# Patient Record
Sex: MALE | Race: BLACK OR AFRICAN AMERICAN | NOT HISPANIC OR LATINO | URBAN - METROPOLITAN AREA
[De-identification: names, ages, dates, MRNs, and addresses within clinical notes are randomized per-mention and may not be internally consistent; named-entity substitution may affect disease eponyms.]

---
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
## 2024-01-16 ENCOUNTER — TELEPHONE (OUTPATIENT)
Dept: PHYSICAL MEDICINE AND REHAB | Facility: CLINIC | Age: 19
End: 2024-01-16
Payer: MEDICAID

## 2024-01-16 DIAGNOSIS — G82.50 SPASTIC QUADRIPARESIS: Primary | ICD-10-CM

## 2024-01-16 DIAGNOSIS — S06.9X6S TRAUMATIC BRAIN INJURY, WITH LOSS OF CONSCIOUSNESS GREATER THAN 24 HOURS WITHOUT RETURN TO PRE-EXISTING CONSCIOUS LEVEL WITH PATIENT SURVIVING, SEQUELA: ICD-10-CM

## 2024-03-20 ENCOUNTER — OFFICE VISIT (OUTPATIENT)
Dept: PHYSICAL MEDICINE AND REHAB | Facility: CLINIC | Age: 19
End: 2024-03-20
Payer: MEDICAID

## 2024-03-20 VITALS
BODY MASS INDEX: 17.27 KG/M2 | HEIGHT: 67 IN | WEIGHT: 110 LBS | DIASTOLIC BLOOD PRESSURE: 70 MMHG | SYSTOLIC BLOOD PRESSURE: 103 MMHG | HEART RATE: 64 BPM

## 2024-03-20 DIAGNOSIS — G82.50 SPASTIC QUADRIPARESIS: Primary | ICD-10-CM

## 2024-03-20 DIAGNOSIS — S06.9X6S TRAUMATIC BRAIN INJURY, WITH LOSS OF CONSCIOUSNESS GREATER THAN 24 HOURS WITHOUT RETURN TO PRE-EXISTING CONSCIOUS LEVEL WITH PATIENT SURVIVING, SEQUELA: ICD-10-CM

## 2024-03-20 DIAGNOSIS — I69.910 ATTENTION AND CONCENTRATION DEFICIT FOLLOWING UNSPECIFIED CEREBROVASCULAR DISEASE: ICD-10-CM

## 2024-03-20 PROCEDURE — 99499 UNLISTED E&M SERVICE: CPT | Mod: S$PBB,,, | Performed by: PHYSICAL MEDICINE & REHABILITATION

## 2024-03-20 PROCEDURE — 95874 GUIDE NERV DESTR NEEDLE EMG: CPT | Mod: 26,S$PBB,, | Performed by: PHYSICAL MEDICINE & REHABILITATION

## 2024-03-20 PROCEDURE — 99213 OFFICE O/P EST LOW 20 MIN: CPT | Mod: PBBFAC | Performed by: PHYSICAL MEDICINE & REHABILITATION

## 2024-03-20 PROCEDURE — 99999 PR PBB SHADOW E&M-EST. PATIENT-LVL III: CPT | Mod: PBBFAC,,, | Performed by: PHYSICAL MEDICINE & REHABILITATION

## 2024-03-20 PROCEDURE — 64642 CHEMODENERV 1 EXTREMITY 1-4: CPT | Mod: S$PBB,,, | Performed by: PHYSICAL MEDICINE & REHABILITATION

## 2024-03-20 PROCEDURE — 99999PBSHW PR PBB SHADOW TECHNICAL ONLY FILED TO HB: Mod: JZ,PBBFAC,,

## 2024-03-20 PROCEDURE — 64643 CHEMODENERV 1 EXTREM 1-4 EA: CPT | Mod: PBBFAC | Performed by: PHYSICAL MEDICINE & REHABILITATION

## 2024-03-20 RX ORDER — METHYLPHENIDATE HYDROCHLORIDE 300 MG/60ML
5 SUSPENSION, EXTENDED RELEASE ORAL
Qty: 180 ML | Refills: 0 | Status: SHIPPED | OUTPATIENT
Start: 2024-03-20 | End: 2024-05-07 | Stop reason: SDUPTHER

## 2024-03-20 RX ADMIN — ONABOTULINUMTOXINA 600 UNITS: 100 INJECTION, POWDER, LYOPHILIZED, FOR SOLUTION INTRADERMAL; INTRAMUSCULAR at 01:03

## 2024-03-20 RX ADMIN — ONABOTULINUMTOXINA 600 UNITS: 100 INJECTION, POWDER, LYOPHILIZED, FOR SOLUTION INTRADERMAL; INTRAMUSCULAR at 08:03
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
## 2024-03-21 NOTE — PROCEDURES
Botulinum Injection  Location: Limbs/Trunk    Date/Time: 3/20/2024 1:40 PM    Performed by: Michael Nunez MD  Authorized by: Michael Nunez MD      Consent:      Consent obtained:  Verbal     Consent given by:  Parent     Risks discussed:  Weakness, bleeding and pain     Alternatives discussed:  Observation    Universal protocol:      Relevant documents present and verified:  Yes       Site/side verified:  Yes       Patient identity confirmed:  Verbally with patient and provided demographic data    Procedure details:      EMG used?:  Yes     Electrical stimulation used?:  NoNo     Diluted by:  Preservative free saline     Toxin (Brand):  OnaBoNT-A (Botox)     Concentration (u/mL):  100     Total number of units available:  600     Muscle area injected: upper arm and upper leg    Upper extremity - upper arm:      Right biceps brachii:  100 units divided amongst 2 site(s)     Right brachialis:  100 units divided amongst 2 site(s)    Lower extremity - upper leg:      Right rectus femoris:  50 units divided amongst 1 site(s)     Left rectus femoris:  100 units divided amongst 2 site(s)     Left vastus intermedius:  50 units divided amongst 1 site(s)     Right vastus lateralis:  50 units divided amongst 1 site(s)     Left vastus lateralis:  50 units divided amongst 1 site(s)     Right vastus medial:  50 units divided amongst 1 site(s)     Left vastus medial:  50 units divided amongst 1 site(s)       Total units injected:  600     Total units wasted:  0    Medications: 600 Units onabotulinumtoxina (BOTOX) injection    Post-procedure details:      Patient tolerance of procedure:  Tolerated well, no immediate complications

## 2024-03-26 ENCOUNTER — TELEPHONE (OUTPATIENT)
Dept: PHYSICAL MEDICINE AND REHAB | Facility: CLINIC | Age: 19
End: 2024-03-26
Payer: MEDICAID

## 2024-03-26 ENCOUNTER — PATIENT MESSAGE (OUTPATIENT)
Dept: NEUROLOGY | Facility: CLINIC | Age: 19
End: 2024-03-26
Payer: MEDICAID

## 2024-03-26 NOTE — TELEPHONE ENCOUNTER
FW: HH REFERRAL  Received: Today  Brandie Mustafa, Michael ARREGUIN MD; Dana Carranza MA; Sandee Galeano  I spoke with the patient's father who was unable to write the given information down due to being at work.    I was given the patient's grandmother's cell phone number of [phone].    I sent the following information through the patient's portal as his grandmother was unable to take down the information as well due to being in transit to another physician's appointment for the patient.          Previous Messages       ----- Message -----  From: Sandee Galeano  Sent: 3/21/2024   8:55 AM CDT  To: Michael Nunez MD; Enrique Rodriguez Staff  Subject: HH REFERRAL                                      Good morning  Thank you for the referral to [facility]. We are out of network with the patient's insurance. Please reach out to: (phone numbers listed below)    [facility] [phone]  [facility] home care [phone]  [facility] home health [phone]  [facility] ph: [phone]  [facility] ph: [phone]  [facility] ph: [phone]  [facility] ph: [phone]       Thanks,  Sandee Galeano LPN  [facility]

## 2024-04-11 ENCOUNTER — TELEPHONE (OUTPATIENT)
Dept: NEUROLOGY | Facility: CLINIC | Age: 19
End: 2024-04-11
Payer: MEDICAID

## 2024-04-11 NOTE — TELEPHONE ENCOUNTER
----- Message from Lady Cannon sent at 4/11/2024 10:27 AM CDT -----  Regarding: Medication Clarity Neede  Contact: [phone]  Albaro moore [facility] is calling in regards to medication methylphenidate HCl (QUILLIVANT XR) 5 mg/mL (25 mg/5 mL) SR24. She states they just want to confirm the medication dosage. Please give [facility] a call.

## 2024-04-12 ENCOUNTER — TELEPHONE (OUTPATIENT)
Dept: NEUROLOGY | Facility: CLINIC | Age: 19
End: 2024-04-12
Payer: MEDICAID

## 2024-04-12 NOTE — TELEPHONE ENCOUNTER
----- Message from James Akers sent at 4/12/2024 11:13 AM CDT -----  YIN DESHPANDE calling regarding Refills  (message) [facility]  calling to get some calarification methylphenidate HCl (QUILLIVANT XR) 5 mg/mL (25 mg/5 mL) SR24 asking for a call back at [phone] Josette

## 2024-05-06 DIAGNOSIS — I69.910 ATTENTION AND CONCENTRATION DEFICIT FOLLOWING UNSPECIFIED CEREBROVASCULAR DISEASE: ICD-10-CM

## 2024-05-06 DIAGNOSIS — S06.9X6S TRAUMATIC BRAIN INJURY, WITH LOSS OF CONSCIOUSNESS GREATER THAN 24 HOURS WITHOUT RETURN TO PRE-EXISTING CONSCIOUS LEVEL WITH PATIENT SURVIVING, SEQUELA: ICD-10-CM

## 2024-05-06 NOTE — TELEPHONE ENCOUNTER
----- Message from Constance CHINA Route sent at 5/6/2024  9:04 AM CDT -----  Regarding: Clarity  Contact: Josette [phone]  Josette with [facility] is calling in ref to pt's medication methylphenidate HCl (QUILLIVANT XR) 5 mg/mL (25 mg/5 mL) SR24. Clarity on the dosage is needed. Best Call Back Number: Josette [phone]

## 2024-05-07 RX ORDER — METHYLPHENIDATE HYDROCHLORIDE 300 MG/60ML
5 SUSPENSION, EXTENDED RELEASE ORAL
Qty: 180 ML | Refills: 0 | Status: SHIPPED | OUTPATIENT
Start: 2024-05-07 | End: 2024-06-06

## 2024-07-31 ENCOUNTER — OFFICE VISIT (OUTPATIENT)
Dept: PHYSICAL MEDICINE AND REHAB | Facility: CLINIC | Age: 19
End: 2024-07-31
Payer: MEDICAID

## 2024-07-31 VITALS — HEART RATE: 69 BPM | SYSTOLIC BLOOD PRESSURE: 137 MMHG | DIASTOLIC BLOOD PRESSURE: 74 MMHG

## 2024-07-31 DIAGNOSIS — G82.50 SPASTIC QUADRIPARESIS: Primary | ICD-10-CM

## 2024-07-31 PROCEDURE — 64642 CHEMODENERV 1 EXTREMITY 1-4: CPT | Mod: PBBFAC | Performed by: PHYSICAL MEDICINE & REHABILITATION

## 2024-07-31 PROCEDURE — 99999PBSHW PR PBB SHADOW TECHNICAL ONLY FILED TO HB: Mod: PBBFAC,,,

## 2024-07-31 PROCEDURE — 99213 OFFICE O/P EST LOW 20 MIN: CPT | Mod: PBBFAC,25 | Performed by: PHYSICAL MEDICINE & REHABILITATION

## 2024-07-31 PROCEDURE — 64643 CHEMODENERV 1 EXTREM 1-4 EA: CPT | Mod: PBBFAC | Performed by: PHYSICAL MEDICINE & REHABILITATION

## 2024-07-31 PROCEDURE — 95874 GUIDE NERV DESTR NEEDLE EMG: CPT | Mod: PBBFAC | Performed by: PHYSICAL MEDICINE & REHABILITATION

## 2024-07-31 PROCEDURE — 99999 PR PBB SHADOW E&M-EST. PATIENT-LVL III: CPT | Mod: PBBFAC,,, | Performed by: PHYSICAL MEDICINE & REHABILITATION

## 2024-07-31 RX ADMIN — ONABOTULINUMTOXINA 600 UNITS: 100 INJECTION, POWDER, LYOPHILIZED, FOR SOLUTION INTRADERMAL; INTRAMUSCULAR at 02:07
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
## 2024-07-31 NOTE — PROCEDURES
Botulinum Injection  Location: Limbs/Trunk    Date/Time: 7/31/2024 2:20 PM    Performed by: Michael Nunez MD  Authorized by: Michael Nunez MD      Consent:      Consent given by:  Guardian     Risks discussed:  Bleeding, infection, pain and weakness     Alternatives discussed:  Observation    Universal protocol:      Relevant documents present and verified:  Yes       Site/side verified:  Yes       Immediately prior to procedure a time out was called:  Yes       Patient identity confirmed:  Verbally with patient and provided demographic data    Procedure details:      EMG used?:  Yes     Diluted by:  Preservative free saline     Toxin (Brand):  OnaBoNT-A (Botox)     Comments about dilution:  1:1     Concentration (u/mL):  100     Total number of units available:  600     Muscle area injected: upper arm, forearm, hand, leg and upper leg    Upper extremity - hand:      Left flexor pollicis brevis:  20 units divided amongst 1 site(s)     Right first dorsal interossei:  30 units divided amongst 1 site(s)     Right interossei:  50 units divided amongst 3 site(s)     Left interossei:  30 units divided amongst 3 site(s)    Upper extremity - forearm:      Right flexor carpi radialis:  50 units divided amongst 1 site(s)     Right flexor carpi ulnaris:  50 units divided amongst 1 site(s)     Right flexor digitorum superficialis:  50 units divided amongst 1 site(s)     Left flexor pollicis longus:  20 units divided amongst 1 site(s)    Upper extremity - upper arm:      Right biceps brachii:  50 units divided amongst 1 site(s)     Right brachialis:  50 units divided amongst 1 site(s)    Lower extremity - upper leg:      Right rectus femoris:  50 units divided amongst 2 site(s)     Left rectus femoris:  50 units divided amongst 2 site(s)    Lower extremity - leg:      Right lateral head gastrocnemius:  25 units divided amongst 1 site(s)     Left lateral head gastrocnemius:  25 units divided amongst 1 site(s)      Right medial head gastrocnemius:  25 units divided amongst 1 site(s)     Left medial head gastrocnemius:  25 units divided amongst 1 site(s)       Total units injected:  600     Total units wasted:  0    Medications: 600 Units onabotulinumtoxina 100 unit    Post-procedure details:      Patient tolerance of procedure:  Tolerated well, no immediate complications

## 2024-08-09 ENCOUNTER — PATIENT MESSAGE (OUTPATIENT)
Dept: NEUROSURGERY | Facility: CLINIC | Age: 19
End: 2024-08-09
Payer: MEDICAID

## 2024-08-09 ENCOUNTER — TELEPHONE (OUTPATIENT)
Dept: NEUROSURGERY | Facility: CLINIC | Age: 19
End: 2024-08-09
Payer: MEDICAID

## 2024-08-14 ENCOUNTER — TELEPHONE (OUTPATIENT)
Dept: NEUROSURGERY | Facility: CLINIC | Age: 19
End: 2024-08-14
Payer: MEDICAID

## 2024-08-14 DIAGNOSIS — R25.2 SPASTICITY: Primary | ICD-10-CM

## 2024-08-14 NOTE — TELEPHONE ENCOUNTER
Called grandmother, could not lvm, but sending message vai portal to inform that xray is scheduled b/f appt.

## 2024-08-15 ENCOUNTER — OFFICE VISIT (OUTPATIENT)
Dept: NEUROSURGERY | Facility: CLINIC | Age: 19
End: 2024-08-15
Payer: MEDICAID

## 2024-08-15 ENCOUNTER — HOSPITAL ENCOUNTER (OUTPATIENT)
Dept: RADIOLOGY | Facility: HOSPITAL | Age: 19
Discharge: HOME | End: 2024-08-15
Attending: NEUROLOGICAL SURGERY
Payer: MEDICAID

## 2024-08-15 DIAGNOSIS — R25.2 SPASTICITY: ICD-10-CM

## 2024-08-15 DIAGNOSIS — G82.50 SPASTIC QUADRIPARESIS: Primary | ICD-10-CM

## 2024-08-15 DIAGNOSIS — S06.9X9S TRAUMATIC BRAIN INJURY WITH LOSS OF CONSCIOUSNESS, SEQUELA: ICD-10-CM

## 2024-08-15 PROCEDURE — 72100 X-RAY EXAM L-S SPINE 2/3 VWS: CPT | Mod: TC

## 2024-08-15 PROCEDURE — 72100 X-RAY EXAM L-S SPINE 2/3 VWS: CPT | Mod: 26,,, | Performed by: RADIOLOGY

## 2024-08-15 PROCEDURE — 99204 OFFICE O/P NEW MOD 45 MIN: CPT | Mod: S$PBB,,, | Performed by: NEUROLOGICAL SURGERY
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
## 2024-08-16 PROBLEM — G82.50 SPASTIC QUADRIPARESIS: Status: ACTIVE | Noted: 2024-08-16

## 2024-08-16 PROBLEM — S06.9XAA TRAUMATIC BRAIN INJURY: Status: ACTIVE | Noted: 2024-08-16

## 2024-08-16 NOTE — PATIENT INSTRUCTIONS
Please use the Bactroban ointment twice daily in your nose for 5 days leading up to surgery. (You may use a Q-tip to apply a little bit of ointment inside each nostril.)    Please use the Hibiclens soap every other day in the shower for the 5 days before your surgery. For example, if surgery is on Monday, use the Hibiclens when you shower on Thursday, Saturday, and Monday morning.     We are asking you to do this to help reduce the chance of having an infection associated with surgery. Thank you!

## 2024-09-03 DIAGNOSIS — G82.50 SPASTIC QUADRIPARESIS: Primary | ICD-10-CM

## 2024-09-20 RX ORDER — METHYLPHENIDATE HYDROCHLORIDE 300 MG/60ML
7 SUSPENSION, EXTENDED RELEASE ORAL DAILY
COMMUNITY
Start: 2024-08-08

## 2024-09-20 RX ORDER — PANTOPRAZOLE SODIUM 40 MG/1
40 TABLET, DELAYED RELEASE ORAL DAILY
COMMUNITY

## 2024-09-20 RX ORDER — HYDROCHLOROTHIAZIDE 12.5 MG/1
12.5 TABLET ORAL DAILY
COMMUNITY

## 2024-09-20 RX ORDER — BACLOFEN 5 MG/ML
25 SUSPENSION ORAL 4 TIMES DAILY
COMMUNITY

## 2024-09-20 RX ORDER — POLYETHYLENE GLYCOL 3350 17 G/17G
17 POWDER, FOR SOLUTION ORAL 2 TIMES DAILY
COMMUNITY
Start: 2024-06-09

## 2024-09-20 NOTE — PRE-PROCEDURE INSTRUCTIONS
PreOp Instructions given: To FOP - Mick [facility]  - Verbal medication information (what to hold and what to take)   - NPO guidelines Stop J-Tube feedings 8 hours prior to Sx   Jesus may have water flushes w/AM medications up to 30 minutes prior to arrival to [facility]  - Arrival place directions given; time to be given the day before procedure by the   Surgeon's Office - [facility]  - Bathing with antibacterial soap   - Don't wear any jewelry or bring any valuables AM of surgery   - No makeup or moisturizer to face   - No perfume/cologne, powder, lotions or aftershave   Pt.'S FATHER verbalized understanding.   Pt 'S FATHER denies any PT h/o Anesthesia/Sedation complications or side effects.  Patient'S FATHER does not know arrival time.  Explained that this information comes from the surgeon's office and if they haven't heard from them by 2 or 3 pm to call the office.  Patient 'S FATHER stated an understanding.     Spastic Quadriplegia  Non-verbal  Stretcher   J-Tube/NPO

## 2024-09-22 ENCOUNTER — ANESTHESIA EVENT (OUTPATIENT)
Dept: SURGERY | Facility: HOSPITAL | Age: 19
End: 2024-09-22
Payer: MEDICAID

## 2024-09-23 ENCOUNTER — HOSPITAL ENCOUNTER (OUTPATIENT)
Facility: HOSPITAL | Age: 19
Discharge: HOME | End: 2024-09-23
Attending: NEUROLOGICAL SURGERY | Admitting: NEUROLOGICAL SURGERY
Payer: MEDICAID

## 2024-09-23 ENCOUNTER — TELEPHONE (OUTPATIENT)
Dept: NEUROSURGERY | Facility: CLINIC | Age: 19
End: 2024-09-23
Payer: MEDICAID

## 2024-09-23 ENCOUNTER — ANESTHESIA (OUTPATIENT)
Dept: SURGERY | Facility: HOSPITAL | Age: 19
End: 2024-09-23
Payer: MEDICAID

## 2024-09-23 VITALS
HEART RATE: 48 BPM | SYSTOLIC BLOOD PRESSURE: 107 MMHG | DIASTOLIC BLOOD PRESSURE: 70 MMHG | TEMPERATURE: 97 F | OXYGEN SATURATION: 97 %

## 2024-09-23 VITALS
HEART RATE: 51 BPM | DIASTOLIC BLOOD PRESSURE: 71 MMHG | WEIGHT: 110 LBS | RESPIRATION RATE: 16 BRPM | TEMPERATURE: 97 F | BODY MASS INDEX: 17.27 KG/M2 | HEIGHT: 67 IN | SYSTOLIC BLOOD PRESSURE: 115 MMHG | OXYGEN SATURATION: 100 %

## 2024-09-23 DIAGNOSIS — R25.2 SPASTICITY: ICD-10-CM

## 2024-09-23 PROCEDURE — 25000003 PHARM REV CODE 250

## 2024-09-23 PROCEDURE — D9220A PRA ANESTHESIA: Mod: CRNA,,,

## 2024-09-23 PROCEDURE — 25000003 PHARM REV CODE 250: Performed by: NEUROLOGICAL SURGERY

## 2024-09-23 PROCEDURE — 37000008 HC ANESTHESIA 1ST 15 MINUTES: Performed by: NEUROLOGICAL SURGERY

## 2024-09-23 PROCEDURE — 71000044 HC DOSC ROUTINE RECOVERY FIRST HOUR: Performed by: NEUROLOGICAL SURGERY

## 2024-09-23 PROCEDURE — 63600175 PHARM REV CODE 636 W HCPCS

## 2024-09-23 PROCEDURE — 63600175 PHARM REV CODE 636 W HCPCS: Performed by: NEUROLOGICAL SURGERY

## 2024-09-23 PROCEDURE — 71000016 HC POSTOP RECOV ADDL HR: Performed by: NEUROLOGICAL SURGERY

## 2024-09-23 PROCEDURE — 37000009 HC ANESTHESIA EA ADD 15 MINS: Performed by: NEUROLOGICAL SURGERY

## 2024-09-23 PROCEDURE — 71000015 HC POSTOP RECOV 1ST HR: Performed by: NEUROLOGICAL SURGERY

## 2024-09-23 PROCEDURE — 97161 PT EVAL LOW COMPLEX 20 MIN: CPT

## 2024-09-23 PROCEDURE — 62323 NJX INTERLAMINAR LMBR/SAC: CPT | Mod: ,,, | Performed by: NEUROLOGICAL SURGERY

## 2024-09-23 PROCEDURE — 36000705 HC OR TIME LEV I EA ADD 15 MIN: Performed by: NEUROLOGICAL SURGERY

## 2024-09-23 PROCEDURE — 36000704 HC OR TIME LEV I 1ST 15 MIN: Performed by: NEUROLOGICAL SURGERY

## 2024-09-23 PROCEDURE — D9220A PRA ANESTHESIA: Mod: ANES,,, | Performed by: ANESTHESIOLOGY

## 2024-09-23 RX ORDER — CEFAZOLIN SODIUM 1 G/3ML
INJECTION, POWDER, FOR SOLUTION INTRAMUSCULAR; INTRAVENOUS
Status: DISCONTINUED | OUTPATIENT
Start: 2024-09-23 | End: 2024-09-23

## 2024-09-23 RX ORDER — LIDOCAINE HYDROCHLORIDE 10 MG/ML
2.1 INJECTION, SOLUTION INFILTRATION; PERINEURAL ONCE
Status: COMPLETED | OUTPATIENT
Start: 2024-09-23 | End: 2024-09-23

## 2024-09-23 RX ORDER — LIDOCAINE HYDROCHLORIDE 10 MG/ML
1 INJECTION, SOLUTION EPIDURAL; INFILTRATION; INTRACAUDAL; PERINEURAL ONCE
Status: DISCONTINUED | OUTPATIENT
Start: 2024-09-23 | End: 2024-09-23 | Stop reason: HOSPADM

## 2024-09-23 RX ORDER — CEFTRIAXONE 1 G/1
1 INJECTION, POWDER, FOR SOLUTION INTRAMUSCULAR; INTRAVENOUS ONCE
Status: COMPLETED | OUTPATIENT
Start: 2024-09-23 | End: 2024-09-23

## 2024-09-23 RX ORDER — PROPOFOL 10 MG/ML
VIAL (ML) INTRAVENOUS
Status: DISCONTINUED | OUTPATIENT
Start: 2024-09-23 | End: 2024-09-23

## 2024-09-23 RX ORDER — LIDOCAINE HYDROCHLORIDE 20 MG/ML
INJECTION, SOLUTION EPIDURAL; INFILTRATION; INTRACAUDAL; PERINEURAL
Status: DISCONTINUED | OUTPATIENT
Start: 2024-09-23 | End: 2024-09-23

## 2024-09-23 RX ORDER — LIDOCAINE HYDROCHLORIDE 10 MG/ML
INJECTION, SOLUTION INFILTRATION; PERINEURAL
Status: DISCONTINUED | OUTPATIENT
Start: 2024-09-23 | End: 2024-09-23 | Stop reason: HOSPADM

## 2024-09-23 RX ORDER — GLUCAGON 1 MG
1 KIT INJECTION
Status: DISCONTINUED | OUTPATIENT
Start: 2024-09-23 | End: 2024-09-23 | Stop reason: HOSPADM

## 2024-09-23 RX ADMIN — BACLOFEN 50 MCG: 0.05 INJECTION INTRATHECAL at 07:09

## 2024-09-23 RX ADMIN — PROPOFOL 30 MG: 10 INJECTION, EMULSION INTRAVENOUS at 07:09

## 2024-09-23 RX ADMIN — LIDOCAINE HYDROCHLORIDE 2.1 ML: 10 INJECTION, SOLUTION INFILTRATION; PERINEURAL at 10:09

## 2024-09-23 RX ADMIN — LIDOCAINE HYDROCHLORIDE 40 MG: 20 INJECTION, SOLUTION EPIDURAL; INFILTRATION; INTRACAUDAL; PERINEURAL at 07:09

## 2024-09-23 RX ADMIN — SODIUM CHLORIDE: 9 INJECTION, SOLUTION INTRAVENOUS at 07:09

## 2024-09-23 RX ADMIN — CEFTRIAXONE 1 G: 1 INJECTION, POWDER, FOR SOLUTION INTRAMUSCULAR; INTRAVENOUS at 10:09

## 2024-09-23 RX ADMIN — CEFAZOLIN 2 G: 330 INJECTION, POWDER, FOR SOLUTION INTRAMUSCULAR; INTRAVENOUS at 07:09
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
## 2024-09-23 NOTE — ANESTHESIA PREPROCEDURE EVALUATION
[facility]  Anesthesia Pre-Operative Evaluation         Patient Name/: Jesus Posey, 2005  MRN: [id number]    SUBJECTIVE:     Pre-operative evaluation for Procedure(s) (LRB):  Lumbar Puncture (N/A)     2024    Jesus Posey is a 19 y.o. male     Patient now presents for the above procedure(s).    ________________________________________  No results found for this or any previous visit.    ________________________________________    LDA:        Drips:       Patient Active Problem List   Diagnosis    Spastic quadriparesis    Traumatic brain injury       Review of patient's allergies indicates:  No Known Allergies    Current Inpatient Medications:       Current Facility-Administered Medications on File Prior to Encounter   Medication Dose Route Frequency Provider Last Rate Last Admin    onabotulinumtoxina injection 600 Units  600 Units   Michael Nunez MD   600 Units at 24 1420     Current Outpatient Medications on File Prior to Encounter   Medication Sig Dispense Refill    famotidine (PEPCID) 40 mg/5 mL (8 mg/mL) suspension 5 mLs (40 mg total) by Per G Tube route once daily. 1200 mL 11    levETIRAcetam (KEPPRA) 100 mg/mL Soln 5 mLs (500 mg total) by Per G Tube route 2 (two) times daily. 300 mL 11    polyethylene glycol (GLYCOLAX) 17 gram/dose powder Take 17 g by mouth 2 (two) times daily.      QUILLIVANT XR 5 mg/mL (25 mg/5 mL) SR24 7 mLs by Per J Tube route Daily.      amantadine HCL (SYMMETREL) 50 mg/5 mL Soln TAKE 10 ML VIA GTUBE TWICE DAILY (Patient not taking: Reported on 2024) 473 mL 11    baclofen 25 mg/5 mL (5 mg/mL) Susp oral liquid 25 mg by Per J Tube route 4 (four) times daily.      diazePAM (VALIUM) 5 mg/5 mL (1 mg/mL) oral solution SMARTSI Milliliter(s) Gastro Tube 3 Times Daily      erythromycin (ROMYCIN) ophthalmic ointment SMARTSI.5 Inch(es) In Eye(s) 4 Times Daily 1 g 11    glycopyrrolate (CUVPOSA) 1 mg/5 mL (0.2 mg/mL) Soln 40 mcg/kg by FEEDING TUBE  route 3 (three) times daily as needed.      hydroCHLOROthiazide (HYDRODIURIL) 12.5 MG Tab 12.5 mg by Per J Tube route once daily.      ibuprofen 20 mg/mL oral liquid TAKE 30 ML BY MOUTH PER PEG EVERY 8 HOURS      ondansetron (ZOFRAN-ODT) 4 MG TbDL Take 4 mg by mouth every 8 (eight) hours as needed.      pantoprazole (PROTONIX) 40 MG tablet Take 40 mg by mouth once daily.      propranoloL (INDERAL) 20 mg/5 mL (4 mg/mL) Soln SMARTSI.5 Milliliter(s) Gastro Tube Twice Daily (Patient not taking: Reported on 2024)      traMADoL (ULTRAM) 50 mg tablet Take 50 mg by mouth every 12 (twelve) hours as needed.         Past Surgical History:   Procedure Laterality Date    CRANIECTOMY Right     CRANIOPLASTY FOR CRANIAL DEFECT Right     GASTROSTOMY TUBE PLACEMENT      PLACEMENT OF JEJUNOSTOMY TUBE         Social History:  Tobacco Use: Low Risk  (9/3/2024)    Received from [facility]    Patient History     Smoking Tobacco Use: Never     Smokeless Tobacco Use: Never     Passive Exposure: Never       Alcohol Use: Not At Risk (2021)    [facility]    AUDIT-C     Frequency of Alcohol Consumption: Never     Average Number of Drinks: Not asked     Frequency of Binge Drinking: Never       OBJECTIVE:     Vital Signs Range:  BMI Readings from Last 1 Encounters:   24 17.23 kg/m² (<1%, Z= -2.78)*     * Growth percentiles are based on [facility] (Boys, 2-20 Years) data.               Significant Labs:        Component Value Date/Time    WBC 11.1 (H) 2021 1239    HGB 11.6 (L) 2021 1239    HCT 35.1 (L) 2021 1239     (H) 2023 0843    K 4.6 2023 1241    CO2 26 2023 1241    BUN 10.0 2023 1241    CREATININE 0.70 2023 1241    CALCIUM 10.7 2023 1241    ALBUMIN 4.3 2023 1241    BILITOT 0.4 2023 1241    AST 35 2023 1241    ALT 66 (H) 2023 1241    INR 1.2 08/15/2023 1301        Please see Results Review for additional labs.      Diagnostic Studies: No relevant studies.    EKG:   No results found for this or any previous visit.    ECHO:  See subjective, if available.      ASSESSMENT/PLAN:                                                                                                                  09/23/2024  Jesus Posey is a 19 y.o., male.      Pre-op Assessment          Review of Systems  Social:  Non-Smoker       Neurological:           Spastic quadriparesis  Traumatic brain injury                                       Anesthesia Plan  Type of Anesthesia, risks & benefits discussed:    Anesthesia Type: Gen ETT, Gen Natural Airway  Intra-op Monitoring Plan: Standard ASA Monitors  Induction:  IV  ASA Score: 3  Day of Surgery Review of History & Physical: H&P Update referred to the surgeon/provider.    Ready For Surgery From Anesthesia Perspective.     .

## 2024-09-23 NOTE — PROGRESS NOTES
New blood consent and Anesthesia consent was obtained via phone with patient's father Deonte Posey [phone]    Grandmother Aurolyn was present at bedside.

## 2024-09-23 NOTE — PLAN OF CARE
Pt meets criteria for discharge. VS WNL; respirations even and unlabored. No signs of acute distress. Assisted pt into personal clothing. D/c instructions reviewed w/ caregiver. verbalized understanding. No questions at this time. Pt transferred off unit via [facility] transport services accompanied by grandmother.

## 2024-09-23 NOTE — PT/OT/SLP EVAL
Physical Therapy   Baclofen Injection - Assessment    Jesus Posey   MRN: [id number]     Pt is a 19M with PMH of severe TBI presenting today for elective lumbar puncture for baclofen pump trial due to spastic quadriparesis. Pt is met with grandmother present who reports pt current level of function to be mostly in the bed and not getting up in wheelchair due to increased spasticity. Pt grandmother reports the R UE to be the most contracted and difficult to move. Upon examination, pt is resting with head turned to the left but is able to be repositioned to midline. Pt also resting with right arm positioned in shoulder adduction and internally rotated, elbow flexed and wrist flexed and pronated. Formal spasticity assessment details below. Pt was found to have decreased elbow ROM to 90 deg extension with a hard end feel into extension and decreased wrist ROM to 60 deg flexion. Pt noted to have decreased ankle ROM bilaterally with patient maintaining plantarflexion also with a hard end feel into DF and PF. Post trial pt was noted to have improvement in spasticity but end feels of these joints and range of motion did not change.      Pre-Bolus 2 hrs after bolus 4 hrs after bolus   Spasticity Scores L R L R L R   Wrist Flexion 0 4 0 3 0 3   Wrist Extension 1+ 0 0 0 1 0   Elbow Flexion 1+ 4 0 3 0 4   Elbow Extension 1+ 0 0 0 0 0   Hip Adduction 3 1+ 1+ 1 1 1   Hip Abduction 0 0 0 0 0 0   Hip Flexion 0 0 0 0 0 0   Knee Extension 4 4 3 4 3 4   Knee Flexion 0 0 0 0 0 0   Ankle Dorsiflexion 4 4 4 4 4 4   Ankle Plantarflexion 4 4 4 4 4 4     Scale Used: Faiza    PT Total time: 33 minutes    Geraldine Aiken, PT 9/23/2024

## 2024-09-23 NOTE — DISCHARGE INSTRUCTIONS
--Patient stable for discharge to home    --Please take prescriptions as detailed in medication list    --All questions/concerns addressed and answered    --Please followup with neurosurgery clinic; to be arranged by Neurosurgery Clinic     --Please call immediately for any new onset nausea/vomiting/fever/chills, clear fluid leakage from lower back, wound breakdown, new numbness/tingling/weakness, or severe headache    Wound Care Instructions:  -If you have any dressings at discharge, please remove 48 hours after the surgery.  -You may shower daily but do not soak or submerge wound in water. Pat incision dry, do not rub.  -Keep all incisions clean, dry, and open to air.  -Do not apply creams or ointments to the wound.  -No driving while on narcotic pain medications  --No excessive bending, twisting or lifting >10 lbs until clinic follow up  -Call Neurosurgery if the wound opens, drains, or becomes red.

## 2024-09-23 NOTE — BRIEF OP NOTE
[facility] - Surgery ([facility])  Brief Operative Note    SUMMARY     Surgery Date: 9/23/2024     Surgeons and Role:     * Estefany Rogers MD - Primary     * Christopher Mcclure MD - Resident - Assisting        Pre-op Diagnosis:  Spastic quadriparesis [G82.50]    Post-op Diagnosis:  Post-Op Diagnosis Codes:     * Spastic quadriparesis [G82.50]    Procedure(s) (LRB):  Lumbar Puncture (N/A)    Anesthesia: Local MAC    Implants:  * No implants in log *    Operative Findings: n/a    Estimated Blood Loss: * No values recorded between 9/23/2024  7:50 AM and 9/23/2024  8:12 AM *    Estimated Blood Loss has been documented.         Specimens:   Specimen (24h ago, onward)      None            [id number]

## 2024-09-23 NOTE — OP NOTE
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 male who presents as a referral from Dr. Nunez for consideration of LP for baclofen trial to assess for candidacy for baclofen pump placement.      We had a pleasant discussion about the risks, benefits, and alternatives to the procedure. Risks include but are not limited to bleeding, pain, infection, scarring, leak of spinal fluid, need for further/repeat procedure, paralysis, and death. They understand that this is a diagnostic, not therapeutic procedure. Informed consent was obtained of the patient's father.      We also discussed the risks and benefits of placing a baclofen pump, including the possible life-threatening withdrawal from baclofen if the pump were to malfunction or become broken. They understand, further, that the LP for trial is not a guarantee that we will proceed with pump placement.       Technical Procedures Used:   LP for IT baclofen injection with use of fluoroscopy      Description of the Procedure: The patient was brought back to the operating room and positioned in the decubitus position with his left side down.  All pressure points were carefully padded.  Monitored anesthesia care was induced by the anesthesia service.  The patient received 2 g of IV Rocephin for prophylaxis.     Using the iliac crest, the entry point around L4 was palpated.  The area was prepped and draped in the usual sterile fashion. 3 cc of 1% lidocaine without epinephrine were used to infiltrate the skin.  The spinal needle was then inserted.   The thecal sac was penetrated with the needle, and brisk flow of clear CSF was appreciated. Xray was brought in to confirm positioning.  Opening pressure was measured and found to be 14 mm of mercury.  This fluctuated well with inspiration and expiration.  We then proceeded to inject 1 cc of 50 mcg/mg Baclofen over one minute, followed by a flush of approximately 2.5 cc of CSF, also injected over one minute. Care was taken not to allow any air into the system.      The stylet was reinserted into the spinal needle, and the entire apparatus was withdrawn.  Sterile gauze and Band-Aid were applied over the wound.  The patient tolerated the procedure well without any apparent complication.  He was transferred to the recovery room with instructions to remain flat for 2 hours. He will be evaluated by physical therapy service to see if there is favorable change at two and four hours post-procedure.      Complications: No     Estimated Blood Loss (EBL): minimal           Specimens:   Specimen (24h ago, onward)      None             Implants: * No implants in log *           Condition: Stable    Disposition: PACU - hemodynamically stable.    Attestation: I was present and scrubbed for the entire procedure.

## 2024-09-23 NOTE — ANESTHESIA POSTPROCEDURE EVALUATION
Anesthesia Post Evaluation    Patient: Jesus Posey    Procedure(s) Performed: Procedure(s) (LRB):  Lumbar Puncture (N/A)    Final Anesthesia Type: general      Patient location during evaluation: PACU  Patient participation: Yes- Able to Participate  Level of consciousness: awake and alert  Post-procedure vital signs: reviewed and stable  Pain management: adequate  Airway patency: patent    PONV status at discharge: No PONV  Anesthetic complications: no      Cardiovascular status: blood pressure returned to baseline  Respiratory status: unassisted  Hydration status: euvolemic  Follow-up not needed.              Vitals Value Taken Time   BP 96/54 09/23/24 0932   Temp 36.2 °C (97.2 °F) 09/23/24 0817   Pulse 48 09/23/24 0939   Resp 17 09/23/24 0939   SpO2 100 % 09/23/24 0939   Vitals shown include unfiled device data.      No case tracking events are documented in the log.      Pain/Basil Score: Basil Score: 8 (9/23/2024  9:30 AM)

## 2024-09-23 NOTE — DISCHARGE SUMMARY
[facility] - Surgery (2nd Fl)  Discharge Note  Short Stay    Procedure(s) (LRB):  Lumbar Puncture (N/A)      OUTCOME: Patient tolerated treatment/procedure well without complication and is now ready for discharge.    DISPOSITION: Home or Self Care    FINAL DIAGNOSIS:  Spastic quadriparesis    FOLLOWUP: In clinic    DISCHARGE INSTRUCTIONS:     --Patient stable for discharge to home    --Please take prescriptions as detailed in medication list    --All questions/concerns addressed and answered    --Please followup with neurosurgery clinic; to be arranged by Neurosurgery Clinic     --Please call immediately for any new onset nausea/vomiting/fever/chills, clear fluid leaking from lower back, new numbness/tingling/weakness, or severe headache    Wound Care Instructions:  -If you have any dressings at discharge, please remove 48 hours after the surgery.  -You may shower daily but do not soak or submerge in water. Pat incision dry, do not rub.  -Keep all incisions clean, dry, and open to air.  -Do not apply creams or ointments to the wound.  -No driving while on narcotic pain medications  --No excessive bending, twisting or lifting >10 lbs until clinic follow up  -Call Neurosurgery if the wound opens, drains, or becomes red.       TIME SPENT ON DISCHARGE: 5 minutes

## 2024-09-23 NOTE — TRANSFER OF CARE
"Anesthesia Transfer of Care Note    Patient: Jesus Posey    Procedure(s) Performed: Procedure(s) (LRB):  Lumbar Puncture (N/A)    Patient location: PACU    Anesthesia Type: general    Transport from OR: Transported from OR on room air with adequate spontaneous ventilation    Post pain: adequate analgesia    Post assessment: no apparent anesthetic complications and tolerated procedure well    Post vital signs: stable    Level of consciousness: sedated    Nausea/Vomiting: no nausea/vomiting    Complications: none    Transfer of care protocol was followed      Last vitals: Visit Vitals  /60 (BP Location: Left leg, Patient Position: Lying)   Pulse (!) 51   Temp 36.2 °C (97.2 °F) (Temporal)   Resp 14   Ht 5' 7" (1.702 m)   Wt 49.9 kg (110 lb 0.2 oz)   SpO2 99%   BMI 17.23 kg/m²     "

## 2024-09-23 NOTE — PLAN OF CARE
Patient is a difficult IV stick.    Margaret PEREZ tried unsuccessfully. Patient does not have any other obvious veins and nothing seen with vein finder.    Dr Reeves was notified that patient will need IV via Ultrasound

## 2024-09-25 ENCOUNTER — TELEPHONE (OUTPATIENT)
Dept: NEUROSURGERY | Facility: CLINIC | Age: 19
End: 2024-09-25
Payer: MEDICAID

## 2024-09-25 NOTE — TELEPHONE ENCOUNTER
Wanted to know if appt could be a phone call, told her that will ask Dr. Rogers but to first try downloading av on her phone and logging into the portal if possible.     ----- Message from Suki Barron sent at 9/25/2024 10:29 AM CDT -----  Regarding: Appointment  Name of Who is Calling:  Patient Grandmother          What is the request in detail:  Patient grandmother would like Dr Rogers to contact her about the patient virtual appointment, she has questions            Can the clinic reply by [facility]: No            What Number to Call Back if not in [facility]: [phone]

## 2024-09-26 ENCOUNTER — OFFICE VISIT (OUTPATIENT)
Dept: NEUROSURGERY | Facility: CLINIC | Age: 19
End: 2024-09-26
Payer: MEDICAID

## 2024-09-26 DIAGNOSIS — G82.50 SPASTIC QUADRIPARESIS: Primary | ICD-10-CM

## 2024-09-26 PROCEDURE — 99214 OFFICE O/P EST MOD 30 MIN: CPT | Mod: 57,95,, | Performed by: NEUROLOGICAL SURGERY
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
## 2024-09-27 NOTE — PATIENT INSTRUCTIONS
Please use the Bactroban ointment twice daily in your nose for 5 days leading up to surgery. (You may use a Q-tip to apply a little bit of ointment inside each nostril.)    Please use the Hibiclens soap every other day in the shower for the 5 days before your surgery. For example, if surgery is on Monday, use the Hibiclens when you shower on Thursday, Saturday, and Monday morning.     We are asking you to do this to help reduce the chance of having an infection associated with surgery. Thank you!     Please do not take any blood thinner medicaitons

## 2024-10-02 DIAGNOSIS — G82.50 SPASTIC QUADRIPARESIS: Primary | ICD-10-CM

## 2024-10-02 DIAGNOSIS — Z01.818 PRE-OP EXAM: ICD-10-CM

## 2024-10-02 RX ORDER — MUPIROCIN 20 MG/G
OINTMENT TOPICAL 2 TIMES DAILY
Qty: 1 EACH | Refills: 0 | Status: SHIPPED | OUTPATIENT
Start: 2024-10-02 | End: 2024-10-07

## 2024-10-04 ENCOUNTER — ANESTHESIA EVENT (OUTPATIENT)
Dept: SURGERY | Facility: HOSPITAL | Age: 19
DRG: 052 | End: 2024-10-04
Payer: MEDICAID

## 2024-10-04 ENCOUNTER — HOSPITAL ENCOUNTER (OUTPATIENT)
Dept: PREADMISSION TESTING | Facility: HOSPITAL | Age: 19
Discharge: HOME | End: 2024-10-04
Attending: NEUROLOGICAL SURGERY | Admitting: NEUROLOGICAL SURGERY
Payer: MEDICAID
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
## 2024-10-07 ENCOUNTER — ANESTHESIA (OUTPATIENT)
Dept: SURGERY | Facility: HOSPITAL | Age: 19
DRG: 052 | End: 2024-10-07
Payer: MEDICAID

## 2024-10-07 ENCOUNTER — HOSPITAL ENCOUNTER (INPATIENT)
Facility: HOSPITAL | Age: 19
LOS: 2 days | Discharge: HOME | DRG: 052 | End: 2024-10-09
Attending: NEUROLOGICAL SURGERY | Admitting: NEUROLOGICAL SURGERY
Payer: MEDICAID

## 2024-10-07 DIAGNOSIS — R33.9 URINARY RETENTION: Primary | ICD-10-CM

## 2024-10-07 DIAGNOSIS — G82.50 SPASTIC QUADRIPARESIS: ICD-10-CM

## 2024-10-07 LAB
ABO + RH BLD: NORMAL
ALBUMIN SERPL BCP-MCNC: 4.1 G/DL (ref 3.5–5.2)
ALP SERPL-CCNC: 205 U/L (ref 55–135)
ALT SERPL W/O P-5'-P-CCNC: 27 U/L (ref 10–44)
ANION GAP SERPL CALC-SCNC: 12 MMOL/L (ref 8–16)
APTT PPP: 37.9 SEC (ref 21–32)
AST SERPL-CCNC: 23 U/L (ref 10–40)
BASOPHILS # BLD AUTO: 0.05 K/UL (ref 0–0.2)
BASOPHILS NFR BLD: 1.1 % (ref 0–1.9)
BILIRUB SERPL-MCNC: 0.3 MG/DL (ref 0.1–1)
BILIRUB UR QL STRIP: NEGATIVE
BLD GP AB SCN CELLS X3 SERPL QL: NORMAL
BUN SERPL-MCNC: 9 MG/DL (ref 6–20)
CALCIUM SERPL-MCNC: 10.7 MG/DL (ref 8.7–10.5)
CHLORIDE SERPL-SCNC: 99 MMOL/L (ref 95–110)
CLARITY UR REFRACT.AUTO: CLEAR
CO2 SERPL-SCNC: 30 MMOL/L (ref 23–29)
COLOR UR AUTO: YELLOW
CREAT SERPL-MCNC: 0.6 MG/DL (ref 0.5–1.4)
DIFFERENTIAL METHOD BLD: ABNORMAL
EOSINOPHIL # BLD AUTO: 0.4 K/UL (ref 0–0.5)
EOSINOPHIL NFR BLD: 9.5 % (ref 0–8)
ERYTHROCYTE [DISTWIDTH] IN BLOOD BY AUTOMATED COUNT: 13.9 % (ref 11.5–14.5)
EST. GFR  (NO RACE VARIABLE): >60 ML/MIN/1.73 M^2
GLUCOSE SERPL-MCNC: 71 MG/DL (ref 70–110)
GLUCOSE UR QL STRIP: NEGATIVE
HCT VFR BLD AUTO: 45.3 % (ref 40–54)
HGB BLD-MCNC: 14.4 G/DL (ref 14–18)
HGB UR QL STRIP: NEGATIVE
IMM GRANULOCYTES # BLD AUTO: 0.02 K/UL (ref 0–0.04)
IMM GRANULOCYTES NFR BLD AUTO: 0.4 % (ref 0–0.5)
INR PPP: 1 (ref 0.8–1.2)
KETONES UR QL STRIP: NEGATIVE
LEUKOCYTE ESTERASE UR QL STRIP: NEGATIVE
LYMPHOCYTES # BLD AUTO: 1.5 K/UL (ref 1–4.8)
LYMPHOCYTES NFR BLD: 32.7 % (ref 18–48)
MCH RBC QN AUTO: 29.9 PG (ref 27–31)
MCHC RBC AUTO-ENTMCNC: 31.8 G/DL (ref 32–36)
MCV RBC AUTO: 94 FL (ref 82–98)
MONOCYTES # BLD AUTO: 0.3 K/UL (ref 0.3–1)
MONOCYTES NFR BLD: 7.3 % (ref 4–15)
NEUTROPHILS # BLD AUTO: 2.2 K/UL (ref 1.8–7.7)
NEUTROPHILS NFR BLD: 49 % (ref 38–73)
NITRITE UR QL STRIP: NEGATIVE
NRBC BLD-RTO: 0 /100 WBC
PH UR STRIP: 7 [PH] (ref 5–8)
PLATELET # BLD AUTO: 283 K/UL (ref 150–450)
PMV BLD AUTO: 11.7 FL (ref 9.2–12.9)
POTASSIUM SERPL-SCNC: 3.7 MMOL/L (ref 3.5–5.1)
PROT SERPL-MCNC: 9.1 G/DL (ref 6–8.4)
PROT UR QL STRIP: NEGATIVE
PROTHROMBIN TIME: 11.4 SEC (ref 9–12.5)
RBC # BLD AUTO: 4.81 M/UL (ref 4.6–6.2)
SODIUM SERPL-SCNC: 141 MMOL/L (ref 136–145)
SP GR UR STRIP: 1.01 (ref 1–1.03)
SPECIMEN OUTDATE: NORMAL
URN SPEC COLLECT METH UR: NORMAL
WBC # BLD AUTO: 4.52 K/UL (ref 3.9–12.7)

## 2024-10-07 PROCEDURE — D9220A PRA ANESTHESIA: Mod: CRNA,,,

## 2024-10-07 PROCEDURE — C1755 CATHETER, INTRASPINAL: HCPCS | Performed by: NEUROLOGICAL SURGERY

## 2024-10-07 PROCEDURE — 85025 COMPLETE CBC W/AUTO DIFF WBC: CPT

## 2024-10-07 PROCEDURE — 36000707: Performed by: NEUROLOGICAL SURGERY

## 2024-10-07 PROCEDURE — 27201037 HC PRESSURE MONITORING SET UP

## 2024-10-07 PROCEDURE — 36415 COLL VENOUS BLD VENIPUNCTURE: CPT | Performed by: NEUROLOGICAL SURGERY

## 2024-10-07 PROCEDURE — 27201423 OPTIME MED/SURG SUP & DEVICES STERILE SUPPLY: Performed by: NEUROLOGICAL SURGERY

## 2024-10-07 PROCEDURE — 71000016 HC POSTOP RECOV ADDL HR: Performed by: NEUROLOGICAL SURGERY

## 2024-10-07 PROCEDURE — 36000706: Performed by: NEUROLOGICAL SURGERY

## 2024-10-07 PROCEDURE — 81003 URINALYSIS AUTO W/O SCOPE: CPT

## 2024-10-07 PROCEDURE — 80053 COMPREHEN METABOLIC PANEL: CPT

## 2024-10-07 PROCEDURE — 63600175 PHARM REV CODE 636 W HCPCS

## 2024-10-07 PROCEDURE — 85610 PROTHROMBIN TIME: CPT

## 2024-10-07 PROCEDURE — D9220A PRA ANESTHESIA: Mod: ANES,,, | Performed by: STUDENT IN AN ORGANIZED HEALTH CARE EDUCATION/TRAINING PROGRAM

## 2024-10-07 PROCEDURE — 71000015 HC POSTOP RECOV 1ST HR: Performed by: NEUROLOGICAL SURGERY

## 2024-10-07 PROCEDURE — 71000039 HC RECOVERY, EACH ADD'L HOUR: Performed by: NEUROLOGICAL SURGERY

## 2024-10-07 PROCEDURE — 71000033 HC RECOVERY, INTIAL HOUR: Performed by: NEUROLOGICAL SURGERY

## 2024-10-07 PROCEDURE — 85730 THROMBOPLASTIN TIME PARTIAL: CPT

## 2024-10-07 PROCEDURE — 25000003 PHARM REV CODE 250

## 2024-10-07 PROCEDURE — 37000008 HC ANESTHESIA 1ST 15 MINUTES: Performed by: NEUROLOGICAL SURGERY

## 2024-10-07 PROCEDURE — 25000003 PHARM REV CODE 250: Performed by: NEUROLOGICAL SURGERY

## 2024-10-07 PROCEDURE — 86901 BLOOD TYPING SEROLOGIC RH(D): CPT

## 2024-10-07 PROCEDURE — 86900 BLOOD TYPING SEROLOGIC ABO: CPT

## 2024-10-07 PROCEDURE — C1772 INFUSION PUMP, PROGRAMMABLE: HCPCS | Performed by: NEUROLOGICAL SURGERY

## 2024-10-07 PROCEDURE — 63600175 PHARM REV CODE 636 W HCPCS: Mod: JZ,JG | Performed by: NEUROLOGICAL SURGERY

## 2024-10-07 PROCEDURE — 27000221 HC OXYGEN, UP TO 24 HOURS

## 2024-10-07 PROCEDURE — 86850 RBC ANTIBODY SCREEN: CPT

## 2024-10-07 PROCEDURE — 63600175 PHARM REV CODE 636 W HCPCS: Performed by: NEUROLOGICAL SURGERY

## 2024-10-07 PROCEDURE — 00HU03Z INSERTION OF INFUSION DEVICE INTO SPINAL CANAL, OPEN APPROACH: ICD-10-PCS | Performed by: NEUROLOGICAL SURGERY

## 2024-10-07 PROCEDURE — 11000001 HC ACUTE MED/SURG PRIVATE ROOM

## 2024-10-07 PROCEDURE — 99900035 HC TECH TIME PER 15 MIN (STAT)

## 2024-10-07 PROCEDURE — 94761 N-INVAS EAR/PLS OXIMETRY MLT: CPT

## 2024-10-07 PROCEDURE — 37000009 HC ANESTHESIA EA ADD 15 MINS: Performed by: NEUROLOGICAL SURGERY

## 2024-10-07 DEVICE — PUMP 8667-20 SYNCHROMEDIII INFUSION EMAN
Type: IMPLANTABLE DEVICE | Site: ABDOMEN | Status: FUNCTIONAL
Brand: SYNCHROMED™ III

## 2024-10-07 DEVICE — CATH ASCENDA 86.4X114.3 4FR: Type: IMPLANTABLE DEVICE | Site: BACK | Status: FUNCTIONAL

## 2024-10-07 RX ORDER — ACETAMINOPHEN 325 MG/1
650 TABLET ORAL EVERY 4 HOURS PRN
Status: DISCONTINUED | OUTPATIENT
Start: 2024-10-07 | End: 2024-10-09 | Stop reason: HOSPADM

## 2024-10-07 RX ORDER — GLUCAGON 1 MG
1 KIT INJECTION
Status: DISCONTINUED | OUTPATIENT
Start: 2024-10-07 | End: 2024-10-07 | Stop reason: HOSPADM

## 2024-10-07 RX ORDER — ROCURONIUM BROMIDE 10 MG/ML
INJECTION, SOLUTION INTRAVENOUS
Status: DISCONTINUED | OUTPATIENT
Start: 2024-10-07 | End: 2024-10-07

## 2024-10-07 RX ORDER — DEXAMETHASONE SODIUM PHOSPHATE 4 MG/ML
INJECTION, SOLUTION INTRA-ARTICULAR; INTRALESIONAL; INTRAMUSCULAR; INTRAVENOUS; SOFT TISSUE
Status: DISCONTINUED | OUTPATIENT
Start: 2024-10-07 | End: 2024-10-07

## 2024-10-07 RX ORDER — FENTANYL CITRATE 50 UG/ML
25 INJECTION, SOLUTION INTRAMUSCULAR; INTRAVENOUS EVERY 5 MIN PRN
Status: DISCONTINUED | OUTPATIENT
Start: 2024-10-07 | End: 2024-10-07 | Stop reason: HOSPADM

## 2024-10-07 RX ORDER — ACETAMINOPHEN 325 MG/1
650 TABLET ORAL EVERY 4 HOURS PRN
Status: DISCONTINUED | OUTPATIENT
Start: 2024-10-07 | End: 2024-10-07

## 2024-10-07 RX ORDER — MUPIROCIN 20 MG/G
1 OINTMENT TOPICAL 2 TIMES DAILY
Status: DISCONTINUED | OUTPATIENT
Start: 2024-10-07 | End: 2024-10-07 | Stop reason: HOSPADM

## 2024-10-07 RX ORDER — FAMOTIDINE 20 MG/1
20 TABLET, FILM COATED ORAL 2 TIMES DAILY
Status: DISCONTINUED | OUTPATIENT
Start: 2024-10-07 | End: 2024-10-07

## 2024-10-07 RX ORDER — FENTANYL CITRATE 50 UG/ML
INJECTION, SOLUTION INTRAMUSCULAR; INTRAVENOUS
Status: DISCONTINUED | OUTPATIENT
Start: 2024-10-07 | End: 2024-10-07

## 2024-10-07 RX ORDER — BACITRACIN ZINC 500 UNIT/G
OINTMENT (GRAM) TOPICAL
Status: DISCONTINUED | OUTPATIENT
Start: 2024-10-07 | End: 2024-10-07 | Stop reason: HOSPADM

## 2024-10-07 RX ORDER — SODIUM CHLORIDE 0.9 % (FLUSH) 0.9 %
10 SYRINGE (ML) INJECTION
Status: DISCONTINUED | OUTPATIENT
Start: 2024-10-07 | End: 2024-10-07 | Stop reason: HOSPADM

## 2024-10-07 RX ORDER — HYDROCODONE BITARTRATE AND ACETAMINOPHEN 5; 325 MG/1; MG/1
1 TABLET ORAL EVERY 4 HOURS PRN
Status: DISCONTINUED | OUTPATIENT
Start: 2024-10-07 | End: 2024-10-07

## 2024-10-07 RX ORDER — EPHEDRINE SULFATE 50 MG/ML
INJECTION, SOLUTION INTRAVENOUS
Status: DISCONTINUED | OUTPATIENT
Start: 2024-10-07 | End: 2024-10-07

## 2024-10-07 RX ORDER — MIDAZOLAM HYDROCHLORIDE 1 MG/ML
INJECTION INTRAMUSCULAR; INTRAVENOUS
Status: DISCONTINUED | OUTPATIENT
Start: 2024-10-07 | End: 2024-10-07

## 2024-10-07 RX ORDER — OXYCODONE HYDROCHLORIDE 10 MG/1
10 TABLET ORAL EVERY 4 HOURS PRN
Status: DISCONTINUED | OUTPATIENT
Start: 2024-10-07 | End: 2024-10-09 | Stop reason: HOSPADM

## 2024-10-07 RX ORDER — ONDANSETRON HYDROCHLORIDE 2 MG/ML
INJECTION, SOLUTION INTRAVENOUS
Status: DISCONTINUED | OUTPATIENT
Start: 2024-10-07 | End: 2024-10-07

## 2024-10-07 RX ORDER — OXYCODONE HYDROCHLORIDE 10 MG/1
10 TABLET ORAL EVERY 4 HOURS PRN
Status: DISCONTINUED | OUTPATIENT
Start: 2024-10-07 | End: 2024-10-07

## 2024-10-07 RX ORDER — PROPOFOL 10 MG/ML
VIAL (ML) INTRAVENOUS
Status: DISCONTINUED | OUTPATIENT
Start: 2024-10-07 | End: 2024-10-07

## 2024-10-07 RX ORDER — MUPIROCIN 20 MG/G
OINTMENT TOPICAL
Status: DISCONTINUED | OUTPATIENT
Start: 2024-10-07 | End: 2024-10-07 | Stop reason: HOSPADM

## 2024-10-07 RX ORDER — ONDANSETRON 8 MG/1
8 TABLET, ORALLY DISINTEGRATING ORAL EVERY 8 HOURS PRN
Status: DISCONTINUED | OUTPATIENT
Start: 2024-10-07 | End: 2024-10-09 | Stop reason: HOSPADM

## 2024-10-07 RX ORDER — LIDOCAINE HYDROCHLORIDE AND EPINEPHRINE 10; 10 MG/ML; UG/ML
INJECTION, SOLUTION INFILTRATION; PERINEURAL
Status: DISCONTINUED | OUTPATIENT
Start: 2024-10-07 | End: 2024-10-07 | Stop reason: HOSPADM

## 2024-10-07 RX ORDER — SODIUM CHLORIDE 9 MG/ML
INJECTION, SOLUTION INTRAVENOUS CONTINUOUS
Status: DISCONTINUED | OUTPATIENT
Start: 2024-10-07 | End: 2024-10-09 | Stop reason: HOSPADM

## 2024-10-07 RX ORDER — HYDROCODONE BITARTRATE AND ACETAMINOPHEN 5; 325 MG/1; MG/1
1 TABLET ORAL EVERY 4 HOURS PRN
Status: DISCONTINUED | OUTPATIENT
Start: 2024-10-07 | End: 2024-10-09 | Stop reason: HOSPADM

## 2024-10-07 RX ORDER — ONDANSETRON HYDROCHLORIDE 2 MG/ML
4 INJECTION, SOLUTION INTRAVENOUS DAILY PRN
Status: DISCONTINUED | OUTPATIENT
Start: 2024-10-07 | End: 2024-10-07 | Stop reason: HOSPADM

## 2024-10-07 RX ORDER — PHENYLEPHRINE HYDROCHLORIDE 10 MG/ML
INJECTION INTRAVENOUS
Status: DISCONTINUED | OUTPATIENT
Start: 2024-10-07 | End: 2024-10-07

## 2024-10-07 RX ORDER — CEFTRIAXONE 1 G/1
INJECTION, POWDER, FOR SOLUTION INTRAMUSCULAR; INTRAVENOUS
Status: DISCONTINUED | OUTPATIENT
Start: 2024-10-07 | End: 2024-10-07 | Stop reason: HOSPADM

## 2024-10-07 RX ORDER — FAMOTIDINE 20 MG/1
20 TABLET, FILM COATED ORAL 2 TIMES DAILY
Status: DISCONTINUED | OUTPATIENT
Start: 2024-10-07 | End: 2024-10-09 | Stop reason: HOSPADM

## 2024-10-07 RX ADMIN — FENTANYL CITRATE 50 MCG: 50 INJECTION, SOLUTION INTRAMUSCULAR; INTRAVENOUS at 08:10

## 2024-10-07 RX ADMIN — EPHEDRINE SULFATE 10 MG: 50 INJECTION INTRAVENOUS at 09:10

## 2024-10-07 RX ADMIN — PROPOFOL 100 MG: 10 INJECTION, EMULSION INTRAVENOUS at 07:10

## 2024-10-07 RX ADMIN — EPHEDRINE SULFATE 10 MG: 50 INJECTION INTRAVENOUS at 08:10

## 2024-10-07 RX ADMIN — PHENYLEPHRINE HYDROCHLORIDE 100 MCG: 10 INJECTION INTRAVENOUS at 08:10

## 2024-10-07 RX ADMIN — ROCURONIUM BROMIDE 40 MG: 10 INJECTION, SOLUTION INTRAVENOUS at 07:10

## 2024-10-07 RX ADMIN — FENTANYL CITRATE 100 MCG: 50 INJECTION, SOLUTION INTRAMUSCULAR; INTRAVENOUS at 07:10

## 2024-10-07 RX ADMIN — PHENYLEPHRINE HYDROCHLORIDE 100 MCG: 10 INJECTION INTRAVENOUS at 07:10

## 2024-10-07 RX ADMIN — CEFTRIAXONE SODIUM 2 G: 2 INJECTION, POWDER, FOR SOLUTION INTRAMUSCULAR; INTRAVENOUS at 07:10

## 2024-10-07 RX ADMIN — FAMOTIDINE 20 MG: 20 TABLET ORAL at 08:10

## 2024-10-07 RX ADMIN — SUGAMMADEX 200 MG: 100 INJECTION, SOLUTION INTRAVENOUS at 10:10

## 2024-10-07 RX ADMIN — ONDANSETRON 4 MG: 2 INJECTION INTRAMUSCULAR; INTRAVENOUS at 07:10

## 2024-10-07 RX ADMIN — DEXAMETHASONE SODIUM PHOSPHATE 4 MG: 4 INJECTION, SOLUTION INTRAMUSCULAR; INTRAVENOUS at 07:10

## 2024-10-07 RX ADMIN — CEFTRIAXONE 2 G: 2 INJECTION, POWDER, FOR SOLUTION INTRAMUSCULAR; INTRAVENOUS at 08:10

## 2024-10-07 RX ADMIN — FAMOTIDINE 20 MG: 20 TABLET ORAL at 01:10

## 2024-10-07 RX ADMIN — MIDAZOLAM HYDROCHLORIDE 0.5 MG: 2 INJECTION, SOLUTION INTRAMUSCULAR; INTRAVENOUS at 07:10

## 2024-10-07 RX ADMIN — SODIUM CHLORIDE: 0.9 INJECTION, SOLUTION INTRAVENOUS at 07:10

## 2024-10-07 RX ADMIN — EPHEDRINE SULFATE 10 MG: 50 INJECTION INTRAVENOUS at 07:10

## 2024-10-07 NOTE — PROGRESS NOTES
Patient has not voided since intra-op remy discontinued around 10:08 AM. Bladder is flat and non distended. Bladder scan performed: Showing only 207cc in bladder. Will allow patient more time to void. Father & Grandmother state patient voids spontaneously at home and does not require In & Out catheterization.

## 2024-10-07 NOTE — BRIEF OP NOTE
[facility] - Surgery ([facility])  Brief Operative Note    SUMMARY     Surgery Date: 10/7/2024     Surgeons and Role:     * Estefany Rogers MD - Primary     * Roby Ospina MD - Resident - Assisting        Pre-op Diagnosis:  Spastic quadriparesis [G82.50]    Post-op Diagnosis:  Post-Op Diagnosis Codes:     * Spastic quadriparesis [G82.50]    Procedure(s) (LRB):  INSERTION, INTRATHECAL BACLOFEN PUMP (N/A)    Anesthesia: General    Implants:  Implant Name Type Inv. Item Serial No.  Lot No. LRB No. Used Action   CATH ASCENDA 86.4X114.3 4FR - [id number]  CATH ASCENDA 86.4X114.3 4FR  [employer] [facility] [id number] N/A 1 Implanted   PUMP INFUS SYNCHROMED III 20ML - [id number]  PUMP INFUS SYNCHROMED III 20ML [id number] [employer] [facility]  N/A 1 Implanted       Operative Findings: intrathecal catheter with right baclofen pump insertion as planned. No intra-operative complications    Estimated Blood Loss: * No values recorded between 10/7/2024  8:22 AM and 10/7/2024 10:12 AM *    Estimated Blood Loss has not been documented. EBL = 10 cc.         Specimens:   Specimen (24h ago, onward)      None            [id number]

## 2024-10-07 NOTE — OP NOTE
[facility] - Surgery ([facility])  Neurosurgery  Operative Note    SUMMARY      Date of Procedure: 10/7/2024     Procedure: Procedure(s) (LRB):  INSERTION, INTRATHECAL BACLOFEN PUMP (N/A)     Surgeons and Role:     * Estefany Rogers MD - Primary     * Roby Ospina MD - Resident - Assisting        Pre-Operative Diagnosis: Spastic quadriparesis [G82.50]    Post-Operative Diagnosis: Post-Op Diagnosis Codes:     * Spastic quadriparesis [G82.50]    Anesthesia: General    Technical Procedures Used:   1. Placement of 20 cc Medtronic Syncromed II intrathecal catheter and pump with 500 mcg/ml baclofen at 50 mcg/day. Catheter length 93.8cm.   2. Use of intraoperative X-ray      Indications:  Jesus Posey is a 19 y.o. male who presents as a referral from Dr. Nunez for consideration of LP for baclofen trial to assess for candidacy for baclofen pump placement. He underwent a 50mcg trial with objective and subjective improvement.      We discussed the risks, benefits, and alternatives to placement of a baclofen pump at length. Risks include, but are not limited to, bleeding, pain, infection, scarring, need for further/repeat procedure, death, paralysis, withdrawal from drug if pump malfunctions that could be deadly, leak of cerebrospinal fluid, and hardware-related complications. Mr. Deonte Posey, Jesus's father, understands and wishes to proceed. Informed consent was obtained. His grandmother, the patient's primary caretaker, also participates in the conversation.      We also discussed the maintenance that will be required for the pump: intermittent refills and programming with Dr. Nunez, together with changing the pump itself every 6.5 years. They expressed understanding. We also discussed that I will likely need to place the pump submuscular given the patient's very thin habitus (meets criteria for severe protein calorie malnutrition with BMI 17.23, loss of subcutaneous fat, and bedridden--will plan for nutrition consult while  in house, together with Iván supplements for wound healing).      Description of the Procedure: The patient was brought back to the operating room, and general endotracheal anesthesia was induced by the anesthesia service. Prior to positioning, the abdominal incision was carefully designed to be more than two finger breadths below the caudal-most rib and approximately 7 cm long. The patient was carefully rolled into the left lateral decubitus position and positioned with the assistance of a bean bag, multiple pillows, and multiple foam rolls, including one placed under the left axilla. SCDs were applied to the patient's lower extremities. He was prepped and draped in the usual sterile fashion.      The patient received 2 grams of IV Vancomycin for surgical prophylaxis.     A lumbar incision was carefully designed in the midline at the level of L4 by palpation of the iliac crest. Skin was incised with a 15 blade, and dissection was carried down to the level of the fascia with Bovie cautery. Using a Tuohy needle with the bevel up under direct fluoroscopic guidance, the L3-L4 disc space was identified and entered with brisk return of CSF. The Tuohy needle was re-oriented to aim rostrally, and the catheter was then passed through the needle to the level of approximately T7. This was observed under fluoroscopic guidance as well. Once the catheter tip was confirmed0, the stylet and needle were carefully withdrawn. Another Xray confirmed that the tip had not moved after removal of the stylet. The anchoring device was then applied and used to secure the catheter to the fascia.      At this point we irrigated the lumbar incision copiously before turning our attention to the abdominal incision. A 10 blade was used to incise the skin, and a combination of Bovie and Bipolar cautery were used to dissect a pocket just larger than the pump that was not more than 2 cm deep from the skin surface. We went below the fascia and below  "the external oblique to allow sufficient coverage over the pump. We then used the Medtronic disposable shunt passer  to tunnel from the lumbar to the abdominal incision. This was tolerated well without any buttonholing. The catheter was then passed gently through the metal tubing of the tunneling device. It was carefully inspected and found to have brisk flow of CSF from the distal end.      At this point we cut about 20 cm off the end of the catheter and connected it to the pump connector. Care was taken to thread the catheter the full length of the metal connecting device, and the plastic cap was snapped securely into place with a resounding "click." We then confirmed flow from the distal end of the connector prior to attaching it to the pump. We employed a 0.346 ml priming bolus over 21 minutes.      After copiously irrigating the abdominal pocket, we placed three 2.0 silk sutures into the fascia to secure the pump. Care was taken to coil the catheter under the pump, and the pump was positioned in the pocked and secured with the sutures. We then irrigated both incision and closed them in layers. We used two layers of 0 Vicryl for the abdominal incision and closed skin with a subcuticular running 4.0 Monocryl. Staples were placed over the subcuticular to provide extra support.  The back was closed with two layers of 2.0 Vicryl suture, and the wound was stitched with a running 4.0 Monocryl suture.      Sterile dressings were applied to both wounds: bacitracin, Telfa, and Tegaderm. The patient was carefully rolled back supine onto an abdominal binder. He was awakened by the anesthesia team and brought to the recovery room. All counts were correct x2, and we used antibiotic-containing irrigation throughout the case.      This case warrants a 22 modifer due to the patient's habitus/malnutrition, requiring more time to place the device subfascially.      Complications: No     Estimated Blood Loss (EBL): minimal         "   Specimens:   Specimen (24h ago, onward)      None             Implants:   Implant Name Type Inv. Item Serial No.  Lot No. LRB No. Used Action   CATH ASCENDA 86.4X114.3 4FR - [id number]  CATH ASCENDA 86.4X114.3 4FR  [facility] [id number] N/A 1 Implanted   PUMP INFUS SYNCHROMED III 20ML - [id number]  PUMP INFUS SYNCHROMED III 20ML [id number] [employer] [facility]  N/A 1 Implanted              Condition: Stable    Disposition: PACU - hemodynamically stable.    Attestation: I was present and scrubbed for the entire procedure.

## 2024-10-07 NOTE — INTERVAL H&P NOTE
The patient has been examined and the H&P has been reviewed:    I concur with the findings and no changes have occurred since H&P was written.    Surgery risks, benefits and alternative options discussed and understood by patient/family. NPO since 2000 last night. No AP/AC per mom.          There are no hospital problems to display for this patient.

## 2024-10-07 NOTE — NURSING TRANSFER
Nursing Transfer Note      10/7/2024   5:15 PM    Reason patient is being transferred: Recovery criteria met    PACU nurse giving handoff: CHRIS Pruett    Nurse receiving handoff:     Transfer to 942    Transfer via bed    Transported by Patient Escort/PCT    Transfer Vital Signs @ 1700  Temperature: 97.5  Blood Pressure: 95/62  Heart Rate: 52 SB  O2 Sat: 95% on RA  Respirations:15    Medicines/Equipment sent with patient: Tube feeding by pump; Extra formula bag and tubing    Any special needs or follow-up needed: Due to void spontaneously; Due for Turn Q2H at 1800    Patient belongings transferred with patient: Yes one duffle bag and one clear belongings bag    Chart send with patient: Yes    Notified: Grandmother & Father    Patient reassessed prior to transfer at: 10/7/24 @ 1700

## 2024-10-07 NOTE — NURSING
Patient Transferred to NPU Room 942       Upon arrival to the floor, patient greeted and oriented to room. Complete head to toe assessment completed per protocol. VSS, see flowsheet for details. Neuro assessment completed. Primary team notified of patient's transfer to floor. All current and transfer orders reviewed/reconciled per protocol. All emergency equipment set up in patient's room. Fall/seizure precautions initiated per providers orders. 4 Eyes skin assessment performed, see below for details. Reviewed assessment and rounding frequency with patient and family. Questions were encouraged and addressed. Repositioned patient for comfort with bed locked in lowest position, side rails up x 2, bed alarm set, and call light within reach. Instructed patient to call staff for mobility/assistance, verbalized understanding. No acute signs of distress noted at this time.

## 2024-10-07 NOTE — ANESTHESIA PROCEDURE NOTES
Intubation    Date/Time: 10/7/2024 7:39 AM    Performed by: Karlos Benítez CRNA  Authorized by: Sandhya Martinez MD    Intubation:     Induction:  Intravenous    Intubated:  Postinduction    Mask Ventilation:  Easy with oral airway    Attempts:  1    Attempted By:  CRNA    Method of Intubation:  Video laryngoscopy    Blade:  Modi 3    Laryngeal View Grade: Grade I - full view of cords      Difficult Airway Encountered?: No      Airway Device:  Oral endotracheal tube    Airway Device Size:  7.5    Style/Cuff Inflation:  Cuffed (inflated to minimal occlusive pressure)    Tube secured:  21    Secured at:  The lips    Placement Verified By:  Capnometry    Complicating Factors:  None    Findings Post-Intubation:  BS equal bilateral

## 2024-10-07 NOTE — PLAN OF CARE
Jesus Posey is a 19 y.o. 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 The patient was seen at bedside following the procedure. The patient was still waking up from anesthesia but grossly at his neurological baseline. All post-operative management orders have been placed and the patient will be going to a room shortly.    Plan:  - Admitted to floor with telemetry, q4h neuro checks, pulse ox  - Maintain abdominal binder  - continue IV rocephin while inpatient, plan to discharge home with 5 day course of PO antibiotics  - follow up nutrition consult for G-tube feeds  - restart home tube feeding Nestle Nutren 1.5 at 64cc/hr for 12-14 hours daily with Iván supplement  - tentative plan for discharge home tomorrow  - Continue to monitor clinically, notify NSGY immediately with any changes in neuro status    Plan discussed with Dr. Rogers.    Roby Ospina MD  Neurosurgery Resident, PGY-1

## 2024-10-07 NOTE — PLAN OF CARE
Anesthesia at bedside for PIV insertion via ultrasound.     U/A to be collected intraoperatively per OR RN (per Dr. Rogers).

## 2024-10-07 NOTE — TRANSFER OF CARE
Anesthesia Transfer of Care Note    Patient: Jesus Posey    Procedure(s) Performed: Procedure(s) (LRB):  INSERTION, INTRATHECAL BACLOFEN PUMP (N/A)    Patient location: PACU    Anesthesia Type: general    Transport from OR: Transported from OR on 6-10 L/min O2 by face mask with adequate spontaneous ventilation    Post pain: adequate analgesia    Post assessment: no apparent anesthetic complications    Post vital signs: stable    Level of consciousness: responds to stimulation    Nausea/Vomiting: no nausea/vomiting    Complications: none    Transfer of care protocol was followed      Last vitals: Visit Vitals  /71 (BP Location: Right arm, Patient Position: Lying)   Pulse (!) 58   Temp 36.1 °C (97 °F) (Temporal)   Resp 12   Wt 49.9 kg (110 lb 0.2 oz)   SpO2 100%   BMI 17.23 kg/m²

## 2024-10-07 NOTE — ANESTHESIA POSTPROCEDURE EVALUATION
Anesthesia Post Evaluation    Patient: Jesus Posey    Procedure(s) Performed: Procedure(s) (LRB):  INSERTION, INTRATHECAL BACLOFEN PUMP (N/A)    Final Anesthesia Type: general      Patient location during evaluation: PACU  Patient participation: No - Unable to Participate, Other Reason (see comments) (Pt at baseline. Non-verbal, spastic paralysis, opens eyes.)  Level of consciousness: awake and alert  Post-procedure vital signs: reviewed and stable  Pain management: adequate  Airway patency: patent  PIO mitigation strategies: Multimodal analgesia  PONV status at discharge: No PONV  Anesthetic complications: no      Cardiovascular status: blood pressure returned to baseline, hemodynamically stable and stable  Respiratory status: unassisted, room air and spontaneous ventilation  Hydration status: euvolemic  Follow-up not needed.              Vitals Value Taken Time   BP 96/74 10/07/24 1217   Temp 36.1 °C (97 °F) 10/07/24 1038   Pulse 46 10/07/24 1222   Resp 45 10/07/24 1222   SpO2 99 % 10/07/24 1222   Vitals shown include unfiled device data.      Event Time   Out of Recovery 11:45:00         Pain/Basil Score: Basil Score: 9 (10/7/2024 11:45 AM)

## 2024-10-08 PROBLEM — Z43.1 ATTENTION TO GASTROSTOMY: Status: ACTIVE | Noted: 2024-10-08

## 2024-10-08 PROBLEM — R53.2 FUNCTIONAL QUADRIPLEGIA: Status: ACTIVE | Noted: 2024-10-08

## 2024-10-08 PROBLEM — Z91.89: Status: ACTIVE | Noted: 2024-10-08

## 2024-10-08 PROBLEM — Z97.8 STATUS POST INSERTION OF INTRATHECAL BACLOFEN PUMP: Status: ACTIVE | Noted: 2024-10-08

## 2024-10-08 PROBLEM — J98.11 ATELECTASIS: Status: ACTIVE | Noted: 2024-10-08

## 2024-10-08 PROBLEM — Z91.89: Status: RESOLVED | Noted: 2024-10-08 | Resolved: 2024-10-08

## 2024-10-08 LAB
ANION GAP SERPL CALC-SCNC: 12 MMOL/L (ref 8–16)
BASOPHILS # BLD AUTO: 0.03 K/UL (ref 0–0.2)
BASOPHILS NFR BLD: 0.4 % (ref 0–1.9)
BUN SERPL-MCNC: 8 MG/DL (ref 6–20)
CALCIUM SERPL-MCNC: 10.2 MG/DL (ref 8.7–10.5)
CHLORIDE SERPL-SCNC: 106 MMOL/L (ref 95–110)
CO2 SERPL-SCNC: 19 MMOL/L (ref 23–29)
CREAT SERPL-MCNC: 0.6 MG/DL (ref 0.5–1.4)
DIFFERENTIAL METHOD BLD: ABNORMAL
EOSINOPHIL # BLD AUTO: 0.1 K/UL (ref 0–0.5)
EOSINOPHIL NFR BLD: 0.7 % (ref 0–8)
ERYTHROCYTE [DISTWIDTH] IN BLOOD BY AUTOMATED COUNT: 14.6 % (ref 11.5–14.5)
EST. GFR  (NO RACE VARIABLE): >60 ML/MIN/1.73 M^2
GLUCOSE SERPL-MCNC: 63 MG/DL (ref 70–110)
HCT VFR BLD AUTO: 44.1 % (ref 40–54)
HGB BLD-MCNC: 14.1 G/DL (ref 14–18)
IMM GRANULOCYTES # BLD AUTO: 0.04 K/UL (ref 0–0.04)
IMM GRANULOCYTES NFR BLD AUTO: 0.5 % (ref 0–0.5)
LYMPHOCYTES # BLD AUTO: 1.1 K/UL (ref 1–4.8)
LYMPHOCYTES NFR BLD: 15.2 % (ref 18–48)
MAGNESIUM SERPL-MCNC: 2.3 MG/DL (ref 1.6–2.6)
MCH RBC QN AUTO: 31.3 PG (ref 27–31)
MCHC RBC AUTO-ENTMCNC: 32 G/DL (ref 32–36)
MCV RBC AUTO: 98 FL (ref 82–98)
MONOCYTES # BLD AUTO: 0.9 K/UL (ref 0.3–1)
MONOCYTES NFR BLD: 11.4 % (ref 4–15)
NEUTROPHILS # BLD AUTO: 5.4 K/UL (ref 1.8–7.7)
NEUTROPHILS NFR BLD: 71.8 % (ref 38–73)
NRBC BLD-RTO: 0 /100 WBC
PHOSPHATE SERPL-MCNC: 3.7 MG/DL (ref 2.7–4.5)
PLATELET # BLD AUTO: 292 K/UL (ref 150–450)
PMV BLD AUTO: 11.2 FL (ref 9.2–12.9)
POTASSIUM SERPL-SCNC: 4.9 MMOL/L (ref 3.5–5.1)
RBC # BLD AUTO: 4.5 M/UL (ref 4.6–6.2)
SODIUM SERPL-SCNC: 137 MMOL/L (ref 136–145)
WBC # BLD AUTO: 7.45 K/UL (ref 3.9–12.7)

## 2024-10-08 PROCEDURE — 25000003 PHARM REV CODE 250

## 2024-10-08 PROCEDURE — 11000001 HC ACUTE MED/SURG PRIVATE ROOM

## 2024-10-08 PROCEDURE — 63600175 PHARM REV CODE 636 W HCPCS

## 2024-10-08 PROCEDURE — 36415 COLL VENOUS BLD VENIPUNCTURE: CPT

## 2024-10-08 PROCEDURE — 80048 BASIC METABOLIC PNL TOTAL CA: CPT

## 2024-10-08 PROCEDURE — 25000003 PHARM REV CODE 250: Performed by: PHYSICIAN ASSISTANT

## 2024-10-08 PROCEDURE — 25000242 PHARM REV CODE 250 ALT 637 W/ HCPCS: Performed by: PHYSICIAN ASSISTANT

## 2024-10-08 PROCEDURE — 25000003 PHARM REV CODE 250: Performed by: NEUROLOGICAL SURGERY

## 2024-10-08 PROCEDURE — 83735 ASSAY OF MAGNESIUM: CPT

## 2024-10-08 PROCEDURE — 84100 ASSAY OF PHOSPHORUS: CPT

## 2024-10-08 PROCEDURE — 99024 POSTOP FOLLOW-UP VISIT: CPT | Mod: ,,, | Performed by: PHYSICIAN ASSISTANT

## 2024-10-08 PROCEDURE — 85025 COMPLETE CBC W/AUTO DIFF WBC: CPT

## 2024-10-08 RX ORDER — B-COMPLEX WITH VITAMIN C
1 TABLET ORAL DAILY
Status: DISCONTINUED | OUTPATIENT
Start: 2024-10-08 | End: 2024-10-09 | Stop reason: HOSPADM

## 2024-10-08 RX ORDER — AMANTADINE HYDROCHLORIDE 50 MG/5ML
100 SOLUTION ORAL 2 TIMES DAILY
Status: DISCONTINUED | OUTPATIENT
Start: 2024-10-08 | End: 2024-10-09 | Stop reason: HOSPADM

## 2024-10-08 RX ORDER — LEVETIRACETAM 100 MG/ML
500 SOLUTION ORAL 2 TIMES DAILY
Status: DISCONTINUED | OUTPATIENT
Start: 2024-10-08 | End: 2024-10-09 | Stop reason: HOSPADM

## 2024-10-08 RX ORDER — ERYTHROMYCIN 5 MG/G
OINTMENT OPHTHALMIC EVERY 6 HOURS
Status: DISCONTINUED | OUTPATIENT
Start: 2024-10-08 | End: 2024-10-09 | Stop reason: HOSPADM

## 2024-10-08 RX ORDER — DIAZEPAM 5 MG/5ML
5 SOLUTION ORAL 3 TIMES DAILY
Status: DISCONTINUED | OUTPATIENT
Start: 2024-10-08 | End: 2024-10-09 | Stop reason: HOSPADM

## 2024-10-08 RX ORDER — HYDROCHLOROTHIAZIDE 12.5 MG/1
12.5 TABLET ORAL DAILY
Status: DISCONTINUED | OUTPATIENT
Start: 2024-10-08 | End: 2024-10-08

## 2024-10-08 RX ORDER — METRONIDAZOLE 500 MG/100ML
500 INJECTION, SOLUTION INTRAVENOUS
Status: DISCONTINUED | OUTPATIENT
Start: 2024-10-08 | End: 2024-10-09

## 2024-10-08 RX ADMIN — LEVETIRACETAM 500 MG: 500 SOLUTION ORAL at 02:10

## 2024-10-08 RX ADMIN — ERYTHROMYCIN: 5 OINTMENT OPHTHALMIC at 05:10

## 2024-10-08 RX ADMIN — BACLOFEN 25 MG: 5 SUSPENSION ORAL at 05:10

## 2024-10-08 RX ADMIN — CEFEPIME 2 G: 2 INJECTION, POWDER, FOR SOLUTION INTRAVENOUS at 09:10

## 2024-10-08 RX ADMIN — DIAZEPAM 5 MG: 5 SOLUTION ORAL at 10:10

## 2024-10-08 RX ADMIN — CEFTRIAXONE 2 G: 2 INJECTION, POWDER, FOR SOLUTION INTRAMUSCULAR; INTRAVENOUS at 09:10

## 2024-10-08 RX ADMIN — DIAZEPAM 5 MG: 5 SOLUTION ORAL at 03:10

## 2024-10-08 RX ADMIN — LEVETIRACETAM 500 MG: 500 SOLUTION ORAL at 09:10

## 2024-10-08 RX ADMIN — METRONIDAZOLE 500 MG: 500 INJECTION, SOLUTION INTRAVENOUS at 08:10

## 2024-10-08 RX ADMIN — FAMOTIDINE 20 MG: 20 TABLET ORAL at 09:10

## 2024-10-08 RX ADMIN — Medication 1 TABLET: at 02:10

## 2024-10-08 RX ADMIN — BACLOFEN 25 MG: 5 SUSPENSION ORAL at 09:10

## 2024-10-08 RX ADMIN — AMANTADINE HYDROCHLORIDE 100 MG: 50 SOLUTION ORAL at 09:10
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
## 2024-10-08 NOTE — PROGRESS NOTES
[facility] - Neurosurgery ([facility])  Adult Nutrition  Consult Note    SUMMARY     Recommendations    1) TF Rec Goal: Nutren 1.5 @ 50ml/hr to provide 1800 kcal/day 82g protein, 917ml Free water  - Provide 150ml flush q4h to provide additional 900ml free water   -  Restart TF's at 10 ml/hr. Increase rate by 10ml each hour until at goal.   - Monitor for tolerance     2) Recommend multivitamin with minerals (Vit C, A, Zinc) to aid in wound healing     3) RD to monitor wt, tolerance, skin, labs     Goals: Meet % EEN/EPN by next RD f/u  Nutrition Goal Status: new  Communication of RD Recs: other (comment) (POC)    Assessment and Plan    Nutrition Problem  Inadequate oral intake     Related to (etiology):   Feeds paused for 12+ hours    Signs and Symptoms (as evidenced by):    Emesis     Interventions/Recommendations (treatment strategy):  Collaboration of nutritional care with other providers    Nutrition Diagnosis Status:   New       Reason for Assessment    Reason For Assessment: consult, new tube feeding  Diagnosis: other (see comments)  Relevant medical history: Spastic quadriparesis, traumatic brain injury   Interdisciplinary Rounds: did not attend     General Information Comments: RD consulted for TF recs. Pt recently had insertion of intrathecal baclofen pump yesterday. Father said around 9pm last night Jesus was vomiting and his feeds were stopped and not started in the morning. Plan is to restart and trickle his feeds to see how he tolerated starting at 12pm. 10ml/hr and increasing it 10ml/hr until goal rate is reached. No NFPE preformed at this time, will continue to follow. 1 BM and 500ml of U/O noted.     Nutrition discharge planning: no indicators present     Nutrition Related Social Determinants of Health: SDOH: None Identified      Nutrition Risk Screen    Nutrition Risk Screen: tube feeding or parenteral nutrition    Nutrition/Diet History    Spiritual, Cultural Beliefs, Buddhism Practices, Values  that Affect Care: no  Food Allergies: NKFA  Factors Affecting Nutritional Intake: nausea/vomiting    Anthropometrics    Temp: (!) 94.7 °F (34.8 °C)  Weight Method: Standard Scale  Weight: 49.9 kg (110 lb 0.2 oz)  Weight (lb): 110.01 lb          Labs:   Recent Labs   Lab 10/08/24  0504      K 4.9      CO2 19*   BUN 8   CREATININE 0.6   GLU 63*   CALCIUM 10.2   PHOS 3.7   MG 2.3       Intake/Output Summary (Last 24 hours) at 10/8/2024 1215  Last data filed at 10/7/2024 1300  Gross per 24 hour   Intake 60 ml   Output --   Net 60 ml         Estimated/Assessed Needs    Weight Used For Calorie Calculations: 49.9 kg (110 lb 0.2 oz)  Energy Calorie Requirements (kcal): 8990-1685 (35-40 kcal/kg) ~1800kcal (36kcal/kg) * for weight gain     Protein Requirements: 75-100g (1.5-2.0 g/kg)  Weight Used For Protein Calculations: 49.9 kg (110 lb 0.2 oz)     Estimated Fluid Requirement Method: RDA Method  RDA Method (mL): 1800ml         Nutrition Prescription Ordered    Nutrition Order Comments: NPO  Restart TF's at 10 cc/hr. Increase rate by 10 cc each hour until at goal. Check for residuals q4h and hold TF's for residuals > 500 cc.  Continuous for 12-14 hours daily     Evaluation of Received Nutrient/Fluid Intake    Fluid Required: meeting needs  Comments: last bm  Tolerance: not tolerating  % Intake of Estimated Energy Needs: 0 - 25 %  % Meal Intake: 0 - 25 %    Nutrition Risk    Level of Risk/Frequency of Follow-up: moderate - high       Monitor and Evaluation    Food and Nutrient Intake: enteral nutrition intake  Food and Nutrient Adminstration: enteral and parenteral nutrition administration  Physical Activity and Function: nutrition-related ADLs and IADLs, factors affecting access to physical activity  Anthropometric Measurements: height/length, weight, weight change, body mass index  Biochemical Data, Medical Tests and Procedures: lipid profile, inflammatory profile, glucose/endocrine profile, gastrointestinal  profile, electrolyte and renal panel  Nutrition-Focused Physical Findings: skin, overall appearance       Nutrition Follow-Up    RD Follow-up?: Yes    Karena Bazan, Registration Eligible, Provisional LDN , MS
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
## 2024-10-08 NOTE — ASSESSMENT & PLAN NOTE
Patient noted to have a percutaneous endoscopic gastrostomy tube in place. I have personally inspected the tube.Tube was placed prior to this admission There are no signs of drainage or infection around the site. The tube is patent. Medications have converted to liquid form if available.  Routine care to be done by wound care and nursing staff.     Episode of emesis on PM of 10/7 following surgery with concern for possible aspiration event. TF's subsequently held.   CXR 10/7 with bibasilar L>R likely atelectasis, unable to exclude minimal infiltrate or aspiration on left lung base.    - Restart TF's at trickle on 10/8 and gradually increase, monitor for tolerance and residuals  - RD consulted, TF regimen per recs  - Monitor for s/s of aspiration or PNA  - Cardiac monitoring, continuous pulse ox  - Repeat CXR 10/9  - HM consulted, appreciate recs

## 2024-10-08 NOTE — SUBJECTIVE & OBJECTIVE
Interval History: Admitted s/p IT baclofen pump implantation, no complications. Pt had episode of vomiting last night after his home TF's were re-started. TF's held. CXR showed likely atelectasis, unable to r/o possible L lung aspiration. Pt grossly at his baseline today, dad at bedside. TF's have remained on hold. Pt had BM this am. AFVSS. Will CTM today. HM consulted for possible aspiration event. RD consulted for recs. Received prophylactic Rocephin x 2 postop.     Medications:  Continuous Infusions:   0.9% NaCl   Intravenous Continuous         Scheduled Meds:   B-complex with vitamin C  1 tablet Per G Tube Daily    famotidine  20 mg Per G Tube BID     PRN Meds:  Current Facility-Administered Medications:     acetaminophen, 650 mg, Per G Tube, Q4H PRN    dextrose 10%, 12.5 g, Intravenous, PRN    dextrose 10%, 25 g, Intravenous, PRN    HYDROcodone-acetaminophen, 1 tablet, Per G Tube, Q4H PRN    ondansetron, 8 mg, Oral, Q8H PRN    oxyCODONE, 10 mg, Per G Tube, Q4H PRN     Review of Systems  Objective:     Weight: 49.9 kg (110 lb 0.2 oz)  Body mass index is 17.23 kg/m².  Vital Signs (Most Recent):  Temp: 97.4 °F (36.3 °C) (10/08/24 1145)  Pulse: 72 (10/08/24 1123)  Resp: 16 (10/08/24 1123)  BP: 96/60 (10/08/24 1123)  SpO2: 99 % (10/08/24 1123) Vital Signs (24h Range):  Temp:  [94.7 °F (34.8 °C)-98.4 °F (36.9 °C)] 97.4 °F (36.3 °C)  Pulse:  [47-87] 72  Resp:  [14-20] 16  SpO2:  [95 %-99 %] 99 %  BP: ()/(56-69) 96/60                              Gastrostomy/Enterostomy LUQ (Active)   Securement secured to abdomen 10/08/24 1101   Interventions Prior to Feeding patency checked 10/08/24 1101   Feeding Type continuous;by pump 10/08/24 0600   Clamp Status/Tolerance clamped 10/08/24 1101   Feeding Action feeding held 10/08/24 1101   Dressing no dressing;other (see comments) 10/08/24 1101   Insertion Site no redness;no warmth;no tenderness 10/08/24 1101   Flush/Irrigation flushed w/;water 10/08/24 1101   Current Rate  (mL/hr) 64 mL/hr 10/07/24 2000   Goal Rate (mL/hr) 64 mL/hr 10/07/24 2000   Water Bolus (mL) 60 mL 10/07/24 1300   Formula Name Nutren 1.5 10/08/24 0600   Residual Amount (ml) 20 ml 10/07/24 1300       Male External Urinary Catheter 10/07/24 1430 (Active)   Skin no redness;no breakdown 10/08/24 1101   Tolerance no signs/symptoms of discomfort 10/08/24 1101          Physical Exam         Neurosurgery Physical Exam    General: Well developed. NAD  Head: normocephalic, atraumatic;    Prior R craniectomy s/p replacement of bone flap, incisions well healed. R temporal muscle wasting.  Neurologic: Awake and alert. Non-verbal (baseline). Does not follow commands on my exam.  Cranial nerves: facial asymmetry   Eyes: R ptosis, does not open lid. L corneal clouding, difficult to see pupil reactivity.   Pulmonary: normal respirations, no signs of respiratory distress  Abdomen: soft, non-distended, not tender to palpation.    G tube in place   Abdominal binder in place over R-sided pump incision  Skin: Skin is warm, dry and intact.  Motor Strength: Spastic quadriparesis; RUE contracted; moves BUE spontaneously, L more than R. Minimal movement BLE.      Significant Labs:  Recent Labs   Lab 10/07/24  0700 10/08/24  0504   GLU 71 63*    137   K 3.7 4.9   CL 99 106   CO2 30* 19*   BUN 9 8   CREATININE 0.6 0.6   CALCIUM 10.7* 10.2   MG  --  2.3     Recent Labs   Lab 10/07/24  0700 10/08/24  0504   WBC 4.52 7.45   HGB 14.4 14.1   HCT 45.3 44.1    292     Recent Labs   Lab 10/07/24  0700   INR 1.0   APTT 37.9*     Microbiology Results (last 7 days)       ** No results found for the last 168 hours. **          All pertinent labs from the last 24 hours have been reviewed.    Significant Diagnostics:  I have reviewed and interpreted all pertinent imaging results/findings within the past 24 hours.

## 2024-10-08 NOTE — SUBJECTIVE & OBJECTIVE
Review of Systems   Constitutional:  Negative for chills and fever.   Respiratory:  Negative for cough and shortness of breath.    Cardiovascular:  Negative for chest pain.   Gastrointestinal:  Negative for abdominal pain, constipation, diarrhea, nausea and vomiting.     Objective:     Vital Signs (Most Recent):  Temp: 97.4 °F (36.3 °C) (10/08/24 1145)  Pulse: 72 (10/08/24 1123)  Resp: 16 (10/08/24 1123)  BP: 96/60 (10/08/24 1123)  SpO2: 99 % (10/08/24 1123) Vital Signs (24h Range):  Temp:  [94.7 °F (34.8 °C)-98.4 °F (36.9 °C)] 97.4 °F (36.3 °C)  Pulse:  [52-87] 72  Resp:  [14-20] 16  SpO2:  [95 %-99 %] 99 %  BP: ()/(56-69) 96/60     Weight: 49.9 kg (110 lb 0.2 oz)  Body mass index is 17.23 kg/m².  No intake or output data in the 24 hours ending 10/08/24 1551      Physical Exam  Vitals and nursing note reviewed.   Constitutional:       Appearance: Normal appearance.   HENT:      Head: Normocephalic.      Nose: Nose normal.   Eyes:      Conjunctiva/sclera: Conjunctivae normal.   Cardiovascular:      Rate and Rhythm: Normal rate and regular rhythm.      Pulses: Normal pulses.      Heart sounds: Normal heart sounds.   Pulmonary:      Effort: Pulmonary effort is normal.      Breath sounds: Normal breath sounds.   Abdominal:      General: Abdomen is flat.      Tenderness: There is no abdominal tenderness. There is no guarding or rebound.      Comments: PEG Tube   Musculoskeletal:      Right lower leg: No edema.      Left lower leg: No edema.   Neurological:      Mental Status: He is alert. Mental status is at baseline.   Psychiatric:         Mood and Affect: Mood normal.         Behavior: Behavior normal.             Significant Labs: All pertinent labs within the past 24 hours have been reviewed.    Significant Imaging: I have reviewed all pertinent imaging results/findings within the past 24 hours.
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
## 2024-10-08 NOTE — PLAN OF CARE
CM attempted to obtain discharge planning assessment however, patient is special needs and no family at bedside.

## 2024-10-08 NOTE — CARE UPDATE
I have reviewed the chart of Jesus Posey who is hospitalized for the following:    Active Hospital Problems    Diagnosis    *Spastic quadriparesis    Functional quadriplegia     Requires assitance with all ADls  Peg dependant  Quadriparesis       Attention to gastrostomy     Peg in place      Status post insertion of intrathecal baclofen pump    Body mass index (BMI) less than 19     underweight      Traumatic brain injury        Jessica Prater NP  Unit Based [facility]

## 2024-10-08 NOTE — ASSESSMENT & PLAN NOTE
20 y/o M with PMH of TBI and subsequent spastic quadriparesis, underwent IT baclofen trial with subjective and objective improvement.    Now s/p implantation of IT baclofen pump on 10/7/24.     Plan:  - Admitted to NSGY service, neuro checks and vitals q4h  - Continue abdominal binder x 2 weeks postop  - Rocephin postop for infection prophylaxis  - Pain control PRN  - Continue home baclofen per G tube  - Continue remainder of home meds as appropriate

## 2024-10-08 NOTE — PLAN OF CARE
Problem: Adult Inpatient Plan of Care  Goal: Plan of Care Review  Outcome: Progressing  Goal: Optimal Comfort and Wellbeing  Outcome: Progressing  Goal: Readiness for Transition of Care  Outcome: Progressing     Problem: Adult Inpatient Plan of Care  Goal: Plan of Care Review  Outcome: Progressing     Problem: Adult Inpatient Plan of Care  Goal: Optimal Comfort and Wellbeing  Outcome: Progressing     Problem: Adult Inpatient Plan of Care  Goal: Readiness for Transition of Care  Outcome: Progressing     Problem: Wound  Goal: Optimal Functional Ability  Outcome: Progressing  Goal: Optimal Pain Control and Function  Outcome: Progressing     Problem: Wound  Goal: Optimal Functional Ability  Outcome: Progressing     Problem: Wound  Goal: Optimal Pain Control and Function  Outcome: Progressing
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
## 2024-10-08 NOTE — NURSING
unable to draw patients labs this am, stated will leave labs for the day technician to try to draw, on call for neurosurgery notified of above
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
## 2024-10-08 NOTE — NURSING
Upon entering room this writer noted patient to be vomiting copious amounts of tube feeding, tube feeding noted to be coming out of patient mouth as well as his nose, tube feeding immediately stopped, on call for neurosurgery notified of above and new order received for STAT chest x-ray for possible aspiration, vital signs obtained, no residual noted after patient vomited up tube feeding.

## 2024-10-08 NOTE — HOSPITAL COURSE
10/8: Admitted s/p IT baclofen pump implantation, no complications. Pt had episode of vomiting last night after his home TF's were re-started. TF's held. CXR showed likely atelectasis, unable to r/o possible L lung aspiration. Pt grossly at his baseline today, dad at bedside. TF's have remained on hold. Pt had BM this am. AFVSS. Will CTM today. HM consulted for possible aspiration event. RD consulted for recs. Received prophylactic Rocephin x 2 postop.   10/9: NAEON. VSS. No further episodes of emesis. CXR pending for follow up of aspiration event. Required straight cath this AM, will monitor with q 6 bladder scan. Home later today vs tomorrow depending on CXR and voiding.

## 2024-10-08 NOTE — ASSESSMENT & PLAN NOTE
"See "attention to gastrostomy"    - RD consulted, TF's per recs  - MVI added to optimize nutrition and wound healing  "

## 2024-10-08 NOTE — PROGRESS NOTES
Respiratory was at bedside with Nurse for VBG,  line is not drawing back. Nurse will notify Respiratory for any changes

## 2024-10-09 VITALS
WEIGHT: 118.81 LBS | SYSTOLIC BLOOD PRESSURE: 95 MMHG | OXYGEN SATURATION: 96 % | RESPIRATION RATE: 18 BRPM | HEART RATE: 80 BPM | DIASTOLIC BLOOD PRESSURE: 68 MMHG | TEMPERATURE: 98 F | BODY MASS INDEX: 18.65 KG/M2 | HEIGHT: 67 IN

## 2024-10-09 PROBLEM — R33.9 URINARY RETENTION: Status: ACTIVE | Noted: 2024-10-09

## 2024-10-09 LAB
BASOPHILS # BLD AUTO: 0.04 K/UL (ref 0–0.2)
BASOPHILS NFR BLD: 0.4 % (ref 0–1.9)
DIFFERENTIAL METHOD BLD: ABNORMAL
EOSINOPHIL # BLD AUTO: 0.4 K/UL (ref 0–0.5)
EOSINOPHIL NFR BLD: 3.9 % (ref 0–8)
ERYTHROCYTE [DISTWIDTH] IN BLOOD BY AUTOMATED COUNT: 14.1 % (ref 11.5–14.5)
HCT VFR BLD AUTO: 38.5 % (ref 40–54)
HGB BLD-MCNC: 13.6 G/DL (ref 14–18)
IMM GRANULOCYTES # BLD AUTO: 0.09 K/UL (ref 0–0.04)
IMM GRANULOCYTES NFR BLD AUTO: 1 % (ref 0–0.5)
LYMPHOCYTES # BLD AUTO: 1.4 K/UL (ref 1–4.8)
LYMPHOCYTES NFR BLD: 15.2 % (ref 18–48)
MCH RBC QN AUTO: 31.8 PG (ref 27–31)
MCHC RBC AUTO-ENTMCNC: 35.3 G/DL (ref 32–36)
MCV RBC AUTO: 90 FL (ref 82–98)
MONOCYTES # BLD AUTO: 0.7 K/UL (ref 0.3–1)
MONOCYTES NFR BLD: 8.1 % (ref 4–15)
NEUTROPHILS # BLD AUTO: 6.3 K/UL (ref 1.8–7.7)
NEUTROPHILS NFR BLD: 71.4 % (ref 38–73)
NRBC BLD-RTO: 0 /100 WBC
PLATELET # BLD AUTO: 295 K/UL (ref 150–450)
PMV BLD AUTO: 12.2 FL (ref 9.2–12.9)
RBC # BLD AUTO: 4.28 M/UL (ref 4.6–6.2)
WBC # BLD AUTO: 8.89 K/UL (ref 3.9–12.7)

## 2024-10-09 PROCEDURE — 25000003 PHARM REV CODE 250: Performed by: PHYSICIAN ASSISTANT

## 2024-10-09 PROCEDURE — 85025 COMPLETE CBC W/AUTO DIFF WBC: CPT | Performed by: PHYSICIAN ASSISTANT

## 2024-10-09 PROCEDURE — 36415 COLL VENOUS BLD VENIPUNCTURE: CPT

## 2024-10-09 PROCEDURE — 63600175 PHARM REV CODE 636 W HCPCS: Performed by: NEUROLOGICAL SURGERY

## 2024-10-09 PROCEDURE — 25000242 PHARM REV CODE 250 ALT 637 W/ HCPCS: Performed by: PHYSICIAN ASSISTANT

## 2024-10-09 PROCEDURE — 36415 COLL VENOUS BLD VENIPUNCTURE: CPT | Performed by: PHYSICIAN ASSISTANT

## 2024-10-09 PROCEDURE — 99024 POSTOP FOLLOW-UP VISIT: CPT | Mod: ,,, | Performed by: PHYSICIAN ASSISTANT

## 2024-10-09 PROCEDURE — 87040 BLOOD CULTURE FOR BACTERIA: CPT

## 2024-10-09 PROCEDURE — 25000003 PHARM REV CODE 250

## 2024-10-09 PROCEDURE — 51701 INSERT BLADDER CATHETER: CPT

## 2024-10-09 PROCEDURE — 51798 US URINE CAPACITY MEASURE: CPT

## 2024-10-09 PROCEDURE — 25000003 PHARM REV CODE 250: Performed by: NEUROLOGICAL SURGERY

## 2024-10-09 PROCEDURE — 63600175 PHARM REV CODE 636 W HCPCS

## 2024-10-09 RX ORDER — TAMSULOSIN HYDROCHLORIDE 0.4 MG/1
0.4 CAPSULE ORAL DAILY
Status: DISCONTINUED | OUTPATIENT
Start: 2024-10-09 | End: 2024-10-09 | Stop reason: HOSPADM

## 2024-10-09 RX ORDER — SILODOSIN 4 MG/1
4 CAPSULE ORAL DAILY
Qty: 30 CAPSULE | Refills: 1 | Status: SHIPPED | OUTPATIENT
Start: 2024-10-09 | End: 2024-10-09

## 2024-10-09 RX ORDER — B-COMPLEX WITH VITAMIN C
1 TABLET ORAL DAILY
Qty: 30 TABLET | Refills: 1 | Status: SHIPPED | OUTPATIENT
Start: 2024-10-10

## 2024-10-09 RX ORDER — SILODOSIN 4 MG/1
4 CAPSULE ORAL DAILY
Qty: 30 CAPSULE | Refills: 1 | Status: SHIPPED | OUTPATIENT
Start: 2024-10-09 | End: 2025-10-09

## 2024-10-09 RX ORDER — HYDROCODONE BITARTRATE AND ACETAMINOPHEN 5; 325 MG/1; MG/1
1 TABLET ORAL EVERY 4 HOURS PRN
Qty: 30 TABLET | Refills: 0 | Status: SHIPPED | OUTPATIENT
Start: 2024-10-09

## 2024-10-09 RX ADMIN — FAMOTIDINE 20 MG: 20 TABLET ORAL at 10:10

## 2024-10-09 RX ADMIN — ERYTHROMYCIN: 5 OINTMENT OPHTHALMIC at 06:10

## 2024-10-09 RX ADMIN — LEVETIRACETAM 500 MG: 500 SOLUTION ORAL at 10:10

## 2024-10-09 RX ADMIN — AMANTADINE HYDROCHLORIDE 100 MG: 50 SOLUTION ORAL at 10:10

## 2024-10-09 RX ADMIN — METRONIDAZOLE 500 MG: 500 INJECTION, SOLUTION INTRAVENOUS at 05:10

## 2024-10-09 RX ADMIN — ERYTHROMYCIN: 5 OINTMENT OPHTHALMIC at 12:10

## 2024-10-09 RX ADMIN — TAMSULOSIN HYDROCHLORIDE 0.4 MG: 0.4 CAPSULE ORAL at 02:10

## 2024-10-09 RX ADMIN — DIAZEPAM 5 MG: 5 SOLUTION ORAL at 10:10

## 2024-10-09 RX ADMIN — Medication 1 TABLET: at 10:10

## 2024-10-09 RX ADMIN — CEFEPIME 2 G: 2 INJECTION, POWDER, FOR SOLUTION INTRAVENOUS at 06:10

## 2024-10-09 RX ADMIN — BACLOFEN 25 MG: 5 SUSPENSION ORAL at 10:10

## 2024-10-09 RX ADMIN — VANCOMYCIN HYDROCHLORIDE 1250 MG: 1.25 INJECTION, POWDER, LYOPHILIZED, FOR SOLUTION INTRAVENOUS at 10:10

## 2024-10-09 NOTE — CONSULTS
VANCOMYCIN DOSING BY PHARMACY DISCONTINUATION NOTE    Jesus Posey is a 19 y.o. male who had been consulted for vancomycin dosing.    The pharmacy consult for vancomycin dosing has been discontinued.     Vancomycin Dosing by Pharmacy Consult will sign-off. Please reconsult if necessary. Thank you for allowing us to participate in this patient's care.     Carolyn Ashby, PharmD  Ext. [phone]

## 2024-10-09 NOTE — NURSING
On call Neurosurgery, Dr.Jonathan Bhavesh MD notified of decreased urine output with no measurable urine other than incontinence with bowel movement.  Bladder scan performed and 357 ml noted. TF@40ml/hr with Free Water bolus q4hrs @150ml/hr. No residual noted. However, abdomen is firm. Verbal order given for In/Out sterile catheter.

## 2024-10-09 NOTE — SUBJECTIVE & OBJECTIVE
Interval History:  NAEON. VSS. No further episodes of emesis. CXR pending for follow up of aspiration event. Required straight cath this AM, will monitor with q 6 bladder scan. Home later today vs tomorrow depending on CXR and voiding.     Medications:  Continuous Infusions:   0.9% NaCl   Intravenous Continuous         Scheduled Meds:   amantadine HCL  100 mg Per G Tube BID    B-complex with vitamin C  1 tablet Per G Tube Daily    baclofen  25 mg Per J Tube QID    ceFEPime IV (PEDS and ADULTS)  2 g Intravenous Q8H    diazePAM  5 mg Per G Tube TID    erythromycin   Both Eyes Q6H    famotidine  20 mg Per G Tube BID    levetiracetam  500 mg Per G Tube BID    metroNIDAZOLE IV (PEDS and ADULTS)  500 mg Intravenous Q8H    vancomycin (VANCOCIN) IV (PEDS and ADULTS)  1,250 mg Intravenous Once     PRN Meds:  Current Facility-Administered Medications:     acetaminophen, 650 mg, Per G Tube, Q4H PRN    dextrose 10%, 12.5 g, Intravenous, PRN    dextrose 10%, 25 g, Intravenous, PRN    HYDROcodone-acetaminophen, 1 tablet, Per G Tube, Q4H PRN    ondansetron, 8 mg, Oral, Q8H PRN    oxyCODONE, 10 mg, Per G Tube, Q4H PRN    Pharmacy to dose Vancomycin consult, , , Once **AND** vancomycin - pharmacy to dose, , Intravenous, pharmacy to manage frequency     Review of Systems  Objective:     Weight: 53.9 kg (118 lb 13.3 oz)  Body mass index is 18.61 kg/m².  Vital Signs (Most Recent):  Temp: 97.6 °F (36.4 °C) (10/09/24 0827)  Pulse: 82 (10/09/24 0827)  Resp: 16 (10/09/24 0827)  BP: 111/65 (10/09/24 0827)  SpO2: (!) 94 % (10/09/24 0827) Vital Signs (24h Range):  Temp:  [93 °F (33.9 °C)-98.4 °F (36.9 °C)] 97.6 °F (36.4 °C)  Pulse:  [71-93] 82  Resp:  [16-20] 16  SpO2:  [94 %-100 %] 94 %  BP: ()/(60-80) 111/65                              Gastrostomy/Enterostomy LUQ (Active)   Securement secured to abdomen 10/08/24 1101   Interventions Prior to Feeding patency checked 10/08/24 1101   Feeding Type continuous;by pump 10/08/24 0600   Clamp  "Status/Tolerance clamped 10/08/24 1101   Feeding Action feeding held 10/08/24 1101   Dressing no dressing;other (see comments) 10/08/24 1101   Insertion Site no redness;no warmth;no tenderness 10/08/24 1101   Flush/Irrigation flushed w/;water 10/08/24 1101   Current Rate (mL/hr) 64 mL/hr 10/07/24 2000   Goal Rate (mL/hr) 64 mL/hr 10/07/24 2000   Intake (mL) 60 mL 10/08/24 2141   Water Bolus (mL) 150 mL 10/09/24 0400   Formula Name Nutren 1.5 10/08/24 0600   Tube Feeding Intake (mL) 50 10/09/24 0600   Residual Amount (ml) 20 ml 10/07/24 1300       Male External Urinary Catheter 10/07/24 1430 (Active)   Skin no redness;no breakdown 10/08/24 1101   Tolerance no signs/symptoms of discomfort 10/08/24 1101   Output (mL) 0 mL 10/08/24 2300          Physical Exam         Neurosurgery Physical Exam  General: Well developed. NAD  Head: normocephalic, atraumatic;               Prior R craniectomy s/p replacement of bone flap, incisions well healed. R temporal muscle wasting.  Neurologic: Awake and alert. Non-verbal (baseline). Does not follow commands on my exam.  Cranial nerves: facial asymmetry   Eyes: R ptosis, does not open lid. L corneal clouding, difficult to see pupil reactivity.   Pulmonary: normal respirations, no signs of respiratory distress  Abdomen: soft, non-distended, not tender to palpation.               G tube in place, appears clean and dry              Abdominal binder in place, right abdominal incision c/d/I. No drainage from incision or dehiscence.   Skin: Skin is warm, dry and intact.  Motor Strength: Spastic quadriparesis; RUE contracted; moves BUE spontaneously, L more than R. Minimal movement BLE.        Significant Labs:  Recent Labs   Lab 10/08/24  0504   GLU 63*      K 4.9      CO2 19*   BUN 8   CREATININE 0.6   CALCIUM 10.2   MG 2.3     Recent Labs   Lab 10/08/24  0504   WBC 7.45   HGB 14.1   HCT 44.1        No results for input(s): "LABPT", "INR", "APTT" in the last 48 " hours.  Microbiology Results (last 7 days)       ** No results found for the last 168 hours. **          All pertinent labs from the last 24 hours have been reviewed.    Significant Diagnostics:

## 2024-10-09 NOTE — HOSPITAL COURSE
Patient and family continue to deny signs and symptoms of fever, cough, and shortness of breath. Low suspicion for aspiration pneumonia at this time.

## 2024-10-09 NOTE — DISCHARGE INSTRUCTIONS
Neurosurgery Patient Information      -For non-epilepsy patients: No driving until cleared in your post-operative appointment. No driving while on narcotics.   -For epilepsy patients: No driving until cleared by your epilepsy neurologist.   -Do not take any OTC products containing acetaminophen at the same time as you take your narcotic pain medication. Medications that may contain acetaminophen include but are not limited to: Excedrin and other headache medications, arthritis medications, cold and sinus medications, etc. Please review the list of active ingredients in any OTC medication prior to taking it.  -Do not take any Aspirin or Aspirin-containing products for 2 weeks after surgery.  -Do not take any Aleve, Naprosyn, Naproxen, Ibuprofen, Advil or any other nonsteroidal anti-inflammatory drug (NSAID) for 2 weeks after surgery.  -Do not take any herbal supplements for 2 weeks after surgery.   -Do not consume any alcoholic beverages until released by your neurosurgeon  -Do not perform any excessive bending over or leaning forward as this is a fall hazard.  -Do not lift anything heavier than a gallon of milk until cleared in post-operative visit.     Contact the Neurosurgery Office immediately if:  If you begin to notice any neurologic changes such as:           -Sudden onset of lethargy or sleepiness           -Sudden confusion, trouble speaking, or understanding            -Sudden trouble seeing in one or both eyes            -Sudden trouble walking, dizziness, loss of coordination            -Sudden severe headache with no known cause            -Sudden onset of numbness or weakness       Wound Care:  Keep your incision open to air. You may shower on the 2nd day after your surgery. Please shower with baby shampoo, but do not take a bath. Keep the incision clean and dry at all times. Please cover the incision with saran wrap or other occlusive dressing while showering and REMOVE once you have completed taking  your shower. Do not allow the force of water to hit the incision. If the incision gets damp, gently pat it dry. Do not rub or scrub the incision. You cannot take a bath/swim/submerge the incision until 8 weeks after surgery.    The incisions do not need to be cleaned with any water, soap, alcohol, peroxide, or other substance    Please apply bacitracin ointment to incisions twice daily. You have dissolvable suture in your back incision. It does not need to be removed. You have staples in your abdominal incision that need to removed.       You now have an implanted device in place. It is imperative that any infection (such as a urinary tract infection) be treated immediately so that it cannot get into your bloodstream. If an infection ends up in your blood, it may seed the device, thus requiring us to remove it. Call the Neurosurgery office or go to the Emergency Room for any signs of infection including: increased redness, drainage, pain or fever (temperature greater than or equal to 101.4).         Miscellaneous:  -Follow up with Dr. Rogers  in 2 weeks for a wound check. Appointment will be mailed to you.    You had difficulty urinating after surgery. A remy catheter was placed to help empty your bladder. You will need to follow up in about a week with the Urology team for a voiding trial. A referral has been placed, they will contact you for an appointment. If you are not contacted within 48 hours of discharge, please notify our office.     Neurosurgery Office: [phone]

## 2024-10-09 NOTE — PLAN OF CARE
Problem: Adult Inpatient Plan of Care  Goal: Plan of Care Review  10/9/2024 0735 by Elvia Riley RN  Outcome: Progressing  10/9/2024 0735 by Elvia Riley RN  Outcome: Progressing  10/9/2024 0728 by Elvia Riley RN  Outcome: Progressing  Goal: Patient-Specific Goal (Individualized)  10/9/2024 0735 by Elvia Riley RN  Outcome: Progressing  10/9/2024 0735 by Elvia Riley RN  Outcome: Progressing  10/9/2024 0728 by Elvia Riley RN  Outcome: Progressing  Goal: Absence of Hospital-Acquired Illness or Injury  10/9/2024 0735 by Elvia Riley RN  Outcome: Progressing  10/9/2024 0735 by Elvia Riley RN  Outcome: Progressing  10/9/2024 0728 by Elvia Riley RN  Outcome: Progressing  Goal: Optimal Comfort and Wellbeing  10/9/2024 0735 by Elvia Riley RN  Outcome: Progressing  10/9/2024 0735 by Elvia Riley RN  Outcome: Progressing  10/9/2024 0728 by Elvia Riley RN  Outcome: Progressing  Goal: Readiness for Transition of Care  10/9/2024 0735 by Elvia Riley RN  Outcome: Progressing  10/9/2024 0735 by Elvia Riley RN  Outcome: Progressing  10/9/2024 0728 by Elvia Riley RN  Outcome: Progressing     Problem: Wound  Goal: Optimal Coping  10/9/2024 0735 by Elvia Riley, RN  Outcome: Progressing  10/9/2024 0735 by Elvia Riley, RN  Outcome: Progressing  10/9/2024 0728 by Elvia Riley RN  Outcome: Progressing  Goal: Optimal Functional Ability  10/9/2024 0735 by Elvia Riley RN  Outcome: Progressing  10/9/2024 0735 by Elvia Riley RN  Outcome: Progressing  10/9/2024 0728 by Elvia Riley RN  Outcome: Progressing  Goal: Absence of Infection Signs and Symptoms  10/9/2024 0735 by Elvia Riley RN  Outcome: Progressing  10/9/2024 0735 by Elvia Riley, RN  Outcome: Progressing  10/9/2024 0728 by Elvia Riley RN  Outcome: Progressing  Goal: Improved Oral Intake  10/9/2024 0735 by Elvia Riley,  RN  Outcome: Progressing  10/9/2024 0735 by Elvia Riley RN  Outcome: Progressing  10/9/2024 0728 by Elvia Riley RN  Outcome: Progressing  Goal: Optimal Pain Control and Function  10/9/2024 0735 by Elvia Riley RN  Outcome: Progressing  10/9/2024 0735 by Elvia Riley RN  Outcome: Progressing  10/9/2024 0728 by Elvia Riley RN  Outcome: Progressing  Goal: Skin Health and Integrity  10/9/2024 0735 by Elvia Riley RN  Outcome: Progressing  10/9/2024 0735 by Elvia Riley RN  Outcome: Progressing  10/9/2024 0728 by Elvia Riley RN  Outcome: Progressing  Goal: Optimal Wound Healing  10/9/2024 0735 by Elvia Riley RN  Outcome: Progressing  10/9/2024 0735 by Elvia Riley RN  Outcome: Progressing  10/9/2024 0728 by Elvia Riley RN  Outcome: Progressing     Problem: Skin Injury Risk Increased  Goal: Skin Health and Integrity  10/9/2024 0735 by Elvia Riley RN  Outcome: Progressing  10/9/2024 0735 by Elvia Riley RN  Outcome: Progressing  10/9/2024 0728 by Elvia Riley RN  Outcome: Progressing     Problem: Fall Injury Risk  Goal: Absence of Fall and Fall-Related Injury  10/9/2024 0735 by Elvia Riley RN  Outcome: Progressing  10/9/2024 0735 by Elvia Riley RN  Outcome: Progressing     Problem: Pain Acute  Goal: Optimal Pain Control and Function  10/9/2024 0735 by Elvia Riley RN  Outcome: Progressing  10/9/2024 0735 by Elvia Riley RN  Outcome: Progressing     Problem: Infection  Goal: Absence of Infection Signs and Symptoms  10/9/2024 0735 by Elvia Riley RN  Outcome: Progressing  10/9/2024 0735 by Elvia Riley RN  Outcome: Progressing   POC updated and reviewed with the Father at bedside. Questions regarding POC were encouraged and addressed. VSS, see flowsheets. Patient opens eyes however, is global aphasic. Tele maintained per order.  Pain/Nausea management using PRNs, effective. See MAR for medication  administration details. Fall/safety precautions maintained. Seizure precautions maintained. No signs of injury noted over night. Patient is due to void after successful straight cath this past AM. Tube Feeds are at goal@50ml/hr with Free water flush 150 ml every 4 hours per pump. Upon exiting room, patient's bed locked in low position, side rails up x 4, bed alarm set, with call light within reach. Instructed patient to call staff for mobility, verbalized understanding.  No acute signs of distress noted at this time. Family was updated per Neurosurgery at bedside. No further questions at this time.

## 2024-10-09 NOTE — PLAN OF CARE
Problem: Adult Inpatient Plan of Care  Goal: Plan of Care Review  10/9/2024 1740 by Janene Riley LPN  Outcome: Met  10/9/2024 1740 by Janene Riley LPN  Outcome: Progressing  10/9/2024 1739 by Janene Riley LPN  Outcome: Progressing  Goal: Patient-Specific Goal (Individualized)  10/9/2024 1740 by Janene Riley LPN  Outcome: Met  10/9/2024 1740 by Janene Riley LPN  Outcome: Progressing  10/9/2024 1739 by Janene Riley LPN  Outcome: Progressing  Goal: Absence of Hospital-Acquired Illness or Injury  10/9/2024 1740 by Janene Riley LPN  Outcome: Met  10/9/2024 1740 by Janene Riley LPN  Outcome: Progressing  10/9/2024 1739 by Janene Riley LPN  Outcome: Progressing  Goal: Optimal Comfort and Wellbeing  10/9/2024 1740 by Janene Riley LPN  Outcome: Met  10/9/2024 1740 by Janene Riley LPN  Outcome: Progressing  10/9/2024 1739 by Janene Riley LPN  Outcome: Progressing  Goal: Readiness for Transition of Care  10/9/2024 1740 by Janene Riley LPN  Outcome: Met  10/9/2024 1740 by Janene Riley LPN  Outcome: Progressing  10/9/2024 1739 by Janene Riley LPN  Outcome: Progressing

## 2024-10-09 NOTE — NURSING
Father returned to room and updated to status and questions answered. Expressed concern about In/Out Catheter Procedure. Explained that Neurosurgery on call notified of Bladder Scan. Father and family friend request Charge RN to evaluate patient at this time as they believe the patients left side of face to be bruised and swollen. Ana Charge RN notified and at bedside to evaluate patient. No swelling or bruising noted to face from previous evaluation. Request Neurosurgery to be notified and evaluate prior to restarting IV.

## 2024-10-09 NOTE — NURSING
Neuro Surgery, Dr. Roby Ospina on call notified of patient's fathers request and concern. Spoke with CHRIS Perez Charge Nurse. All VS WNL no acute distress noted. Father requested to hold on placement of PIV or In/Out cath until after MD assessed.
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
## 2024-10-09 NOTE — CARE UPDATE
Repeat CXR reviewed, no overt consolidation, stable from prior.   Afebrile, VSS  Bladder scan at 12:00 pm with 250 cc. Huddleston catheter placed. Silodosin started to be administered through g tube. 1 week voiding trial with Urology    Patient stable for discharge. Instructions discussed with patient's grandmother and patient's father.     Kizzy Rice PA-C   [phone]  Neurosurgery  [facility]

## 2024-10-09 NOTE — PLAN OF CARE
[facility] - [facility]surgery (Hospital)  Initial Discharge Assessment       Primary Care Provider: Maricel Batista MD    Admission Diagnosis: Spastic quadriparesis [G82.50]    Admission Date: 10/7/2024  Expected Discharge Date: 10/9/2024    Transition of Care Barriers: None    Payor: MEDICAID / Plan: [employer] / Product Type: Managed Medicaid /     Extended Emergency Contact Information  Primary Emergency Contact: Vicki Posey  Mobile Phone: [phone]  Relation: Grandparent  Preferred language: English   needed? No  Secondary Emergency Contact: KalpeshMick  Address: [address] [place] [address] [place]  Mobile Phone: [phone]  Relation: Father  Preferred language: English   needed? No    Discharge Plan A: Home with family  Discharge Plan B: Home with family      [id number] [place], [place] - [address] AT [facility] [address]  [address]  [facility] [address]  Phone: [phone] Fax: [phone]    [facility] [place] [facility] [place] [id number] [facility]  [address] [facility] [address]  Phone: [phone] Fax: [phone]    [id number] [place] - [address]  [address]  [facility] [address]  Phone: [phone] Fax: [phone]    [id number] [place] - [address]  [address]  [facility] [address]  Phone: [phone] Fax: [phone]    Cm obtained discharge planning assessment from patient grandmother who is at bedside, patient is non verbal.  Initial Assessment (most recent)       Adult Discharge Assessment - 10/09/24 1515          Discharge Assessment    Assessment Type Discharge Planning Assessment     Confirmed/corrected address, phone number and insurance Yes     Confirmed Demographics Correct on Facesheet     Source of Information family     Communicated EDINSON with patient/caregiver Yes     Reason For Admission Spastic  quadriparesis     People in Home parent(s);grandparent(s)     Do you expect to return to your current living situation? Yes     Do you have help at home or someone to help you manage your care at home? Yes     Who are your caregiver(s) and their phone number(s)? Vicki Posey - grandmother [phone] Mick Kalpesh - father [phone]     Prior to hospitilization cognitive status: Unable to Assess     Current cognitive status: Unable to Assess     Walking or Climbing Stairs Difficulty yes     Walking or Climbing Stairs transferring difficulty, dependent     Mobility Management bed bound     Dressing/Bathing Difficulty yes     Dressing/Bathing bathing difficulty, dependent     Equipment Currently Used at Home CPAP;feeding device;hospital bed;suction machine     Readmission within 30 days? No     Patient currently being followed by outpatient case management? No     Do you currently have service(s) that help you manage your care at home? No     Do you take prescription medications? Yes     Do you have prescription coverage? Yes     Coverage MEDICAID - [employer]     Do you have any problems affording any of your prescribed medications? No     Is the patient taking medications as prescribed? yes     How do you get to doctors appointments? health plan transportation     Are you on dialysis? No     Do you take coumadin? No     Discharge Plan A Home with family     Discharge Plan B Home with family     DME Needed Upon Discharge  none     Discharge Plan discussed with: Parent(s)     Transition of Care Barriers None        Physical Activity    On average, how many days per week do you engage in moderate to strenuous exercise (like a brisk walk)? 0 days     On average, how many minutes do you engage in exercise at this level? 0 min        Financial Resource Strain    How hard is it for you to pay for the very basics like food, housing, medical care, and heating? Not hard at all        Housing Stability    In the  last 12 months, was there a time when you were not able to pay the mortgage or rent on time? No     At any time in the past 12 months, were you homeless or living in a shelter (including now)? No        Transportation Needs    Has the lack of transportation kept you from medical appointments, meetings, work or from getting things needed for daily living? No        Food Insecurity    Within the past 12 months, you worried that your food would run out before you got the money to buy more. Never true     Within the past 12 months, the food you bought just didn't last and you didn't have money to get more. Never true        Stress    Do you feel stress - tense, restless, nervous, or anxious, or unable to sleep at night because your mind is troubled all the time - these days? Not at all        Social Isolation    How often do you feel lonely or isolated from those around you?  Never        Alcohol Use    Q1: How often do you have a drink containing alcohol? Never     Q2: How many drinks containing alcohol do you have on a typical day when you are drinking? Patient does not drink     Q3: How often do you have six or more drinks on one occasion? Never        Utilities    In the past 12 months has the electric, gas, oil, or water company threatened to shut off services in your home? No        Health Literacy    How often do you need to have someone help you when you read instructions, pamphlets, or other written material from your doctor or pharmacy? Always        OTHER    Name(s) of People in Home father and grandmother

## 2024-10-09 NOTE — PLAN OF CARE
CM attempted to schedule urology appointment, however, unsuccessful.  Central scheduling sent message to urology for appointment, referral in.

## 2024-10-09 NOTE — CARE UPDATE
I have reviewed the chart of Jesus Posey who is hospitalized for the following:    Active Hospital Problems    Diagnosis    *Spastic quadriparesis    Urinary retention    Functional quadriplegia     Requires assitance with all ADls  Peg dependant  Quadriparesis       Attention to gastrostomy     Peg in place      Status post insertion of intrathecal baclofen pump    Body mass index (BMI) less than 19     underweight      At risk for aspiration related to tube feeding    Atelectasis     Seen on imaging   IS  Deep breathing exercises      Traumatic brain injury        Jessica Prater NP  Unit Based DARINEL
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
## 2024-10-09 NOTE — PLAN OF CARE
[facility]surgery (Hospital)  Discharge Final Note    Primary Care Provider: Maricel Batista MD    Expected Discharge Date: 10/9/2024    Patient to be discharged home.   [facility] to provide stretcher transportation.    Future Appointments   Date Time Provider Department Center   10/23/2024 11:00 AM Timoteo Lopez RN [facility] NEUROS8 [facility]   11/19/2024  2:20 PM Michael Nunez MD [facility]        Final Discharge Note (most recent)       Final Note - 10/09/24 1523          Final Note    Assessment Type Final Discharge Note     Anticipated Discharge Disposition Home or Self Care        Post-Acute Status    Post-Acute Authorization Other     Other Status No Post-Acute Service Needs     Discharge Delays None known at this time                     Important Message from Medicare

## 2024-10-09 NOTE — SUBJECTIVE & OBJECTIVE
Interval History: NAEON. Afebrile, VSS on RA. No symptoms of fever, cough, and/or shortness of breath. Repeat CXR without consolidation. Low suspicion for aspiration pneumonia at this time.     Review of Systems   Constitutional:  Negative for chills and fever.   Respiratory:  Negative for cough and shortness of breath.    Cardiovascular:  Negative for chest pain.   Gastrointestinal:  Negative for abdominal pain, constipation, diarrhea, nausea and vomiting.     Objective:     Vital Signs (Most Recent):  Temp: 97.6 °F (36.4 °C) (10/09/24 0827)  Pulse: 82 (10/09/24 1505)  Resp: 18 (10/09/24 1110)  BP: 99/66 (10/09/24 1110)  SpO2: (!) 93 % (10/09/24 1110) Vital Signs (24h Range):  Temp:  [93 °F (33.9 °C)-98.4 °F (36.9 °C)] 97.6 °F (36.4 °C)  Pulse:  [71-93] 82  Resp:  [16-20] 18  SpO2:  [93 %-100 %] 93 %  BP: ()/(65-80) 99/66     Weight: 53.9 kg (118 lb 13.3 oz)  Body mass index is 18.61 kg/m².    Intake/Output Summary (Last 24 hours) at 10/9/2024 1520  Last data filed at 10/9/2024 0622  Gross per 24 hour   Intake 1419.14 ml   Output 650 ml   Net 769.14 ml         Physical Exam  Vitals and nursing note reviewed.   Constitutional:       Appearance: Normal appearance.   HENT:      Head: Normocephalic.      Nose: Nose normal.   Eyes:      Conjunctiva/sclera: Conjunctivae normal.   Cardiovascular:      Rate and Rhythm: Normal rate and regular rhythm.      Pulses: Normal pulses.      Heart sounds: Normal heart sounds.   Pulmonary:      Effort: Pulmonary effort is normal.      Breath sounds: Normal breath sounds.   Abdominal:      General: Abdomen is flat.      Tenderness: There is no abdominal tenderness. There is no guarding or rebound.      Comments: PEG Tube   Musculoskeletal:      Right lower leg: No edema.      Left lower leg: No edema.   Neurological:      Mental Status: Mental status is at baseline.   Psychiatric:         Mood and Affect: Mood normal.         Behavior: Behavior normal.             Significant  Labs: All pertinent labs within the past 24 hours have been reviewed.    Significant Imaging: I have reviewed all pertinent imaging results/findings within the past 24 hours.
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
## 2024-10-12 LAB
BACTERIA BLD CULT: NORMAL

## 2024-11-19 ENCOUNTER — OFFICE VISIT (OUTPATIENT)
Dept: PHYSICAL MEDICINE AND REHAB | Facility: CLINIC | Age: 19
End: 2024-11-19
Payer: MEDICAID

## 2024-11-19 VITALS
BODY MASS INDEX: 18.61 KG/M2 | HEART RATE: 73 BPM | SYSTOLIC BLOOD PRESSURE: 93 MMHG | DIASTOLIC BLOOD PRESSURE: 55 MMHG | HEIGHT: 67 IN

## 2024-11-19 DIAGNOSIS — G82.50 SPASTIC QUADRIPARESIS: Primary | ICD-10-CM

## 2024-11-19 DIAGNOSIS — S06.9X6S TRAUMATIC BRAIN INJURY, WITH LOSS OF CONSCIOUSNESS GREATER THAN 24 HOURS WITHOUT RETURN TO PRE-EXISTING CONSCIOUS LEVEL WITH PATIENT SURVIVING, SEQUELA: ICD-10-CM

## 2024-11-19 PROCEDURE — 62368 ANALYZE SP INF PUMP W/REPROG: CPT | Mod: PBBFAC | Performed by: PHYSICAL MEDICINE & REHABILITATION

## 2024-11-19 PROCEDURE — 64642 CHEMODENERV 1 EXTREMITY 1-4: CPT | Mod: PBBFAC | Performed by: PHYSICAL MEDICINE & REHABILITATION

## 2024-11-19 PROCEDURE — 99999PBSHW PR PBB SHADOW TECHNICAL ONLY FILED TO HB: Mod: JZ,PBBFAC,,

## 2024-11-19 PROCEDURE — 99999 PR PBB SHADOW E&M-EST. PATIENT-LVL III: CPT | Mod: PBBFAC,,, | Performed by: PHYSICAL MEDICINE & REHABILITATION

## 2024-11-19 PROCEDURE — 99213 OFFICE O/P EST LOW 20 MIN: CPT | Mod: PBBFAC | Performed by: PHYSICAL MEDICINE & REHABILITATION

## 2024-11-19 RX ADMIN — ONABOTULINUMTOXINA 600 UNITS: 100 INJECTION, POWDER, LYOPHILIZED, FOR SOLUTION INTRADERMAL; INTRAMUSCULAR at 02:11

## 2024-11-21 NOTE — PROCEDURES
ITB Pump Record/Settings:   Estimated Pump Replacement: June 2031  Last examined:  10/7/2024  Last change: 10/7/2024  Drug Concentration: 500 mcg/mL  Infusion Mode: Simple Continuous  Reservoir Volume: 20 mL  Low Reservoir Alarm Date:   N/A      ITB PUMP EVALUATION AND REPROGRAMMING PROCEDURE NOTE:    The pump was interrogated and was found to be delivering a dose of 50.06 ug/day with reservoir volume of 15.3 mL.  It was reprogrammed to flex bolus dosing to deliver the following:    Base Rate:  (2.09 mcg/hr):  47.97 mcg  7:30-8:00 AM: (4.00 mcg/hr): 2.00 mcg  7:30-8:00 PM: (4.00 mcg/hr): 2.00 mcg    This will deliver a total dose of 51.98 ug/day (+3.8%) without difficulty.  The new LRAD (low reservoir alarm date) is 3/27/2025.          Follow up: Will refill with 2000 mcg/mL/20 mL at next botox appointment. Refill date: 3/27/2025.

## 2024-11-21 NOTE — PROCEDURES
Botulinum Injection  Location: Limbs/Trunk    Date/Time: 11/19/2024 2:20 PM    Performed by: Michael Nunez MD  Authorized by: Michael Nunez MD      Consent:      Consent obtained:  Verbal     Consent given by:  Parent     Risks discussed:  Bleeding, infection, pain and weakness     Alternatives discussed:  Observation and alternative treatment    Universal protocol:      Relevant documents present and verified:  Yes       Site/side verified:  Yes       Immediately prior to procedure a time out was called:  Yes       Patient identity confirmed:  Provided demographic data and hospital-assigned identification number    Procedure details:      EMG used?:  Yes     Diluted by:  Preservative free saline     Toxin (Brand):  OnaBoNT-A (Botox)     Comments about dilution:  1:1     Concentration (u/mL):  100     Total number of units available:  600     Muscle area injected: forearm, hand and leg    Upper extremity - forearm:      Right flexor carpi radialis:  50 units divided amongst 1 site(s)     Right flexor carpi ulnaris:  50 units divided amongst 1 site(s)     Right flexor digitorum profundus:  50 units divided amongst 1 site(s)     Left flexor digitorum profundus:  50 units divided amongst 1 site(s)     Right flexor digitorum superficialis:  50 units divided amongst 1 site(s)     Left flexor digitorum superficialis:  50 units divided amongst 1 site(s)     Left flexor pollicis longus:  50 units divided amongst 1 site(s)     Right pronator teres:  50 units divided amongst 1 site(s)     Left pronator teres:  50 units divided amongst 1 site(s)    Lower extremity - leg:      Right lateral head gastrocnemius:  25 units divided amongst 1 site(s)     Left lateral head gastrocnemius:  25 units divided amongst 1 site(s)     Right medial head gastrocnemius:  25 units divided amongst 1 site(s)     Left medial head gastrocnemius:  25 units divided amongst 1 site(s)     Right soleus:  25 units divided amongst 1 site(s)      Left soleus:  25 units divided amongst 1 site(s)       Total units injected:  600     Total units wasted:  0    Medications: 600 Units onabotulinumtoxina 100 unit
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
## 2024-12-04 ENCOUNTER — TELEPHONE (OUTPATIENT)
Dept: NEUROSURGERY | Facility: CLINIC | Age: 19
End: 2024-12-04
Payer: MEDICAID

## 2024-12-05 ENCOUNTER — OFFICE VISIT (OUTPATIENT)
Dept: NEUROSURGERY | Facility: CLINIC | Age: 19
End: 2024-12-05
Payer: MEDICAID

## 2024-12-05 DIAGNOSIS — Z97.8 STATUS POST INSERTION OF INTRATHECAL BACLOFEN PUMP: Primary | ICD-10-CM

## 2024-12-05 PROCEDURE — 99024 POSTOP FOLLOW-UP VISIT: CPT | Mod: ,,, | Performed by: NEUROLOGICAL SURGERY
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
## 2025-01-07 ENCOUNTER — TELEPHONE (OUTPATIENT)
Dept: PHYSICAL MEDICINE AND REHAB | Facility: CLINIC | Age: 20
End: 2025-01-07
Payer: MEDICAID

## 2025-01-07 NOTE — TELEPHONE ENCOUNTER
"----- Message from Faby sent at 1/7/2025 11:16 AM CST -----  Regarding: pt advice  Contact: [phone]  .Name Of Caller: Dadaella/grandmother      Contact Preference?:[phone]     What is the nature of the call?: in reference to ordering tubes for pt medication, pls call     Additional Notes:  "Thank you for all that you do for our patients"  "

## 2025-02-11 ENCOUNTER — TELEPHONE (OUTPATIENT)
Dept: PHYSICAL MEDICINE AND REHAB | Facility: CLINIC | Age: 20
End: 2025-02-11
Payer: MEDICAID

## 2025-02-11 DIAGNOSIS — G82.50 SPASTIC QUADRIPARESIS: Primary | ICD-10-CM

## 2025-03-26 ENCOUNTER — PROCEDURE VISIT (OUTPATIENT)
Dept: PHYSICAL MEDICINE AND REHAB | Facility: CLINIC | Age: 20
End: 2025-03-26
Payer: MEDICAID

## 2025-03-26 VITALS
SYSTOLIC BLOOD PRESSURE: 88 MMHG | WEIGHT: 108 LBS | DIASTOLIC BLOOD PRESSURE: 64 MMHG | HEART RATE: 69 BPM | BODY MASS INDEX: 16.92 KG/M2

## 2025-03-26 DIAGNOSIS — S06.9XAD TRAUMATIC BRAIN INJURY, WITH UNKNOWN LOSS OF CONSCIOUSNESS STATUS, SUBSEQUENT ENCOUNTER: Primary | ICD-10-CM

## 2025-03-26 DIAGNOSIS — G82.50 SPASTIC QUADRIPARESIS: ICD-10-CM

## 2025-03-28 NOTE — PROCEDURES
ITB Pump Record/Settings:   Estimated Pump Replacement: June 2031  Last examined:  10/7/2024  Last change: 10/7/2024  Drug Concentration: 500 mcg/mL  Infusion Mode: Flex bolus  Reservoir Volume: 20 mL  Low [place] Alarm Date:   3/27/2025      ITB PUMP REFILL PROCEDURE NOTE:    The potential risks were discussed with the patient and the patient elected to proceed.  The patient remained in a supine position and the pump was located by palpation.  The pump was interrogated and found to be delivering a dose of 51.98 ug/day with a residual volume of 2.2ml.      Using sterile technique the area was cleaned with betadine and a sterile field applied.  Using a 22G needle the port was pierced with no  difficulty and 2.5 ml residual diluent was aspirated.  The new diluent was prepared in a 40cc syringe and delivered using the supplied filter without difficulty.  The pump was then reprogrammed for the new volume as follows:    Base Rate:  (2.18 mcg/hr):  48.98 mcg  7:30-8:00 AM:    (4.00 mcg/hr): 2.00 mcg  12:00 PM -12:30PM: (4.00 mcg/hr): 2.00 mcg  7:30-8:00 PM: (4.00 mcg/hr): 2.00 mcg    This will deliver a total dose of 54.99 ug/day (+5.8%) without difficulty.  The new LRAD (low reservoir alarm date) is 9/5/2025.          Follow up: 500 mcg/ml, 20 mL. Refill date: 9/5/2025.
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
## 2025-03-31 PROCEDURE — 99285 EMERGENCY DEPT VISIT HI MDM: CPT

## 2025-04-01 ENCOUNTER — HOSPITAL ENCOUNTER (INPATIENT)
Facility: HOSPITAL | Age: 20
LOS: 23 days | Discharge: HOME HEALTH | DRG: 393 | End: 2025-04-24
Attending: EMERGENCY MEDICINE | Admitting: HOSPITALIST
Payer: MEDICAID

## 2025-04-01 DIAGNOSIS — K94.23 PEG TUBE MALFUNCTION: ICD-10-CM

## 2025-04-01 DIAGNOSIS — R53.2 FUNCTIONAL QUADRIPLEGIA: Chronic | ICD-10-CM

## 2025-04-01 DIAGNOSIS — R07.9 CHEST PAIN: ICD-10-CM

## 2025-04-01 DIAGNOSIS — R00.1 BRADYCARDIA: ICD-10-CM

## 2025-04-01 DIAGNOSIS — G82.50 SPASTIC QUADRIPARESIS: Chronic | ICD-10-CM

## 2025-04-01 DIAGNOSIS — K94.13 MALFUNCTION OF JEJUNOSTOMY TUBE: Primary | ICD-10-CM

## 2025-04-01 DIAGNOSIS — I49.9 IRREGULAR HEART RATE: ICD-10-CM

## 2025-04-01 DIAGNOSIS — S06.9X9D TRAUMATIC BRAIN INJURY WITH LOSS OF CONSCIOUSNESS, SUBSEQUENT ENCOUNTER: ICD-10-CM

## 2025-04-01 DIAGNOSIS — R00.0 TACHYCARDIA: ICD-10-CM

## 2025-04-01 DIAGNOSIS — J96.01 ACUTE HYPOXIC RESPIRATORY FAILURE: ICD-10-CM

## 2025-04-01 PROBLEM — R64 CACHEXIA: Chronic | Status: ACTIVE | Noted: 2024-10-08

## 2025-04-01 LAB
ABSOLUTE EOSINOPHIL (OHS): 0.42 K/UL
ABSOLUTE MONOCYTE (OHS): 0.33 K/UL (ref 0.3–1)
ABSOLUTE NEUTROPHIL COUNT (OHS): 1.24 K/UL (ref 1.8–7.7)
ALBUMIN SERPL BCP-MCNC: 3.5 G/DL (ref 3.5–5.2)
ALP SERPL-CCNC: 205 UNIT/L (ref 40–150)
ALT SERPL W/O P-5'-P-CCNC: 53 UNIT/L (ref 10–44)
ANION GAP (OHS): 12 MMOL/L (ref 8–16)
AST SERPL-CCNC: 28 UNIT/L (ref 11–45)
BASOPHILS # BLD AUTO: 0.04 K/UL
BASOPHILS NFR BLD AUTO: 1.1 %
BILIRUB SERPL-MCNC: 0.3 MG/DL (ref 0.1–1)
BILIRUB UR QL STRIP.AUTO: NEGATIVE
BUN SERPL-MCNC: 10 MG/DL (ref 6–20)
CALCIUM SERPL-MCNC: 9.8 MG/DL (ref 8.7–10.5)
CHLORIDE SERPL-SCNC: 100 MMOL/L (ref 95–110)
CLARITY UR: CLEAR
CO2 SERPL-SCNC: 29 MMOL/L (ref 23–29)
COLOR UR AUTO: YELLOW
CREAT SERPL-MCNC: 0.5 MG/DL (ref 0.5–1.4)
ERYTHROCYTE [DISTWIDTH] IN BLOOD BY AUTOMATED COUNT: 14.1 % (ref 11.5–14.5)
GFR SERPLBLD CREATININE-BSD FMLA CKD-EPI: >60 ML/MIN/1.73/M2
GLUCOSE SERPL-MCNC: 67 MG/DL (ref 70–110)
GLUCOSE UR QL STRIP: NEGATIVE
HCT VFR BLD AUTO: 39.6 % (ref 40–54)
HGB BLD-MCNC: 13.1 GM/DL (ref 14–18)
HGB UR QL STRIP: NEGATIVE
IMM GRANULOCYTES # BLD AUTO: 0.01 K/UL (ref 0–0.04)
IMM GRANULOCYTES NFR BLD AUTO: 0.3 % (ref 0–0.5)
INFLUENZA A BY PCR (OHS): NEGATIVE
INFLUENZA B BY PCR (OHS): NEGATIVE
KETONES UR QL STRIP: NEGATIVE
LACTATE SERPL-SCNC: 0.9 MMOL/L (ref 0.5–2.2)
LEUKOCYTE ESTERASE UR QL STRIP: NEGATIVE
LYMPHOCYTES # BLD AUTO: 1.51 K/UL (ref 1–4.8)
MAGNESIUM SERPL-MCNC: 2 MG/DL (ref 1.6–2.6)
MCH RBC QN AUTO: 30.6 PG (ref 27–31)
MCHC RBC AUTO-ENTMCNC: 33.1 G/DL (ref 32–36)
MCV RBC AUTO: 93 FL (ref 82–98)
NITRITE UR QL STRIP: NEGATIVE
NUCLEATED RBC (/100WBC) (OHS): 0 /100 WBC
PH UR STRIP: 8 [PH]
PHOSPHATE SERPL-MCNC: 3.7 MG/DL (ref 2.7–4.5)
PLATELET # BLD AUTO: 229 K/UL (ref 150–450)
PMV BLD AUTO: 11.6 FL (ref 9.2–12.9)
POCT GLUCOSE: 76 MG/DL (ref 70–110)
POTASSIUM SERPL-SCNC: 3.5 MMOL/L (ref 3.5–5.1)
PROCALCITONIN SERPL-MCNC: <0.02 NG/ML
PROT SERPL-MCNC: 7.7 GM/DL (ref 6–8.4)
PROT UR QL STRIP: NEGATIVE
RBC # BLD AUTO: 4.28 M/UL (ref 4.6–6.2)
RELATIVE EOSINOPHIL (OHS): 11.8 %
RELATIVE LYMPHOCYTE (OHS): 42.5 % (ref 18–48)
RELATIVE MONOCYTE (OHS): 9.3 % (ref 4–15)
RELATIVE NEUTROPHIL (OHS): 35 % (ref 38–73)
RSV A 5' UTR RNA NPH QL NAA+PROBE: NEGATIVE
SARS-COV-2 RNA RESP QL NAA+PROBE: NEGATIVE
SODIUM SERPL-SCNC: 141 MMOL/L (ref 136–145)
SP GR UR STRIP: 1.01
UROBILINOGEN UR STRIP-ACNC: NEGATIVE EU/DL
WBC # BLD AUTO: 3.55 K/UL (ref 3.9–12.7)

## 2025-04-01 PROCEDURE — 81003 URINALYSIS AUTO W/O SCOPE: CPT | Performed by: HOSPITALIST

## 2025-04-01 PROCEDURE — 83735 ASSAY OF MAGNESIUM: CPT | Performed by: HOSPITALIST

## 2025-04-01 PROCEDURE — 87040 BLOOD CULTURE FOR BACTERIA: CPT | Performed by: HOSPITALIST

## 2025-04-01 PROCEDURE — 80053 COMPREHEN METABOLIC PANEL: CPT | Performed by: HOSPITALIST

## 2025-04-01 PROCEDURE — 63600175 PHARM REV CODE 636 W HCPCS: Performed by: STUDENT IN AN ORGANIZED HEALTH CARE EDUCATION/TRAINING PROGRAM

## 2025-04-01 PROCEDURE — 25500020 PHARM REV CODE 255: Performed by: HOSPITALIST

## 2025-04-01 PROCEDURE — 21400001 HC TELEMETRY ROOM

## 2025-04-01 PROCEDURE — 84100 ASSAY OF PHOSPHORUS: CPT | Performed by: HOSPITALIST

## 2025-04-01 PROCEDURE — 63600175 PHARM REV CODE 636 W HCPCS: Performed by: HOSPITALIST

## 2025-04-01 PROCEDURE — 51798 US URINE CAPACITY MEASURE: CPT

## 2025-04-01 PROCEDURE — 0241U SARS-COV2 (COVID) WITH FLU/RSV BY PCR: CPT | Performed by: HOSPITALIST

## 2025-04-01 PROCEDURE — 11000001 HC ACUTE MED/SURG PRIVATE ROOM

## 2025-04-01 PROCEDURE — 99284 EMERGENCY DEPT VISIT MOD MDM: CPT | Mod: ,,, | Performed by: PHYSICIAN ASSISTANT

## 2025-04-01 PROCEDURE — 51701 INSERT BLADDER CATHETER: CPT

## 2025-04-01 PROCEDURE — 83605 ASSAY OF LACTIC ACID: CPT | Performed by: HOSPITALIST

## 2025-04-01 PROCEDURE — 85025 COMPLETE CBC W/AUTO DIFF WBC: CPT | Performed by: HOSPITALIST

## 2025-04-01 PROCEDURE — 84145 PROCALCITONIN (PCT): CPT | Performed by: HOSPITALIST

## 2025-04-01 RX ORDER — NALOXONE HCL 0.4 MG/ML
0.02 VIAL (ML) INJECTION
Status: DISCONTINUED | OUTPATIENT
Start: 2025-04-01 | End: 2025-04-24 | Stop reason: HOSPADM

## 2025-04-01 RX ORDER — LORAZEPAM 2 MG/ML
0.75 INJECTION INTRAMUSCULAR EVERY 12 HOURS
Status: DISCONTINUED | OUTPATIENT
Start: 2025-04-01 | End: 2025-04-03

## 2025-04-01 RX ORDER — DEXTROSE, SODIUM CHLORIDE, SODIUM LACTATE, POTASSIUM CHLORIDE, AND CALCIUM CHLORIDE 5; .6; .31; .03; .02 G/100ML; G/100ML; G/100ML; G/100ML; G/100ML
INJECTION, SOLUTION INTRAVENOUS CONTINUOUS
Status: DISCONTINUED | OUTPATIENT
Start: 2025-04-01 | End: 2025-04-03

## 2025-04-01 RX ORDER — SODIUM CHLORIDE, SODIUM LACTATE, POTASSIUM CHLORIDE, CALCIUM CHLORIDE 600; 310; 30; 20 MG/100ML; MG/100ML; MG/100ML; MG/100ML
INJECTION, SOLUTION INTRAVENOUS CONTINUOUS
Status: DISCONTINUED | OUTPATIENT
Start: 2025-04-01 | End: 2025-04-01

## 2025-04-01 RX ORDER — LEVETIRACETAM 500 MG/5ML
500 INJECTION, SOLUTION, CONCENTRATE INTRAVENOUS EVERY 12 HOURS
Status: DISCONTINUED | OUTPATIENT
Start: 2025-04-01 | End: 2025-04-03

## 2025-04-01 RX ORDER — SODIUM CHLORIDE 0.9 % (FLUSH) 0.9 %
1-10 SYRINGE (ML) INJECTION EVERY 12 HOURS PRN
Status: DISCONTINUED | OUTPATIENT
Start: 2025-04-01 | End: 2025-04-24 | Stop reason: HOSPADM

## 2025-04-01 RX ADMIN — SODIUM CHLORIDE, SODIUM LACTATE, POTASSIUM CHLORIDE, CALCIUM CHLORIDE AND DEXTROSE MONOHYDRATE: 5; 600; 310; 30; 20 INJECTION, SOLUTION INTRAVENOUS at 01:04

## 2025-04-01 RX ADMIN — SODIUM CHLORIDE, POTASSIUM CHLORIDE, SODIUM LACTATE AND CALCIUM CHLORIDE: 600; 310; 30; 20 INJECTION, SOLUTION INTRAVENOUS at 03:04

## 2025-04-01 RX ADMIN — LEVETIRACETAM 500 MG: 100 INJECTION INTRAVENOUS at 08:04

## 2025-04-01 RX ADMIN — LORAZEPAM 0.75 MG: 2 INJECTION INTRAMUSCULAR; INTRAVENOUS at 10:04

## 2025-04-01 RX ADMIN — LORAZEPAM 0.75 MG: 2 INJECTION INTRAMUSCULAR; INTRAVENOUS at 03:04

## 2025-04-01 RX ADMIN — LEVETIRACETAM 500 MG: 100 INJECTION INTRAVENOUS at 03:04

## 2025-04-01 RX ADMIN — LEVETIRACETAM 500 MG: 100 INJECTION INTRAVENOUS at 10:04

## 2025-04-01 RX ADMIN — IOHEXOL 30 ML: 350 INJECTION, SOLUTION INTRAVENOUS at 11:04

## 2025-04-01 NOTE — PLAN OF CARE
Recommendations  TF RECS: Nutren 1.5 @ 60 mL/hr x 24 hours to provide 1440 total fluid volume, 2160 kcals, 98 g PRO, 1100 mL fluid,144 % DRI  At home Tube feeding regimen: Nutren 1.5 @ 65 mL/hr x 14 hours to provide 910 total fluid volume, 1365 kcals, 62 g protein, 691 mL fluid, 91% DRI - FWF per MD (provides 81% EEN/63% EPN)    Monitor labs, meds, weight, skin    Goals: Meet % een/epn via EN by next RD f/u, maintain weight during admission  Nutrition Goal Status: new  Communication of RD Recs: other (comment) (poc

## 2025-04-01 NOTE — Clinical Note
Diagnosis: Malfunction of jejunostomy tube [874910]   Future Attending Provider: TITUS HALL [[id number]]

## 2025-04-01 NOTE — ED TRIAGE NOTES
Jesus Posey, a 19 y.o. male presents to the ED w/ complaint of     Triage note:  Chief Complaint   Patient presents with    J-Tube Malfunction     Patient from home via EMS, patient J Tube with a piece malfunctioning and unable to administer home medications and tube feedings.      Review of patient's allergies indicates:  No Known Allergies  Past Medical History:   Diagnosis Date    Traumatic brain injury

## 2025-04-01 NOTE — NURSING
Unable to obtain an oral or axillary temp on patient. Core rectal temp 92.5 Luisa hugger applied.

## 2025-04-01 NOTE — H&P
[facility] - Emergency [facility] Medicine  History & Physical    Patient Name: Jesus Posey  MRN: [id number]  Patient Class: OP- Observation  Admission Date: 4/1/2025  Attending Physician: Elvia Judd MD   Primary Care Provider: Pallavi Primary Doctor         Patient information was obtained from parent, past medical records, and ER records.     Subjective:     Principal Problem:PEG tube malfunction    Chief Complaint:   Chief Complaint   Patient presents with    J-Tube Malfunction     Patient from home via EMS, patient J Tube with a piece malfunctioning and unable to administer home medications and tube feedings.         HPI: Jesus Posey is a 19 y.o. 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     As he is nonverbal, father at bedside provides history.     He reports that yesterday, they noted leakage from his GJ tube which resembled his tube feeds. Otherwise, he has had no new issues.     They exchanged his tube last month.     Past Medical History:   Diagnosis Date    Traumatic brain injury        Past Surgical History:   Procedure Laterality Date    BACLOFEN PUMP IMPLANTATION N/A 10/7/2024    Procedure: INSERTION, INTRATHECAL BACLOFEN PUMP;  Surgeon: Estefany Rogers MD;  Location: [facility] OR [address] [place] [address];  Service: Neurosurgery;  Laterality: N/A;  Anes: Gen  Bed: Reg, Reversed  Pos: Left Lat  Rad: C-arm  Medtronic Pump: 20cc, Baclofen 500 mcg/mL    CRANIECTOMY Right     CRANIOPLASTY FOR CRANIAL DEFECT Right     GASTROSTOMY TUBE PLACEMENT      LUMBAR PUNCTURE N/A 9/23/2024    Procedure: Lumbar Puncture;  Surgeon: Estefany Rogers MD;  Location: [facility] OR [address] [place] [address];  Service: Neurosurgery;  Laterality: N/A;  LP for Baclofen Trial    PLACEMENT OF JEJUNOSTOMY TUBE         Review of patient's allergies indicates:  No Known Allergies    Current Facility-Administered Medications on File Prior to Encounter   Medication    onabotulinumtoxina  injection 600 Units     Current Outpatient Medications on File Prior to Encounter   Medication Sig    amantadine HCL (SYMMETREL) 50 mg/5 mL Soln TAKE 10 ML VIA GTUBE TWICE DAILY    B-complex with vitamin C (Z-BEC OR EQUIV) tablet 1 tablet by Per G Tube route once daily.    baclofen 25 mg/5 mL (5 mg/mL) Susp oral liquid 25 mg by Per J Tube route 4 (four) times daily.    diazePAM (VALIUM) 5 mg/5 mL (1 mg/mL) oral solution SMARTSI Milliliter(s) Gastro Tube 3 Times Daily    erythromycin (ROMYCIN) ophthalmic ointment SMARTSI.5 Inch(es) In Eye(s) 4 Times Daily    famotidine (PEPCID) 40 mg/5 mL (8 mg/mL) suspension 5 mLs (40 mg total) by Per G Tube route once daily.    glycopyrrolate (CUVPOSA) 1 mg/5 mL (0.2 mg/mL) Soln 40 mcg/kg by FEEDING TUBE route 3 (three) times daily as needed.    hydroCHLOROthiazide (HYDRODIURIL) 12.5 MG Tab 12.5 mg by Per J Tube route once daily.    HYDROcodone-acetaminophen (NORCO) 5-325 mg per tablet 1 tablet by Per G Tube route every 4 (four) hours as needed for Pain.    levETIRAcetam (KEPPRA) 100 mg/mL Soln 5 mLs (500 mg total) by Per G Tube route 2 (two) times daily.    ondansetron (ZOFRAN-ODT) 4 MG TbDL Take 4 mg by mouth every 8 (eight) hours as needed.    pantoprazole (PROTONIX) 40 MG tablet Take 40 mg by mouth once daily.    polyethylene glycol (GLYCOLAX) 17 gram/dose powder Take 17 g by mouth 2 (two) times daily.    propranoloL (INDERAL) 20 mg/5 mL (4 mg/mL) Soln SMARTSI.5 Milliliter(s) Gastro Tube Twice Daily (Patient not taking: Reported on 2024)    QUILLIVANT XR 5 mg/mL (25 mg/5 mL) SR24 7 mLs by Per J Tube route Daily.    silodosin (RAPAFLO) 4 mg Cap capsule Take 1 capsule (4 mg total) by mouth once daily. Capsules may be opened and the powder sprinkled into G tube     Family History    None       Tobacco Use    Smoking status: Never    Smokeless tobacco: Never   Substance and Sexual Activity    Alcohol use: Not on file    Drug use: Not on file    Sexual activity: Not on  "file     Review of Systems   Unable to perform ROS: Patient nonverbal     Objective:     Vital Signs (Most Recent):  Temp: 98 °F (36.7 °C) (04/01/25 0146)  Pulse: 82 (04/01/25 0146)  Resp: 18 (04/01/25 0146)  BP: 112/80 (04/01/25 0146)  SpO2: 99 % (04/01/25 0146) Vital Signs (24h Range):  Temp:  [97.9 °F (36.6 °C)-98 °F (36.7 °C)] 98 °F (36.7 °C)  Pulse:  [80-82] 82  Resp:  [18] 18  SpO2:  [99 %] 99 %  BP: (109-112)/(73-80) 112/80     Weight: 49 kg (108 lb)  Body mass index is 16.92 kg/m².     Physical Exam  Vitals and nursing note reviewed.   Constitutional:       General: He is not in acute distress.     Appearance: He is well-developed. He is not diaphoretic.      Comments: Cachectic, eyes open but does not respond    HENT:      Head: Normocephalic and atraumatic.      Mouth/Throat:      Mouth: Mucous membranes are moist.   Eyes:      General: No scleral icterus.     Conjunctiva/sclera: Conjunctivae normal.   Neck:      Vascular: No JVD.   Cardiovascular:      Rate and Rhythm: Normal rate and regular rhythm.   Pulmonary:      Effort: Pulmonary effort is normal. No respiratory distress.   Abdominal:      General: There is no distension.      Tenderness: There is no abdominal tenderness.      Comments: G tube in place with powder surrounding, leakage from tube   Skin:     Coloration: Skin is not jaundiced or pale.   Neurological:      Mental Status: He is alert.      Motor: No abnormal muscle tone.      Comments: Does not attempt to speak, no seizure like activity, contractured extremities    Psychiatric:         Attention and Perception: He is inattentive.         Speech: He is noncommunicative.                Significant Labs: All pertinent labs within the past 24 hours have been reviewed.  CBC: No results for input(s): "WBC", "HGB", "HCT", "PLT" in the last 48 hours.  CMP: No results for input(s): "NA", "K", "CL", "CO2", "GLU", "BUN", "CREATININE", "CALCIUM", "PROT", "ALBUMIN", "BILITOT", "ALKPHOS", "AST", " ""ALT", "ANIONGAP", "EGFRNONAA" in the last 48 hours.    Invalid input(s): "ESTGFAFRICA"    Significant Imaging: I have reviewed all pertinent imaging results/findings within the past 24 hours.  Assessment/Plan:     Assessment & Plan  PEG tube malfunction  Patient with GJ tube dependence, presenting with tube malfunction. IR consulted for exchange under fluoroscopy.       Spastic quadriparesis  Intrathecal baclofen pump in place. Will continue home Keppra via IV, and switch home PO Valium to Lorazepam IV. Supportive care.     Functional quadriplegia  Intrathecal baclofen pump in place. Supportive care.     Cachexia  Resume tube feeds after GJ tube replaced.     VTE Risk Mitigation (From admission, onward)           Ordered     IP VTE LOW RISK PATIENT  Once         04/01/25 0209     Place sequential compression device  Until discontinued         04/01/25 0209                       On 04/01/2025, patient should be placed in hospital observation services under my care.             Albaro Nicole MD  Department of Hospital Medicine  [facility] - Emergency Dept          "

## 2025-04-01 NOTE — Clinical Note
"Jesus Rodarteflavia Posey was seen and treated in our emergency department on 3/31/2025.  He may return to work on 04/03/2025.       If you have any questions or concerns, please don't hesitate to call.       RN    "

## 2025-04-01 NOTE — ED PROVIDER NOTES
"Encounter Date: 04/01/2025       Chief Complaint   J-Tube Malfunction (Patient from home via EMS, patient J Tube with a piece malfunctioning and unable to administer home medications and tube feedings. )      History Of Present Illness     Jesus Posey is a 19 y.o. male w/ a PMHx of TBI w/significant deficits including functional quadriplegia, J tube placed in 2023 due to inability to tolerate tube feeding via GJ tube.  Patient presents to the ED today accompanied by family member who states that a small piece of the GJ-tube connector broke off this afternoon and he is now no longer able to give medications.  Caregiver denies changes in mental status/activity, fevers, trauma, increased WOB, evidence of abdominal pain, missed medication doses, changes in urine/stool output, melena/hematochezia.    Please see MDM for additional details.      Past History   As per HPI and below:  Past Medical History[1]  Past Surgical History[2]  Medications Ordered Prior to Encounter[3]    Social History[4]  family history is not on file.   Review of patient's allergies indicates:  No Known Allergies    Physical Exam     Vitals:    03/31/25 2353 04/01/25 0146 04/01/25 0358 04/01/25 0621   BP: 109/73 112/80 122/79    Pulse: 80 82 (!) 48 (!) 48   Resp: 18 18     Temp: 97.9 °F (36.6 °C) 98 °F (36.7 °C)     TempSrc: Oral      SpO2: 99% 99% 99%    Weight: 49 kg (108 lb)      Height: 5' 7" (1.702 m)        Physical Exam  Vitals and nursing note reviewed.   Constitutional:       General: He is not in acute distress.     Appearance: Normal appearance. He is not ill-appearing.   HENT:      Head: Normocephalic and atraumatic.      Nose: Nose normal.   Eyes:      Conjunctiva/sclera: Conjunctivae normal.      Pupils: Pupils are equal, round, and reactive to light.   Cardiovascular:      Rate and Rhythm: Normal rate.   Pulmonary:      Effort: Pulmonary effort is normal. No respiratory distress.      Breath sounds: Normal breath sounds.   Abdominal: "      General: Abdomen is flat. There is no distension.      Tenderness: There is no abdominal tenderness.      Comments: Small defect in GJ tube port.  Otherwise GJ tube does not appear well-positioned.  No abdominal injury noted.   Musculoskeletal:         General: No tenderness, deformity or signs of injury.   Skin:     General: Skin is warm and dry.      Findings: No bruising, erythema, lesion or rash.   Neurological:      Mental Status: He is alert. Mental status is at baseline.   Psychiatric:         Behavior: Behavior normal.            Medications Given     Medications   levETIRAcetam injection 500 mg (500 mg Intravenous Given 4/1/25 0337)   sodium chloride 0.9% flush 1-10 mL (has no administration in time range)   naloxone 0.4 mg/mL injection 0.02 mg (has no administration in time range)   LORazepam injection 0.75 mg (0.75 mg Intravenous Given 4/1/25 0338)   lactated ringers infusion ( Intravenous New Bag 4/1/25 0339)       Labs & Imaging     Labs Reviewed   HEPATITIS C ANTIBODY   HIV 1 / 2 ANTIBODY       Imaging Results    None         AMBAR Posey is a 19 y.o. male who presents to the emergency department for GJ tube malfunction as detailed in HPI. On exam, patient in NAD and VSS.  Patient at neurologic baseline.  No apparent abdominal tenderness to palpation.  GJ tube appears clean, dry, intact.  Small plastic piece of G-tube is cracked which will likely prevent medication delivery.  Otherwise, physical exam largely unremarkable.  Fluoro GJ tube replacement ordered.     Labs:  None indicated    Imaging:  None indicated     Most likely Dx:  At this time, I have highest suspicion for GJ tube malfunction w/small defect in medication port.  No evidence of acute injury or malpositioning of GJ tube.    Disposition:  Admitted.  Patient will have GJ tube replaced in the inpatient setting.    Please see HPI, physical exam, ED course for additional details.    Procedures       ED Disposition Condition     Observation Stable            Colby Benites MD         [1]   Past Medical History:  Diagnosis Date    Traumatic brain injury    [2]   Past Surgical History:  Procedure Laterality Date    BACLOFEN PUMP IMPLANTATION N/A 10/7/2024    Procedure: INSERTION, INTRATHECAL BACLOFEN PUMP;  Surgeon: Estefany Rogers MD;  Location: [facility] OR 2ND FLR;  Service: Neurosurgery;  Laterality: N/A;  Anes: Gen  Bed: Reg, Reversed  Pos: Left Lat  Rad: C-arm  Medtronic Pump: 20cc, Baclofen 500 mcg/mL    CRANIECTOMY Right     CRANIOPLASTY FOR CRANIAL DEFECT Right     GASTROSTOMY TUBE PLACEMENT      LUMBAR PUNCTURE N/A 2024    Procedure: Lumbar Puncture;  Surgeon: Estefany Rogers MD;  [facility];  Service: Neurosurgery;  Laterality: N/A;  LP for Baclofen Trial    PLACEMENT OF JEJUNOSTOMY TUBE     [3]   Current Facility-Administered Medications on File Prior to Encounter   Medication Dose Route Frequency Provider Last Rate Last Admin    onabotulinumtoxina injection 600 Units  600 Units   Michael Nunez MD   600 Units at 24 1420     Current Outpatient Medications on File Prior to Encounter   Medication Sig Dispense Refill    amantadine HCL (SYMMETREL) 50 mg/5 mL Soln TAKE 10 ML VIA GTUBE TWICE DAILY 473 mL 11    B-complex with vitamin C (Z-BEC OR EQUIV) tablet 1 tablet by Per G Tube route once daily. 30 tablet 1    baclofen 25 mg/5 mL (5 mg/mL) Susp oral liquid 25 mg by Per J Tube route 4 (four) times daily.      diazePAM (VALIUM) 5 mg/5 mL (1 mg/mL) oral solution SMARTSI Milliliter(s) Gastro Tube 3 Times Daily      erythromycin (ROMYCIN) ophthalmic ointment SMARTSI.5 Inch(es) In Eye(s) 4 Times Daily 1 g 11    famotidine (PEPCID) 40 mg/5 mL (8 mg/mL) suspension 5 mLs (40 mg total) by Per G Tube route once daily. 1200 mL 11    glycopyrrolate (CUVPOSA) 1 mg/5 mL (0.2 mg/mL) Soln 40 mcg/kg by FEEDING TUBE route 3 (three) times daily as needed.      hydroCHLOROthiazide (HYDRODIURIL) 12.5 MG Tab 12.5 mg by Per J  Tube route once daily.      HYDROcodone-acetaminophen (NORCO) 5-325 mg per tablet 1 tablet by Per G Tube route every 4 (four) hours as needed for Pain. 30 tablet 0    levETIRAcetam (KEPPRA) 100 mg/mL Soln 5 mLs (500 mg total) by Per G Tube route 2 (two) times daily. 300 mL 11    ondansetron (ZOFRAN-ODT) 4 MG TbDL Take 4 mg by mouth every 8 (eight) hours as needed.      pantoprazole (PROTONIX) 40 MG tablet Take 40 mg by mouth once daily.      polyethylene glycol (GLYCOLAX) 17 gram/dose powder Take 17 g by mouth 2 (two) times daily.      propranoloL (INDERAL) 20 mg/5 mL (4 mg/mL) Soln SMARTSI.5 Milliliter(s) Gastro Tube Twice Daily (Patient not taking: Reported on 2024)      QUILLIVANT XR 5 mg/mL (25 mg/5 mL) SR24 7 mLs by Per J Tube route Daily.      silodosin (RAPAFLO) 4 mg Cap capsule Take 1 capsule (4 mg total) by mouth once daily. Capsules may be opened and the powder sprinkled into G tube 30 capsule 1   [4]   Social History  Tobacco Use    Smoking status: Never    Smokeless tobacco: Never        Colby Benites MD  Resident  [phone]

## 2025-04-01 NOTE — CARE UPDATE
Unit [facility] Care Support Interaction      I have reviewed the chart of Jesus Posey who is hospitalized for PEG tube malfunction. The patient is currently located in the following unit: ED     I have seen and examined the patient and provided the following support:     Proactive rounds - patient is stable  Pt noted to be hypothermic, nael hugger had been applied - temperature is trending up.   Otherwise VSS.   Lactic acid 0.9   POC Glucose 76 - orders noted for dextrose LR  Blood cultures in process  Primary team aware and monitoring        Sara Calixto PA-C  Unit Based [facility]

## 2025-04-01 NOTE — ASSESSMENT & PLAN NOTE
Intrathecal baclofen pump in place. Will continue home Keppra via IV, and switch home PO Valium to Lorazepam IV. Supportive care.

## 2025-04-01 NOTE — LETTER
April 7, 2025         [address]  [facility] [address]  Phone: [phone]  Fax: [phone]       Patient: Jesus Posey   YOB: 2005  Date of Visit: 04/07/2025    To Whom It May Concern:    Dinh Posey (son of Deonte Posey) is presently critically ill at [facility].   Deonte Posey is visiting Jesus in the Medical Intensive Care Unit on 04/07/2025.  Please excuse him from work until further notice.    If you have any questions or concerns, please don't hesitate to call.      Sincerely,    Reyna Martin RN

## 2025-04-01 NOTE — HPI
Mr. 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      As he is nonverbal, father reported leakage from his GJ tube which resembled his tube feeds. Last tube exchange was last month.      Admitted to Hospital Medicine in the setting of GJ tube malfunction. Intermittently hypothermic and bradycardic secondary to dysautonomia. Infectious workup has been negative. On IV maintenance fluids. IR replaced tube on 4/2.   During hospital course with repeated episodes of intolerance to PO intake and emesis. Repeat CT AP with G Tube with good positioning. Stepped up to MICU on 4/7 due to acute hypoxic respiratory failure requiring up to 15 L  HFNC  in the setting of likely aspiration pneumonitis. Isolated febrile episode of 100.7F. CXR concerning for BL pulmonary opacities. Started on Unasyn by Hospital Medicine.

## 2025-04-01 NOTE — LETTER
Mr Posey accompanied his son Jesus Posey to the emergency department on 3/31/2025. Patient was admitted to our hospital and he was with the patient until discharge 4/4/25.     If you have any questions or concerns, please don't hesitate to call.      Sincerely,  Estrella Shaw MD

## 2025-04-01 NOTE — FIRST PROVIDER EVALUATION
Medical screening examination initiated.  I have conducted a focused provider triage encounter, findings are as follows:    Brief history of present illness:  J tube not working    There were no vitals filed for this visit.    Pertinent physical exam:  Hx TBI, no acute distress        Preliminary workup initiated; this workup will be continued and followed by the physician or advanced practice provider that is assigned to the patient when roomed.

## 2025-04-01 NOTE — SUBJECTIVE & OBJECTIVE
Past Medical History:   Diagnosis Date    Traumatic brain injury        Past Surgical History:   Procedure Laterality Date    BACLOFEN PUMP IMPLANTATION N/A 10/7/2024    Procedure: INSERTION, INTRATHECAL BACLOFEN PUMP;  Surgeon: Estefany Rogers MD;  [facility];  Service: Neurosurgery;  Laterality: N/A;  Anes: Gen  Bed: Reg, Reversed  Pos: Left Lat  Rad: C-arm  Medtronic Pump: 20cc, Baclofen 500 mcg/mL    CRANIECTOMY Right     CRANIOPLASTY FOR CRANIAL DEFECT Right     GASTROSTOMY TUBE PLACEMENT      LUMBAR PUNCTURE N/A 2024    Procedure: Lumbar Puncture;  Surgeon: Estefany Rogers MD;  Location: [facility] OR [address] [place] [address];  Service: Neurosurgery;  Laterality: N/A;  LP for Baclofen Trial    PLACEMENT OF JEJUNOSTOMY TUBE         Review of patient's allergies indicates:  No Known Allergies    Current Facility-Administered Medications on File Prior to Encounter   Medication    onabotulinumtoxina injection 600 Units     Current Outpatient Medications on File Prior to Encounter   Medication Sig    amantadine HCL (SYMMETREL) 50 mg/5 mL Soln TAKE 10 ML VIA GTUBE TWICE DAILY    B-complex with vitamin C (Z-BEC OR EQUIV) tablet 1 tablet by Per G Tube route once daily.    baclofen 25 mg/5 mL (5 mg/mL) Susp oral liquid 25 mg by Per J Tube route 4 (four) times daily.    diazePAM (VALIUM) 5 mg/5 mL (1 mg/mL) oral solution SMARTSI Milliliter(s) Gastro Tube 3 Times Daily    erythromycin (ROMYCIN) ophthalmic ointment SMARTSI.5 Inch(es) In Eye(s) 4 Times Daily    famotidine (PEPCID) 40 mg/5 mL (8 mg/mL) suspension 5 mLs (40 mg total) by Per G Tube route once daily.    glycopyrrolate (CUVPOSA) 1 mg/5 mL (0.2 mg/mL) Soln 40 mcg/kg by FEEDING TUBE route 3 (three) times daily as needed.    hydroCHLOROthiazide (HYDRODIURIL) 12.5 MG Tab 12.5 mg by Per J Tube route once daily.    HYDROcodone-acetaminophen (NORCO) 5-325 mg per tablet 1 tablet by Per G Tube route every 4 (four) hours as needed for Pain.    levETIRAcetam (KEPPRA) 100  mg/mL Soln 5 mLs (500 mg total) by Per G Tube route 2 (two) times daily.    ondansetron (ZOFRAN-ODT) 4 MG TbDL Take 4 mg by mouth every 8 (eight) hours as needed.    pantoprazole (PROTONIX) 40 MG tablet Take 40 mg by mouth once daily.    polyethylene glycol (GLYCOLAX) 17 gram/dose powder Take 17 g by mouth 2 (two) times daily.    propranoloL (INDERAL) 20 mg/5 mL (4 mg/mL) Soln SMARTSI.5 Milliliter(s) Gastro Tube Twice Daily (Patient not taking: Reported on 2024)    QUILLIVANT XR 5 mg/mL (25 mg/5 mL) SR24 7 mLs by Per J Tube route Daily.    silodosin (RAPAFLO) 4 mg Cap capsule Take 1 capsule (4 mg total) by mouth once daily. Capsules may be opened and the powder sprinkled into G tube     Family History    None       Tobacco Use    Smoking status: Never    Smokeless tobacco: Never   Substance and Sexual Activity    Alcohol use: Not on file    Drug use: Not on file    Sexual activity: Not on file     Review of Systems   Unable to perform ROS: Patient nonverbal     Objective:     Vital Signs (Most Recent):  Temp: 98 °F (36.7 °C) (25 014)  Pulse: 82 (25 014)  Resp: 18 (25)  BP: 112/80 (25)  SpO2: 99 % (25 014) Vital Signs (24h Range):  Temp:  [97.9 °F (36.6 °C)-98 °F (36.7 °C)] 98 °F (36.7 °C)  Pulse:  [80-82] 82  Resp:  [18] 18  SpO2:  [99 %] 99 %  BP: (109-112)/(73-80) 112/80     Weight: 49 kg (108 lb)  Body mass index is 16.92 kg/m².     Physical Exam  Vitals and nursing note reviewed.   Constitutional:       General: He is not in acute distress.     Appearance: He is well-developed. He is not diaphoretic.      Comments: Cachectic, eyes open but does not respond    HENT:      Head: Normocephalic and atraumatic.      Mouth/Throat:      Mouth: Mucous membranes are moist.   Eyes:      General: No scleral icterus.     Conjunctiva/sclera: Conjunctivae normal.   Neck:      Vascular: No JVD.   Cardiovascular:      Rate and Rhythm: Normal rate and regular rhythm.   Pulmonary:  "     Effort: Pulmonary effort is normal. No respiratory distress.   Abdominal:      General: There is no distension.      Tenderness: There is no abdominal tenderness.      Comments: G tube in place with powder surrounding, leakage from tube   Skin:     Coloration: Skin is not jaundiced or pale.   Neurological:      Mental Status: He is alert.      Motor: No abnormal muscle tone.      Comments: Does not attempt to speak, no seizure like activity, contractured extremities    Psychiatric:         Attention and Perception: He is inattentive.         Speech: He is noncommunicative.                Significant Labs: All pertinent labs within the past 24 hours have been reviewed.  CBC: No results for input(s): "WBC", "HGB", "HCT", "PLT" in the last 48 hours.  CMP: No results for input(s): "NA", "K", "CL", "CO2", "GLU", "BUN", "CREATININE", "CALCIUM", "PROT", "ALBUMIN", "BILITOT", "ALKPHOS", "AST", "ALT", "ANIONGAP", "EGFRNONAA" in the last 48 hours.    Invalid input(s): "ESTGFAFRICA"    Significant Imaging: I have reviewed all pertinent imaging results/findings within the past 24 hours.  "

## 2025-04-01 NOTE — ASSESSMENT & PLAN NOTE
Patient with GJ tube dependence, presenting with tube malfunction. IR consulted for exchange under fluoroscopy.

## 2025-04-01 NOTE — CONSULTS
"Interventional Radiology  Consult/History & Physical Note    Consult Requested By: Elvia Judd MD  Reason for Consult: feeding tube malfunction    SUBJECTIVE:     Chief Complaint: GJ tube malfunction    History of Present Illness:  Jesus Posey is a 19 y.o. male with a PMHx of TBI leading to quadriplegia, seizure disorder, GJ tube dependence, cachexia who was  brought to the ED by his father due to leakage of tube feeds from a "broken component" of his GJ tube. Interventional Radiology has been consulted for percutaneous GJ tube exchange. Pt originally had a PEG endoscopically placed on 3/17/22 at an OSH. The PEG was converted to an IR GJ tube on 9/12/23 at an OSH. Pt's father reports the GJ tube was most recently exchange last month at an OSH. No recent imaging has been obtained of the pt's abdomen. Pt is afebrile and hemodynamically stable. He  does not have a history of PIO requiring nightly CPAP or difficulty breathing when lying flat. He does not take any anticoagulants. He has been NPO since midnight.    Review of Systems   Unable to perform ROS: Patient nonverbal       Scheduled Meds:   levETIRAcetam (Keppra) IV (PEDS and ADULTS)  500 mg Intravenous Q12H    lorazepam  0.75 mg Intravenous Q12H     Continuous Infusions:   lactated ringers   Intravenous Continuous 75 mL/hr at 04/01/25 0339 New Bag at 04/01/25 0339     PRN Meds:  Current Facility-Administered Medications:     naloxone, 0.02 mg, Intravenous, PRN    sodium chloride 0.9%, 1-10 mL, Intravenous, Q12H PRN    Review of patient's allergies indicates:  No Known Allergies    Past Medical History:   Diagnosis Date    Traumatic brain injury      Past Surgical History:   Procedure Laterality Date    BACLOFEN PUMP IMPLANTATION N/A 10/7/2024    Procedure: INSERTION, INTRATHECAL BACLOFEN PUMP;  Surgeon: Estefany Rogers MD;  Location: [facility] OR [address] [place] [address];  Service: Neurosurgery;  Laterality: N/A;  Anes: Gen  Bed: Reg, Reversed  Pos: Left Lat  Rad: C-arm  Medtronic " "Pump: 20cc, Baclofen 500 mcg/mL    CRANIECTOMY Right     CRANIOPLASTY FOR CRANIAL DEFECT Right     GASTROSTOMY TUBE PLACEMENT      LUMBAR PUNCTURE N/A 9/23/2024    Procedure: Lumbar Puncture;  Surgeon: Estefany Rogers MD;  Location: [facility] OR [address] [place] [address];  Service: Neurosurgery;  Laterality: N/A;  LP for Baclofen Trial    PLACEMENT OF JEJUNOSTOMY TUBE       No family history on file.  Social History[1]    OBJECTIVE:     Vital Signs (Most Recent)  Temp: (!) 92.5 °F (33.6 °C) (04/01/25 0801)  Pulse: (!) 53 (04/01/25 0801)  Resp: 18 (04/01/25 0146)  BP: 114/71 (04/01/25 0801)  SpO2: 100 % (04/01/25 0801)    Physical Exam:  Physical Exam  Vitals and nursing note reviewed.   Constitutional:       General: He is not in acute distress.     Appearance: He is ill-appearing.   HENT:      Head: Normocephalic and atraumatic.      Right Ear: External ear normal.      Left Ear: External ear normal.   Eyes:      Extraocular Movements: Extraocular movements intact.      Conjunctiva/sclera: Conjunctivae normal.      Pupils: Pupils are equal, round, and reactive to light.   Cardiovascular:      Rate and Rhythm: Normal rate.   Pulmonary:      Effort: Pulmonary effort is normal. No respiratory distress.   Abdominal:      General: Abdomen is flat. There is no distension.      Comments: GJ tube, button appears broken   Musculoskeletal:      Comments: contractures   Skin:     General: Skin is warm and dry.      Coloration: Skin is not jaundiced.   Neurological:      Mental Status: He is alert.      Comments: nonverbal         Laboratory  I have reviewed all pertinent lab results within the past 24 hours.  CBC: No results for input(s): "WBC", "RBC", "HGB", "HCT", "PLT", "MCV", "MCH", "MCHC" in the last 168 hours.  BMP: No results for input(s): "GLU", "NA", "K", "CL", "CO2", "BUN", "CREATININE", "CALCIUM", "MG" in the last 168 hours.  CMP: No results for input(s): "GLU", "CALCIUM", "ALBUMIN", "PROT", "NA", "K", "CO2", "CL", "BUN", "CREATININE", " ""ALKPHOS", "ALT", "AST", "BILITOT" in the last 168 hours.  LFTs: No results for input(s): "ALT", "AST", "ALKPHOS", "BILITOT", "PROT", "ALBUMIN" in the last 168 hours.  Coagulation: No results for input(s): "LABPROT", "INR", "APTT" in the last 168 hours.  Microbiology Results (last 7 days)       ** No results found for the last 168 hours. **            ASA/Mallampati  ASA: 3  Mallampati: 2    Imaging:  Recent imaging studies reviewed    ASSESSMENT/PLAN:     Assessment:  19 y.o. male with a PMHx of TBI leading to quadriplegia, seizure disorder, GJ tube dependence, cachexia  who has been referred to IR for percutaneous GJ tube exchange. Prior to proceeding with GJ exchange, first recommend obtaining a KUB to assess the GJ tube's position. Messaged primary provider with recommendations via secure chat. The procedure was discussed in great detail with the pt/pt's family including thorough explanations of the potential risks and benefits of gastrostomy tube exchange.  Risks include but are not limited to sepsis, severe infection, hemorrhage, bowel injury, catheter dislodgement, catheter blockage and need for additional procedures.  The pt is a candidate for percutaneous gastrostromy tube exchange under moderate sedation. Plan discussed with ordering physician and pt/pt's family who verbalized understanding of the plan and would like to proceed. Will obtain consent from pt's father.    Plan:  Will proceed with percutaneous gastrostomy tube exchange under moderate sedation on 4/1/25.   Please keep pt NPO  Anticoagulation history reviewed.   Coagulation labs reviewed- not routinely recommended for GJ exchange through a matured tract per SIR  Thank you for the consult. Please contact with questions via [employer] secure chat or spectra.    Time spent during patient care today was 76 minutes. This includes time spent before the visit reviewing the chart, discussing case with staff physician and ordering provider, time spent during " the face to face patient visit, and time spent after the visit on documentation. Time excludes procedure time.     Faye Lancaster PA-C  Interventional Radiology  Spectra: [phone]          [1]   Social History  Tobacco Use    Smoking status: Never    Smokeless tobacco: Never

## 2025-04-01 NOTE — ED NOTES
Bed: [facility]  Expected date:   Expected time:   Means of arrival:   Comments:  taj

## 2025-04-01 NOTE — PHARMACY MED REC
"Admission Medication History     The home medication history was taken by Alberto Shipman.    You may go to "Admission" then "Reconcile Home Medications" tabs to review and/or act upon these items.     The home medication list has been updated by the Pharmacy department.   Please read ALL comments highlighted in yellow.   Please address this information as you see fit.    Feel free to contact us if you have any questions or require assistance.      The medications listed below were removed from the home medication list. Please reorder if appropriate:  Patient reports no longer taking the following medication(s):  AMANTADINE 50 MG/5 ML SOLN  B-COMPLEX W/VIT C TAB  ERYTHROMYCIN OPHT OINT  FAMOTIDINE 40 MG/5 ML SUSP  ONDANSETRON ODT 4 MG  PANTOPRAZOLE 40 MG  PROPRANOLOL 20 MG/5 ML SOLN  SILODOSIN 4 MG CAP    Medications listed below were obtained from: Patient/family and Analytic software- [employer]  Current Outpatient Medications on File Prior to Encounter   Medication Sig    baclofen 25 mg/5 mL (5 mg/mL) Susp oral liquid   25 mg by Per G Tube route 4 (four) times daily.    diazePAM (VALIUM) 5 mg/5 mL (1 mg/mL) oral solution   SMARTSI Milliliter(s) Gastro Tube 3 Times Daily    glycopyrrolate (CUVPOSA) 1 mg/5 mL (0.2 mg/mL) Soln   40 mcg/kg by Per G Tube route 3 (three) times daily as needed.    hydroCHLOROthiazide (HYDRODIURIL) 12.5 MG Tab   12.5 mg by Per G Tube route once daily.    HYDROcodone-acetaminophen (NORCO) 5-325 mg per tablet   1 tablet by Per G Tube route every 4 (four) hours as needed for Pain.    levETIRAcetam (KEPPRA) 100 mg/mL Soln   5 mLs (500 mg total) by Per G Tube route 2 (two) times daily.    polyethylene glycol (GLYCOLAX) 17 gram/dose powder   17 g by Per G Tube route 2 (two) times daily as needed for Constipation.    QUILLIVANT XR 5 mg/mL (25 mg/5 mL) SR24 7 mLs by Per G Tube route Daily.             Alberto Shipman  EXT [id number]                  .          "

## 2025-04-01 NOTE — CONSULTS
[facility] Dept  Adult Nutrition  Consult Note    SUMMARY   Recommendations  TF RECS: Nutren 1.5 @ 60 mL/hr x 24 hours to provide 1440 total fluid volume, 2160 kcals, 98 g PRO, 1100 mL fluid,144 % DRI  At home Tube feeding regimen: Nutren 1.5 @ 65 mL/hr x 14 hours to provide 910 total fluid volume, 1365 kcals, 62 g protein, 691 mL fluid, 91% DRI - [facility]WF per MD (provides 81% EEN/63% EPN)    Monitor labs, meds, weight, skin    Goals: Meet % een/epn via EN by next RD f/u, maintain weight during admission  Nutrition Goal Status: new  Communication of RD Recs: other (comment) (poc)    Nutrition Discharge Planning     Nutrition Discharge Planning: Resume home/ nursing home regimen    Assessment and Plan    Nutrition Problem  Increased nutrient needs (protein, calorie)     Related to (etiology):   Wound healing     Signs and Symptoms (as evidenced by):   pressure injury to heal- unstageable, BMI 16.92     Interventions/Recommendations (treatment strategy):  Collaboration of nutritional care with other providers.  EN  Iván     Nutrition Diagnosis Status:   New      Malnutrition Assessment  Pending NFPE findings     Reason for Assessment    Reason For Assessment: consult  Diagnosis: gastrointestinal disease (PEG tube malfunction)  General Information Comments: 19 y.o. 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 RD consulted for pressure injury. Pt has pressure injury to heal- unstageable. Per chart review, wt stable since 10/2023. RD saw pt at bedside, unable to wake. RD noted severe waisting to face. Unable to complete NFPE w/o consent from patient, nurse, or family member. Called dad, who states home TF Regimen is Nutren 1.5 @ 65 mL/hr x 14 hours w/ flushes of 80 mL per hour. Endorses no intolerance prior to PEG malfunction. States #. LBM unclear at this time. RD following.    Nutrition Related Social  "Determinants of Health: SDOH: Adequate food in home environment and Other:  Tube feeding dependant      Nutrition/Diet History    Spiritual, Cultural Beliefs, Hoahaoism Practices, Values that Affect Care: no  Food Allergies: NKFA    Anthropometrics    Height: 5' 7" (170.2 cm)  Height (inches): 67 in  Height Method: Estimated  Weight: 49 kg (108 lb)  Weight (lb): 108 lb  Weight Method: Estimated  Ideal Body Weight (IBW), Male: 148 lb  % Ideal Body Weight, Male (lb): 72.97 %  BMI (Calculated): 16.9  BMI Grade: less than 18.5 - underweight       Lab/Procedures/Meds    Pertinent Labs Reviewed: reviewed  Pertinent Labs Comments: AST: 51, ALT: 91  Pertinent Medications Reviewed: reviewed  Pertinent Medications Comments: Lactated ringers    Estimated/Assessed Needs    Weight Used For Calorie Calculations: 67.1 kg (148 lb) (IBW)  Energy Calorie Requirements (kcal): 0652-2601 (25-30 kcal/kg of IBW)  Energy Need Method: Kcal/kg  Protein Requirements:  (1.5-2.5 g/kg)  Weight Used For Protein Calculations: 49 kg (108 lb 0.4 oz) (actual body weight)     Estimated Fluid Requirement Method: RDA Method  RDA Method (mL): 1678         Nutrition Prescription Ordered    Current Diet Order: NPO    Evaluation of Received Nutrient/Fluid Intake    I/O: n/a  % Intake of Estimated Energy Needs: 0 - 25 %  % Meal Intake: NPO    Nutrition Risk    Level of Risk/Frequency of Follow-up: moderate (f/u 1-2x/week)       Monitor and Evaluation    Monitor and Evaluation: Enteral and parenteral nutrition administration, Weight, Electrolyte and renal panel, Glucose/endocrine profile, Gastrointestinal profile, Inflammatory profile, Lipid profile, Skin, Nutrition focused physical findings       Nutrition Follow-Up    RD Follow-up?: Yes    "

## 2025-04-02 ENCOUNTER — TELEPHONE (OUTPATIENT)
Dept: REHABILITATION | Facility: HOSPITAL | Age: 20
End: 2025-04-02
Payer: MEDICAID

## 2025-04-02 PROBLEM — T68.XXXA HYPOTHERMIA: Status: ACTIVE | Noted: 2025-04-02

## 2025-04-02 LAB
ABSOLUTE EOSINOPHIL (OHS): 0.16 K/UL
ABSOLUTE MONOCYTE (OHS): 0.24 K/UL (ref 0.3–1)
ABSOLUTE NEUTROPHIL COUNT (OHS): 1.1 K/UL (ref 1.8–7.7)
ALBUMIN SERPL BCP-MCNC: 2.9 G/DL (ref 3.5–5.2)
ALP SERPL-CCNC: 154 UNIT/L (ref 40–150)
ALT SERPL W/O P-5'-P-CCNC: 41 UNIT/L (ref 10–44)
ANION GAP (OHS): 4 MMOL/L (ref 8–16)
AST SERPL-CCNC: 24 UNIT/L (ref 11–45)
BASOPHILS # BLD AUTO: 0.02 K/UL
BASOPHILS NFR BLD AUTO: 0.8 %
BILIRUB SERPL-MCNC: 0.2 MG/DL (ref 0.1–1)
BUN SERPL-MCNC: 5 MG/DL (ref 6–20)
CALCIUM SERPL-MCNC: 8.7 MG/DL (ref 8.7–10.5)
CHLORIDE SERPL-SCNC: 115 MMOL/L (ref 95–110)
CO2 SERPL-SCNC: 26 MMOL/L (ref 23–29)
CREAT SERPL-MCNC: 0.4 MG/DL (ref 0.5–1.4)
ERYTHROCYTE [DISTWIDTH] IN BLOOD BY AUTOMATED COUNT: 14.7 % (ref 11.5–14.5)
GFR SERPLBLD CREATININE-BSD FMLA CKD-EPI: >60 ML/MIN/1.73/M2
GLUCOSE SERPL-MCNC: 65 MG/DL (ref 70–110)
HCT VFR BLD AUTO: 38.4 % (ref 40–54)
HGB BLD-MCNC: 12.2 GM/DL (ref 14–18)
IMM GRANULOCYTES # BLD AUTO: 0 K/UL (ref 0–0.04)
IMM GRANULOCYTES NFR BLD AUTO: 0 % (ref 0–0.5)
LYMPHOCYTES # BLD AUTO: 1.12 K/UL (ref 1–4.8)
MCH RBC QN AUTO: 30.3 PG (ref 27–31)
MCHC RBC AUTO-ENTMCNC: 31.8 G/DL (ref 32–36)
MCV RBC AUTO: 96 FL (ref 82–98)
NUCLEATED RBC (/100WBC) (OHS): 0 /100 WBC
OHS QRS DURATION: 92 MS
OHS QTC CALCULATION: 414 MS
PLATELET # BLD AUTO: 189 K/UL (ref 150–450)
PMV BLD AUTO: 11.5 FL (ref 9.2–12.9)
POCT GLUCOSE: 61 MG/DL (ref 70–110)
POCT GLUCOSE: 81 MG/DL (ref 70–110)
POCT GLUCOSE: 97 MG/DL (ref 70–110)
POTASSIUM SERPL-SCNC: 3.4 MMOL/L (ref 3.5–5.1)
PROT SERPL-MCNC: 6.4 GM/DL (ref 6–8.4)
RBC # BLD AUTO: 4.02 M/UL (ref 4.6–6.2)
RELATIVE EOSINOPHIL (OHS): 6.1 %
RELATIVE LYMPHOCYTE (OHS): 42.4 % (ref 18–48)
RELATIVE MONOCYTE (OHS): 9.1 % (ref 4–15)
RELATIVE NEUTROPHIL (OHS): 41.6 % (ref 38–73)
SODIUM SERPL-SCNC: 145 MMOL/L (ref 136–145)
WBC # BLD AUTO: 2.64 K/UL (ref 3.9–12.7)

## 2025-04-02 PROCEDURE — 21400001 HC TELEMETRY ROOM

## 2025-04-02 PROCEDURE — 36415 COLL VENOUS BLD VENIPUNCTURE: CPT | Performed by: HOSPITALIST

## 2025-04-02 PROCEDURE — 25000003 PHARM REV CODE 250: Performed by: NURSE PRACTITIONER

## 2025-04-02 PROCEDURE — 0D2DXUZ CHANGE FEEDING DEVICE IN LOWER INTESTINAL TRACT, EXTERNAL APPROACH: ICD-10-PCS | Performed by: STUDENT IN AN ORGANIZED HEALTH CARE EDUCATION/TRAINING PROGRAM

## 2025-04-02 PROCEDURE — 93010 ELECTROCARDIOGRAM REPORT: CPT | Mod: ,,, | Performed by: INTERNAL MEDICINE

## 2025-04-02 PROCEDURE — 63600175 PHARM REV CODE 636 W HCPCS: Performed by: NURSE PRACTITIONER

## 2025-04-02 PROCEDURE — 85025 COMPLETE CBC W/AUTO DIFF WBC: CPT | Performed by: HOSPITALIST

## 2025-04-02 PROCEDURE — 63600175 PHARM REV CODE 636 W HCPCS: Performed by: STUDENT IN AN ORGANIZED HEALTH CARE EDUCATION/TRAINING PROGRAM

## 2025-04-02 PROCEDURE — 25000003 PHARM REV CODE 250: Performed by: HOSPITALIST

## 2025-04-02 PROCEDURE — 25500020 PHARM REV CODE 255: Performed by: HOSPITALIST

## 2025-04-02 PROCEDURE — 63600175 PHARM REV CODE 636 W HCPCS: Performed by: HOSPITALIST

## 2025-04-02 PROCEDURE — 84450 TRANSFERASE (AST) (SGOT): CPT | Performed by: HOSPITALIST

## 2025-04-02 PROCEDURE — 93005 ELECTROCARDIOGRAM TRACING: CPT

## 2025-04-02 RX ORDER — FENTANYL CITRATE 50 UG/ML
INJECTION, SOLUTION INTRAMUSCULAR; INTRAVENOUS
Status: COMPLETED | OUTPATIENT
Start: 2025-04-02 | End: 2025-04-02

## 2025-04-02 RX ORDER — POTASSIUM CHLORIDE 7.45 MG/ML
10 INJECTION INTRAVENOUS ONCE
Status: COMPLETED | OUTPATIENT
Start: 2025-04-02 | End: 2025-04-02

## 2025-04-02 RX ORDER — CEFTRIAXONE 1 G/1
1 INJECTION, POWDER, FOR SOLUTION INTRAMUSCULAR; INTRAVENOUS
Status: DISCONTINUED | OUTPATIENT
Start: 2025-04-02 | End: 2025-04-02

## 2025-04-02 RX ORDER — BALSAM PERU/CASTOR OIL
OINTMENT (GRAM) TOPICAL 2 TIMES DAILY
Status: DISCONTINUED | OUTPATIENT
Start: 2025-04-02 | End: 2025-04-24 | Stop reason: HOSPADM

## 2025-04-02 RX ADMIN — LEVETIRACETAM 500 MG: 100 INJECTION INTRAVENOUS at 09:04

## 2025-04-02 RX ADMIN — AZITHROMYCIN MONOHYDRATE 500 MG: 500 INJECTION, POWDER, LYOPHILIZED, FOR SOLUTION INTRAVENOUS at 01:04

## 2025-04-02 RX ADMIN — CEFTRIAXONE 1 G: 1 INJECTION, POWDER, FOR SOLUTION INTRAMUSCULAR; INTRAVENOUS at 01:04

## 2025-04-02 RX ADMIN — FENTANYL CITRATE 50 MCG: 0.05 INJECTION, SOLUTION INTRAMUSCULAR; INTRAVENOUS at 05:04

## 2025-04-02 RX ADMIN — Medication: at 09:04

## 2025-04-02 RX ADMIN — SODIUM CHLORIDE, SODIUM LACTATE, POTASSIUM CHLORIDE, CALCIUM CHLORIDE AND DEXTROSE MONOHYDRATE 100 ML/HR: 5; 600; 310; 30; 20 INJECTION, SOLUTION INTRAVENOUS at 11:04

## 2025-04-02 RX ADMIN — IOHEXOL 20 ML: 300 INJECTION, SOLUTION INTRAVENOUS at 05:04

## 2025-04-02 RX ADMIN — SODIUM CHLORIDE 500 ML: 0.9 INJECTION, SOLUTION INTRAVENOUS at 12:04

## 2025-04-02 RX ADMIN — LORAZEPAM 0.75 MG: 2 INJECTION INTRAMUSCULAR; INTRAVENOUS at 09:04

## 2025-04-02 RX ADMIN — SODIUM CHLORIDE 1000 ML: 9 INJECTION, SOLUTION INTRAVENOUS at 09:04

## 2025-04-02 RX ADMIN — POTASSIUM CHLORIDE 10 MEQ: 7.46 INJECTION, SOLUTION INTRAVENOUS at 06:04

## 2025-04-02 NOTE — HOSPITAL COURSE
Stepped up to ICU in 4/7 the setting of acute hypoxic respiratory failure in the swtting of basilar atelectasis and mucus plugging, with sepsis secondary to PNA requiring up to 15 L HFNC. Started on Vanc-Zosyn. During ICU course requiring up to norepi 0.06 mcg likely in the setting of hypovolemia 2/2 poor intake. Encephalopathy improving. CTH w/o acute processes. Lactate improved. EEG c/w toxic/drug-induced encephalopathy. No seizures. Persistenty bradycardic and hypothermic with EKG junctional rhythm on 30s.On Zosyn. TSH wnl. TTE EF 65%, normal diastolic function. Cortisol < 3 . Started on IV hydrocortisone 100 mg TID. Off pressors on 4/10 at 7:30 am, but back on pressors by 2pm, again off by 4/10 7pm. Repleting dextrose for hypoglycemia.    Patient feeding tubes replaced without complication.  Tube feeds restarted without issue.  Stress dose steroids weaned with the help of Endocrinology.  Electrolytes replaced with concern for refeeding syndrome.  Noted to have occasional hemodynamically stable bradycardia.  Chronic medical issue when ill on chart review.  Completed 5 days of antibiotics with Zosyn for aspiration pneumonia.    Underwent stim test for AI; had appropriate response, no indication for treatment or endocrine follow up.    Discharged home with DME.      Patient deemed appropriate for discharge. I personally saw, examined, and evaluated patient prior to departure. Plan discussed with patient family, who was agreeable and amenable; medications were discussed and reviewed, outpatient follow-up scheduled, ER precautions were given, all questions were answered to the patient's satisfaction, and Jesus Posey was subsequently discharged.

## 2025-04-02 NOTE — PROCEDURES
Interventional Radiology Post-Procedure Note    Pre Op Diagnosis: malfunctioning GJ tube  Post Op Diagnosis: Same    Procedure: GJ tube exchange    Procedure performed by: Ange Key MD    Written Informed Consent Obtained: Yes  Specimen Removed: YES existing GJ  Estimated Blood Loss: Minimal    Findings:   GJ with fundal loop. Existing GJ tube broke upon removal.    Successful G to GJ with placement of 18F CARINA GJ tube with distal tip in jejunum.     Patient tolerated procedure well.      Ange Key MD  Interventional Radiology

## 2025-04-02 NOTE — PLAN OF CARE
GJ Exchange complete. Pt tolerated well. VSS. No signs or symptoms of distress noted.  Site CDI.  Pt will be transferred to pt room and report to be given at bedside.

## 2025-04-02 NOTE — PROGRESS NOTES
04/02/25 0445   Vital Signs   Temp 97.8 °F (36.6 °C)   Temp Source Oral   Pulse (!) 48   Resp 16   SpO2 98 %   BP 90/64   BP Location Left arm   BP Method Automatic   Patient Position Sitting     Pt here for a peg tube malfunction, pt a quadriplegic nonverbal and AAOXO. Pt presented bradycardic and hypotensive. Pt asymptomatic. Nurse marenfied Milady Morel PA0C. PRN EKG was ordered and completed. Nurse notified PA-C of resulted EKG. Parents at bedside updated on POC.    No acute s/s of distress. Pt AAOxO, resting comfortably in bed, respirations E/UL, updated on POC, wheels locked and in low position, call bell within reach, Comfort positioning and restroom needs were addressed. Necessary items were placed within reach. Plan of care ongoing. No new orders at this time.

## 2025-04-02 NOTE — SUBJECTIVE & OBJECTIVE
Interval History: no AEON. Suspect low temp and bradycardia related to dysautonomia. Infectious workup negative. Stop abx. Bolus given for low BP, which I believe is secondary to lack of tube feedings for the past few days. Maintenance fluids.l     Review of Systems   Unable to perform ROS: Patient nonverbal     Objective:     Vital Signs (Most Recent):  Temp: (!) 89.3 °F (31.8 °C) (04/02/25 1230)  Pulse: 61 (04/02/25 1126)  Resp: 18 (04/02/25 1126)  BP: (!) 97/56 (04/02/25 1126)  SpO2: 98 % (04/02/25 1126) Vital Signs (24h Range):  Temp:  [87.6 °F (30.9 °C)-99 °F (37.2 °C)] 89.3 °F (31.8 °C)  Pulse:  [] 61  Resp:  [15-18] 18  SpO2:  [90 %-99 %] 98 %  BP: ()/(42-71) 97/56     Weight: 49 kg (108 lb 0.4 oz)  Body mass index is 16.92 kg/m².    Intake/Output Summary (Last 24 hours) at 4/2/2025 1447  Last data filed at 4/1/2025 1814  Gross per 24 hour   Intake --   Output 550 ml   Net -550 ml         Physical Exam  Vitals and nursing note reviewed.   Constitutional:       General: He is not in acute distress.     Appearance: He is well-developed. He is not diaphoretic.      Comments: Cachectic, eyes open but does not respond    HENT:      Head: Normocephalic and atraumatic.      Mouth/Throat:      Mouth: Mucous membranes are moist.   Eyes:      General: No scleral icterus.     Conjunctiva/sclera: Conjunctivae normal.   Neck:      Vascular: No JVD.   Cardiovascular:      Rate and Rhythm: Normal rate and regular rhythm.   Pulmonary:      Effort: Pulmonary effort is normal. No respiratory distress.   Abdominal:      General: There is no distension.      Tenderness: There is no abdominal tenderness.      Comments: G tube in place  Skin:     Coloration: Skin is not jaundiced or pale.   Neurological:      Motor: No abnormal muscle tone.      Comments: Does not attempt to speak, no seizure like activity, contractured extremities    Psychiatric:         Attention and Perception: He is inattentive.         Speech: He  is noncommunicative.              Significant Labs: All pertinent labs within the past 24 hours have been reviewed.    Significant Imaging: I have reviewed all pertinent imaging results/findings within the past 24 hours.

## 2025-04-02 NOTE — PLAN OF CARE
Pt arrived to 88 for GJ echange. Pt oriented to unit and staff.Positioner pillows utilized to minimize pressure points. Blankets applied. Pt prepped and draped utilizing standard sterile technique. Patient placed on continuous monitoring, as required by sedation policy. Timeouts completed utilizing standard universal time-out, per department and facility policy. RN to remain at bedside, continuous monitoring maintained. See flow sheets for monitoring, medication administration, and updates.

## 2025-04-02 NOTE — CARE UPDATE
RAPID RESPONSE NURSE PROACTIVE ROUNDING NOTE       Time of Visit: 942    Patient Information:  Admit Date: 2025  LOS: 1  Code Status: Full Code   Date of Visit: 2025  : 2005  Age: 19 y.o.  Sex: male  Race: Black or   Bed: [address] [place] [address] A:   MRN: [id number]    Recent Clinical History:  ICU discharge this admission? No   PACU discharge (last 24 hours)? No   Received conscious sedation/general anesthesia (last 24 hours)? No  ED Visit (Last 24 hours)? Yes  On NIPPV (Last 24 hours)? No     Primary Team:  Attending Physician: Elvia Judd MD  [place] [facility] [place] [facility] [place]     SITUATION    Notification source: Charge RN during rounding.  Reason for alert: Hypothermia  Alert category: Circulatory     BACKGROUND     Primary Reason for Admission: PEG tube malfunction    Patient has a past medical history of Traumatic brain injury.    Last Vitals:  Temp: 87.6 °F (30.9 °C) (945)  Pulse: 67 (958)  Resp: 18 (844)  BP: 99/65 (958)  SpO2: 93 % (913)    24 Hours Vitals Range:  Temp:  [87.6 °F (30.9 °C)-99 °F (37.2 °C)]   Pulse:  []   Resp:  [15-19]   BP: ()/(42-71)   SpO2:  [90 %-99 %]     Labs:  Recent Labs     25  1218   WBC 3.55*   HGB 13.1*   HCT 39.6*          Recent Labs     25  1218      K 3.5      CO2 29   BUN 10   CREATININE 0.5   PHOS 3.7   MG 2.0      Patient with Rectal temp of 87.7 F. Hr 63, bp 99/65. Bare hugger placed.       ASSESSMENT      Physical Exam  Vitals and nursing note reviewed.   Constitutional:       Appearance: He is ill-appearing.   Eyes:      Pupils: Pupils are equal, round, and reactive to light.   Cardiovascular:      Rate and Rhythm: Regular rhythm. Bradycardia present.   Pulmonary:      Breath sounds: Normal breath sounds.   Abdominal:      Comments: PEG tube.   Musculoskeletal:      Cervical back: Full passive range of motion without pain.      Comments: Arms contracted at baseline     Neurological:      General: No focal deficit present.      Mental Status: He is lethargic.      GCS: GCS eye subscore is 2. GCS verbal subscore is 1. GCS motor subscore is 5.         INTERVENTIONS    Patient evaluated for Medical problem. Staff concerns included hypothermia. The following interventions were performed: continuous pulse ox monitoring continued and continuous cardiac monitoring continued.    Time spent at the bedside: < 15 min    RECOMMENDATIONS    We Recommend: Continuous telemetry monitoring, Maintain IV access, Seizure precautions, and Notify Rapid Response of decline in patient status    PROVIDER ESCALATION    Escalation Required:  Yes    Orders received and case discussed with  Derek Mariscal NP.    Patient Disposition: Remain in room 1125.    FOLLOW-UP    Charge nurseCarol  updated on plan of care. Instructed to contact Rapid Response Nurse, Rivera Ospina RN at [phone] for further questions or concerns.

## 2025-04-02 NOTE — TELEPHONE ENCOUNTER
Spoke with pt's father regarding Jesus's OP PT and OT therapy evaluations scheduled for 4/3/25. Pt is currently admitted in the hospital and will need to reschedule. Pt's father stated that he would call back to schedule as he was at work. Informed pt's father that due to Jesus's change in medical status new therapy orders would also need to be obtained.     Padmini Mcgregor, ELIZA, LOTR  04/02/2025

## 2025-04-02 NOTE — PROGRESS NOTES
[facility] Medicine  Progress Note    Patient Name: Jesus Posey  MRN: [id number]  Patient Class: IP- Inpatient   Admission Date: 4/1/2025  Length of Stay: 1 days  Attending Physician: Elvia Judd MD  Primary Care Provider: Pallavi, Primary Doctor        Subjective     Principal Problem:PEG tube malfunction        HPI:  Jesus Posey is a 19 y.o. 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     As he is nonverbal, father at bedside provides history.     He reports that yesterday, they noted leakage from his GJ tube which resembled his tube feeds. Otherwise, he has had no new issues.     They exchanged his tube last month.     Overview/Hospital Course:  GJ tube malfunction. Intermittently hypothermic and bradycardic which I suspect is related to dysautonomia. Pt's grandmother confirmed that pt tends to be hypothermic every time he is hospitalized. Infectious workup has been negative. On IV maintenance fluids. Awaiting IR replacement of tube.     Interval History: no AEON. Suspect low temp and bradycardia related to dysautonomia. Infectious workup negative. Stop abx. Bolus given for low BP, which I believe is secondary to lack of tube feedings for the past few days. Maintenance fluids.l     Review of Systems   Unable to perform ROS: Patient nonverbal     Objective:     Vital Signs (Most Recent):  Temp: (!) 89.3 °F (31.8 °C) (04/02/25 1230)  Pulse: 61 (04/02/25 1126)  Resp: 18 (04/02/25 1126)  BP: (!) 97/56 (04/02/25 1126)  SpO2: 98 % (04/02/25 1126) Vital Signs (24h Range):  Temp:  [87.6 °F (30.9 °C)-99 °F (37.2 °C)] 89.3 °F (31.8 °C)  Pulse:  [] 61  Resp:  [15-18] 18  SpO2:  [90 %-99 %] 98 %  BP: ()/(42-71) 97/56     Weight: 49 kg (108 lb 0.4 oz)  Body mass index is 16.92 kg/m².    Intake/Output Summary (Last 24 hours) at 4/2/2025 1447  Last data filed at 4/1/2025 1814  Gross per 24 hour    Intake --   Output 550 ml   Net -550 ml         Physical Exam  Vitals and nursing note reviewed.   Constitutional:       General: He is not in acute distress.     Appearance: He is well-developed. He is not diaphoretic.      Comments: Cachectic, eyes open but does not respond    HENT:      Head: Normocephalic and atraumatic.      Mouth/Throat:      Mouth: Mucous membranes are moist.   Eyes:      General: No scleral icterus.     Conjunctiva/sclera: Conjunctivae normal.   Neck:      Vascular: No JVD.   Cardiovascular:      Rate and Rhythm: Normal rate and regular rhythm.   Pulmonary:      Effort: Pulmonary effort is normal. No respiratory distress.   Abdominal:      General: There is no distension.      Tenderness: There is no abdominal tenderness.      Comments: G tube in place  Skin:     Coloration: Skin is not jaundiced or pale.   Neurological:      Motor: No abnormal muscle tone.      Comments: Does not attempt to speak, no seizure like activity, contractured extremities    Psychiatric:         Attention and Perception: He is inattentive.         Speech: He is noncommunicative.              Significant Labs: All pertinent labs within the past 24 hours have been reviewed.    Significant Imaging: I have reviewed all pertinent imaging results/findings within the past 24 hours.      Assessment & Plan  PEG tube malfunction  Patient with GJ tube dependence, presenting with tube malfunction. IR consulted for exchange under fluoroscopy.   Spastic quadriparesis  Intrathecal baclofen pump in place. Will continue home Keppra via IV, and switch home PO Valium to Lorazepam IV. Supportive care.   Functional quadriplegia  Intrathecal baclofen pump in place. Supportive care.   Cachexia  Resume tube feeds after GJ tube replaced.   Hypothermia  Per family, this is common when pt is hospitalized  Negative infectious workup  Suspect low temps and bradycardia are related to dysautonomia  VTE Risk Mitigation (From admission, onward)            Ordered     IP VTE LOW RISK PATIENT  Once         04/01/25 0209     Place sequential compression device  Until discontinued         04/01/25 0209                    Discharge Planning   EDINSON: 4/4/2025     Code Status: Full Code   Medical Readiness for Discharge Date:   Discharge Plan A: Home with family, Home Health                        Elvia Judd MD  Department of Hospital Medicine   [facility] - Internal Medicine Telemetry

## 2025-04-02 NOTE — PLAN OF CARE
[facility] - Internal Medicine Telemetry  Initial Discharge Assessment       Primary Care Provider: Pallavi, Primary Doctor    Admission Diagnosis: Bradycardia [R00.1]  Malfunction of jejunostomy tube [K94.13]  Chest pain [R07.9]  PEG tube malfunction [K94.23]    Admission Date: 4/1/2025  Expected Discharge Date: 4/3/2025    Transition of Care Barriers: (P) None    Payor: MEDICAID / Plan: [employer] / Product Type: Managed Medicaid /     Extended Emergency Contact Information  Primary Emergency Contact: Mick Posey  Address: [address] [place] [address] [facility]  Home Phone: [phone]  Mobile Phone: [phone]  Relation: Father  Preferred language: English   needed? No  Secondary Emergency Contact: Vicki Posey  Mobile Phone: [phone]  Relation: Grandparent  Preferred language: English   needed? No    Discharge Plan A: (P) Home with family, Home Health, Other (Receives Tube feeding via [facility] per patient's father)  Discharge Plan B: (P) Home with family, Home Health      [id number] - [place] - [address] AT [facility]  [address]  [facility] [address]  Phone: [phone] Fax: [phone]    [facility] [address] [place] [id number] [facility]  [address] [facility] [address]  Phone: [phone] Fax: [phone]    [id number] - [place], [place] - [address]  [address]  [facility] [address]  Phone: [phone] Fax: [phone]    [id number] - [place] - [address]  [address]  [facility] [address]  Phone: [phone] Fax: [phone]    [facility] - [place], [place] - [address] [facility]  [address] [facility]  [place] [address]  Phone: [phone] Fax: [phone]      Initial Assessment (most recent)       Adult Discharge Assessment - 04/02/25 1550          Discharge Assessment     Assessment Type Discharge Planning Assessment     Confirmed/corrected address, phone number and insurance Yes     Confirmed Demographics Correct on Facesheet     Source of Information family     If unable to respond/provide information was family/caregiver contacted? Yes     Contact Name/Number Mick Posey (Father)  [phone]     When was your last doctors appointment? --   Last visit was a few days ago per patient's father. Patient's PCP is Dr. Riley    Communicated EDINSON with patient/caregiver Yes     Reason For Admission PEG tube malfunction     People in Home parent(s);sibling(s)     Facility Arrived From: arrived from home     Do you expect to return to your current living situation? Yes     Do you have help at home or someone to help you manage your care at home? Yes     Who are your caregiver(s) and their phone number(s)? Mick Posey (Father)  [phone]     Prior to hospitilization cognitive status: Unable to Assess   patient nonverbal    Current cognitive status: Unable to Assess     Walking or Climbing Stairs Difficulty yes     Walking or Climbing Stairs transferring difficulty, dependent;ambulation difficulty, dependent;stair climbing difficulty, dependent     Mobility Management Patient bed bound     Dressing/Bathing Difficulty yes     Dressing/Bathing bathing difficulty, dependent;dressing difficulty, assistance 1 person     Home Layout Able to live on 1st floor     Equipment Currently Used at Home hospital bed   Patient's father states that there was an order placed prior for a low air loss mattress, but the mattress was never delivered.    Readmission within 30 days? No     Patient currently being followed by outpatient case management? No (P)      Do you currently have service(s) that help you manage your care at home? Yes (P)      Name and Contact number of agency [facility] (P)      Is the pt/caregiver preference to resume services with current agency Yes (P)      Do you take prescription  medications? Yes (P)      Do you have prescription coverage? Yes (P)      Coverage [employer] Medicaid (P)      Do you have any problems affording any of your prescribed medications? No (P)      Is the patient taking medications as prescribed? yes (P)      Who is going to help you get home at discharge? Patient will need transportation (P)      How do you get to doctors appointments? other (see comments) (P)    via [facility] per patient's father    Are you on dialysis? No (P)      Do you take coumadin? No (P)      Discharge Plan A Home with family;Home Health;Other (P)    Receives Tube feeding via [facility] per patient's father    Discharge Plan B Home with family;Home Health (P)      DME Needed Upon Discharge  other (see comments) (P)    TBD    Discharge Plan discussed with: Parent(s) (P)      Name(s) and Number(s) Mick Posey (Father)  [phone] (P)      Transition of Care Barriers None (P)         Physical Activity    On average, how many days per week do you engage in moderate to strenuous exercise (like a brisk walk)? 0 days (P)      On average, how many minutes do you engage in exercise at this level? 0 min (P)         Financial Resource Strain    How hard is it for you to pay for the very basics like food, housing, medical care, and heating? Not hard at all (P)         Housing Stability    In the last 12 months, was there a time when you were not able to pay the mortgage or rent on time? No (P)      At any time in the past 12 months, were you homeless or living in a shelter (including now)? No (P)         Transportation Needs    In the past 12 months, has lack of transportation kept you from medical appointments or from getting medications? No (P)      In the past 12 months, has lack of transportation kept you from meetings, work, or from getting things needed for daily living? No (P)         Food Insecurity    Within the past 12 months, you worried that your food would run out before you got  the money to buy more. Never true (P)      Within the past 12 months, the food you bought just didn't last and you didn't have money to get more. Never true (P)         Stress    Do you feel stress - tense, restless, nervous, or anxious, or unable to sleep at night because your mind is troubled all the time - these days? Patient unable to answer (P)         Social Isolation    How often do you feel lonely or isolated from those around you?  Patient unable to answer (P)         Alcohol Use    Q1: How often do you have a drink containing alcohol? Never (P)      Q2: How many drinks containing alcohol do you have on a typical day when you are drinking? Patient does not drink (P)      Q3: How often do you have six or more drinks on one occasion? Never (P)         Utilities    In the past 12 months has the electric, gas, oil, or water company threatened to shut off services in your home? No (P)         Health Literacy    How often do you need to have someone help you when you read instructions, pamphlets, or other written material from your doctor or pharmacy? Always (P)         OTHER    Name(s) of People in Home Mick Posey (Father)  [phone] (P)                    Discharge Plan A and Plan B have been determined by review of patient's clinical status, future medical and therapeutic needs, and coverage/benefits for post-acute care in coordination with multidisciplinary team members.    Monroe Young RN-CM  Case Management  [facility]  [phone]

## 2025-04-02 NOTE — ASSESSMENT & PLAN NOTE
Per family, this is common when pt is hospitalized  Negative infectious workup  Suspect low temps and bradycardia are related to dysautonomia

## 2025-04-02 NOTE — PROGRESS NOTES
[facility] - Internal Medicine Telemetry  Wound Care    Patient Name:  Jesus Posey   MRN:  [id number]  Date: 4/2/2025  Diagnosis: PEG tube malfunction    History:     Past Medical History:   Diagnosis Date    Traumatic brain injury        Social History[1]    Precautions:     Allergies as of 03/31/2025    (No Known Allergies)       [facility] Assessment Details/Treatment   Patient seen for wound care consultation.  Chart reviewed for this encounter.  See flow sheet for findings.    Pt in bed with family at the bedside. The left heel is intact, purple and dry - DTPI. Foam dressing applied. The sacrum is intact with scar tissue. Foam dressing applied. Pt's family educated on the wound care and the importance of turning every 2 hours.     Recommendations:  - Left heel: bedside nursing to cleanse with NS, pat dry and apply BPCO every shift  - Sacrum: bedside nursing to cleanse with bath wipes, pat dry and apply a foam dressing every 3 days and prn soilage   - Turning every 2 hours  - Heel lift boots  - Waffle mattress overlay - in use     04/02/25 1058        Wound 04/01/25 0623 Pressure Injury Left Heel   Date First Assessed/Time First Assessed: 04/01/25 0623   Present on Original Admission: Yes  Primary Wound Type: Pressure Injury  Side: Left  Location: Heel  Is this injury device related?: No   Wound Image    Pressure Injury Stage DTPI   Dressing Appearance Open to air   Drainage Amount None   Appearance Intact;Dry;Purple   Periwound Area Intact;Dry   Dressing Applied;Foam        Wound 04/02/25 1058 Other (comment) Sacral spine   Date First Assessed/Time First Assessed: 04/02/25 1058   Present on Original Admission: Yes  Primary Wound Type: (c) Other (comment)  Location: Sacral spine   Wound Image    Dressing Appearance Open to air   Drainage Amount None   Appearance Intact;Red;Pink;Dry;Other (see comments)  (scar tissue)   Periwound Area Intact;Dry   Dressing Applied;Foam     Right heel - intact with darkened discoloration;  foam dressing applied    Sly score: 7  Recommendations made to primary team for above plan via secure chat. Wound care will follow-up as needed.   04/02/2025         [1]   Social History  Socioeconomic History    Marital status: Single   Tobacco Use    Smoking status: Never    Smokeless tobacco: Never     Social Drivers of Health     Financial Resource Strain: Low Risk  (10/9/2024)    Overall Financial Resource Strain (CARDIA)     Difficulty of Paying Living Expenses: Not hard at all   Food Insecurity: No Food Insecurity (10/9/2024)    Hunger Vital Sign     Worried About Running Out of Food in the Last Year: Never true     Ran Out of Food in the Last Year: Never true   Transportation Needs: No Transportation Needs (10/9/2024)    TRANSPORTATION NEEDS     Transportation : No   Physical Activity: Inactive (10/9/2024)    Exercise Vital Sign     Days of Exercise per Week: 0 days     Minutes of Exercise per Session: 0 min   Stress: No Stress Concern Present (10/9/2024)    [employer] - Occupational Stress Questionnaire     Feeling of Stress : Not at all   Housing Stability: Unknown (10/9/2024)    Housing Stability Vital Sign     Unable to Pay for Housing in the Last Year: No     Homeless in the Last Year: No

## 2025-04-03 PROBLEM — E87.6 HYPOKALEMIA: Status: ACTIVE | Noted: 2025-04-03

## 2025-04-03 PROBLEM — E87.0 HYPERNATREMIA: Status: ACTIVE | Noted: 2025-04-03

## 2025-04-03 PROBLEM — E43 SEVERE MALNUTRITION: Status: ACTIVE | Noted: 2025-04-03

## 2025-04-03 LAB
ANION GAP (OHS): 6 MMOL/L (ref 8–16)
BUN SERPL-MCNC: <3 MG/DL (ref 6–20)
CALCIUM SERPL-MCNC: 8.7 MG/DL (ref 8.7–10.5)
CHLORIDE SERPL-SCNC: 111 MMOL/L (ref 95–110)
CO2 SERPL-SCNC: 30 MMOL/L (ref 23–29)
CREAT SERPL-MCNC: 0.5 MG/DL (ref 0.5–1.4)
GFR SERPLBLD CREATININE-BSD FMLA CKD-EPI: >60 ML/MIN/1.73/M2
GLUCOSE SERPL-MCNC: 94 MG/DL (ref 70–110)
POTASSIUM SERPL-SCNC: 3.2 MMOL/L (ref 3.5–5.1)
SODIUM SERPL-SCNC: 147 MMOL/L (ref 136–145)

## 2025-04-03 PROCEDURE — 82310 ASSAY OF CALCIUM: CPT | Performed by: STUDENT IN AN ORGANIZED HEALTH CARE EDUCATION/TRAINING PROGRAM

## 2025-04-03 PROCEDURE — 36415 COLL VENOUS BLD VENIPUNCTURE: CPT | Performed by: STUDENT IN AN ORGANIZED HEALTH CARE EDUCATION/TRAINING PROGRAM

## 2025-04-03 PROCEDURE — 25000242 PHARM REV CODE 250 ALT 637 W/ HCPCS: Performed by: STUDENT IN AN ORGANIZED HEALTH CARE EDUCATION/TRAINING PROGRAM

## 2025-04-03 PROCEDURE — 21400001 HC TELEMETRY ROOM

## 2025-04-03 PROCEDURE — 25000003 PHARM REV CODE 250: Performed by: STUDENT IN AN ORGANIZED HEALTH CARE EDUCATION/TRAINING PROGRAM

## 2025-04-03 RX ORDER — LEVETIRACETAM 100 MG/ML
500 SOLUTION ORAL 2 TIMES DAILY
Status: DISCONTINUED | OUTPATIENT
Start: 2025-04-03 | End: 2025-04-05

## 2025-04-03 RX ORDER — DIAZEPAM 5 MG/5ML
5 SOLUTION ORAL 3 TIMES DAILY
Status: DISCONTINUED | OUTPATIENT
Start: 2025-04-03 | End: 2025-04-24 | Stop reason: HOSPADM

## 2025-04-03 RX ADMIN — LEVETIRACETAM 500 MG: 500 SOLUTION ORAL at 09:04

## 2025-04-03 RX ADMIN — POTASSIUM BICARBONATE 40 MEQ: 391 TABLET, EFFERVESCENT ORAL at 09:04

## 2025-04-03 RX ADMIN — POTASSIUM BICARBONATE 40 MEQ: 391 TABLET, EFFERVESCENT ORAL at 02:04

## 2025-04-03 RX ADMIN — Medication: at 09:04

## 2025-04-03 RX ADMIN — BACLOFEN 25 MG: 5 SUSPENSION ORAL at 06:04

## 2025-04-03 RX ADMIN — DIAZEPAM 5 MG: 5 SOLUTION ORAL at 06:04

## 2025-04-03 RX ADMIN — LEVETIRACETAM 500 MG: 500 SOLUTION ORAL at 11:04

## 2025-04-03 RX ADMIN — DIAZEPAM 5 MG: 5 SOLUTION ORAL at 09:04

## 2025-04-03 RX ADMIN — Medication: at 11:04

## 2025-04-03 RX ADMIN — BACLOFEN 25 MG: 5 SUSPENSION ORAL at 11:04

## 2025-04-03 RX ADMIN — DIAZEPAM 5 MG: 5 SOLUTION ORAL at 11:04

## 2025-04-03 RX ADMIN — BACLOFEN 25 MG: 5 SUSPENSION ORAL at 09:04

## 2025-04-03 RX ADMIN — BACLOFEN 25 MG: 5 SUSPENSION ORAL at 02:04

## 2025-04-03 NOTE — CARE UPDATE
I have reviewed the chart of Jesus Posey who is hospitalized for the following:    Active Hospital Problems    Diagnosis    *PEG tube malfunction     Peg in place      Hypernatremia   - Na 147 today    - could be 2/2 dehydration (tube feeds held)     Hypokalemia   - K 3.2, replace IV or via G tube   - trend    Severe malnutrition   - diagnosis reviewed from prior RD assessment    - tube feedings resumed today     Body mass index (BMI) less than 19    Hypothermia  - may be 2/2 dysautonomia?   - initially required nael hugger   - hypothermic again overnight but this morning seems to have resolved spontaneously     Functional quadriplegia     Requires assitance with all ADls  Peg dependant  Quadriparesis       Cachexia     underweight      Atelectasis     Seen on imaging   IS  Deep breathing exercises      Spastic quadriparesis        Petrona Flores PAAfricaC  Unit Based DARINEL

## 2025-04-03 NOTE — PROGRESS NOTES
[facility] - Internal Medicine [facility]  Progress Note    Patient Name: Jesus Posey  MRN: [id number]  Patient Class: IP- Inpatient   Admission Date: 4/1/2025  Length of Stay: 2 days  Attending Physician: Shiraz Coronado*  Primary Care Provider: Pallavi, Primary Doctor        Subjective     Principal Problem:PEG tube malfunction        HPI:  Jesus Posey is a 19 y.o. 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     As he is nonverbal, father at bedside provides history.     He reports that yesterday, they noted leakage from his GJ tube which resembled his tube feeds. Otherwise, he has had no new issues.     They exchanged his tube last month.     Overview/Hospital Course:  GJ tube malfunction. Intermittently hypothermic and bradycardic which I suspect is related to dysautonomia. Pt's grandmother confirmed that pt tends to be hypothermic every time he is hospitalized. Infectious workup has been negative. On IV maintenance fluids. Awaiting IR replacement of tube.   Suspect low temp and bradycardia related to dysautonomia. Infectious workup negative. Antibiotics dc. Bolus given for low BP, which was thought to be secondary to lack of tube feedings for the past few days. Started on Maintenance fluids. Tube replaced 4/2.     Interval History: No acute events. IR replaced tube 4/2. Starting tube feedings    Review of Systems  Objective:     Vital Signs (Most Recent):  Temp: 97.5 °F (36.4 °C) (04/03/25 0417)  Pulse: (!) 51 (04/03/25 1205)  Resp: 18 (04/03/25 0838)  BP: 94/61 (04/03/25 0838)  SpO2: 100 % (04/03/25 0838) Vital Signs (24h Range):  Temp:  [89.3 °F (31.8 °C)-98.2 °F (36.8 °C)] 97.5 °F (36.4 °C)  Pulse:  [51-99] 51  Resp:  [9-18] 18  SpO2:  [93 %-100 %] 100 %  BP: ()/(57-72) 94/61     Weight: 49 kg (108 lb 0.4 oz)  Body mass index is 16.92 kg/m².  No intake or output data in the 24 hours ending 04/03/25  1221      Physical Exam  Vitals and nursing note reviewed.   Constitutional:       General: He is not in acute distress.     Appearance: He is well-developed. He is not diaphoretic.      Comments: Cachectic, eyes open but does not respond    HENT:      Head: Normocephalic and atraumatic.      Mouth/Throat:      Mouth: Mucous membranes are moist.   Eyes:      General: No scleral icterus.     Conjunctiva/sclera: Conjunctivae normal.   Neck:      Vascular: No JVD.   Cardiovascular:      Rate and Rhythm: Normal rate and regular rhythm.   Pulmonary:      Effort: Pulmonary effort is normal. No respiratory distress.   Abdominal:      General: There is no distension.      Tenderness: There is no abdominal tenderness.      Comments: G tube in place   Skin:     Coloration: Skin is not jaundiced or pale.   Neurological:      Mental Status: He is alert.      Motor: No abnormal muscle tone.      Comments: Does not attempt to speak, no seizure like activity, contractured extremities    Psychiatric:         Attention and Perception: He is inattentive.         Speech: He is noncommunicative.               Significant Labs: All pertinent labs within the past 24 hours have been reviewed.    Significant Imaging: I have reviewed all pertinent imaging results/findings within the past 24 hours.      Assessment & Plan  PEG tube malfunction  Patient with GJ tube dependence, presenting with tube malfunction. IR consulted for exchange under fluoroscopy.   Replaced tube 4.2  Start trickle feeds and advance as tolerated  Added free water bolus due to hypernatremia  Spastic quadriparesis  Intrathecal baclofen pump in place. Will transition back to home regimen with tube replacement. Valium, baclofen, keppra.   Functional quadriplegia  Intrathecal baclofen pump in place. Supportive care.   Cachexia  Resume tube feeds after GJ tube replaced.   Hypothermia  Per family, this is common when pt is hospitalized  Negative infectious workup  Suspect low  temps and bradycardia are related to dysautonomia  VTE Risk Mitigation (From admission, onward)           Ordered     IP VTE LOW RISK PATIENT  Once         04/01/25 0209     Place sequential compression device  Until discontinued         04/01/25 0209                    Discharge Planning   EDINSON: 4/4/2025     Code Status: Full Code   Medical Readiness for Discharge Date:   Discharge Plan A: Home with family, Home Health          Shiraz Coronado MD  Department of Hospital Medicine   [facility] - Internal Medicine Telemetry

## 2025-04-03 NOTE — ASSESSMENT & PLAN NOTE
Intrathecal baclofen pump in place. Will transition back to home regimen with tube replacement. Valium, baclofen, keppra.

## 2025-04-03 NOTE — ASSESSMENT & PLAN NOTE
Patient with GJ tube dependence, presenting with tube malfunction. IR consulted for exchange under fluoroscopy.   Replaced tube 4.2  Start trickle feeds and advance as tolerated  Added free water bolus due to hypernatremia

## 2025-04-03 NOTE — SUBJECTIVE & OBJECTIVE
Interval History: No acute events. IR replaced tube 4/2. Starting tube feedings    Review of Systems  Objective:     Vital Signs (Most Recent):  Temp: 97.5 °F (36.4 °C) (04/03/25 0417)  Pulse: (!) 51 (04/03/25 1205)  Resp: 18 (04/03/25 0838)  BP: 94/61 (04/03/25 0838)  SpO2: 100 % (04/03/25 0838) Vital Signs (24h Range):  Temp:  [89.3 °F (31.8 °C)-98.2 °F (36.8 °C)] 97.5 °F (36.4 °C)  Pulse:  [51-99] 51  Resp:  [9-18] 18  SpO2:  [93 %-100 %] 100 %  BP: ()/(57-72) 94/61     Weight: 49 kg (108 lb 0.4 oz)  Body mass index is 16.92 kg/m².  No intake or output data in the 24 hours ending 04/03/25 1221      Physical Exam  Vitals and nursing note reviewed.   Constitutional:       General: He is not in acute distress.     Appearance: He is well-developed. He is not diaphoretic.      Comments: Cachectic, eyes open but does not respond    HENT:      Head: Normocephalic and atraumatic.      Mouth/Throat:      Mouth: Mucous membranes are moist.   Eyes:      General: No scleral icterus.     Conjunctiva/sclera: Conjunctivae normal.   Neck:      Vascular: No JVD.   Cardiovascular:      Rate and Rhythm: Normal rate and regular rhythm.   Pulmonary:      Effort: Pulmonary effort is normal. No respiratory distress.   Abdominal:      General: There is no distension.      Tenderness: There is no abdominal tenderness.      Comments: G tube in place   Skin:     Coloration: Skin is not jaundiced or pale.   Neurological:      Mental Status: He is alert.      Motor: No abnormal muscle tone.      Comments: Does not attempt to speak, no seizure like activity, contractured extremities    Psychiatric:         Attention and Perception: He is inattentive.         Speech: He is noncommunicative.               Significant Labs: All pertinent labs within the past 24 hours have been reviewed.    Significant Imaging: I have reviewed all pertinent imaging results/findings within the past 24 hours.

## 2025-04-04 LAB
ABSOLUTE EOSINOPHIL (OHS): 0.28 K/UL
ABSOLUTE MONOCYTE (OHS): 0.47 K/UL (ref 0.3–1)
ABSOLUTE NEUTROPHIL COUNT (OHS): 4.21 K/UL (ref 1.8–7.7)
ANION GAP (OHS): 7 MMOL/L (ref 8–16)
BASOPHILS # BLD AUTO: 0.04 K/UL
BASOPHILS NFR BLD AUTO: 0.7 %
BUN SERPL-MCNC: 3 MG/DL (ref 6–20)
CALCIUM SERPL-MCNC: 9.4 MG/DL (ref 8.7–10.5)
CHLORIDE SERPL-SCNC: 109 MMOL/L (ref 95–110)
CO2 SERPL-SCNC: 30 MMOL/L (ref 23–29)
CREAT SERPL-MCNC: 0.5 MG/DL (ref 0.5–1.4)
ERYTHROCYTE [DISTWIDTH] IN BLOOD BY AUTOMATED COUNT: 14.9 % (ref 11.5–14.5)
GFR SERPLBLD CREATININE-BSD FMLA CKD-EPI: >60 ML/MIN/1.73/M2
GLUCOSE SERPL-MCNC: 96 MG/DL (ref 70–110)
HCT VFR BLD AUTO: 39.2 % (ref 40–54)
HGB BLD-MCNC: 12.8 GM/DL (ref 14–18)
IMM GRANULOCYTES # BLD AUTO: 0.02 K/UL (ref 0–0.04)
IMM GRANULOCYTES NFR BLD AUTO: 0.3 % (ref 0–0.5)
LYMPHOCYTES # BLD AUTO: 1.03 K/UL (ref 1–4.8)
MCH RBC QN AUTO: 30.8 PG (ref 27–31)
MCHC RBC AUTO-ENTMCNC: 32.7 G/DL (ref 32–36)
MCV RBC AUTO: 95 FL (ref 82–98)
NUCLEATED RBC (/100WBC) (OHS): 0 /100 WBC
PLATELET # BLD AUTO: 210 K/UL (ref 150–450)
PMV BLD AUTO: 11.9 FL (ref 9.2–12.9)
POTASSIUM SERPL-SCNC: 3.7 MMOL/L (ref 3.5–5.1)
RBC # BLD AUTO: 4.15 M/UL (ref 4.6–6.2)
RELATIVE EOSINOPHIL (OHS): 4.6 %
RELATIVE LYMPHOCYTE (OHS): 17 % (ref 18–48)
RELATIVE MONOCYTE (OHS): 7.8 % (ref 4–15)
RELATIVE NEUTROPHIL (OHS): 69.6 % (ref 38–73)
SODIUM SERPL-SCNC: 146 MMOL/L (ref 136–145)
WBC # BLD AUTO: 6.05 K/UL (ref 3.9–12.7)

## 2025-04-04 PROCEDURE — 80048 BASIC METABOLIC PNL TOTAL CA: CPT | Performed by: STUDENT IN AN ORGANIZED HEALTH CARE EDUCATION/TRAINING PROGRAM

## 2025-04-04 PROCEDURE — 25000003 PHARM REV CODE 250: Performed by: STUDENT IN AN ORGANIZED HEALTH CARE EDUCATION/TRAINING PROGRAM

## 2025-04-04 PROCEDURE — 25000242 PHARM REV CODE 250 ALT 637 W/ HCPCS: Performed by: STUDENT IN AN ORGANIZED HEALTH CARE EDUCATION/TRAINING PROGRAM

## 2025-04-04 PROCEDURE — 36415 COLL VENOUS BLD VENIPUNCTURE: CPT | Performed by: STUDENT IN AN ORGANIZED HEALTH CARE EDUCATION/TRAINING PROGRAM

## 2025-04-04 PROCEDURE — 85025 COMPLETE CBC W/AUTO DIFF WBC: CPT | Performed by: STUDENT IN AN ORGANIZED HEALTH CARE EDUCATION/TRAINING PROGRAM

## 2025-04-04 PROCEDURE — 21400001 HC TELEMETRY ROOM

## 2025-04-04 RX ORDER — GLYCOPYRROLATE 1 MG/1
1 TABLET ORAL 3 TIMES DAILY
Status: DISCONTINUED | OUTPATIENT
Start: 2025-04-04 | End: 2025-04-06

## 2025-04-04 RX ORDER — METHYLPHENIDATE HYDROCHLORIDE 5 MG/1
5 TABLET ORAL EVERY MORNING
Refills: 0 | Status: DISCONTINUED | OUTPATIENT
Start: 2025-04-04 | End: 2025-04-24 | Stop reason: HOSPADM

## 2025-04-04 RX ADMIN — DIAZEPAM 5 MG: 5 SOLUTION ORAL at 04:04

## 2025-04-04 RX ADMIN — LEVETIRACETAM 500 MG: 500 SOLUTION ORAL at 11:04

## 2025-04-04 RX ADMIN — METHYLPHENIDATE HYDROCHLORIDE 5 MG: 5 TABLET ORAL at 04:04

## 2025-04-04 RX ADMIN — DIAZEPAM 5 MG: 5 SOLUTION ORAL at 08:04

## 2025-04-04 RX ADMIN — Medication: at 09:04

## 2025-04-04 RX ADMIN — Medication: at 08:04

## 2025-04-04 RX ADMIN — GLYCOPYRROLATE 1 MG: 1 TABLET ORAL at 04:04

## 2025-04-04 RX ADMIN — DIAZEPAM 5 MG: 5 SOLUTION ORAL at 11:04

## 2025-04-04 RX ADMIN — GLYCOPYRROLATE 1 MG: 1 TABLET ORAL at 08:04

## 2025-04-04 RX ADMIN — LEVETIRACETAM 500 MG: 500 SOLUTION ORAL at 08:04

## 2025-04-04 NOTE — ASSESSMENT & PLAN NOTE
Nutrition consulted. Most recent weight and BMI monitored-     Measurements:  Wt Readings from Last 1 Encounters:   04/02/25 49 kg (108 lb 0.4 oz)   Body mass index is 16.92 kg/m².    Patient has been screened and assessed by RD.    Malnutrition Type:  Context:    Level:      Malnutrition Characteristic Summary:       Interventions/Recommendations (treatment strategy):

## 2025-04-04 NOTE — NURSING
Altered skin integrity  Jtube site. Scant serosanguinous drainage noted. Placed wound care consult. Applied vaseline gauze to site to reduce friction, covered with 4x4 dressing.

## 2025-04-04 NOTE — ASSESSMENT & PLAN NOTE
Hypernatremia is likely due to Dehydration from missed free water flushes. The patient's most recent sodium results are listed below.    Recent Labs     04/02/25  1256 04/03/25  0826 04/04/25  0653    147* 146*     Plan  - Aim to correct the sodium by 8-10mEq in 24 hours.   - resume tube feeds and free water flushes  - 500 cc of D5W at 75cc/hr

## 2025-04-04 NOTE — PROGRESS NOTES
"[facility] - Internal Medicine [facility] Medicine  Progress Note    Patient Name: Jesus Posey  MRN: [id number]  Patient Class: IP- Inpatient   Admission Date: 4/1/2025  Length of Stay: 3 days  Attending Physician: Estrella Shaw MD  Primary Care Provider: Pallavi, Primary Doctor        Subjective     Principal Problem:PEG tube malfunction        HPI:  Jesus Posey is a 19 y.o. 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     As he is nonverbal, father at bedside provides history.     He reports that yesterday, they noted leakage from his GJ tube which resembled his tube feeds. Otherwise, he has had no new issues.     They exchanged his tube last month.     Overview/Hospital Course:  GJ tube malfunction. Intermittently hypothermic and bradycardic which I suspect is related to dysautonomia. Pt's grandmother confirmed that pt tends to be hypothermic every time he is hospitalized. Infectious workup has been negative. On IV maintenance fluids. Awaiting IR replacement of tube.   Suspect low temp and bradycardia related to dysautonomia. Infectious workup negative. Antibiotics dc. Bolus given for low BP, which was thought to be secondary to lack of tube feedings for the past few days. Started on Maintenance fluids. Tube replaced 4/2.     Interval History:     Patient seen at bedside. No acute overnight events. Patients father at bedside.   Patients father asking for "mini button J tube placement." IR says this is not in stock and will order and arrange for outpatient exchange. Family notified.   Nursing had trouble starting feeds overnight this morning due to confusion of G/J tube.. discussed with IR and Gastroenterology - recommend J tube use for feeds and meds. Patient resumed on tube feeds and free water flushes. D5LR ordered.  Plan to DC after Na level normalizes.       Review of Systems  Objective:     Vital Signs (Most " Recent):  Temp: 97.9 °F (36.6 °C) (04/04/25 1216)  Pulse: 108 (04/04/25 1216)  Resp: 18 (04/04/25 1216)  BP: 94/60 (04/04/25 1216)  SpO2: 100 % (04/04/25 1216) Vital Signs (24h Range):  Temp:  [97.1 °F (36.2 °C)-97.9 °F (36.6 °C)] 97.9 °F (36.6 °C)  Pulse:  [] 108  Resp:  [17-18] 18  SpO2:  [92 %-100 %] 100 %  BP: (92-95)/(55-65) 94/60     Weight: 49 kg (108 lb 0.4 oz)  Body mass index is 16.92 kg/m².    Intake/Output Summary (Last 24 hours) at 4/4/2025 1447  Last data filed at 4/3/2025 2100  Gross per 24 hour   Intake 570 ml   Output --   Net 570 ml         Physical Exam  Constitutional:       General: He is not in acute distress.     Comments: Cachectic, eyes open but does not respond    HENT:      Mouth/Throat:      Mouth: Mucous membranes are dry.   Cardiovascular:      Rate and Rhythm: Normal rate.      Heart sounds: Normal heart sounds.   Pulmonary:      Effort: Pulmonary effort is normal. No respiratory distress.   Abdominal:      Comments: JG tube in place   Musculoskeletal:      Right lower leg: No edema.      Left lower leg: No edema.   Neurological:      Mental Status: Mental status is at baseline.      Comments: Not following commands  Not responsive   Contractures in extremities               Significant Labs: All pertinent labs within the past 24 hours have been reviewed.  BMP:   Recent Labs   Lab 04/04/25  0653   *   K 3.7      CO2 30*   BUN 3*   CREATININE 0.5   CALCIUM 9.4     CBC:   Recent Labs   Lab 04/04/25  0653   WBC 6.05   HGB 12.8*   HCT 39.2*          Significant Imaging: I have reviewed all pertinent imaging results/findings within the past 24 hours.      Assessment & Plan  PEG tube malfunction  Patient with GJ tube dependence, presenting with tube malfunction. IR consulted for exchange under fluoroscopy.   Spastic quadriparesis  Intrathecal baclofen pump in place. Resume home regimen with tube replacement. Valium, keppra.     Functional quadriplegia  Intrathecal  baclofen pump in place. Supportive care.   Cachexia  Resume tube feeds after GJ tube replaced.   Hypothermia  Per family, this is common when pt is hospitalized  Negative infectious workup  Suspect low temps and bradycardia are related to dysautonomia  Hypernatremia  Hypernatremia is likely due to Dehydration from missed free water flushes. The patient's most recent sodium results are listed below.    Recent Labs     04/02/25  1256 04/03/25  0826 04/04/25  0653    147* 146*     Plan  - Aim to correct the sodium by 8-10mEq in 24 hours.   - resume tube feeds and free water flushes  - 500 cc of D5W at 75cc/hr  Hypokalemia  Patient's most recent potassium results are listed below.   Recent Labs     04/02/25  1256 04/03/25  0826 04/04/25  0653   K 3.4* 3.2* 3.7     Plan  - Replete potassium per protocol  - Monitor potassium Daily  - Patient's hypokalemia is stable  Severe malnutrition  Nutrition consulted. Most recent weight and BMI monitored-     Measurements:  Wt Readings from Last 1 Encounters:   04/02/25 49 kg (108 lb 0.4 oz)   Body mass index is 16.92 kg/m².    Patient has been screened and assessed by RD.    Malnutrition Type:  Context:    Level:      Malnutrition Characteristic Summary:       Interventions/Recommendations (treatment strategy):       Body mass index (BMI) less than 19      VTE Risk Mitigation (From admission, onward)           Ordered     IP VTE LOW RISK PATIENT  Once         04/01/25 0209     Place sequential compression device  Until discontinued         04/01/25 0209                    Discharge Planning   EDINSON: 4/5/2025     Code Status: Full Code   Medical Readiness for Discharge Date:   Discharge Plan A: Home with family, Home Health                Estrella Shaw MD  Department of Hospital Medicine   [facility] - Internal Medicine Telemetry

## 2025-04-04 NOTE — ASSESSMENT & PLAN NOTE
Patient's most recent potassium results are listed below.   Recent Labs     04/02/25  1256 04/03/25  0826 04/04/25  0653   K 3.4* 3.2* 3.7     Plan  - Replete potassium per protocol  - Monitor potassium Daily  - Patient's hypokalemia is stable

## 2025-04-04 NOTE — PROGRESS NOTES
[facility] - Internal Medicine Telemetry  Wound Care    Patient Name:  Jesus Posey   MRN:  [id number]  Date: 4/4/2025  Diagnosis: PEG tube malfunction    History:     Past Medical History:   Diagnosis Date    Traumatic brain injury        Social History[1]    Precautions:     Allergies as of 03/31/2025    (No Known Allergies)       [facility] Assessment Details/Treatment   Patient seen for wound care consultation.  Chart reviewed for this encounter.  See flow sheet for findings.     Pt in bed with family at the bedside. Skin tear to the left abdomen likely related to tape removal. The wound bed is pink and moist. Calmoseptine applied for protection, moisture and for comfort. Supplies at the bedside.      Recommendations:  - Left abdomen skin tear: bedside nursing to cleanse with NS, pat dry, apply Calmosepine and cover with dry gauze daily   - Left heel: bedside nursing to cleanse with NS, pat dry and apply BPCO every shift  - Sacrum: bedside nursing to cleanse with bath wipes, pat dry and apply a foam dressing every 3 days and prn soilage   - Turning every 2 hours  - Heel lift boots  - Waffle mattress overlay - in use     04/04/25 1106        Wound 04/04/25 1106 Skin Tear Left Lower quadrant   Date First Assessed/Time First Assessed: 04/04/25 1106   Present on Original Admission: Yes  Primary Wound Type: Skin Tear  Side: Left  Location: Lower quadrant   Wound Image    Dressing Appearance Intact;Moist drainage   Drainage Amount Small   Drainage Characteristics/Odor Serosanguineous   Appearance Pink;Moist   Tissue loss description Partial thickness   Periwound Area Intact;Dry   Care Cleansed with:;Sterile normal saline   Dressing Applied;Other (comment);Gauze  (Calmoseptine)     Recommendations made to primary team for above plan via secure chat. Wound care will follow-up as needed.   04/04/2025         [1]   Social History  Socioeconomic History    Marital status: Single   Tobacco Use    Smoking status: Never    Smokeless  tobacco: Never     Social Drivers of Health     Financial Resource Strain: Low Risk  (4/2/2025)    Overall Financial Resource Strain (CARDIA)     Difficulty of Paying Living Expenses: Not hard at all   Food Insecurity: No Food Insecurity (4/2/2025)    Hunger Vital Sign     Worried About Running Out of Food in the Last Year: Never true     Ran Out of Food in the Last Year: Never true   Transportation Needs: No Transportation Needs (4/2/2025)    PRAPARE - Transportation     Lack of Transportation (Medical): No     Lack of Transportation (Non-Medical): No   Physical Activity: Inactive (4/2/2025)    Exercise Vital Sign     Days of Exercise per Week: 0 days     Minutes of Exercise per Session: 0 min   Stress: Patient Unable To Answer (4/2/2025)    [employer] [place] [employer] - Occupational Stress Questionnaire     Feeling of Stress : Patient unable to answer   Housing Stability: Low Risk  (4/2/2025)    Housing Stability Vital Sign     Unable to Pay for Housing in the Last Year: No     Homeless in the Last Year: No

## 2025-04-04 NOTE — SUBJECTIVE & OBJECTIVE
"Interval History:     Patient seen at bedside. No acute overnight events. Patients father at bedside.   Patients father asking for "mini button J tube placement." IR says this is not in stock and will order and arrange for outpatient exchange. Family notified.   Nursing had trouble starting feeds overnight this morning due to confusion of G/J tube.. discussed with IR and Gastroenterology - recommend J tube use for feeds and meds. Patient resumed on tube feeds and free water flushes. D5LR ordered.  Plan to DC after Na level normalizes.       Review of Systems  Objective:     Vital Signs (Most Recent):  Temp: 97.9 °F (36.6 °C) (04/04/25 1216)  Pulse: 108 (04/04/25 1216)  Resp: 18 (04/04/25 1216)  BP: 94/60 (04/04/25 1216)  SpO2: 100 % (04/04/25 1216) Vital Signs (24h Range):  Temp:  [97.1 °F (36.2 °C)-97.9 °F (36.6 °C)] 97.9 °F (36.6 °C)  Pulse:  [] 108  Resp:  [17-18] 18  SpO2:  [92 %-100 %] 100 %  BP: (92-95)/(55-65) 94/60     Weight: 49 kg (108 lb 0.4 oz)  Body mass index is 16.92 kg/m².    Intake/Output Summary (Last 24 hours) at 4/4/2025 1447  Last data filed at 4/3/2025 2100  Gross per 24 hour   Intake 570 ml   Output --   Net 570 ml         Physical Exam  Constitutional:       General: He is not in acute distress.     Comments: Cachectic, eyes open but does not respond    HENT:      Mouth/Throat:      Mouth: Mucous membranes are dry.   Cardiovascular:      Rate and Rhythm: Normal rate.      Heart sounds: Normal heart sounds.   Pulmonary:      Effort: Pulmonary effort is normal. No respiratory distress.   Abdominal:      Comments: JG tube in place   Musculoskeletal:      Right lower leg: No edema.      Left lower leg: No edema.   Neurological:      Mental Status: Mental status is at baseline.      Comments: Not following commands  Not responsive   Contractures in extremities               Significant Labs: All pertinent labs within the past 24 hours have been reviewed.  BMP:   Recent Labs   Lab " 04/04/25  0653   *   K 3.7      CO2 30*   BUN 3*   CREATININE 0.5   CALCIUM 9.4     CBC:   Recent Labs   Lab 04/04/25  0653   WBC 6.05   HGB 12.8*   HCT 39.2*          Significant Imaging: I have reviewed all pertinent imaging results/findings within the past 24 hours.

## 2025-04-05 LAB
ABSOLUTE EOSINOPHIL (OHS): 0.26 K/UL
ABSOLUTE MONOCYTE (OHS): 0.44 K/UL (ref 0.3–1)
ABSOLUTE NEUTROPHIL COUNT (OHS): 3.64 K/UL (ref 1.8–7.7)
ANION GAP (OHS): 9 MMOL/L (ref 8–16)
BASOPHILS # BLD AUTO: 0.04 K/UL
BASOPHILS NFR BLD AUTO: 0.7 %
BUN SERPL-MCNC: 7 MG/DL (ref 6–20)
CALCIUM SERPL-MCNC: 9.4 MG/DL (ref 8.7–10.5)
CHLORIDE SERPL-SCNC: 106 MMOL/L (ref 95–110)
CO2 SERPL-SCNC: 27 MMOL/L (ref 23–29)
CREAT SERPL-MCNC: 0.5 MG/DL (ref 0.5–1.4)
ERYTHROCYTE [DISTWIDTH] IN BLOOD BY AUTOMATED COUNT: 14.6 % (ref 11.5–14.5)
GFR SERPLBLD CREATININE-BSD FMLA CKD-EPI: >60 ML/MIN/1.73/M2
GLUCOSE SERPL-MCNC: 62 MG/DL (ref 70–110)
HCT VFR BLD AUTO: 41.7 % (ref 40–54)
HGB BLD-MCNC: 13.7 GM/DL (ref 14–18)
IMM GRANULOCYTES # BLD AUTO: 0.07 K/UL (ref 0–0.04)
IMM GRANULOCYTES NFR BLD AUTO: 1.2 % (ref 0–0.5)
LYMPHOCYTES # BLD AUTO: 1.63 K/UL (ref 1–4.8)
MCH RBC QN AUTO: 30.9 PG (ref 27–31)
MCHC RBC AUTO-ENTMCNC: 32.9 G/DL (ref 32–36)
MCV RBC AUTO: 94 FL (ref 82–98)
NUCLEATED RBC (/100WBC) (OHS): 0 /100 WBC
PLATELET # BLD AUTO: 181 K/UL (ref 150–450)
PMV BLD AUTO: 12.1 FL (ref 9.2–12.9)
POCT GLUCOSE: 83 MG/DL (ref 70–110)
POTASSIUM SERPL-SCNC: 4.3 MMOL/L (ref 3.5–5.1)
RBC # BLD AUTO: 4.43 M/UL (ref 4.6–6.2)
RELATIVE EOSINOPHIL (OHS): 4.3 %
RELATIVE LYMPHOCYTE (OHS): 26.8 % (ref 18–48)
RELATIVE MONOCYTE (OHS): 7.2 % (ref 4–15)
RELATIVE NEUTROPHIL (OHS): 59.8 % (ref 38–73)
SODIUM SERPL-SCNC: 142 MMOL/L (ref 136–145)
WBC # BLD AUTO: 6.08 K/UL (ref 3.9–12.7)

## 2025-04-05 PROCEDURE — 82374 ASSAY BLOOD CARBON DIOXIDE: CPT | Performed by: STUDENT IN AN ORGANIZED HEALTH CARE EDUCATION/TRAINING PROGRAM

## 2025-04-05 PROCEDURE — 94761 N-INVAS EAR/PLS OXIMETRY MLT: CPT

## 2025-04-05 PROCEDURE — 93005 ELECTROCARDIOGRAM TRACING: CPT

## 2025-04-05 PROCEDURE — 36415 COLL VENOUS BLD VENIPUNCTURE: CPT | Performed by: STUDENT IN AN ORGANIZED HEALTH CARE EDUCATION/TRAINING PROGRAM

## 2025-04-05 PROCEDURE — 25000242 PHARM REV CODE 250 ALT 637 W/ HCPCS: Performed by: STUDENT IN AN ORGANIZED HEALTH CARE EDUCATION/TRAINING PROGRAM

## 2025-04-05 PROCEDURE — 63600175 PHARM REV CODE 636 W HCPCS: Performed by: STUDENT IN AN ORGANIZED HEALTH CARE EDUCATION/TRAINING PROGRAM

## 2025-04-05 PROCEDURE — 21400001 HC TELEMETRY ROOM

## 2025-04-05 PROCEDURE — A9698 NON-RAD CONTRAST MATERIALNOC: HCPCS | Performed by: STUDENT IN AN ORGANIZED HEALTH CARE EDUCATION/TRAINING PROGRAM

## 2025-04-05 PROCEDURE — 85025 COMPLETE CBC W/AUTO DIFF WBC: CPT | Performed by: STUDENT IN AN ORGANIZED HEALTH CARE EDUCATION/TRAINING PROGRAM

## 2025-04-05 PROCEDURE — 93010 ELECTROCARDIOGRAM REPORT: CPT | Mod: ,,, | Performed by: INTERNAL MEDICINE

## 2025-04-05 PROCEDURE — 63600175 PHARM REV CODE 636 W HCPCS: Mod: JZ,TB | Performed by: STUDENT IN AN ORGANIZED HEALTH CARE EDUCATION/TRAINING PROGRAM

## 2025-04-05 PROCEDURE — 25500020 PHARM REV CODE 255: Performed by: STUDENT IN AN ORGANIZED HEALTH CARE EDUCATION/TRAINING PROGRAM

## 2025-04-05 PROCEDURE — 25000003 PHARM REV CODE 250: Performed by: STUDENT IN AN ORGANIZED HEALTH CARE EDUCATION/TRAINING PROGRAM

## 2025-04-05 RX ORDER — KETOROLAC TROMETHAMINE 15 MG/ML
15 INJECTION, SOLUTION INTRAMUSCULAR; INTRAVENOUS EVERY 6 HOURS PRN
Status: DISPENSED | OUTPATIENT
Start: 2025-04-05 | End: 2025-04-06

## 2025-04-05 RX ORDER — PROCHLORPERAZINE EDISYLATE 5 MG/ML
5 INJECTION INTRAMUSCULAR; INTRAVENOUS ONCE
Status: COMPLETED | OUTPATIENT
Start: 2025-04-06 | End: 2025-04-06

## 2025-04-05 RX ORDER — SODIUM CHLORIDE, SODIUM LACTATE, POTASSIUM CHLORIDE, CALCIUM CHLORIDE 600; 310; 30; 20 MG/100ML; MG/100ML; MG/100ML; MG/100ML
INJECTION, SOLUTION INTRAVENOUS CONTINUOUS
Status: DISCONTINUED | OUTPATIENT
Start: 2025-04-05 | End: 2025-04-08

## 2025-04-05 RX ORDER — KETOROLAC TROMETHAMINE 15 MG/ML
15 INJECTION, SOLUTION INTRAMUSCULAR; INTRAVENOUS ONCE
Status: COMPLETED | OUTPATIENT
Start: 2025-04-05 | End: 2025-04-05

## 2025-04-05 RX ORDER — LEVETIRACETAM 500 MG/5ML
500 INJECTION, SOLUTION, CONCENTRATE INTRAVENOUS EVERY 12 HOURS
Status: DISCONTINUED | OUTPATIENT
Start: 2025-04-05 | End: 2025-04-15

## 2025-04-05 RX ORDER — ONDANSETRON HYDROCHLORIDE 2 MG/ML
4 INJECTION, SOLUTION INTRAVENOUS EVERY 6 HOURS PRN
Status: DISCONTINUED | OUTPATIENT
Start: 2025-04-05 | End: 2025-04-24 | Stop reason: HOSPADM

## 2025-04-05 RX ADMIN — SODIUM CHLORIDE, POTASSIUM CHLORIDE, SODIUM LACTATE AND CALCIUM CHLORIDE: 600; 310; 30; 20 INJECTION, SOLUTION INTRAVENOUS at 10:04

## 2025-04-05 RX ADMIN — LEVETIRACETAM 500 MG: 500 SOLUTION ORAL at 10:04

## 2025-04-05 RX ADMIN — KETOROLAC TROMETHAMINE 15 MG: 15 INJECTION, SOLUTION INTRAMUSCULAR; INTRAVENOUS at 10:04

## 2025-04-05 RX ADMIN — ONDANSETRON 4 MG: 2 INJECTION INTRAMUSCULAR; INTRAVENOUS at 10:04

## 2025-04-05 RX ADMIN — Medication: at 10:04

## 2025-04-05 RX ADMIN — ONDANSETRON 4 MG: 2 INJECTION INTRAMUSCULAR; INTRAVENOUS at 09:04

## 2025-04-05 RX ADMIN — DIAZEPAM 5 MG: 5 SOLUTION ORAL at 10:04

## 2025-04-05 RX ADMIN — KETOROLAC TROMETHAMINE 15 MG: 15 INJECTION, SOLUTION INTRAMUSCULAR; INTRAVENOUS at 04:04

## 2025-04-05 RX ADMIN — GLYCOPYRROLATE 1 MG: 1 TABLET ORAL at 10:04

## 2025-04-05 RX ADMIN — LEVETIRACETAM 500 MG: 100 INJECTION INTRAVENOUS at 09:04

## 2025-04-05 RX ADMIN — Medication: at 09:04

## 2025-04-05 RX ADMIN — BARIUM SULFATE 100 ML: 20 SUSPENSION ORAL at 01:04

## 2025-04-05 RX ADMIN — METHYLPHENIDATE HYDROCHLORIDE 5 MG: 5 TABLET ORAL at 07:04

## 2025-04-05 NOTE — CARE UPDATE
RAPID RESPONSE NURSE PROACTIVE ROUNDING NOTE       Time of Visit: 2:35 PM    Patient Information:  Admit Date: 2025  LOS: 4  Code Status: Full Code   Date of Visit: 2025  : 2005  Age: 19 y.o.  Sex: male  Race: Black or   Bed: 1125/[facility] A:   MRN: [id number]    Recent Clinical History:  ICU discharge this admission? No   PACU discharge (last 24 hours)? No   Received conscious sedation/general anesthesia (last 24 hours)? No  ED Visit (Last 24 hours)? No  On NIPPV (Last 24 hours)? No     Primary Team:  Attending Physician: Shiraz Coronado*  Primary Service: [facility] MED B     SITUATION    Notification source: Charge RN via phone call.  Reason for alert: Hypothermia, bradycardia, nausea/vomiting   Alert category: Neuro, GI     BACKGROUND     Primary Reason for Admission: PEG tube malfunction    Patient has a past medical history of Traumatic brain injury.    Last Vitals:  Temp: 95.4 °F (35.2 °C) ( 104)  Pulse: 61 ( 145)  Resp: 16 ( 104)  BP: 113/76 ( 1041)  SpO2: 100 % ( 1451)    24 Hours Vitals Range:  Temp:  [94.7 °F (34.8 °C)-97.7 °F (36.5 °C)]   Pulse:  []   Resp:  [16-20]   BP: (107-124)/(69-85)   SpO2:  [91 %-100 %]     Labs:  Recent Labs     25  0653 25  0713   WBC 6.05 6.08   HGB 12.8* 13.7*   HCT 39.2* 41.7    181       Recent Labs     25  0826 25  0653 25  0713   * 146* 142   K 3.2* 3.7 4.3   * 109 106   CO2 30* 30* 27   BUN <3* 3* 7   CREATININE 0.5 0.5 0.5        ASSESSMENT     Physical Exam  Constitutional:       General: He is awake. He is not in acute distress.     Appearance: He is cachectic. He is ill-appearing.   Cardiovascular:      Rate and Rhythm: Regular rhythm. Bradycardia present.   Pulmonary:      Effort: Pulmonary effort is normal. No respiratory distress.   Abdominal:      Comments: G/J-tube to LLQ. 4x4 gauze dressing clean/dry/intact. Large, loose, brown BM x1.     Musculoskeletal:      Right elbow: Normal range of motion.   Skin:     General: Skin is warm and dry.   Neurological:      Mental Status: Mental status is at baseline.     RRT called to bedside to assess pt. Charge RN reported pt was given ~100mL of oral contrast through G-tube per MD order. She also reported episode of vomiting that resolved w/IV Zofran. Tube feeds held since ~7:30 AM. Bedside nursing staff also reported they were told contrast not visible on portable abdominal x-ray by radiology. Charge RN/bedside RN notified primary team.     RRT assisted w/incontinence care and assessed pt. Pt persistently hypothermic and bradycardiac. Vital signs stable otherwise. Warming blanket applied. EKG contacted to ensure STAT 12 lead from this morning be completed ASAP. Recommended that aspiration precautions be strictly adhered to. POCT BG ordered q 4 hours while tube feeds on hold. Continuous telemetry and SpO2 monitoring in place. No ICU needs at this time.     INTERVENTIONS    Patient evaluated for Medical problem. Staff concerns included intolerance of tube feeds, hypothermia, and bradycardia. The following interventions were performed: continuous pulse ox monitoring continued, continuous cardiac monitoring continued, and warming blanket applied.    Time spent at the bedside: 15 -30 min    RECOMMENDATIONS    We Recommend: Continuous telemetry monitoring, Continuous SpO2 monitoring, Maintain IV access, Strict I/O, Aspiration precautions, Seizure precautions, Monitor for signs of respiratory distress and hypoglycemia, Ensure PRN medications for nausea are available and administered as needed, Administer prescribed medications and monitor for effect, and Notify Rapid Response of decline in patient status    PROVIDER ESCALATION    Escalation Required:  No - Charge RN discussed situation w/ primary physician     Orders received and case discussed with NA.    Patient Disposition: Remain in room  0115.    FOLLOW-UP    Charge nurse, Darlin,  updated on plan of care. Instructed to contact Rapid Response Nurse, Anu Carney RN at [phone] for further questions or concerns.

## 2025-04-05 NOTE — SUBJECTIVE & OBJECTIVE
Interval History: Overnight patient with vomiting. Tube feeds paused and cxr/kub obtained, largely unrevealing. Tube feeds resumed overnight but this am with further vomiting noted  Discussed with IR and will give barium via tube and obtain xr    Review of Systems  Objective:     Vital Signs (Most Recent):  Temp: (!) 94.7 °F (34.8 °C) (04/05/25 0945)  Pulse: (!) 58 (04/05/25 1113)  Resp: 16 (04/05/25 1041)  BP: 113/76 (04/05/25 1041)  SpO2: 98 % (04/05/25 1041) Vital Signs (24h Range):  Temp:  [94.7 °F (34.8 °C)-97.9 °F (36.6 °C)] 94.7 °F (34.8 °C)  Pulse:  [] 58  Resp:  [16-20] 16  SpO2:  [91 %-100 %] 98 %  BP: ()/(60-85) 113/76     Weight: 49 kg (108 lb 0.4 oz)  Body mass index is 16.92 kg/m².    Intake/Output Summary (Last 24 hours) at 4/5/2025 1156  Last data filed at 4/4/2025 2030  Gross per 24 hour   Intake 632 ml   Output 250 ml   Net 382 ml         Physical Exam  Constitutional:       General: He is not in acute distress.     Comments: Cachectic, eyes open but does not respond    HENT:      Mouth/Throat:      Mouth: Mucous membranes are dry.   Cardiovascular:      Rate and Rhythm: Normal rate.      Heart sounds: Normal heart sounds.   Pulmonary:      Effort: Pulmonary effort is normal. No respiratory distress.   Abdominal:      Comments: JG tube in place   Musculoskeletal:      Right lower leg: No edema.      Left lower leg: No edema.   Neurological:      Mental Status: Mental status is at baseline.      Comments: Not following commands  Not responsive   Contractures in extremities               Significant Labs: All pertinent labs within the past 24 hours have been reviewed.    Significant Imaging: I have reviewed all pertinent imaging results/findings within the past 24 hours.

## 2025-04-05 NOTE — ASSESSMENT & PLAN NOTE
Hypernatremia is likely due to Dehydration from missed free water flushes. The patient's most recent sodium results are listed below.    Recent Labs     04/03/25  0826 04/04/25  0653 04/05/25  0713   * 146* 142     Plan  - Aim to correct the sodium by 8-10mEq in 24 hours.   - resume tube feeds and free water flushes as able

## 2025-04-05 NOTE — CONSULTS
RD consult for GJ tube feed/med assistance. Spoke to RN. There was confusion of where to connect to feed and give meds. IR and GI answered - pt to received feeds and meds through J tube. Please re-consult if needed.     Caryn Corbett, MS, RD, LDN

## 2025-04-05 NOTE — NURSING
Alert.No s/s of discomfort. J tube feedings held due to episodes of emesis; green gastric content. Ivf in progress. No distress noted. Oral suctioning provided and antiemetic given. Rectal temp 95.9; warmer in place. Intermittent a fib on telemetry monitoring with bradycardic episodes; hr 49 awaiting EKG. New order for barium 450 ml for abd x-ray at bedside. 40 cc administered to gastric tubing per md. Tubing patent and no residual prior. Advised for additional amount for imaging. 60 cc's additional administered to gastric tubing. Md at bedside. Rapid response following.

## 2025-04-05 NOTE — ASSESSMENT & PLAN NOTE
Patient's most recent potassium results are listed below.   Recent Labs     04/03/25  0826 04/04/25  0653 04/05/25  0713   K 3.2* 3.7 4.3     Plan  - Replete potassium per protocol  - Monitor potassium Daily  - Patient's hypokalemia is stable

## 2025-04-05 NOTE — PROGRESS NOTES
[facility] - Internal Medicine [facility] Medicine  Progress Note    Patient Name: Jesus Posey  MRN: [id number]  Patient Class: IP- Inpatient   Admission Date: 4/1/2025  Length of Stay: 4 days  Attending Physician: Shiraz Coronado*  Primary Care Provider: Pallavi, Primary Doctor        Subjective     Principal Problem:PEG tube malfunction        HPI:  Jesus Posey is a 19 y.o. 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     As he is nonverbal, father at bedside provides history.     He reports that yesterday, they noted leakage from his GJ tube which resembled his tube feeds. Otherwise, he has had no new issues.     They exchanged his tube last month.     Overview/Hospital Course:  GJ tube malfunction. Intermittently hypothermic and bradycardic which I suspect is related to dysautonomia. Pt's grandmother confirmed that pt tends to be hypothermic every time he is hospitalized. Infectious workup has been negative. On IV maintenance fluids. Awaiting IR replacement of tube.   Suspect low temp and bradycardia related to dysautonomia. Infectious workup negative. Antibiotics dc. Bolus given for low BP, which was thought to be secondary to lack of tube feedings for the past few days. Started on Maintenance fluids. Tube replaced 4/2.     Interval History: Overnight patient with vomiting. Tube feeds paused and cxr/kub obtained, largely unrevealing. Tube feeds resumed overnight but this am with further vomiting noted  Discussed with IR and will give barium via tube and obtain xr    Review of Systems  Objective:     Vital Signs (Most Recent):  Temp: (!) 94.7 °F (34.8 °C) (04/05/25 0945)  Pulse: (!) 58 (04/05/25 1113)  Resp: 16 (04/05/25 1041)  BP: 113/76 (04/05/25 1041)  SpO2: 98 % (04/05/25 1041) Vital Signs (24h Range):  Temp:  [94.7 °F (34.8 °C)-97.9 °F (36.6 °C)] 94.7 °F (34.8 °C)  Pulse:  [] 58  Resp:  [16-20] 16  SpO2:   [91 %-100 %] 98 %  BP: ()/(60-85) 113/76     Weight: 49 kg (108 lb 0.4 oz)  Body mass index is 16.92 kg/m².    Intake/Output Summary (Last 24 hours) at 4/5/2025 1156  Last data filed at 4/4/2025 2030  Gross per 24 hour   Intake 632 ml   Output 250 ml   Net 382 ml         Physical Exam  Constitutional:       General: He is not in acute distress.     Comments: Cachectic, eyes open but does not respond    HENT:      Mouth/Throat:      Mouth: Mucous membranes are dry.   Cardiovascular:      Rate and Rhythm: Normal rate.      Heart sounds: Normal heart sounds.   Pulmonary:      Effort: Pulmonary effort is normal. No respiratory distress.   Abdominal:      Comments: JG tube in place   Musculoskeletal:      Right lower leg: No edema.      Left lower leg: No edema.   Neurological:      Mental Status: Mental status is at baseline.      Comments: Not following commands  Not responsive   Contractures in extremities               Significant Labs: All pertinent labs within the past 24 hours have been reviewed.    Significant Imaging: I have reviewed all pertinent imaging results/findings within the past 24 hours.      Assessment & Plan  PEG tube malfunction  Patient with GJ tube dependence, presenting with tube malfunction. IR consulted for exchange under fluoroscopy.   Spastic quadriparesis  Intrathecal baclofen pump in place. Resume home regimen with tube replacement. Valium, keppra.   Functional quadriplegia  Intrathecal baclofen pump in place. Supportive care.   Cachexia  Resume tube feeds after GJ tube replaced.   Hypothermia  Per family, this is common when pt is hospitalized  Negative infectious workup  Suspect low temps and bradycardia are related to dysautonomia  Hypernatremia  Hypernatremia is likely due to Dehydration from missed free water flushes. The patient's most recent sodium results are listed below.    Recent Labs     04/03/25  0826 04/04/25  0653 04/05/25  0713   * 146* 142     Plan  - Aim to  correct the sodium by 8-10mEq in 24 hours.   - resume tube feeds and free water flushes as able  Hypokalemia  Patient's most recent potassium results are listed below.   Recent Labs     04/03/25  0826 04/04/25  0653 04/05/25  0713   K 3.2* 3.7 4.3     Plan  - Replete potassium per protocol  - Monitor potassium Daily  - Patient's hypokalemia is stable  Severe malnutrition  Nutrition consulted. Most recent weight and BMI monitored-     Measurements:  Wt Readings from Last 1 Encounters:   04/02/25 49 kg (108 lb 0.4 oz)   Body mass index is 16.92 kg/m².    Patient has been screened and assessed by RD.    Malnutrition Type:  Context:    Level:      Malnutrition Characteristic Summary:       Interventions/Recommendations (treatment strategy):       Body mass index (BMI) less than 19      Atelectasis    VTE Risk Mitigation (From admission, onward)           Ordered     IP VTE LOW RISK PATIENT  Once         04/01/25 0209     Place sequential compression device  Until discontinued         04/01/25 0209                    Discharge Planning   EDINSON: 4/5/2025     Code Status: Full Code   Medical Readiness for Discharge Date:   Discharge Plan A: Home with family, Home Health                        Shiraz Coronado MD  Department of Hospital Medicine   [facility] - Internal Medicine Telemetry

## 2025-04-05 NOTE — CARE UPDATE
"RAPID RESPONSE NURSE CHART REVIEW        Chart Reviewed: 04/05/2025, 7:45 AM    MRN: [id number]  Bed: 1125/1125 A    Dx: PEG tube malfunction    Jesus Posey has a past medical history of Traumatic brain injury.    Last VS: /82   Pulse (!) 49   Temp (!) 95.4 °F (35.2 °C) (Rectal)   Resp 20   Ht 5' 7" (1.702 m)   Wt 49 kg (108 lb 0.4 oz)   SpO2 99%   BMI 16.92 kg/m²     24H Vital Sign Range:  Temp:  [95.4 °F (35.2 °C)-97.9 °F (36.6 °C)]   Pulse:  []   Resp:  [18-20]   BP: ()/(60-85)   SpO2:  [91 %-100 %]     Level of Consciousness (AVPU): alert    Recent Labs     04/02/25  1256 04/04/25  0653   WBC 2.64* 6.05   HGB 12.2* 12.8*   HCT 38.4* 39.2*    210       Recent Labs     04/02/25  1256 04/03/25  0826 04/04/25  0653    147* 146*   K 3.4* 3.2* 3.7   * 111* 109   CO2 26 30* 30*   BUN 5* <3* 3*   CREATININE 0.4* 0.5 0.5        OXYGEN:     Oxygen Concentration (%): 100       MEWS score: 1    Contacted at 07:36 AM.    Bedside RNBailee, alerted RRT of irregular bradycardia. No additional acute concerns verbalized at this time. Airstrips and chart reviewed. Pt also hypothermic. BP stable. Presentation similar on admission. Primary team aware. STAT 12 lead EKG pending. Continuous telemetry monitoring in place. Warming blanket ordered. Instructed to call [phone] for further concerns or assistance.    Anu Carney RN       "

## 2025-04-05 NOTE — RESPIRATORY THERAPY
"RAPID RESPONSE RESPIRATORY THERAPY PROACTIVE NOTE           Time of visit: 1430     Code Status: Full Code   : 2005  Bed: 1125/1125 A:   MRN: [id number]  Time spent at the bedside: 15 -30 min    SITUATION    Evaluated patient for: Assessment    BACKGROUND    Why is the patient in the hospital?: PEG tube malfunction    Patient has a past medical history of Traumatic brain injury.    24 Hours Vitals Range:  Temp:  [94.7 °F (34.8 °C)-97.5 °F (36.4 °C)]   Pulse:  []   Resp:  [16-20]   BP: (107-135)/(69-88)   SpO2:  [91 %-100 %]     Labs:    Recent Labs     25  0826 25  0653 25  0713   * 146* 142   K 3.2* 3.7 4.3   * 109 106   CO2 30* 30* 27   BUN <3* 3* 7   CREATININE 0.5 0.5 0.5        No results for input(s): "PH", "PCO2", "PO2", "HCO3", "POCSATURATED", "BE" in the last 72 hours.    ASSESSMENT/INTERVENTIONS  Rapid team called by charge RN to assess pt. Pt had been given 100ml oral contrast through G-tube earlier. Episode of vomiting also reported that resolved with IV Zofran. Pt bradycardic and hypothermic. Warming blanket applied. EKG tech contacted to complete EKG order that had been outstanding since this AM. Pt has no respiratory issues currently, pt breathing unlabored, on RA, sat 99%.    Last VS   Temp: 95.4 °F (35.2 °C) ( 1041)  Pulse: 59 ( 155)  Resp: 19 ( 155)  BP: 135/88 (1556)  SpO2: 95 % (1556)    Level of Consciousness: Level of Consciousness (AVPU): alert  Respiratory Effort: Respiratory Effort: Unlabored Expansion/Accessory Muscle Usage: Expansion/Accessory Muscles/Retractions: no retractions, expansion symmetric  All Lung Field Breath Sounds: All Lung Fields Breath Sounds: Anterior:, Lateral:, diminished, equal bilaterally  O2 Device/Concentration: RA, 21%.  NIPPV: No Surgical airway: No  ETCO2 monitored: ETCO2 (mmHg): 0 mmHg  Ambu at bedside: Yes.    Active Orders   Respiratory Care    Pulse Oximetry Continuous     Frequency: " Continuous     Number of Occurrences: Until Specified       RECOMMENDATIONS    Suggested Interventions: RRT Recs: Continue POC per primary team.    FOLLOW-UP    Please call back the Rapid Response RT, Anam Lakhani, RRT at x [phone] for any questions or concerns.

## 2025-04-06 PROBLEM — J98.11 ATELECTASIS: Status: RESOLVED | Noted: 2024-10-08 | Resolved: 2025-04-06

## 2025-04-06 LAB
ABSOLUTE EOSINOPHIL (OHS): 0.1 K/UL
ABSOLUTE MONOCYTE (OHS): 0.7 K/UL (ref 0.3–1)
ABSOLUTE NEUTROPHIL COUNT (OHS): 6.8 K/UL (ref 1.8–7.7)
ANION GAP (OHS): 8 MMOL/L (ref 8–16)
BACTERIA BLD CULT: NORMAL
BACTERIA BLD CULT: NORMAL
BASOPHILS # BLD AUTO: 0.03 K/UL
BASOPHILS NFR BLD AUTO: 0.3 %
BUN SERPL-MCNC: 8 MG/DL (ref 6–20)
CALCIUM SERPL-MCNC: 9.4 MG/DL (ref 8.7–10.5)
CHLORIDE SERPL-SCNC: 104 MMOL/L (ref 95–110)
CO2 SERPL-SCNC: 27 MMOL/L (ref 23–29)
CREAT SERPL-MCNC: 0.5 MG/DL (ref 0.5–1.4)
ERYTHROCYTE [DISTWIDTH] IN BLOOD BY AUTOMATED COUNT: 14.2 % (ref 11.5–14.5)
GFR SERPLBLD CREATININE-BSD FMLA CKD-EPI: >60 ML/MIN/1.73/M2
GLUCOSE SERPL-MCNC: 74 MG/DL (ref 70–110)
HCT VFR BLD AUTO: 39 % (ref 40–54)
HGB BLD-MCNC: 12.9 GM/DL (ref 14–18)
IMM GRANULOCYTES # BLD AUTO: 0.04 K/UL (ref 0–0.04)
IMM GRANULOCYTES NFR BLD AUTO: 0.5 % (ref 0–0.5)
LYMPHOCYTES # BLD AUTO: 1.06 K/UL (ref 1–4.8)
MCH RBC QN AUTO: 30.6 PG (ref 27–31)
MCHC RBC AUTO-ENTMCNC: 33.1 G/DL (ref 32–36)
MCV RBC AUTO: 93 FL (ref 82–98)
NUCLEATED RBC (/100WBC) (OHS): 0 /100 WBC
OHS QRS DURATION: 90 MS
OHS QTC CALCULATION: 383 MS
PLATELET # BLD AUTO: 221 K/UL (ref 150–450)
PMV BLD AUTO: 12.1 FL (ref 9.2–12.9)
POCT GLUCOSE: 83 MG/DL (ref 70–110)
POCT GLUCOSE: 89 MG/DL (ref 70–110)
POCT GLUCOSE: 91 MG/DL (ref 70–110)
POCT GLUCOSE: 91 MG/DL (ref 70–110)
POCT GLUCOSE: 92 MG/DL (ref 70–110)
POTASSIUM SERPL-SCNC: 3.9 MMOL/L (ref 3.5–5.1)
RBC # BLD AUTO: 4.21 M/UL (ref 4.6–6.2)
RELATIVE EOSINOPHIL (OHS): 1.1 %
RELATIVE LYMPHOCYTE (OHS): 12.1 % (ref 18–48)
RELATIVE MONOCYTE (OHS): 8 % (ref 4–15)
RELATIVE NEUTROPHIL (OHS): 78 % (ref 38–73)
SODIUM SERPL-SCNC: 139 MMOL/L (ref 136–145)
WBC # BLD AUTO: 8.73 K/UL (ref 3.9–12.7)

## 2025-04-06 PROCEDURE — 85025 COMPLETE CBC W/AUTO DIFF WBC: CPT | Performed by: STUDENT IN AN ORGANIZED HEALTH CARE EDUCATION/TRAINING PROGRAM

## 2025-04-06 PROCEDURE — 63600175 PHARM REV CODE 636 W HCPCS: Performed by: STUDENT IN AN ORGANIZED HEALTH CARE EDUCATION/TRAINING PROGRAM

## 2025-04-06 PROCEDURE — 25000242 PHARM REV CODE 250 ALT 637 W/ HCPCS: Performed by: STUDENT IN AN ORGANIZED HEALTH CARE EDUCATION/TRAINING PROGRAM

## 2025-04-06 PROCEDURE — 82310 ASSAY OF CALCIUM: CPT | Performed by: STUDENT IN AN ORGANIZED HEALTH CARE EDUCATION/TRAINING PROGRAM

## 2025-04-06 PROCEDURE — 36415 COLL VENOUS BLD VENIPUNCTURE: CPT | Performed by: STUDENT IN AN ORGANIZED HEALTH CARE EDUCATION/TRAINING PROGRAM

## 2025-04-06 PROCEDURE — 21400001 HC TELEMETRY ROOM

## 2025-04-06 PROCEDURE — 25000003 PHARM REV CODE 250: Performed by: STUDENT IN AN ORGANIZED HEALTH CARE EDUCATION/TRAINING PROGRAM

## 2025-04-06 RX ADMIN — SODIUM CHLORIDE, POTASSIUM CHLORIDE, SODIUM LACTATE AND CALCIUM CHLORIDE: 600; 310; 30; 20 INJECTION, SOLUTION INTRAVENOUS at 10:04

## 2025-04-06 RX ADMIN — KETOROLAC TROMETHAMINE 15 MG: 15 INJECTION, SOLUTION INTRAMUSCULAR; INTRAVENOUS at 09:04

## 2025-04-06 RX ADMIN — LEVETIRACETAM 500 MG: 100 INJECTION INTRAVENOUS at 09:04

## 2025-04-06 RX ADMIN — AMPICILLIN SODIUM AND SULBACTAM SODIUM 3 G: 2; 1 INJECTION, POWDER, FOR SOLUTION INTRAMUSCULAR; INTRAVENOUS at 09:04

## 2025-04-06 RX ADMIN — Medication: at 09:04

## 2025-04-06 RX ADMIN — ONDANSETRON 4 MG: 2 INJECTION INTRAMUSCULAR; INTRAVENOUS at 05:04

## 2025-04-06 RX ADMIN — AMPICILLIN SODIUM AND SULBACTAM SODIUM 3 G: 2; 1 INJECTION, POWDER, FOR SOLUTION INTRAMUSCULAR; INTRAVENOUS at 02:04

## 2025-04-06 RX ADMIN — DIAZEPAM 5 MG: 5 SOLUTION ORAL at 02:04

## 2025-04-06 RX ADMIN — DIAZEPAM 5 MG: 5 SOLUTION ORAL at 08:04

## 2025-04-06 RX ADMIN — PROCHLORPERAZINE EDISYLATE 5 MG: 5 INJECTION INTRAMUSCULAR; INTRAVENOUS at 12:04

## 2025-04-06 NOTE — SUBJECTIVE & OBJECTIVE
Interval History: Overnight continued intermittent vomiting. Hold further tube feeds. Obtain CT a/p   Afebrile    Review of Systems  Objective:     Vital Signs (Most Recent):  Temp: 98.3 °F (36.8 °C) (04/06/25 1143)  Pulse: 82 (04/06/25 1150)  Resp: 17 (04/06/25 1143)  BP: 128/88 (04/06/25 1143)  SpO2: 95 % (04/06/25 1143) Vital Signs (24h Range):  Temp:  [97.4 °F (36.3 °C)-98.3 °F (36.8 °C)] 98.3 °F (36.8 °C)  Pulse:  [] 82  Resp:  [17-19] 17  SpO2:  [92 %-100 %] 95 %  BP: (113-135)/(77-88) 128/88     Weight: 49 kg (108 lb 0.4 oz)  Body mass index is 16.92 kg/m².    Intake/Output Summary (Last 24 hours) at 4/6/2025 1318  Last data filed at 4/5/2025 1828  Gross per 24 hour   Intake 0 ml   Output 500 ml   Net -500 ml         Physical Exam  Constitutional:       General: He is not in acute distress.     Comments: Cachectic, eyes open but does not respond    Cardiovascular:      Rate and Rhythm: Normal rate.      Heart sounds: Normal heart sounds.   Pulmonary:      Effort: Pulmonary effort is normal. No respiratory distress.   Abdominal:      Comments: JG tube in place   Musculoskeletal:      Right lower leg: No edema.      Left lower leg: No edema.   Neurological:      Mental Status: Mental status is at baseline.      Comments: Not following commands  Not responsive   Contractures in extremities               Significant Labs: All pertinent labs within the past 24 hours have been reviewed.    Significant Imaging: I have reviewed all pertinent imaging results/findings within the past 24 hours.

## 2025-04-06 NOTE — ASSESSMENT & PLAN NOTE
Hypernatremia is likely due to Dehydration from missed free water flushes. The patient's most recent sodium results are listed below.    Recent Labs     04/04/25  0653 04/05/25  0713 04/06/25  0344   * 142 139     Plan  - Aim to correct the sodium by 8-10mEq in 24 hours.   - resume tube feeds and free water flushes as able

## 2025-04-06 NOTE — ASSESSMENT & PLAN NOTE
Patient with GJ tube dependence, presenting with tube malfunction. IR consulted for exchange under fluoroscopy. Tube changed.   Now with recurrent vomiting with initiation of tube feeds  Obtain Ct A/P

## 2025-04-06 NOTE — PROGRESS NOTES
[facility] Medicine [facility] Medicine  Progress Note    Patient Name: Jesus Posey  MRN: [id number]  Patient Class: IP- Inpatient   Admission Date: 4/1/2025  Length of Stay: 5 days  Attending Physician: Shiraz Coronado*  Primary Care Provider: Pallavi, Primary Doctor        Subjective     Principal Problem:PEG tube malfunction        HPI:  Jesus Posey is a 19 y.o. 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     As he is nonverbal, father at bedside provides history.     He reports that yesterday, they noted leakage from his GJ tube which resembled his tube feeds. Otherwise, he has had no new issues.     They exchanged his tube last month.     Overview/Hospital Course:  GJ tube malfunction. Intermittently hypothermic and bradycardic which I suspect is related to dysautonomia. Pt's grandmother confirmed that pt tends to be hypothermic every time he is hospitalized. Infectious workup has been negative. On IV maintenance fluids. Awaiting IR replacement of tube.   Suspect low temp and bradycardia related to dysautonomia. Infectious workup negative. Antibiotics dc. Bolus given for low BP, which was thought to be secondary to lack of tube feedings for the past few days. Started on Maintenance fluids. Tube replaced 4/2.     Interval History: Overnight continued intermittent vomiting. Hold further tube feeds. Obtain CT a/p   Afebrile    Review of Systems  Objective:     Vital Signs (Most Recent):  Temp: 98.3 °F (36.8 °C) (04/06/25 1143)  Pulse: 82 (04/06/25 1150)  Resp: 17 (04/06/25 1143)  BP: 128/88 (04/06/25 1143)  SpO2: 95 % (04/06/25 1143) Vital Signs (24h Range):  Temp:  [97.4 °F (36.3 °C)-98.3 °F (36.8 °C)] 98.3 °F (36.8 °C)  Pulse:  [] 82  Resp:  [17-19] 17  SpO2:  [92 %-100 %] 95 %  BP: (113-135)/(77-88) 128/88     Weight: 49 kg (108 lb 0.4 oz)  Body mass index is 16.92 kg/m².    Intake/Output Summary  (Last 24 hours) at 4/6/2025 1318  Last data filed at 4/5/2025 1828  Gross per 24 hour   Intake 0 ml   Output 500 ml   Net -500 ml         Physical Exam  Constitutional:       General: He is not in acute distress.     Comments: Cachectic, eyes open but does not respond    Cardiovascular:      Rate and Rhythm: Normal rate.      Heart sounds: Normal heart sounds.   Pulmonary:      Effort: Pulmonary effort is normal. No respiratory distress.   Abdominal:      Comments: JG tube in place   Musculoskeletal:      Right lower leg: No edema.      Left lower leg: No edema.   Neurological:      Mental Status: Mental status is at baseline.      Comments: Not following commands  Not responsive   Contractures in extremities               Significant Labs: All pertinent labs within the past 24 hours have been reviewed.    Significant Imaging: I have reviewed all pertinent imaging results/findings within the past 24 hours.      Assessment & Plan  PEG tube malfunction  Patient with GJ tube dependence, presenting with tube malfunction. IR consulted for exchange under fluoroscopy. Tube changed.   Now with recurrent vomiting with initiation of tube feeds  Obtain Ct A/P  Spastic quadriparesis  Intrathecal baclofen pump in place. Resume home regimen with tube replacement. Valium, keppra.   Functional quadriplegia  Intrathecal baclofen pump in place. Supportive care.   Cachexia  Resume tube feeds after GJ tube replaced as able  Hypothermia  Per family, this is common when pt is hospitalized  Negative infectious workup  Suspect low temps and bradycardia are related to dysautonomia  Hypernatremia  Hypernatremia is likely due to Dehydration from missed free water flushes. The patient's most recent sodium results are listed below.    Recent Labs     04/04/25  0653 04/05/25  0713 04/06/25  0344   * 142 139     Plan  - Aim to correct the sodium by 8-10mEq in 24 hours.   - resume tube feeds and free water flushes as  able  Hypokalemia  Patient's most recent potassium results are listed below.   Recent Labs     04/04/25  0653 04/05/25  0713 04/06/25  0344   K 3.7 4.3 3.9     Plan  - Replete potassium per protocol  - Monitor potassium Daily  - Patient's hypokalemia is stable  Severe malnutrition  Body mass index (BMI) less than 19  Nutrition consulted. Most recent weight and BMI monitored-     Measurements:  Wt Readings from Last 1 Encounters:   04/02/25 49 kg (108 lb 0.4 oz)   Body mass index is 16.92 kg/m².    Patient has been screened and assessed by RD.    Malnutrition Type:  Context:    Level:      Malnutrition Characteristic Summary:       Interventions/Recommendations (treatment strategy):       VTE Risk Mitigation (From admission, onward)           Ordered     IP VTE LOW RISK PATIENT  Once         04/01/25 0209     Place sequential compression device  Until discontinued         04/01/25 0209                    Discharge Planning   EDINSON: 4/8/2025     Code Status: Full Code   Medical Readiness for Discharge Date:   Discharge Plan A: Home with family, Home Health          Shiraz Coronado MD  Department of Hospital Medicine   [facility] - Internal Medicine Telemetry

## 2025-04-06 NOTE — NURSING
3 episodes of nausea/vomiting overnight. Moderate amount of green emesis noted. See MAR.     Grandmother at bedside.

## 2025-04-06 NOTE — PLAN OF CARE
Problem: Adult Inpatient Plan of Care  Goal: Plan of Care Review  Outcome: Progressing     Problem: Adult Inpatient Plan of Care  Goal: Patient-Specific Goal (Individualized)  Outcome: Progressing     Problem: Adult Inpatient Plan of Care  Goal: Optimal Comfort and Wellbeing  Outcome: Progressing     Problem: Skin Injury Risk Increased  Goal: Skin Health and Integrity  Outcome: Progressing     Problem: Wound  Goal: Optimal Coping  Outcome: Progressing     Problem: Wound  Goal: Absence of Infection Signs and Symptoms  Outcome: Progressing     Problem: Wound  Goal: Skin Health and Integrity  Outcome: Progressing     Problem: Wound  Goal: Optimal Wound Healing  Outcome: Progressing   Pt has been resting all shift, he has had no S/SX of nausea, he appears comfortable, bear hugger remains in place, IV fluids continue, pt remains NPO as well as nothing through PEG tube except anticonvulsant medication. Transported to CT for a CT of abd and pelvis without contrast. Turned and repositioned for comfort and pressure management treatment to left heel and abdominal excoriation, grandmother felt the Calmoseptine was not helping the excoriation and request it be changed, I reached out to MD who requested wound care consult.

## 2025-04-06 NOTE — ASSESSMENT & PLAN NOTE
Patient's most recent potassium results are listed below.   Recent Labs     04/04/25  0653 04/05/25  0713 04/06/25  0344   K 3.7 4.3 3.9     Plan  - Replete potassium per protocol  - Monitor potassium Daily  - Patient's hypokalemia is stable

## 2025-04-07 ENCOUNTER — TELEPHONE (OUTPATIENT)
Dept: INTERVENTIONAL RADIOLOGY/VASCULAR | Facility: CLINIC | Age: 20
End: 2025-04-07
Payer: MEDICAID

## 2025-04-07 PROBLEM — K94.13 MALFUNCTION OF JEJUNOSTOMY TUBE: Status: ACTIVE | Noted: 2025-04-07

## 2025-04-07 PROBLEM — G92.8 TOXIC METABOLIC ENCEPHALOPATHY: Status: ACTIVE | Noted: 2025-04-07

## 2025-04-07 PROBLEM — T14.8XXA DEEP TISSUE INJURY: Status: ACTIVE | Noted: 2025-04-07

## 2025-04-07 PROBLEM — J96.01 ACUTE HYPOXIC RESPIRATORY FAILURE: Status: ACTIVE | Noted: 2025-04-07

## 2025-04-07 LAB
ABSOLUTE EOSINOPHIL (OHS): 0.17 K/UL
ABSOLUTE MONOCYTE (OHS): 0.66 K/UL (ref 0.3–1)
ABSOLUTE NEUTROPHIL COUNT (OHS): 6.45 K/UL (ref 1.8–7.7)
ALLENS TEST: ABNORMAL
ALLENS TEST: ABNORMAL
ANION GAP (OHS): 9 MMOL/L (ref 8–16)
BASOPHILS # BLD AUTO: 0.03 K/UL
BASOPHILS NFR BLD AUTO: 0.4 %
BUN SERPL-MCNC: 5 MG/DL (ref 6–20)
CALCIUM SERPL-MCNC: 9.2 MG/DL (ref 8.7–10.5)
CHLORIDE SERPL-SCNC: 107 MMOL/L (ref 95–110)
CO2 SERPL-SCNC: 27 MMOL/L (ref 23–29)
CREAT SERPL-MCNC: 0.5 MG/DL (ref 0.5–1.4)
DELSYS: ABNORMAL
DELSYS: ABNORMAL
ERYTHROCYTE [DISTWIDTH] IN BLOOD BY AUTOMATED COUNT: 14.6 % (ref 11.5–14.5)
GFR SERPLBLD CREATININE-BSD FMLA CKD-EPI: >60 ML/MIN/1.73/M2
GLUCOSE SERPL-MCNC: 59 MG/DL (ref 70–110)
HCO3 UR-SCNC: 30.3 MMOL/L (ref 24–28)
HCO3 UR-SCNC: 33.2 MMOL/L (ref 24–28)
HCT VFR BLD AUTO: 42.2 % (ref 40–54)
HGB BLD-MCNC: 13.6 GM/DL (ref 14–18)
IMM GRANULOCYTES # BLD AUTO: 0.02 K/UL (ref 0–0.04)
IMM GRANULOCYTES NFR BLD AUTO: 0.2 % (ref 0–0.5)
INFLUENZA A BY PCR (OHS): NEGATIVE
INFLUENZA B BY PCR (OHS): NEGATIVE
LYMPHOCYTES # BLD AUTO: 0.92 K/UL (ref 1–4.8)
MCH RBC QN AUTO: 30.4 PG (ref 27–31)
MCHC RBC AUTO-ENTMCNC: 32.2 G/DL (ref 32–36)
MCV RBC AUTO: 94 FL (ref 82–98)
MRSA PCR SCRN (OHS): NOT DETECTED
NUCLEATED RBC (/100WBC) (OHS): 0 /100 WBC
OHS QRS DURATION: 86 MS
OHS QTC CALCULATION: 440 MS
PCO2 BLDA: 54.2 MMHG (ref 35–45)
PCO2 BLDA: 62.8 MMHG (ref 35–45)
PH SMN: 7.33 [PH] (ref 7.35–7.45)
PH SMN: 7.36 [PH] (ref 7.35–7.45)
PLATELET # BLD AUTO: 178 K/UL (ref 150–450)
PMV BLD AUTO: 11.8 FL (ref 9.2–12.9)
PO2 BLDA: 43 MMHG (ref 40–60)
PO2 BLDA: 68 MMHG (ref 80–100)
POC BE: 5 MMOL/L
POC BE: 7 MMOL/L
POC SATURATED O2: 74 % (ref 95–100)
POC SATURATED O2: 92 % (ref 95–100)
POC TCO2: 32 MMOL/L (ref 23–27)
POC TCO2: 35 MMOL/L (ref 24–29)
POCT GLUCOSE: 70 MG/DL (ref 70–110)
POCT GLUCOSE: 78 MG/DL (ref 70–110)
POCT GLUCOSE: 84 MG/DL (ref 70–110)
POCT GLUCOSE: 88 MG/DL (ref 70–110)
POTASSIUM SERPL-SCNC: 3.8 MMOL/L (ref 3.5–5.1)
RBC # BLD AUTO: 4.47 M/UL (ref 4.6–6.2)
RELATIVE EOSINOPHIL (OHS): 2.1 %
RELATIVE LYMPHOCYTE (OHS): 11.2 % (ref 18–48)
RELATIVE MONOCYTE (OHS): 8 % (ref 4–15)
RELATIVE NEUTROPHIL (OHS): 78.1 % (ref 38–73)
RSV A 5' UTR RNA NPH QL NAA+PROBE: NEGATIVE
SAMPLE: ABNORMAL
SAMPLE: ABNORMAL
SARS-COV-2 RNA RESP QL NAA+PROBE: NEGATIVE
SITE: ABNORMAL
SITE: ABNORMAL
SODIUM SERPL-SCNC: 143 MMOL/L (ref 136–145)
WBC # BLD AUTO: 8.25 K/UL (ref 3.9–12.7)

## 2025-04-07 PROCEDURE — 25000003 PHARM REV CODE 250: Performed by: INTERNAL MEDICINE

## 2025-04-07 PROCEDURE — 25000242 PHARM REV CODE 250 ALT 637 W/ HCPCS

## 2025-04-07 PROCEDURE — 87641 MR-STAPH DNA AMP PROBE: CPT

## 2025-04-07 PROCEDURE — 99222 1ST HOSP IP/OBS MODERATE 55: CPT | Mod: ,,, | Performed by: INTERNAL MEDICINE

## 2025-04-07 PROCEDURE — 99900035 HC TECH TIME PER 15 MIN (STAT)

## 2025-04-07 PROCEDURE — 99900026 HC AIRWAY MAINTENANCE (STAT)

## 2025-04-07 PROCEDURE — 94640 AIRWAY INHALATION TREATMENT: CPT

## 2025-04-07 PROCEDURE — 25000003 PHARM REV CODE 250

## 2025-04-07 PROCEDURE — 93005 ELECTROCARDIOGRAM TRACING: CPT

## 2025-04-07 PROCEDURE — 82310 ASSAY OF CALCIUM: CPT | Performed by: STUDENT IN AN ORGANIZED HEALTH CARE EDUCATION/TRAINING PROGRAM

## 2025-04-07 PROCEDURE — 94761 N-INVAS EAR/PLS OXIMETRY MLT: CPT

## 2025-04-07 PROCEDURE — 5A1945Z RESPIRATORY VENTILATION, 24-96 CONSECUTIVE HOURS: ICD-10-PCS | Performed by: STUDENT IN AN ORGANIZED HEALTH CARE EDUCATION/TRAINING PROGRAM

## 2025-04-07 PROCEDURE — 63600175 PHARM REV CODE 636 W HCPCS: Performed by: STUDENT IN AN ORGANIZED HEALTH CARE EDUCATION/TRAINING PROGRAM

## 2025-04-07 PROCEDURE — 0241U SARS-COV2 (COVID) WITH FLU/RSV BY PCR: CPT

## 2025-04-07 PROCEDURE — 25000242 PHARM REV CODE 250 ALT 637 W/ HCPCS: Performed by: STUDENT IN AN ORGANIZED HEALTH CARE EDUCATION/TRAINING PROGRAM

## 2025-04-07 PROCEDURE — 63600175 PHARM REV CODE 636 W HCPCS

## 2025-04-07 PROCEDURE — 25000003 PHARM REV CODE 250: Performed by: STUDENT IN AN ORGANIZED HEALTH CARE EDUCATION/TRAINING PROGRAM

## 2025-04-07 PROCEDURE — 99499 UNLISTED E&M SERVICE: CPT | Mod: ,,,

## 2025-04-07 PROCEDURE — 27200966 HC CLOSED SUCTION SYSTEM

## 2025-04-07 PROCEDURE — 63600175 PHARM REV CODE 636 W HCPCS: Performed by: INTERNAL MEDICINE

## 2025-04-07 PROCEDURE — 99291 CRITICAL CARE FIRST HOUR: CPT | Mod: ,,, | Performed by: INTERNAL MEDICINE

## 2025-04-07 PROCEDURE — 99222 1ST HOSP IP/OBS MODERATE 55: CPT | Mod: ,,, | Performed by: NURSE PRACTITIONER

## 2025-04-07 PROCEDURE — 87040 BLOOD CULTURE FOR BACTERIA: CPT

## 2025-04-07 PROCEDURE — 27100171 HC OXYGEN HIGH FLOW UP TO 24 HOURS

## 2025-04-07 PROCEDURE — 27000221 HC OXYGEN, UP TO 24 HOURS

## 2025-04-07 PROCEDURE — 93010 ELECTROCARDIOGRAM REPORT: CPT | Mod: ,,, | Performed by: INTERNAL MEDICINE

## 2025-04-07 PROCEDURE — 36600 WITHDRAWAL OF ARTERIAL BLOOD: CPT

## 2025-04-07 PROCEDURE — 82374 ASSAY BLOOD CARBON DIOXIDE: CPT | Performed by: STUDENT IN AN ORGANIZED HEALTH CARE EDUCATION/TRAINING PROGRAM

## 2025-04-07 PROCEDURE — 36415 COLL VENOUS BLD VENIPUNCTURE: CPT | Performed by: STUDENT IN AN ORGANIZED HEALTH CARE EDUCATION/TRAINING PROGRAM

## 2025-04-07 PROCEDURE — 20000000 HC ICU ROOM

## 2025-04-07 PROCEDURE — 85025 COMPLETE CBC W/AUTO DIFF WBC: CPT | Performed by: STUDENT IN AN ORGANIZED HEALTH CARE EDUCATION/TRAINING PROGRAM

## 2025-04-07 RX ORDER — KETOROLAC TROMETHAMINE 15 MG/ML
15 INJECTION, SOLUTION INTRAMUSCULAR; INTRAVENOUS EVERY 6 HOURS PRN
Status: DISCONTINUED | OUTPATIENT
Start: 2025-04-07 | End: 2025-04-10

## 2025-04-07 RX ORDER — SODIUM CHLORIDE FOR INHALATION 3 %
4 VIAL, NEBULIZER (ML) INHALATION EVERY 6 HOURS
Status: DISCONTINUED | OUTPATIENT
Start: 2025-04-07 | End: 2025-04-12

## 2025-04-07 RX ORDER — GLYCOPYRROLATE 1 MG/5ML
1 SOLUTION ORAL 3 TIMES DAILY
Status: DISCONTINUED | OUTPATIENT
Start: 2025-04-07 | End: 2025-04-12

## 2025-04-07 RX ORDER — ENOXAPARIN SODIUM 100 MG/ML
40 INJECTION SUBCUTANEOUS EVERY 24 HOURS
Status: DISCONTINUED | OUTPATIENT
Start: 2025-04-07 | End: 2025-04-07

## 2025-04-07 RX ORDER — ALBUTEROL SULFATE 2.5 MG/.5ML
2.5 SOLUTION RESPIRATORY (INHALATION) EVERY 6 HOURS
Status: DISCONTINUED | OUTPATIENT
Start: 2025-04-07 | End: 2025-04-12

## 2025-04-07 RX ORDER — GLYCOPYRROLATE 1 MG/1
1 TABLET ORAL 3 TIMES DAILY
Status: DISCONTINUED | OUTPATIENT
Start: 2025-04-07 | End: 2025-04-07

## 2025-04-07 RX ORDER — IPRATROPIUM BROMIDE AND ALBUTEROL SULFATE 2.5; .5 MG/3ML; MG/3ML
3 SOLUTION RESPIRATORY (INHALATION) EVERY 4 HOURS PRN
Status: DISCONTINUED | OUTPATIENT
Start: 2025-04-07 | End: 2025-04-24 | Stop reason: HOSPADM

## 2025-04-07 RX ORDER — NOREPINEPHRINE BITARTRATE 0.02 MG/ML
INJECTION, SOLUTION INTRAVENOUS
Status: DISPENSED
Start: 2025-04-07 | End: 2025-04-08

## 2025-04-07 RX ORDER — NOREPINEPHRINE BITARTRATE 0.02 MG/ML
0-.2 INJECTION, SOLUTION INTRAVENOUS CONTINUOUS
Status: DISPENSED | OUTPATIENT
Start: 2025-04-07 | End: 2025-04-08

## 2025-04-07 RX ORDER — ACETAMINOPHEN 650 MG/20.3ML
650 LIQUID ORAL EVERY 6 HOURS PRN
Status: DISCONTINUED | OUTPATIENT
Start: 2025-04-07 | End: 2025-04-12

## 2025-04-07 RX ORDER — ENOXAPARIN SODIUM 100 MG/ML
30 INJECTION SUBCUTANEOUS EVERY 24 HOURS
Status: DISCONTINUED | OUTPATIENT
Start: 2025-04-07 | End: 2025-04-24 | Stop reason: HOSPADM

## 2025-04-07 RX ORDER — METOCLOPRAMIDE HYDROCHLORIDE 5 MG/ML
5 INJECTION INTRAMUSCULAR; INTRAVENOUS EVERY 8 HOURS
Status: DISCONTINUED | OUTPATIENT
Start: 2025-04-07 | End: 2025-04-15

## 2025-04-07 RX ADMIN — LEVETIRACETAM 500 MG: 100 INJECTION INTRAVENOUS at 08:04

## 2025-04-07 RX ADMIN — AMPICILLIN SODIUM AND SULBACTAM SODIUM 3 G: 2; 1 INJECTION, POWDER, FOR SOLUTION INTRAMUSCULAR; INTRAVENOUS at 10:04

## 2025-04-07 RX ADMIN — IPRATROPIUM BROMIDE AND ALBUTEROL SULFATE 3 ML: 2.5; .5 SOLUTION RESPIRATORY (INHALATION) at 09:04

## 2025-04-07 RX ADMIN — Medication 2 G: at 08:04

## 2025-04-07 RX ADMIN — SODIUM CHLORIDE SOLN NEBU 3% 4 ML: 3 NEBU SOLN at 08:04

## 2025-04-07 RX ADMIN — METOCLOPRAMIDE 5 MG: 5 INJECTION, SOLUTION INTRAMUSCULAR; INTRAVENOUS at 08:04

## 2025-04-07 RX ADMIN — METOCLOPRAMIDE 5 MG: 5 INJECTION, SOLUTION INTRAMUSCULAR; INTRAVENOUS at 03:04

## 2025-04-07 RX ADMIN — DIAZEPAM 5 MG: 5 SOLUTION ORAL at 08:04

## 2025-04-07 RX ADMIN — GLYCOPYRROLATE 1 MG: 1 LIQUID ORAL at 08:04

## 2025-04-07 RX ADMIN — METOCLOPRAMIDE 5 MG: 5 INJECTION, SOLUTION INTRAMUSCULAR; INTRAVENOUS at 09:04

## 2025-04-07 RX ADMIN — Medication: at 11:04

## 2025-04-07 RX ADMIN — GLYCOPYRROLATE 1 MG: 1 LIQUID ORAL at 03:04

## 2025-04-07 RX ADMIN — KETOROLAC TROMETHAMINE 15 MG: 15 INJECTION, SOLUTION INTRAMUSCULAR; INTRAVENOUS at 08:04

## 2025-04-07 RX ADMIN — ENOXAPARIN SODIUM 30 MG: 40 INJECTION SUBCUTANEOUS at 06:04

## 2025-04-07 RX ADMIN — NOREPINEPHRINE BITARTRATE 0.04 MCG/KG/MIN: 0.02 INJECTION, SOLUTION INTRAVENOUS at 08:04

## 2025-04-07 RX ADMIN — VANCOMYCIN HYDROCHLORIDE 1000 MG: 1 INJECTION, POWDER, LYOPHILIZED, FOR SOLUTION INTRAVENOUS at 03:04

## 2025-04-07 RX ADMIN — DIAZEPAM 5 MG: 5 SOLUTION ORAL at 11:04

## 2025-04-07 RX ADMIN — AMPICILLIN SODIUM AND SULBACTAM SODIUM 3 G: 2; 1 INJECTION, POWDER, FOR SOLUTION INTRAMUSCULAR; INTRAVENOUS at 03:04

## 2025-04-07 RX ADMIN — DEXTROSE MONOHYDRATE 25 G: 25 INJECTION, SOLUTION INTRAVENOUS at 12:04

## 2025-04-07 RX ADMIN — IPRATROPIUM BROMIDE AND ALBUTEROL SULFATE 3 ML: 2.5; .5 SOLUTION RESPIRATORY (INHALATION) at 12:04

## 2025-04-07 RX ADMIN — ALBUTEROL SULFATE 2.5 MG: 2.5 SOLUTION RESPIRATORY (INHALATION) at 08:04

## 2025-04-07 RX ADMIN — PIPERACILLIN SODIUM AND TAZOBACTAM SODIUM 4.5 G: 4; .5 INJECTION, POWDER, FOR SOLUTION INTRAVENOUS at 03:04

## 2025-04-07 RX ADMIN — GLYCOPYRROLATE 1 MG: 1 LIQUID ORAL at 11:04

## 2025-04-07 NOTE — ASSESSMENT & PLAN NOTE
Patient with GJ tube dependence, presenting with tube malfunction. IR consulted for exchange under fluoroscopy. Tube changed 4/2.   GI consulted d/t recurrent vomiting with initiation of tube feeds. CTAP with GJ tube in satisfactory position.    Recommendations:  - 5mg IV metoclopramide Q8h   - If patient/family would like to transition to CARINA-KEY low profile GJ tube, advised to call IR to schedule so appropriate tube stoma length can be ordered

## 2025-04-07 NOTE — NURSING
"Pt's father stated that he feels that "going back to the button" would be the best option for his son due to the patient being unable to tolerate feeds/medication via PEG.     Pt's father also requests GI consult. Notified on call provider via secure chat.    Plan of care ongoing.   "

## 2025-04-07 NOTE — CONSULTS
[facility] - Internal Medicine Telemetry  Gastroenterology  Consult Note    Patient Name: Jesus Posey  MRN: [id number]  Admission Date: 4/1/2025  Hospital Length of Stay: 6 days  Code Status: Full Code   Attending Provider: Shiraz Coronado*   Consulting Provider: Sandee Alcantara MD  Primary Care Physician: Pallavi, Primary Doctor  Principal Problem:PEG tube malfunction    Inpatient consult to Gastroenterology  Consult performed by: Sandee Alcantara MD  Consult ordered by: Shiraz Coronado MD        Subjective:     HPI:  Jesus Posey is a 19 y.o. 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 Family noted leakage from his GJ tube which resembled his tube feeds. Otherwise, he has had no new issues. He was admitted to hospital medicine. Infectious workup has been negative. On IV maintenance fluids. GJ tube replaced 4/2 however he was had continued vomiting since. CTAP obtained- GJ tube in satisfactory positioning. GI consulted for continued inability to tolerate tube feeds.     Past Medical History:   Diagnosis Date    Atelectasis 10/08/2024    Seen on imaging   IS  Deep breathing exercises      Traumatic brain injury        Past Surgical History:   Procedure Laterality Date    BACLOFEN PUMP IMPLANTATION N/A 10/7/2024    Procedure: INSERTION, INTRATHECAL BACLOFEN PUMP;  Surgeon: Estefany Rogers MD;  Location: [facility] OR [address] [place] [address];  Service: Neurosurgery;  Laterality: N/A;  Anes: Gen  Bed: Reg, Reversed  Pos: Left Lat  Rad: C-arm  Medtronic Pump: 20cc, Baclofen 500 mcg/mL    CRANIECTOMY Right     CRANIOPLASTY FOR CRANIAL DEFECT Right     GASTROSTOMY TUBE PLACEMENT      LUMBAR PUNCTURE N/A 9/23/2024    Procedure: Lumbar Puncture;  Surgeon: Estefany Rogers MD;  Location: [facility] OR [address] [place] [address];  Service: Neurosurgery;  Laterality: N/A;  LP for Baclofen Trial    PLACEMENT OF JEJUNOSTOMY TUBE         Review of patient's  allergies indicates:  No Known Allergies  Family History    None       Tobacco Use    Smoking status: Never    Smokeless tobacco: Never   Substance and Sexual Activity    Alcohol use: Not on file    Drug use: Not on file    Sexual activity: Not on file     Review of Systems   Reason unable to perform ROS: Patient is nonverbal.     Objective:     Vital Signs (Most Recent):  Temp: (!) 100.7 °F (38.2 °C) (04/07/25 0740)  Pulse: (!) 116 (04/07/25 0740)  Resp: 18 (04/07/25 0740)  BP: 122/78 (04/07/25 0740)  SpO2: (!) 92 % (04/07/25 0744) Vital Signs (24h Range):  Temp:  [97.4 °F (36.3 °C)-100.7 °F (38.2 °C)] 100.7 °F (38.2 °C)  Pulse:  [] 116  Resp:  [17-24] 18  SpO2:  [88 %-98 %] 92 %  BP: (112-128)/(72-88) 122/78     Weight: 49 kg (108 lb 0.4 oz) (04/02/25 0639)  Body mass index is 16.92 kg/m².      Intake/Output Summary (Last 24 hours) at 4/7/2025 0850  Last data filed at 4/6/2025 1755  Gross per 24 hour   Intake 0 ml   Output 250 ml   Net -250 ml       Lines/Drains/Airways       Drain  Duration                  Gastrostomy/Enterostomy LUQ -- days              Peripheral Intravenous Line  Duration                  Peripheral IV - Single Lumen 04/01/25 0337 20 G 1 3/4 in Anterior;Left Upper Arm 6 days                     Physical Exam  Constitutional:       Appearance: He is cachectic.      Comments: Opens eyes to voice; does not follow commands   Cardiovascular:      Rate and Rhythm: Normal rate and regular rhythm.      Heart sounds: Normal heart sounds.   Pulmonary:      Effort: Pulmonary effort is normal.   Abdominal:      General: Bowel sounds are normal.      Comments: GJ tube in place; C/D/I   Musculoskeletal:      Right lower leg: No edema.      Left lower leg: No edema.   Neurological:      Mental Status: He is lethargic.      Comments: Contractures in upper extremities           Significant Labs:  CBC:   Recent Labs   Lab 04/06/25  0344   WBC 8.73   HGB 12.9*   HCT 39.0*        CMP:   Recent Labs    Lab 04/06/25  0344   CALCIUM 9.4      K 3.9   CO2 27      BUN 8   CREATININE 0.5       Significant Imaging:    CTAP w/o contrast  Impression:     Bilateral lower lobe consolidative change.     GJ tube in satisfactory positioning.     Punctate left renal stone.        Electronically signed by:Yoandy Galeano MD  Date:                                            04/06/2025  Time:                                           13:31  Assessment/Plan:     GI  * PEG tube malfunction  Patient with GJ tube dependence, presenting with tube malfunction. IR consulted for exchange under fluoroscopy. Tube changed 4/2.   GI consulted d/t recurrent vomiting with initiation of tube feeds. CTAP with GJ tube in satisfactory position.    Recommendations:  - 5mg IV metoclopramide Q8h   - If patient/family would like to transition to CARINA-KEY low profile GJ tube, advised to call IR to schedule so appropriate tube stoma length can be ordered         Thank you for your consult. I will follow-up with patient. Please contact us if you have any additional questions.    Sandee Alcantara MD  Gastroenterology  [facility] - Internal Medicine Telemetry

## 2025-04-07 NOTE — ASSESSMENT & PLAN NOTE
- consult received for evaluation of skin injury.  - pt presents with GJ-tube malfunction.   - left heel with intact deep purple/maroon discoloration.  - deep tissue injury present on admit.  - continue treatment per wound care RN recs.  - mildred intouch surface.  - heel boots reapplied.  - turn q2h.  - saskia score documented at 9 with a nutrition sub score of 2.  - RD following. TFs ordered.  - nursing to maintain pressure injury prevention measures and continue wound care per orders.

## 2025-04-07 NOTE — CODE/ RAPID DOCUMENTATION
RAPID RESPONSE NURSE NOTE        Admit Date: 2025  LOS: 6  Code Status: Full Code   Date of Consult: 2025  : 2005  Age: 19 y.o.  Weight:   Wt Readings from Last 1 Encounters:   25 49 kg (108 lb 0.4 oz)     Sex: male  Race: Black or    Bed: [address] [place] [address] A:   MRN: [id number]  Time Rapid Response Team page Received: 0947   Time Rapid Response Team at Bedside: 0949  Time Rapid Response Team left Bedside: 1010  Was the patient discharged from an ICU this admission? No   Was the patient discharged from a PACU within last 24 hours? No   Did the patient receive conscious sedation/general anesthesia in last 24 hours? No  Was the patient in the ED within the past 24 hours? No  Was the patient on NIPPV within the past 24 hours? No   Did this progress into an ARC or CPA: No  Attending Physician: Shiraz Croonado*  Primary Service: [facility]       SITUATION    Notified by Pager.  Reason for alert: Hypoxia  Called to evaluate the patient for Respiratory.    BACKGROUND     Why is the patient in the hospital?: PEG tube malfunction    Patient has a past medical history of Atelectasis and Traumatic brain injury.    Last Vitals:  Temp: 100.7 °F (38.2 °C) (740)  Pulse: 122 (959)  Resp: 26 (959)  BP: 117/55 (959)  SpO2: 84 % (959)    24 Hours Vitals Range:  Temp:  [97.4 °F (36.3 °C)-100.7 °F (38.2 °C)]   Pulse:  []   Resp:  [17-28]   BP: (112-128)/(55-88)   SpO2:  [84 %-98 %]     Labs:  Recent Labs     25  0713 25  0344 25  0801   WBC 6.08 8.73 8.25   HGB 13.7* 12.9* 13.6*   HCT 41.7 39.0* 42.2    221 178       Recent Labs     25  0713 25  0344 25  0801    139 143   K 4.3 3.9 3.8    104 107   CO2 27 27 27   BUN 7 8 5*   CREATININE 0.5 0.5 0.5        ASSESSMENT    Physical Exam  Constitutional:       General: He is in acute distress.      Appearance: He is ill-appearing.   Cardiovascular:       Rate and Rhythm: Regular rhythm. Tachycardia present.   Pulmonary:      Effort: Respiratory distress present.   Neurological:      Mental Status: Mental status is at baseline.         INTERVENTIONS    The patient was seen for Respiratory problem. Staff concerns included oxygen saturation < 90% despite supplemental oxygen, increased WOB, and increased oxygen requirements. The following interventions were performed: supplemental oxygen, PRN suctioning, POCT arterial blood gas , continuous pulse ox monitoring continued, continuous cardiac monitoring continued, and POCT blood sugar. Critical care consulted.    RECOMMENDATIONS    We recommend: Critical care upgrade    PROVIDER ESCALATION    Orders received and case discussed with  Bess Luna NP.    Primary team arrival time: 1004    Disposition: Tx in ICU bed 7077.    FOLLOW UP    Charge nurse, Carol  updated on plan of care. Instructed to call the Rapid Response Nurse, Kayli Huang RN at [phone] for additional questions or concerns.

## 2025-04-07 NOTE — ASSESSMENT & PLAN NOTE
Patient with Hypoxic Respiratory failure which is Acute with chronic hypercapnic respiratory failure. he is not on home oxygen. Supplemental oxygen was provided and noted-      .   Signs/symptoms of respiratory failure include- respiratory distress. Contributing diagnoses includes - ARDS and Aspiration Labs and images were reviewed. Patient Has recent ABG, which has been reviewed. Will treat underlying causes and adjust management of respiratory failure as follows-     Suspect aspiration pneumonia in the setting of hospital acquired pneumonia with recurrent vomiting. Suggest broadening antibiotics for HAP.    Plan:  -collect new set of blood cultures.   - LR 75 cc/hr  - HFNC O2.   - Follow ABG.   -On UNASYN, transition to zosyn.

## 2025-04-07 NOTE — CONSULTS
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      As he is nonverbal, father at bedside provides history.      He reports that yesterday, they noted leakage from his GJ tube which resembled his tube feeds. Otherwise, he has had no new issues.      Tube was exchanged last month.     Patient admitted to hospital medicine service for further management. Skin integrity DARINEL consulted for evaluation of skin injury.    Scheduled Meds:   ampicillin-sulbactam  3 g Intravenous Q6H    balsam peru-castor oiL   Topical (Top) BID    dextrose 50%        diazePAM  5 mg Per G Tube TID    glycopyrrolate  1 mg Per J Tube TID    levETIRAcetam (Keppra) IV (PEDS and ADULTS)  500 mg Intravenous Q12H    methylphenidate HCl  5 mg Per G Tube QAM    metoclopramide  5 mg Intravenous Q8H     Continuous Infusions:   lactated ringers   Intravenous Continuous 75 mL/hr at 04/06/25 1005 New Bag at 04/06/25 1005     PRN Meds:  Current Facility-Administered Medications:     acetaminophen, 650 mg, Per J Tube, Q6H PRN    albuterol-ipratropium, 3 mL, Nebulization, Q4H PRN    artificial tears, 1 drop, Both Eyes, QID PRN    dextrose 50%, , ,     ketorolac, 15 mg, Intravenous, Q6H PRN    naloxone, 0.02 mg, Intravenous, PRN    ondansetron, 4 mg, Intravenous, Q6H PRN    sodium chloride 0.9%, 1-10 mL, Intravenous, Q12H PRN    Review of patient's allergies indicates:  No Known Allergies     Past Medical History:    Diagnosis Date    Atelectasis 10/08/2024    Seen on imaging   IS  Deep breathing exercises      Traumatic brain injury      Past Surgical History:   Procedure Laterality Date    BACLOFEN PUMP IMPLANTATION N/A 10/7/2024    Procedure: INSERTION, INTRATHECAL BACLOFEN PUMP;  Surgeon: Estefany Rogers MD;  [facility];  Service: Neurosurgery;  Laterality: N/A;  Anes: Gen  Bed: Reg, Reversed  Pos: Left Lat  Rad: C-arm  Medtronic Pump: 20cc, Baclofen 500 mcg/mL    CRANIECTOMY Right     CRANIOPLASTY FOR CRANIAL DEFECT Right     GASTROSTOMY TUBE PLACEMENT      LUMBAR PUNCTURE N/A 9/23/2024    Procedure: Lumbar Puncture;  Surgeon: Estefany Rogers MD;  [facility];  Service: Neurosurgery;  Laterality: N/A;  LP for Baclofen Trial    PLACEMENT OF JEJUNOSTOMY TUBE         Family History    None       Tobacco Use    Smoking status: Never    Smokeless tobacco: Never   Substance and Sexual Activity    Alcohol use: Not on file    Drug use: Not on file    Sexual activity: Not on file     Review of Systems   Skin:  Positive for wound.       Objective:     Vital Signs (Most Recent):  Temp: 99.9 °F (37.7 °C) (04/07/25 1031)  Pulse: (!) 113 (04/07/25 1348)  Resp: 18 (04/07/25 1330)  BP: 103/64 (04/07/25 1300)  SpO2: 98 % (04/07/25 1330) Vital Signs (24h Range):  Temp:  [97.4 °F (36.3 °C)-100.7 °F (38.2 °C)] 99.9 °F (37.7 °C)  Pulse:  [] 113  Resp:  [13-28] 18  SpO2:  [84 %-98 %] 98 %  BP: (103-124)/(55-88) 103/64     Weight: 49 kg (108 lb 0.4 oz)  Body mass index is 16.92 kg/m².     Physical Exam  Constitutional:       Appearance: Normal appearance.   Skin:     General: Skin is warm and dry.      Findings: Lesion present.   Neurological:      Mental Status: He is alert.          Laboratory:  All pertinent labs reviewed within the last 24 hours.    Diagnostic Results:  None      Assessment/Plan:              DARINEL Skin Integrity Evaluation      Skin Integrity DARINEL evaluation of patient as part of the  comprehensive skin care team.     He has been admitted for 6 days. Skin injury was noted on 4/1/25. POA yes.    L heel        Orthopedic  Deep tissue injury  - consult received for evaluation of skin injury.  - pt presents with GJ-tube malfunction.   - left heel with intact deep purple/maroon discoloration.  - deep tissue injury present on admit.  - continue treatment per wound care RN recs.  - mildred intouch surface.  - heel boots reapplied.  - turn q2h.  - saskia score documented at 9 with a nutrition sub score of 2.  - RD following. TFs ordered.  - nursing to maintain pressure injury prevention measures and continue wound care per orders.      Will reassess sacrum. Admitted with scar tissue noted to area.    Thank you for your consult. I will follow-up with patient. Please contact us if you have any additional questions.      Karon Ohara NP  Skin Integrity DARINEL  [facility]

## 2025-04-07 NOTE — HOSPITAL COURSE
Stepped up to ICU in 4/7 the setting of acute hypoxic respiratory failure in the swtting of basilar atelectasis and mucus plugging,  with sepsis secondary to PNA requiring up to 15 L HFNC. Started on Vanc-Zosyn.  During ICU course requiring up to norepi 0.06 mcg likely in the setting of hypovolemia 2/2 poor intake. Encephalopathy improving. CTH w/o acute processes. Lactate improved. EEG c/w toxic/drug-induced encephalopathy. No seizures. Persistenty bradycardic and hypothermic with EKG junctional rhythm on 30s.On Zosyn. TSH wnl. Ordering TTE. Cortisol < 3 . Started on IV hydrocortisone 100 mg TID. Off pressors on 4/10 at 7:30 am, but back on pressors by 2pm, again off by 4/10 7pm.  Repleting dextrose for hypoglycemia.

## 2025-04-07 NOTE — CODE/ RAPID DOCUMENTATION
"Medical Emergency Team Consult Note  Critical Care Medicine    CC: PEG tube malfunction  Date: 04/07/2025  Admit Date: 4/1/2025  Hospital Length of Stay: 6  MRN: [id number]  The patient location is: Bed 7091/7091 A  Dx: PEG tube malfunction  Code Status: Full Code   Chart Reviewed: 04/07/2025, 10:30 AM      SUBJECTIVE:     HPI:  Jesus Posey has a past medical history of Atelectasis and Traumatic brain injury.    Significant Events: Called urgently to the bedside to evaluate the patient  for respiratory distress by the Rapid Response Nurse       OBJECTIVE:     Physical Exam  Eyes:      Extraocular Movements: Extraocular movements intact.      Pupils: Pupils are equal, round, and reactive to light.   Cardiovascular:      Rate and Rhythm: Regular rhythm. Tachycardia present.   Pulmonary:      Effort: Tachypnea present. No accessory muscle usage or respiratory distress.      Breath sounds: No stridor. Rales present. No wheezing or rhonchi.   Skin:     General: Skin is warm.   Neurological:      Mental Status: He is lethargic.         Last VS: BP (!) 117/55 (BP Location: Right arm, Patient Position: Lying)   Pulse (!) 122   Temp (!) 100.7 °F (38.2 °C) (Oral)   Resp (!) 26   Ht 5' 7" (1.702 m)   Wt 49 kg (108 lb 0.4 oz)   SpO2 (!) 84%   BMI 16.92 kg/m²     24H Vital Sign Range:    Temp:  [97.4 °F (36.3 °C)-100.7 °F (38.2 °C)]   Pulse:  []   Resp:  [17-28]   BP: (112-128)/(55-88)   SpO2:  [84 %-98 %]     Level of Consciousness (AVPU): alert      Intake/Output Summary (Last 24 hours) at 4/7/2025 1030  Last data filed at 4/7/2025 0954  Gross per 24 hour   Intake 0 ml   Output 450 ml   Net -450 ml       Recent Labs     04/05/25  0713 04/06/25  0344 04/07/25  0801   WBC 6.08 8.73 8.25   HGB 13.7* 12.9* 13.6*   HCT 41.7 39.0* 42.2    221 178       Recent Labs     04/05/25  0713 04/06/25  0344 04/07/25  0801    139 143   K 4.3 3.9 3.8    104 107   CO2 27 27 27   BUN 7 8 5*   CREATININE 0.5 0.5 0.5 " "       No results for input(s): "PH", "PCO2", "PO2", "HCO3", "POCSATURATED", "BE" in the last 72 hours.     Lab Results   Component Value Date    LACTATE 0.9 04/01/2025         ASSESSMENT AND PLAN :       Hypoxic Respiratory Failure:   -- Concern for aspiration vs. Infectious process  -- ABG, CXR pending  -- CCM transfer for hypoxia  -- Uptitrate oxygen to maintain SpO2 > 92%  -- Grandmother updated on POC at bedside       Uninterrupted Critical Care/Counseling Time (not including procedures):  30 minutes  Critical care was time spent personally by me on the following activities: development of treatment plan with patient or surrogate and bedside caregivers, discussions with consultants, evaluation of patient's response to treatment, examination of patient, ordering and performing treatments and interventions, ordering and review of laboratory studies, ordering and review of radiographic studies, pulse oximetry, re-evaluation of patient's condition. This critical care time did not overlap with that of any other provider or involve time for any procedures.    Bess Dale, DNP  Pulmonary Critical Care              "

## 2025-04-07 NOTE — NURSING
Pt arrived via RR on 15L HFNC, oxygen saturation 88%-90%, heavy oral secretions. Pt resting comfortably, no signs of distress. WCTM.

## 2025-04-07 NOTE — PLAN OF CARE
Recommendations  TF RECS: Nutren 1.5 @ 60 mL/hr x 24 hours to provide 1440 total fluid volume, 2160 kcals, 98 g PRO, 1100 mL fluid,144 % DRI  At home Tube feeding regimen: Nutren 1.5 @ 65 mL/hr x 14 hours to provide 910 total fluid volume, 1365 kcals, 62 g protein, 691 mL fluid, 91% DRI - FWF per MD (provides 81% EEN/63% EPN)  Add Iván BID as TF modifier  to provide 90 kcal, 2.5 g protein, 7 g L-Arginine, 7 g L-Glutamine per serving to aid in wound healing    --Do not mix Iván with formula in a tube-feeding bag  --Pour one packet of Iván in a clean, small (approximately 6- to 8-fl-oz) container for mixing  --Add 4 fl oz (120 mL) water at room temperature  --Mix well with disposable spoon or tongue blade until all particles are completely hydrated  --Verify correct tube placement  --Flush feeding tube with 30 mL water  --Administer Iván through feeding tube using a 60-mL or larger syringe  --Flush with an additional 30 mL water (a smaller amount of water can be used to flush the tube if the patient is on a fluid- restricted diet)  Monitor labs, meds, weight, skin     Goals: Meet % een/epn via EN by next RD f/u, maintain weight during admission, display s/s of wound healing during admission  Nutrition Goal Status: continues

## 2025-04-07 NOTE — PLAN OF CARE
MICU DAILY GOALS     Family/Goals of care/Code Status   Code Status: Full Code    24H Vital Sign Range  Temp:  [97.4 °F (36.3 °C)-100.7 °F (38.2 °C)]   Pulse:  []   Resp:  [13-28]   BP: ()/(55-84)   SpO2:  [84 %-100 %]      Shift Events (include procedures and significant events)   Pt arrived via RR for resp distress, 15L NRB. O2 weaned throughout shift to 3L NC. Mid shift, pt noted to be more somnolent than usual. [facility] notified and CTH ordered and completed. In route to CT, pt began to vocalize and move left eye spontaneously. EEG ordered. TF held until after EEG per Fellow's verbal order. WCTM.     AWAKE RASS: Goal -    Actual - RASS (Perkins Agitation-Sedation Scale): alert and calm    Restraint necessity: Not necessary   BREATHE SBT: Not intubated    Coordinate A & B, analgesics/sedatives Pain: managed   SAT: Not intubated   Delirium CAM-ICU:     Early(intubated/ Progressive (non-intubated) Mobility MOVE Screen (INTUBATED ONLY): Fail    Activity: Activity Management: Patient unable to perform activities   Feeding/Nutrition Diet order: Diet/Nutrition Received: NPO,     Thrombus DVT prophylaxis:     HOB Elevation Head of Bed (HOB) Positioning: HOB at 45 degrees   Ulcer Prophylaxis GI: yes   Glucose control managed     Skin Skin assessment:     Sacrum intact/not altered? No  Heels intact/not altered? No  Surgical wound? Yes    CHECK ONE!   (no altered skin or altered skin) and sub boxes:  [] No Altered Skin Integrity Present    []Prevention Measures Documented    [x] Altered Skin Integrity Present or Discovered   [x] LDA already present in EPIC, daily doc completed              [] LDA added if not already in EPIC (describe/stage wound).               [] Wound Image Taken (required on admit,                   transfer/discharge and every Tuesday)    Wound Care Consulted? No   Bowel Function no issues    Indwelling Catheter Necessity            De-escalation Antibiotics No        VS and assessment per  flow sheet, patient progressing towards goals as tolerated, plan of care reviewed with family, all concerns addressed, will continue to monitor.

## 2025-04-07 NOTE — ASSESSMENT & PLAN NOTE
Patient with Hypoxic Respiratory failure which is Acute with chronic hypercapnic respiratory failure. he is not on home oxygen. Supplemental oxygen was provided and noted-      .   Signs/symptoms of respiratory failure include- respiratory distress. Contributing diagnoses includes - ARDS and Aspiration Labs and images were reviewed. Patient Has recent ABG, which has been reviewed. Will treat underlying causes and adjust management of respiratory failure as follows-     Suspect aspiration pneumonia in the setting of hospital acquired pneumonia with recurrent vomiting with supperimposed atelectasis.  Suggest broadening antibiotics for HAP.    Plan:  -BCX 4/7 NGTD  - NC  2L O2.   - vanc-zosyn.   -Pulmonary toilet: bronchodilators PRN, hypertonic saline.  IPPV.

## 2025-04-07 NOTE — SUBJECTIVE & OBJECTIVE
Past Medical History:   Diagnosis Date    Atelectasis 10/08/2024    Seen on imaging   IS  Deep breathing exercises      Traumatic brain injury        Past Surgical History:   Procedure Laterality Date    BACLOFEN PUMP IMPLANTATION N/A 10/7/2024    Procedure: INSERTION, INTRATHECAL BACLOFEN PUMP;  Surgeon: Estefany Rogers MD;  Location: [facility] OR 2ND FLR;  Service: Neurosurgery;  Laterality: N/A;  Anes: Gen  Bed: Reg, Reversed  Pos: Left Lat  Rad: C-arm  Medtronic Pump: 20cc, Baclofen 500 mcg/mL    CRANIECTOMY Right     CRANIOPLASTY FOR CRANIAL DEFECT Right     GASTROSTOMY TUBE PLACEMENT      LUMBAR PUNCTURE N/A 9/23/2024    Procedure: Lumbar Puncture;  Surgeon: Estefany Rogers MD;  [facility];  Service: Neurosurgery;  Laterality: N/A;  LP for Baclofen Trial    PLACEMENT OF JEJUNOSTOMY TUBE         Review of patient's allergies indicates:  No Known Allergies  Family History    None       Tobacco Use    Smoking status: Never    Smokeless tobacco: Never   Substance and Sexual Activity    Alcohol use: Not on file    Drug use: Not on file    Sexual activity: Not on file     Review of Systems   Reason unable to perform ROS: Patinet is nonverbal.     Objective:     Vital Signs (Most Recent):  Temp: (!) 100.7 °F (38.2 °C) (04/07/25 0740)  Pulse: (!) 116 (04/07/25 0740)  Resp: 18 (04/07/25 0740)  BP: 122/78 (04/07/25 0740)  SpO2: (!) 92 % (04/07/25 0744) Vital Signs (24h Range):  Temp:  [97.4 °F (36.3 °C)-100.7 °F (38.2 °C)] 100.7 °F (38.2 °C)  Pulse:  [] 116  Resp:  [17-24] 18  SpO2:  [88 %-98 %] 92 %  BP: (112-128)/(72-88) 122/78     Weight: 49 kg (108 lb 0.4 oz) (04/02/25 0639)  Body mass index is 16.92 kg/m².      Intake/Output Summary (Last 24 hours) at 4/7/2025 0850  Last data filed at 4/6/2025 1755  Gross per 24 hour   Intake 0 ml   Output 250 ml   Net -250 ml       Lines/Drains/Airways       Drain  Duration                  Gastrostomy/Enterostomy LUQ -- days              Peripheral Intravenous Line   Duration                  Peripheral IV - Single Lumen 04/01/25 0337 20 G 1 3/4 in Anterior;Left Upper Arm 6 days                     Physical Exam  Constitutional:       Appearance: He is cachectic.      Comments: Opens eyes to voice; does not follow commands   Cardiovascular:      Rate and Rhythm: Normal rate and regular rhythm.      Heart sounds: Normal heart sounds.   Pulmonary:      Effort: Pulmonary effort is normal.   Abdominal:      General: Bowel sounds are normal.      Comments: GJ tube in place; C/D/I   Musculoskeletal:      Right lower leg: No edema.      Left lower leg: No edema.   Neurological:      Mental Status: He is lethargic.      Comments: Contractures in upper extremities           Significant Labs:  CBC:   Recent Labs   Lab 04/06/25  0344   WBC 8.73   HGB 12.9*   HCT 39.0*        CMP:   Recent Labs   Lab 04/06/25  0344   CALCIUM 9.4      K 3.9   CO2 27      BUN 8   CREATININE 0.5       Significant Imaging:    CTAP w/o contrast  Impression:     Bilateral lower lobe consolidative change.     GJ tube in satisfactory positioning.     Punctate left renal stone.        Electronically signed by:Yoandy Galeano MD  Date:                                            04/06/2025  Time:                                           13:31

## 2025-04-07 NOTE — CONSULTS
"19 y.o. male with a PMHx of TBI leading to quadriplegia, seizure disorder, GJ tube dependence, cachexia who was  brought to the ED by his father due to leakage of tube feeds from a "broken component" of his low profile CARINA-KEY GJ tube. Hospital course notable for IR GJ tube exchange on 4/2/25. The entirety of pt's GJ tube was coiled within the stomach at the start of the procedure. His CARINA-BLEDSOE GJ tube was exchanged for a properly positioned CARINA GJ tube. Per IR procedure note, if patient/family would like to transition to CARINA-KEY low profile GJ tube, advised to call IR to schedule so appropriate tube stoma length can be ordered (patient previously had 14F x 3.0cm CARINA-KEY GJ which was not in stock at time of replacement). Hospital course also notable for hypothermia, vomiting and inability to tolerate tubes feeds (CT a/p pn 4/6 shows GJ tube in good position), and step up to the MICU earlier today for acute respiratory failure and fever.     IR consult received for conversion of pt's GJ tube to a low profile CARINA-KEY GJ tube. Case discussed with staff. 18F x 3.0cm CARINA-KEY GJ tube was ordered by our dept earlier today, not sure when it will be delivered. As pt was just stepped up to the MICU today, recommend re-consulting IR once pt is more medically stable, and hopefully correct size CARINA-BLEDSOE GJ tube will be available at that time. Discussed with ordering provider via secure chat.     18F x 3.0cm CARINA-KEY GJ tube was ordered on 4/7/25  Please re-consult IR once pt is more medically stable     Faye Lancaster PA-C  Interventional Radiology   Spectra: [phone]    "

## 2025-04-07 NOTE — SUBJECTIVE & OBJECTIVE
Scheduled Meds:   ampicillin-sulbactam  3 g Intravenous Q6H    balsam peru-castor oiL   Topical (Top) BID    dextrose 50%        diazePAM  5 mg Per G Tube TID    glycopyrrolate  1 mg Per J Tube TID    levETIRAcetam (Keppra) IV (PEDS and ADULTS)  500 mg Intravenous Q12H    methylphenidate HCl  5 mg Per G Tube QAM    metoclopramide  5 mg Intravenous Q8H     Continuous Infusions:   lactated ringers   Intravenous Continuous 75 mL/hr at 04/06/25 1005 New Bag at 04/06/25 1005     PRN Meds:  Current Facility-Administered Medications:     acetaminophen, 650 mg, Per J Tube, Q6H PRN    albuterol-ipratropium, 3 mL, Nebulization, Q4H PRN    artificial tears, 1 drop, Both Eyes, QID PRN    dextrose 50%, , ,     ketorolac, 15 mg, Intravenous, Q6H PRN    naloxone, 0.02 mg, Intravenous, PRN    ondansetron, 4 mg, Intravenous, Q6H PRN    sodium chloride 0.9%, 1-10 mL, Intravenous, Q12H PRN    Review of patient's allergies indicates:  No Known Allergies     Past Medical History:   Diagnosis Date    Atelectasis 10/08/2024    Seen on imaging   IS  Deep breathing exercises      Traumatic brain injury      Past Surgical History:   Procedure Laterality Date    BACLOFEN PUMP IMPLANTATION N/A 10/7/2024    Procedure: INSERTION, INTRATHECAL BACLOFEN PUMP;  Surgeon: Estefany Rogers MD;  Location: [facility] OR [address] [place] [address];  Service: Neurosurgery;  Laterality: N/A;  Anes: Gen  Bed: Reg, Reversed  Pos: Left Lat  Rad: C-arm  Medtronic Pump: 20cc, Baclofen 500 mcg/mL    CRANIECTOMY Right     CRANIOPLASTY FOR CRANIAL DEFECT Right     GASTROSTOMY TUBE PLACEMENT      LUMBAR PUNCTURE N/A 9/23/2024    Procedure: Lumbar Puncture;  Surgeon: Estefany Rogers MD;  Location: [facility] OR [address] [place] [address];  Service: Neurosurgery;  Laterality: N/A;  LP for Baclofen Trial    PLACEMENT OF JEJUNOSTOMY TUBE         Family History    None       Tobacco Use    Smoking status: Never    Smokeless tobacco: Never   Substance and Sexual Activity    Alcohol use: Not on file    Drug use: Not on file     Sexual activity: Not on file     Review of Systems   Skin:  Positive for wound.       Objective:     Vital Signs (Most Recent):  Temp: 99.9 °F (37.7 °C) (04/07/25 1031)  Pulse: (!) 113 (04/07/25 1348)  Resp: 18 (04/07/25 1330)  BP: 103/64 (04/07/25 1300)  SpO2: 98 % (04/07/25 1330) Vital Signs (24h Range):  Temp:  [97.4 °F (36.3 °C)-100.7 °F (38.2 °C)] 99.9 °F (37.7 °C)  Pulse:  [] 113  Resp:  [13-28] 18  SpO2:  [84 %-98 %] 98 %  BP: (103-124)/(55-88) 103/64     Weight: 49 kg (108 lb 0.4 oz)  Body mass index is 16.92 kg/m².     Physical Exam  Constitutional:       Appearance: Normal appearance.   Skin:     General: Skin is warm and dry.      Findings: Lesion present.   Neurological:      Mental Status: He is alert.          Laboratory:  All pertinent labs reviewed within the last 24 hours.    Diagnostic Results:  None

## 2025-04-07 NOTE — PROGRESS NOTES
"Pharmacokinetic Initial Assessment: IV Vancomycin    Assessment/Plan:    Initiate intravenous vancomycin with loading dose of 1000 mg once followed by a maintenance dose of vancomycin 750mg IV every 12 hours  Desired empiric serum trough concentration is 15 to 20 mcg/mL  Draw vancomycin trough level 60 min prior to fourth dose on 4/9/25 at approximately 0315  Pharmacy will continue to follow and monitor vancomycin.      Please contact pharmacy at extension [phone] with any questions regarding this assessment.     Thank you for the consult,   Escobar Riley       Patient brief summary:  Jesus Posey is a 19 y.o. male initiated on antimicrobial therapy with IV Vancomycin for treatment of suspected bacteremia    Drug Allergies:   Review of patient's allergies indicates:  No Known Allergies    Actual Body Weight:   49 kg    Renal Function:   Estimated Creatinine Clearance: 163.3 mL/min (based on SCr of 0.5 mg/dL).,     Dialysis Method (if applicable):  N/A    CBC (last 72 hours):  Recent Labs   Lab Result Units 04/05/25  0713 04/06/25  0344 04/07/25  0801   WBC K/uL 6.08 8.73 8.25   HGB gm/dL 13.7* 12.9* 13.6*   HCT % 41.7 39.0* 42.2   Platelet Count K/uL 181 221 178   Lymph % % 26.8 12.1* 11.2*   Mono % % 7.2 8.0 8.0   Eos % % 4.3 1.1 2.1   Basophil % % 0.7 0.3 0.4       Metabolic Panel (last 72 hours):  Recent Labs   Lab Result Units 04/05/25  0713 04/06/25  0344 04/07/25  0801   Sodium mmol/L 142 139 143   Potassium mmol/L 4.3 3.9 3.8   Chloride mmol/L 106 104 107   CO2 mmol/L 27 27 27   Glucose mg/dL 62* 74 59*   BUN mg/dL 7 8 5*   Creatinine mg/dL 0.5 0.5 0.5       Drug levels (last 3 results):  No results for input(s): "VANCOMYCINRA", "VANCORANDOM", "VANCOMYCINPE", "VANCOPEAK", "VANCOMYCINTR", "VANCOTROUGH" in the last 72 hours.    Microbiologic Results:  Microbiology Results (last 7 days)       Procedure Component Value Units Date/Time    Blood culture [[id number]] Collected: 04/07/25 0254    Order Status: Sent " Specimen: Blood from Peripheral, Forearm, Left     Blood culture [[id number]] Collected: 04/07/25 1459    Order Status: Sent Specimen: Blood from Peripheral, Forearm, Right     MRSA Screen by PCR [[id number]]     Order Status: Sent Specimen: Nasal Swab     Blood culture [[id number]]  (Normal) Collected: 04/01/25 1236    Order Status: Completed Specimen: Blood from Peripheral, Ankle, Right Updated: 04/06/25 1401     Blood Culture No Growth After 5 Days    Blood Culture #2 **CANNOT BE ORDERED STAT** [[id number]]  (Normal) Collected: 04/01/25 1236    Order Status: Completed Specimen: Blood from Peripheral, Ankle, Left Updated: 04/06/25 1401     Blood Culture No Growth After 5 Days    Blood culture [[id number]]     Order Status: Canceled Specimen: Blood     Blood Culture #2 **CANNOT BE ORDERED STAT** [[id number]]     Order Status: Canceled Specimen: Blood

## 2025-04-07 NOTE — ASSESSMENT & PLAN NOTE
Patient with GJ tube dependence, presenting with tube malfunction. IR consulted for exchange under fluoroscopy. Tube changed 4/2.   GI consulted d/t recurrent vomiting with initiation of tube feeds. CTAP with GJ tube in satisfactory position.     Plan:  - 5mg IV metoclopramide Q8h   - If patient/family would like to transition to CARINA-KEY low profile GJ tube, advised to call IR to schedule so appropriate tube stoma length can be ordered

## 2025-04-07 NOTE — NURSING
"Pt SpO2 83% on room air with labored breathing noted. Moderate amount clear sputum suctioned orally. Small amount of green emesis noted on towel across his chest. Pt repositioned and placed on 5 L nasal canula with SpO2 93%.     Notified Med B via secure chat. See orders.    Grandmother at bedside and reports pt "gets breathing treatments at home." Pt weaned to 3 L nc with SpO2 94%.     Plan of care ongoing.   "

## 2025-04-07 NOTE — CODE/ RAPID DOCUMENTATION
RAPID RESPONSE RESPIRATORY THERAPY NOTE             Code Status: Full Code   : 2005  Bed: 7091/7091 A:   MRN: [id number]  Time page Received: 0947   Time Rapid Response RT at Bedside: 0950  Time Rapid Response RT left Bedside: 1010    SITUATION    Evaluated patient for: respiratory    BACKGROUND    Why is the patient in the hospital?: PEG tube malfunction    Patient has a past medical history of Atelectasis and Traumatic brain injury.    24 Hours Vitals Range:  Temp:  [97.4 °F (36.3 °C)-100.7 °F (38.2 °C)]   Pulse:  []   Resp:  [17-28]   BP: (112-128)/(55-88)   SpO2:  [84 %-98 %]     Labs:    Recent Labs     25  0713 25  0344 25  0801    139 143   K 4.3 3.9 3.8    104 107   CO2 27 27 27   BUN 7 8 5*   CREATININE 0.5 0.5 0.5        Recent Labs     25  1028   PH 7.356   PCO2 54.2*   PO2 68*   HCO3 30.3*   POCSATURATED 92   BE 5*       ASSESSMENT/INTERVENTIONS  Upon arrival to bedside, pt tachypneic on 6 lpm nasal cannula with labored breathing and tactile fremitus. O2 sats < 90%. Pt NT suctioned via right nare with large amount of thin, clear secretions. Pt on continuous pulse ox monitoring and continuous cardiac monitoring. Pt transported to ICU. POCT ABG obtained.     Last Vitals: Temp: 100.7 °F (38.2 °C) (740)  Pulse: 122 (959)  Resp: 26 (959)  BP: 117/55 (959)  SpO2: 84 % (959)  Level of Consciousness: Level of Consciousness (AVPU): alert  Respiratory Effort: Respiratory Effort: Mild  Expansion/Accessory Muscle Usage: Expansion/Accessory Muscles/Retractions: no use of accessory muscles, no retractions, expansion symmetric  All Lung Field Breath Sounds: All Lung Fields Breath Sounds: Anterior:, Lateral:, crackles, crackles, coarse  PACO Breath Sounds: crackles, coarse  LLL Breath Sounds: crackles, coarse  O2 Device/Concentration: 12 lpm HF nasal cannula  NIPPV: No Surgical airway: No Vent: No  ETCO2 monitored: ETCO2 (mmHg): 0  mmHg  Ambu at bedside: yes    Active Orders   Respiratory Care    Inhalation Treatment Q4H PRN     Frequency: Q4H PRN     Number of Occurrences: Until Specified    Oxygen Continuous     Frequency: Continuous     Number of Occurrences: Until Specified     Order Questions:      Device type: High flow      Device: High Flow Nasal Cannula (6 -15 Liters)      LPM: 10      Titrate O2 per Oxygen Titration Protocol: Yes      To maintain SpO2 goal of: >= 90%      Notify MD of: Inability to achieve desired SpO2; Sudden change in patient status and requires 20% increase in FiO2; Patient requires >60% FiO2    Oxygen PRN     Frequency: PRN     Number of Occurrences: Until Specified     Order Questions:      Device type: Low flow      Device: Nasal Cannula (1- 5 Liters)      LPM: 3      Titrate O2 per Oxygen Titration Protocol: Yes      To maintain SpO2 goal of: >= 90%      Notify MD of: Inability to achieve desired SpO2; Sudden change in patient status and requires 20% increase in FiO2; Patient requires >60% FiO2    POCT ARTERIAL BLOOD GAS Blood Gas     Frequency: Once     Number of Occurrences: 1 Occurrences     Order Comments: Notify Physician if: see parameters below.       Order Questions:      Component: Blood Gas    Pulse Oximetry Continuous     Frequency: Continuous     Number of Occurrences: Until Specified       RECOMMENDATIONS  ?  We recommend: RRT Recs: Continue POC per primary team.    ESCALATION    Orders received and case discussed with Ramona Dale NP.    FOLLOW-UP    Disposition: Tx in ICU bed 7091.    Please call back the Rapid Response RTMeredith, BHANU at x [phone] for any questions or concerns.

## 2025-04-07 NOTE — ASSESSMENT & PLAN NOTE
May be secondary to sedation with diazepam with superimposed HAP and sepsis.   -Will order ABG and CT head ruled out acute hypercapnic rx failure , possible CVA.  -Pending EEG.  Much improved mental status today. More awake and better arousal.

## 2025-04-07 NOTE — HPI
Jesus Posey is a 19 y.o. 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 Family noted leakage from his GJ tube which resembled his tube feeds. Otherwise, he has had no new issues. He was admitted to hospital medicine. Infectious workup has been negative. On IV maintenance fluids. GJ tube replaced 4/2 however he was had continued vomiting since. CTAP obtained- GJ tube in satisfactory positioning. GI consulted for continued inability to tolerate tube feeds.

## 2025-04-07 NOTE — SUBJECTIVE & OBJECTIVE
Interval History/Significant Events: Febrile episode this .7F. Oxygen requirement increased up to 15L HFNC, weaned off to 7L. Currently on unasyn    Review of Systems   Reason unable to perform ROS: altered mental status.     Objective:     Vital Signs (Most Recent):  Temp: 99.9 °F (37.7 °C) (04/07/25 1031)  Pulse: 108 (04/07/25 1200)  Resp: 19 (04/07/25 1200)  BP: 111/69 (04/07/25 1200)  SpO2: (!) 93 % (04/07/25 1200) Vital Signs (24h Range):  Temp:  [97.4 °F (36.3 °C)-100.7 °F (38.2 °C)] 99.9 °F (37.7 °C)  Pulse:  [] 108  Resp:  [13-28] 19  SpO2:  [84 %-98 %] 93 %  BP: (104-124)/(55-88) 111/69   Weight: 49 kg (108 lb 0.4 oz)  Body mass index is 16.92 kg/m².      Intake/Output Summary (Last 24 hours) at 4/7/2025 1302  Last data filed at 4/7/2025 0954  Gross per 24 hour   Intake 0 ml   Output 450 ml   Net -450 ml          Physical Exam  Constitutional:       General: He is not in acute distress.     Comments: Cachectic, eyes open but does not respond    Cardiovascular:      Rate and Rhythm: Normal rate.      Heart sounds: Normal heart sounds.   Pulmonary:      Effort: Pulmonary effort is normal. No respiratory distress.   Abdominal:      Comments: JG tube in place   Musculoskeletal:      Right lower leg: No edema.      Left lower leg: No edema.   Neurological:      Mental Status: Mental status is at baseline.      Comments: Not following commands  Not responsive   Contractures in extremities            Vents:  Oxygen Concentration (%): 100 (04/02/25 2108)  Lines/Drains/Airways       Drain  Duration                  Gastrostomy/Enterostomy LUQ -- days              Peripheral Intravenous Line  Duration                  Peripheral IV - Single Lumen 04/01/25 0337 20 G 1 3/4 in Anterior;Left Upper Arm 6 days                  Significant Labs:    CBC/Anemia Profile:  Recent Labs   Lab 04/06/25  0344 04/07/25  0801   WBC 8.73 8.25   HGB 12.9* 13.6*   HCT 39.0* 42.2    178   MCV 93 94   RDW 14.2 14.6*         Chemistries:  Recent Labs   Lab 04/06/25  0344 04/07/25  0801    143   K 3.9 3.8    107   CO2 27 27   BUN 8 5*   CREATININE 0.5 0.5   CALCIUM 9.4 9.2   GLUCOSE 74 59*       All pertinent labs within the past 24 hours have been reviewed.    Significant Imaging:  I have reviewed all pertinent imaging results/findings within the past 24 hours.

## 2025-04-07 NOTE — HPI
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      As he is nonverbal, father at bedside provides history.      He reports that yesterday, they noted leakage from his GJ tube which resembled his tube feeds. Otherwise, he has had no new issues.      Tube was exchanged last month.     Patient admitted to hospital medicine service for further management. Skin integrity DARINEL consulted for evaluation of skin injury.

## 2025-04-07 NOTE — PROGRESS NOTES
[facility]  Adult Nutrition  Progress Note    SUMMARY       Recommendations  TF RECS: Nutren 1.5 @ 60 mL/hr x 24 hours to provide 1440 total fluid volume, 2160 kcals, 98 g PRO, 1100 mL fluid,144 % DRI  At home Tube feeding regimen: Nutren 1.5 @ 65 mL/hr x 14 hours to provide 910 total fluid volume, 1365 kcals, 62 g protein, 691 mL fluid, 91% DRI - FWF per MD (provides 81% EEN/63% EPN)  Add Iván BID as TF modifier  to provide 90 kcal, 2.5 g protein, 7 g L-Arginine, 7 g L-Glutamine per serving to aid in wound healing    --Do not mix Iván with formula in a tube-feeding bag  --Pour one packet of Iván in a clean, small (approximately 6- to 8-fl-oz) container for mixing  --Add 4 fl oz (120 mL) water at room temperature  --Mix well with disposable spoon or tongue blade until all particles are completely hydrated  --Verify correct tube placement  --Flush feeding tube with 30 mL water  --Administer Iván through feeding tube using a 60-mL or larger syringe  --Flush with an additional 30 mL water (a smaller amount of water can be used to flush the tube if the patient is on a fluid- restricted diet)  Monitor labs, meds, weight, skin     Goals: Meet % een/epn via EN by next RD f/u, maintain weight during admission, display s/s of wound healing during admission  Nutrition Goal Status: continues   Communication of RD Recs: (poc)    Nutrition Discharge Planning    Nutrition Discharge Planning: Resume home/ nursing home regimen    Assessment and Plan    Nutrition Problem  Increased nutrient needs (protein, calorie)     Related to (etiology):   Wound healing     Signs and Symptoms (as evidenced by):   pressure injury to heal- deep tissue, BMI 16.92     Interventions/Recommendations (treatment strategy):  Collaboration of nutritional care with other providers.  EN  Iván     Nutrition Diagnosis Status:   Continues       Reason for Assessment    Reason For Assessment: RD follow-up  Diagnosis: gastrointestinal  "disease (PEG tube malfunction)  General Information Comments: RD follow-up: Discussed pt through secure chat w/ RN- pt was transferred via rapid response. TF's to resume. Pt has pressure injury to heal- deep tissue, nutritional intervention indicated. Home- TF Regimen is Nutren 1.5 @ 65 mL/hr x 14 hours w/ flushes of 80 mL per hour.  States #. NFPE unable to be completed, will be completed during follow-up. RD following.  Nutrition Related Social Determinants of Health: SDOH: None Identified      Nutrition/Diet History    Spiritual, Cultural Beliefs, [employer]al Practices, Values that Affect Care: no  Food Allergies: NKFA    Anthropometrics    Height: 5' 7" (170.2 cm)  Height (inches): 67 in  Height Method: Stated  Weight: 49 kg (108 lb 0.4 oz)  Weight (lb): 108.03 lb  Weight Method: Estimated  Ideal Body Weight (IBW), Male: 148 lb  % Ideal Body Weight, Male (lb): 72.97 %  BMI (Calculated): 16.9  BMI Grade: less than 18.5 - underweight       Lab/Procedures/Meds    Pertinent Labs Reviewed: reviewed  Pertinent Labs Comments: AST: 51, ALT: 91  Pertinent Medications Reviewed: reviewed  Pertinent Medications Comments: Lactated ringers    Estimated/Assessed Needs    Weight Used For Calorie Calculations: 67.1 kg (148 lb) (IBW)  Energy Calorie Requirements (kcal): 5270-0057 (25-30 kcal/kg of IBW)  Energy Need Method: Kcal/kg  Protein Requirements:  (1.5-2.5 g/kg)  Weight Used For Protein Calculations: 49 kg (108 lb 0.4 oz) (actual body weight)     Estimated Fluid Requirement Method: RDA Method  RDA Method (mL): 1678         Nutrition Prescription Ordered    Current Diet Order: No diet order     Evaluation of Received Nutrient/Fluid Intake    I/O: n/a  % Intake of Estimated Energy Needs: 0 - 25 %- no TF running   % Meal Intake: No diet order    Nutrition Risk    Level of Risk/Frequency of Follow-up: moderate (f/u 1-2x/week)     Monitor and Evaluation    Monitor and Evaluation: Enteral and parenteral nutrition " administration, Weight, Electrolyte and renal panel, Glucose/endocrine profile, Gastrointestinal profile, Inflammatory profile, Lipid profile, Skin, Nutrition focused physical findings     Nutrition Follow-Up    RD Follow-up?: Yes

## 2025-04-07 NOTE — H&P
[facility]  Critical Care Medicine  History & Physical    Patient Name: Jesus Posey  MRN: [id number]  Admission Date: 4/1/2025  Hospital Length of Stay: 6 days  Code Status: Full Code  Attending Physician: Shiraz Maki MD   Primary Care Provider: Pallavi, Primary Doctor   Principal Problem: Acute hypoxic respiratory failure    Subjective:     HPI:  Mr. 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      As he is nonverbal, father reported leakage from his GJ tube which resembled his tube feeds. Last tube exchange was last month.     Admitted to Hospital Medicine in the setting of GJ tube malfunction. Intermittently hypothermic and bradycardic secondary to dysautonomia. Infectious workup has been negative. On IV maintenance fluids. IR replaced tube on 4/2.   During hospital course with repeated episodes of intolerance to PO intake and emesis. Repeat CT AP with G Tube with good positioning. Stepped up to MICU on 4/7 due to acute hypoxic respiratory failure requiring up to 15 L  HFNC  in the setting of likely aspiration pneumonitis. Isolated febrile episode of 100.7F. CXR concerning for BL pulmonary opacities. Started on Unasyn by Hospital Medicine.    Hospital/ICU Course:  No notes on file.       Interval History/Significant Events: Febrile episode this .7F. Oxygen requirement increased up to 15L HFNC, weaned off to 7L. Currently on unasyn    Review of Systems   Reason unable to perform ROS: altered mental status.     Objective:     Vital Signs (Most Recent):  Temp: 99.9 °F (37.7 °C) (04/07/25 1031)  Pulse: 108 (04/07/25 1200)  Resp: 19 (04/07/25 1200)  BP: 111/69 (04/07/25 1200)  SpO2: (!) 93 % (04/07/25 1200) Vital Signs (24h Range):  Temp:  [97.4 °F (36.3 °C)-100.7 °F (38.2 °C)] 99.9 °F (37.7 °C)  Pulse:  [] 108  Resp:  [13-28] 19  SpO2:  [84 %-98 %] 93 %  BP:  (104-124)/(55-88) 111/69   Weight: 49 kg (108 lb 0.4 oz)  Body mass index is 16.92 kg/m².      Intake/Output Summary (Last 24 hours) at 4/7/2025 1302  Last data filed at 4/7/2025 0954  Gross per 24 hour   Intake 0 ml   Output 450 ml   Net -450 ml          Physical Exam  Constitutional:       General: He is not in acute distress.     Comments: Cachectic, eyes open but does not respond    Cardiovascular:      Rate and Rhythm: Normal rate.      Heart sounds: Normal heart sounds.   Pulmonary:      Effort: Pulmonary effort is normal. No respiratory distress.   Abdominal:      Comments: JG tube in place   Musculoskeletal:      Right lower leg: No edema.      Left lower leg: No edema.   Neurological:      Mental Status: Mental status is at baseline.      Comments: Not following commands  Not responsive   Contractures in extremities            Vents:  Oxygen Concentration (%): 100 (04/02/25 2108)  Lines/Drains/Airways       Drain  Duration                  Gastrostomy/Enterostomy LUQ -- days              Peripheral Intravenous Line  Duration                  Peripheral IV - Single Lumen 04/01/25 0337 20 G 1 3/4 in Anterior;Left Upper Arm 6 days                  Significant Labs:    CBC/Anemia Profile:  Recent Labs   Lab 04/06/25  0344 04/07/25  0801   WBC 8.73 8.25   HGB 12.9* 13.6*   HCT 39.0* 42.2    178   MCV 93 94   RDW 14.2 14.6*        Chemistries:  Recent Labs   Lab 04/06/25  0344 04/07/25  0801    143   K 3.9 3.8    107   CO2 27 27   BUN 8 5*   CREATININE 0.5 0.5   CALCIUM 9.4 9.2   GLUCOSE 74 59*       All pertinent labs within the past 24 hours have been reviewed.    Significant Imaging:  I have reviewed all pertinent imaging results/findings within the past 24 hours.  Assessment/Plan:     Neuro  Toxic metabolic encephalopathy  May be secondary to sedation with diazepam with superimposed HAP and sepsis.   - Decrease diazepam dose.   -Will order ABG EEG and CT head to rule out acute hypercapnic  rx failure , non convulsive status epilepticus and possible CVA.    Functional quadriplegia  -Requires assistance with all ADLs.  Plan: Consult PT/OT    Traumatic brain injury  Continue current management with diazepam and keppra.   Will consult epilepsy for possible diazepam adjustment.    Spastic quadriparesis  Mobility protocol.     Pulmonary  * Acute hypoxic respiratory failure secondary to Aspiration Pneumonitis  Patient with Hypoxic Respiratory failure which is Acute with chronic hypercapnic respiratory failure. he is not on home oxygen. Supplemental oxygen was provided and noted-      .   Signs/symptoms of respiratory failure include- respiratory distress. Contributing diagnoses includes - ARDS and Aspiration Labs and images were reviewed. Patient Has recent ABG, which has been reviewed. Will treat underlying causes and adjust management of respiratory failure as follows-     Suspect aspiration pneumonia in the setting of hospital acquired pneumonia with recurrent vomiting with supperimposed atelectasis.  Suggest broadening antibiotics for HAP.    Plan:  -collect new set of blood cultures.   - LR 75 cc/hr  - HFNC O2.   - Follow ABG.   -On UNASYN, transition to vanc-zosyn.   -Pulmonary toilet: bronchodilators PRN, hypertonic saline.  IPPV.    Endocrine  Severe malnutrition  Nutrition consulted. Most recent weight and BMI monitored-     Measurements:  Wt Readings from Last 1 Encounters:   04/02/25 49 kg (108 lb 0.4 oz)   Body mass index is 16.92 kg/m².    Patient has been screened and assessed by RD.    Malnutrition Type:  Context:    Level:      Malnutrition Characteristic Summary:       Interventions/Recommendations (treatment strategy):         GI  PEG tube malfunction  Patient with GJ tube dependence, presenting with tube malfunction. IR consulted for exchange under fluoroscopy. Tube changed 4/2.   GI consulted d/t recurrent vomiting with initiation of tube feeds. CTAP with GJ tube in satisfactory  position.     Plan:  - 5mg IV metoclopramide Q8h   - If patient/family would like to transition to CARINA-KEY low profile GJ tube, advised to call IR to schedule so appropriate tube stoma length can be ordered         Critical Care Daily Checklist:    A: Awake: RASS Goal/Actual Goal:    Actual:     B: Spontaneous Breathing Trial Performed?     C: SAT & SBT Coordinated?  N/A                      D: Delirium: CAM-ICU  ABG and CT Head non-contrast, EEG   E: Early Mobility Performed? No   F: Feeding Goal: Goals: Meet % een/epn via EN by next RD f/u, maintain weight during admission  Status: Nutrition Goal Status: new   Current Diet Order   No orders of the defined types were placed in this encounter.      AS: Analgesia/Sedation Glycopirrolate/diazepam,   PRN tylenol and toradol   T: Thromboembolic Prophylaxis Suggest enoxaparin   H: HOB > 300 Yes   U: Stress Ulcer Prophylaxis (if needed) Not needd.   G: Glucose Control No need for insulin req.   B: Bowel Function Stool Occurrence: 1   I: Indwelling Catheter (Lines & [place]uddleston) Necessity TBD   D: De-escalation of Antimicrobials/Pharmacotherapies Suggest escalation to zosyn    Plan for the day/ETD TBD    Code Status:  Family/Goals of Care: Full Code         Critical secondary to Patient has a condition that poses threat to life and bodily function: Severe Respiratory Distress    Critical care was time spent personally by me on the following activities: development of treatment plan with patient or surrogate and bedside caregivers, discussions with consultants, evaluation of patient's response to treatment, examination of patient, ordering and performing treatments and interventions, ordering and review of laboratory studies, ordering and review of radiographic studies, pulse oximetry, re-evaluation of patient's condition. This critical care time did not overlap with that of any other provider or involve time for any procedures.     Silvio Green MD  Critical Care  Medicine  [facility]

## 2025-04-08 ENCOUNTER — DOCUMENTATION ONLY (OUTPATIENT)
Dept: NEUROLOGY | Facility: CLINIC | Age: 20
End: 2025-04-08
Payer: MEDICAID

## 2025-04-08 LAB
ABSOLUTE EOSINOPHIL (OHS): 0.52 K/UL
ABSOLUTE EOSINOPHIL (OHS): 0.53 K/UL
ABSOLUTE MONOCYTE (OHS): 0.76 K/UL (ref 0.3–1)
ABSOLUTE MONOCYTE (OHS): 0.89 K/UL (ref 0.3–1)
ABSOLUTE NEUTROPHIL COUNT (OHS): 5.71 K/UL (ref 1.8–7.7)
ABSOLUTE NEUTROPHIL COUNT (OHS): 7.51 K/UL (ref 1.8–7.7)
ALBUMIN SERPL BCP-MCNC: 2.1 G/DL (ref 3.5–5.2)
ALLENS TEST: ABNORMAL
ALP SERPL-CCNC: 117 UNIT/L (ref 40–150)
ALT SERPL W/O P-5'-P-CCNC: 14 UNIT/L (ref 10–44)
ANION GAP (OHS): 10 MMOL/L (ref 8–16)
ANION GAP (OHS): 10 MMOL/L (ref 8–16)
ANION GAP (OHS): 9 MMOL/L (ref 8–16)
AST SERPL-CCNC: 14 UNIT/L (ref 11–45)
BASOPHILS # BLD AUTO: 0.04 K/UL
BASOPHILS # BLD AUTO: 0.05 K/UL
BASOPHILS NFR BLD AUTO: 0.5 %
BASOPHILS NFR BLD AUTO: 0.5 %
BILIRUB SERPL-MCNC: 0.4 MG/DL (ref 0.1–1)
BUN SERPL-MCNC: 5 MG/DL (ref 6–20)
CALCIUM SERPL-MCNC: 7.7 MG/DL (ref 8.7–10.5)
CALCIUM SERPL-MCNC: 7.7 MG/DL (ref 8.7–10.5)
CALCIUM SERPL-MCNC: 8.7 MG/DL (ref 8.7–10.5)
CHLORIDE SERPL-SCNC: 107 MMOL/L (ref 95–110)
CHLORIDE SERPL-SCNC: 111 MMOL/L (ref 95–110)
CHLORIDE SERPL-SCNC: 112 MMOL/L (ref 95–110)
CO2 SERPL-SCNC: 25 MMOL/L (ref 23–29)
CO2 SERPL-SCNC: 26 MMOL/L (ref 23–29)
CO2 SERPL-SCNC: 29 MMOL/L (ref 23–29)
CREAT SERPL-MCNC: 0.5 MG/DL (ref 0.5–1.4)
CREAT SERPL-MCNC: 0.5 MG/DL (ref 0.5–1.4)
CREAT SERPL-MCNC: 0.6 MG/DL (ref 0.5–1.4)
DELSYS: ABNORMAL
ERYTHROCYTE [DISTWIDTH] IN BLOOD BY AUTOMATED COUNT: 14.6 % (ref 11.5–14.5)
ERYTHROCYTE [DISTWIDTH] IN BLOOD BY AUTOMATED COUNT: 14.6 % (ref 11.5–14.5)
GFR SERPLBLD CREATININE-BSD FMLA CKD-EPI: >60 ML/MIN/1.73/M2
GLUCOSE SERPL-MCNC: 108 MG/DL (ref 70–110)
GLUCOSE SERPL-MCNC: 149 MG/DL (ref 70–110)
GLUCOSE SERPL-MCNC: 151 MG/DL (ref 70–110)
HCO3 UR-SCNC: 36.7 MMOL/L (ref 24–28)
HCT VFR BLD AUTO: 31.8 % (ref 40–54)
HCT VFR BLD AUTO: 36.8 % (ref 40–54)
HGB BLD-MCNC: 11.5 GM/DL (ref 14–18)
HGB BLD-MCNC: 9.9 GM/DL (ref 14–18)
IMM GRANULOCYTES # BLD AUTO: 0.02 K/UL (ref 0–0.04)
IMM GRANULOCYTES # BLD AUTO: 0.05 K/UL (ref 0–0.04)
IMM GRANULOCYTES NFR BLD AUTO: 0.2 % (ref 0–0.5)
IMM GRANULOCYTES NFR BLD AUTO: 0.5 % (ref 0–0.5)
LYMPHOCYTES # BLD AUTO: 1.81 K/UL (ref 1–4.8)
LYMPHOCYTES # BLD AUTO: 1.87 K/UL (ref 1–4.8)
MCH RBC QN AUTO: 30.2 PG (ref 27–31)
MCH RBC QN AUTO: 30.6 PG (ref 27–31)
MCHC RBC AUTO-ENTMCNC: 31.1 G/DL (ref 32–36)
MCHC RBC AUTO-ENTMCNC: 31.3 G/DL (ref 32–36)
MCV RBC AUTO: 97 FL (ref 82–98)
MCV RBC AUTO: 98 FL (ref 82–98)
NUCLEATED RBC (/100WBC) (OHS): 0 /100 WBC
NUCLEATED RBC (/100WBC) (OHS): 0 /100 WBC
PCO2 BLDA: 71.7 MMHG (ref 35–45)
PH SMN: 7.32 [PH] (ref 7.35–7.45)
PLATELET # BLD AUTO: 193 K/UL (ref 150–450)
PLATELET # BLD AUTO: 215 K/UL (ref 150–450)
PMV BLD AUTO: 11.2 FL (ref 9.2–12.9)
PMV BLD AUTO: 11.6 FL (ref 9.2–12.9)
PO2 BLDA: 52 MMHG (ref 40–60)
POC BE: 11 MMOL/L
POC SATURATED O2: 81 % (ref 95–100)
POC TCO2: 39 MMOL/L (ref 24–29)
POCT GLUCOSE: 74 MG/DL (ref 70–110)
POCT GLUCOSE: 86 MG/DL (ref 70–110)
POCT GLUCOSE: 92 MG/DL (ref 70–110)
POCT GLUCOSE: 96 MG/DL (ref 70–110)
POTASSIUM SERPL-SCNC: 3 MMOL/L (ref 3.5–5.1)
POTASSIUM SERPL-SCNC: 3.1 MMOL/L (ref 3.5–5.1)
POTASSIUM SERPL-SCNC: 3.9 MMOL/L (ref 3.5–5.1)
PROT SERPL-MCNC: 5.7 GM/DL (ref 6–8.4)
RBC # BLD AUTO: 3.24 M/UL (ref 4.6–6.2)
RBC # BLD AUTO: 3.81 M/UL (ref 4.6–6.2)
RELATIVE EOSINOPHIL (OHS): 4.9 %
RELATIVE EOSINOPHIL (OHS): 5.9 %
RELATIVE LYMPHOCYTE (OHS): 17.2 % (ref 18–48)
RELATIVE LYMPHOCYTE (OHS): 20.4 % (ref 18–48)
RELATIVE MONOCYTE (OHS): 8.2 % (ref 4–15)
RELATIVE MONOCYTE (OHS): 8.6 % (ref 4–15)
RELATIVE NEUTROPHIL (OHS): 64.4 % (ref 38–73)
RELATIVE NEUTROPHIL (OHS): 68.7 % (ref 38–73)
SAMPLE: ABNORMAL
SITE: ABNORMAL
SODIUM SERPL-SCNC: 146 MMOL/L (ref 136–145)
SODIUM SERPL-SCNC: 146 MMOL/L (ref 136–145)
SODIUM SERPL-SCNC: 147 MMOL/L (ref 136–145)
TSH SERPL-ACNC: 2.6 UIU/ML (ref 0.4–4)
VANCOMYCIN SERPL-MCNC: 10 UG/ML (ref ?–80)
WBC # BLD AUTO: 10.9 K/UL (ref 3.9–12.7)
WBC # BLD AUTO: 8.86 K/UL (ref 3.9–12.7)

## 2025-04-08 PROCEDURE — 25000003 PHARM REV CODE 250

## 2025-04-08 PROCEDURE — 82374 ASSAY BLOOD CARBON DIOXIDE: CPT

## 2025-04-08 PROCEDURE — 63600175 PHARM REV CODE 636 W HCPCS

## 2025-04-08 PROCEDURE — 80202 ASSAY OF VANCOMYCIN: CPT | Performed by: INTERNAL MEDICINE

## 2025-04-08 PROCEDURE — 99232 SBSQ HOSP IP/OBS MODERATE 35: CPT | Mod: ,,,

## 2025-04-08 PROCEDURE — 27000221 HC OXYGEN, UP TO 24 HOURS

## 2025-04-08 PROCEDURE — 25000242 PHARM REV CODE 250 ALT 637 W/ HCPCS

## 2025-04-08 PROCEDURE — 99900035 HC TECH TIME PER 15 MIN (STAT)

## 2025-04-08 PROCEDURE — 63600175 PHARM REV CODE 636 W HCPCS: Performed by: INTERNAL MEDICINE

## 2025-04-08 PROCEDURE — 94640 AIRWAY INHALATION TREATMENT: CPT

## 2025-04-08 PROCEDURE — 85025 COMPLETE CBC W/AUTO DIFF WBC: CPT | Performed by: STUDENT IN AN ORGANIZED HEALTH CARE EDUCATION/TRAINING PROGRAM

## 2025-04-08 PROCEDURE — 27100171 HC OXYGEN HIGH FLOW UP TO 24 HOURS

## 2025-04-08 PROCEDURE — 5A09357 ASSISTANCE WITH RESPIRATORY VENTILATION, LESS THAN 24 CONSECUTIVE HOURS, CONTINUOUS POSITIVE AIRWAY PRESSURE: ICD-10-PCS | Performed by: STUDENT IN AN ORGANIZED HEALTH CARE EDUCATION/TRAINING PROGRAM

## 2025-04-08 PROCEDURE — 80048 BASIC METABOLIC PNL TOTAL CA: CPT | Performed by: STUDENT IN AN ORGANIZED HEALTH CARE EDUCATION/TRAINING PROGRAM

## 2025-04-08 PROCEDURE — 95813 EEG EXTND MNTR 61-119 MIN: CPT

## 2025-04-08 PROCEDURE — 85025 COMPLETE CBC W/AUTO DIFF WBC: CPT

## 2025-04-08 PROCEDURE — 25000242 PHARM REV CODE 250 ALT 637 W/ HCPCS: Performed by: STUDENT IN AN ORGANIZED HEALTH CARE EDUCATION/TRAINING PROGRAM

## 2025-04-08 PROCEDURE — 20000000 HC ICU ROOM

## 2025-04-08 PROCEDURE — 99223 1ST HOSP IP/OBS HIGH 75: CPT | Mod: ,,, | Performed by: PHYSICIAN ASSISTANT

## 2025-04-08 PROCEDURE — 95819 EEG AWAKE AND ASLEEP: CPT | Mod: 26,,, | Performed by: PSYCHIATRY & NEUROLOGY

## 2025-04-08 PROCEDURE — 94761 N-INVAS EAR/PLS OXIMETRY MLT: CPT

## 2025-04-08 PROCEDURE — 84443 ASSAY THYROID STIM HORMONE: CPT

## 2025-04-08 PROCEDURE — 94660 CPAP INITIATION&MGMT: CPT

## 2025-04-08 PROCEDURE — 63600175 PHARM REV CODE 636 W HCPCS: Performed by: STUDENT IN AN ORGANIZED HEALTH CARE EDUCATION/TRAINING PROGRAM

## 2025-04-08 PROCEDURE — 27000190 HC CPAP FULL FACE MASK W/VALVE

## 2025-04-08 PROCEDURE — 99233 SBSQ HOSP IP/OBS HIGH 50: CPT | Mod: ,,, | Performed by: INTERNAL MEDICINE

## 2025-04-08 PROCEDURE — 25000003 PHARM REV CODE 250: Performed by: STUDENT IN AN ORGANIZED HEALTH CARE EDUCATION/TRAINING PROGRAM

## 2025-04-08 RX ORDER — NOREPINEPHRINE BITARTRATE 0.02 MG/ML
0-.2 INJECTION, SOLUTION INTRAVENOUS CONTINUOUS
Status: ACTIVE | OUTPATIENT
Start: 2025-04-08 | End: 2025-04-09

## 2025-04-08 RX ORDER — DEXTROSE MONOHYDRATE AND SODIUM CHLORIDE 5; .45 G/100ML; G/100ML
INJECTION, SOLUTION INTRAVENOUS CONTINUOUS
Status: DISCONTINUED | OUTPATIENT
Start: 2025-04-08 | End: 2025-04-08

## 2025-04-08 RX ADMIN — GLYCOPYRROLATE 1 MG: 1 LIQUID ORAL at 09:04

## 2025-04-08 RX ADMIN — POTASSIUM BICARBONATE 40 MEQ: 391 TABLET, EFFERVESCENT ORAL at 09:04

## 2025-04-08 RX ADMIN — ALBUTEROL SULFATE 2.5 MG: 2.5 SOLUTION RESPIRATORY (INHALATION) at 12:04

## 2025-04-08 RX ADMIN — NOREPINEPHRINE BITARTRATE 0.04 MCG/KG/MIN: 0.02 INJECTION, SOLUTION INTRAVENOUS at 05:04

## 2025-04-08 RX ADMIN — POTASSIUM BICARBONATE 40 MEQ: 391 TABLET, EFFERVESCENT ORAL at 06:04

## 2025-04-08 RX ADMIN — METOCLOPRAMIDE 5 MG: 5 INJECTION, SOLUTION INTRAMUSCULAR; INTRAVENOUS at 09:04

## 2025-04-08 RX ADMIN — POTASSIUM BICARBONATE 40 MEQ: 391 TABLET, EFFERVESCENT ORAL at 10:04

## 2025-04-08 RX ADMIN — METOCLOPRAMIDE 5 MG: 5 INJECTION, SOLUTION INTRAMUSCULAR; INTRAVENOUS at 03:04

## 2025-04-08 RX ADMIN — PIPERACILLIN SODIUM AND TAZOBACTAM SODIUM 4.5 G: 4; .5 INJECTION, POWDER, FOR SOLUTION INTRAVENOUS at 09:04

## 2025-04-08 RX ADMIN — ENOXAPARIN SODIUM 30 MG: 40 INJECTION SUBCUTANEOUS at 05:04

## 2025-04-08 RX ADMIN — LEVETIRACETAM 500 MG: 100 INJECTION INTRAVENOUS at 09:04

## 2025-04-08 RX ADMIN — METHYLPHENIDATE HYDROCHLORIDE 5 MG: 5 TABLET ORAL at 06:04

## 2025-04-08 RX ADMIN — SODIUM CHLORIDE SOLN NEBU 3% 4 ML: 3 NEBU SOLN at 12:04

## 2025-04-08 RX ADMIN — GLYCOPYRROLATE 1 MG: 1 LIQUID ORAL at 03:04

## 2025-04-08 RX ADMIN — KETOROLAC TROMETHAMINE 15 MG: 15 INJECTION, SOLUTION INTRAMUSCULAR; INTRAVENOUS at 09:04

## 2025-04-08 RX ADMIN — METOCLOPRAMIDE 5 MG: 5 INJECTION, SOLUTION INTRAMUSCULAR; INTRAVENOUS at 06:04

## 2025-04-08 RX ADMIN — ALBUTEROL SULFATE 2.5 MG: 2.5 SOLUTION RESPIRATORY (INHALATION) at 07:04

## 2025-04-08 RX ADMIN — DIAZEPAM 5 MG: 5 SOLUTION ORAL at 03:04

## 2025-04-08 RX ADMIN — SODIUM CHLORIDE SOLN NEBU 3% 4 ML: 3 NEBU SOLN at 01:04

## 2025-04-08 RX ADMIN — SODIUM CHLORIDE SOLN NEBU 3% 4 ML: 3 NEBU SOLN at 07:04

## 2025-04-08 RX ADMIN — Medication 2 G: at 09:04

## 2025-04-08 RX ADMIN — ALBUTEROL SULFATE 2.5 MG: 2.5 SOLUTION RESPIRATORY (INHALATION) at 01:04

## 2025-04-08 RX ADMIN — PIPERACILLIN SODIUM AND TAZOBACTAM SODIUM 4.5 G: 4; .5 INJECTION, POWDER, FOR SOLUTION INTRAVENOUS at 11:04

## 2025-04-08 RX ADMIN — DIAZEPAM 5 MG: 5 SOLUTION ORAL at 09:04

## 2025-04-08 RX ADMIN — NOREPINEPHRINE BITARTRATE 0.04 MCG/KG/MIN: 0.02 INJECTION, SOLUTION INTRAVENOUS at 09:04

## 2025-04-08 RX ADMIN — PIPERACILLIN SODIUM AND TAZOBACTAM SODIUM 4.5 G: 4; .5 INJECTION, POWDER, FOR SOLUTION INTRAVENOUS at 03:04

## 2025-04-08 RX ADMIN — SODIUM CHLORIDE SOLN NEBU 3% 4 ML: 3 NEBU SOLN at 08:04

## 2025-04-08 RX ADMIN — Medication: at 09:04

## 2025-04-08 RX ADMIN — ALBUTEROL SULFATE 2.5 MG: 2.5 SOLUTION RESPIRATORY (INHALATION) at 08:04

## 2025-04-08 RX ADMIN — PIPERACILLIN SODIUM AND TAZOBACTAM SODIUM 4.5 G: 4; .5 INJECTION, POWDER, FOR SOLUTION INTRAVENOUS at 01:04

## 2025-04-08 NOTE — PROGRESS NOTES
[facility]  Gastroenterology  Progress Note    Patient Name: Jesus Posey  MRN: [id number]  Admission Date: 4/1/2025  Hospital Length of Stay: 7 days  Code Status: Full Code   Attending Provider: Shiraz Maki MD  Consulting Provider: KESHA Tony  Primary Care Physician: Pallavi, Primary Doctor  Principal Problem: Acute hypoxic respiratory failure        Subjective:     Interval History: GI consulted for not tolerating TF. TF have been held for planned EEG. IR consulted for conversion of pt's GJ tube to a low profile CARINA-KEY GJ tube, tube was ordered. Respiratory distress overnight requiring higher level of care, pt moved to MICU.     Review of Systems   Unable to perform ROS: Acuity of condition     Objective:     Vital Signs (Most Recent):  Temp: 97.9 °F (36.6 °C) (04/08/25 0800)  Pulse: 74 (04/08/25 1054)  Resp: 20 (04/08/25 0750)  BP: (!) 91/58 (04/08/25 1054)  SpO2: 96 % (04/08/25 1054) Vital Signs (24h Range):  Temp:  [97.5 °F (36.4 °C)-98.1 °F (36.7 °C)] 97.9 °F (36.6 °C)  Pulse:  [] 74  Resp:  [14-30] 20  SpO2:  [90 %-100 %] 96 %  BP: ()/(39-77) 91/58     Weight: 49 kg (108 lb 0.4 oz) (04/02/25 0639)  Body mass index is 16.92 kg/m².      Intake/Output Summary (Last 24 hours) at 4/8/2025 1119  Last data filed at 4/8/2025 0900  Gross per 24 hour   Intake 80 ml   Output --   Net 80 ml       Lines/Drains/Airways       Drain  Duration                  Gastrostomy/Enterostomy LUQ -- days    Male External Urinary Catheter 04/07/25 1646 Small <1 day              Peripheral Intravenous Line  Duration                  Peripheral IV - Single Lumen 04/01/25 0337 20 G 1 3/4 in Anterior;Left Upper Arm 7 days         Peripheral IV - Single Lumen 04/07/25 1521 20 G Anterior;Right Forearm <1 day                     Physical Exam  Constitutional:       General: He is not in acute distress.     Comments: Cachectic, eyes open but does not respond    Cardiovascular:      Rate and Rhythm:  Normal rate.      Heart sounds: Normal heart sounds.   Pulmonary:      Effort: Pulmonary effort is normal. No respiratory distress.   Abdominal:      Comments: GJ tube in place   Musculoskeletal:      Right lower leg: No edema.      Left lower leg: No edema.   Neurological:      Mental Status: Mental status is at baseline.      Comments: Not following commands  Not responsive   Contractures in extremities         Significant Labs:  Recent Lab Results  (Last 5 results in the past 24 hours)        04/08/25  0915   04/08/25  0318   04/08/25  0124   04/07/25  1556   04/07/25  1553        RSV Ag by Molecular Method         Negative       Influenza A, Molecular         Negative       Influenza B, Molecular         Negative       Albumin   2.1             ALP   117             Allens Test       N/A         ALT   14             Anion Gap   10                9             AST   14             Baso #   0.05             Basophil %   0.5             BILIRUBIN TOTAL   0.4  Comment: For infants and newborns, interpretation of results should be based   on gestational age, weight and in agreement with clinical   observations.    Premature Infant recommended reference ranges:   0-24 hours:  <8.0 mg/dL   24-48 hours: <12.0 mg/dL   3-5 days:    <15.0 mg/dL   6-29 days:   <15.0 mg/dL             Site       Other         BUN   5                5             Calcium   7.7                7.7             Chloride   112                111             CO2   25                26             Creatinine   0.5                0.6             DelSys       Nasal Can         eGFR   >60  Comment: Estimated GFR calculated using the CKD-EPI creatinine (2021) equation.                >60  Comment: Estimated GFR calculated using the CKD-EPI creatinine (2021) equation.             Eos #   0.53             Eos %   4.9             Glucose   149                151             Gran # (ANC)   7.51             Hematocrit   31.8             Hemoglobin   9.9              Immature Grans (Abs)   0.05  Comment: Mild elevation in immature granulocytes is non specific and can be seen in a variety of conditions including stress response, acute inflammation, trauma and pregnancy. Correlation with other laboratory and clinical findings is essential.             Immature Granulocytes   0.5             Lymph #   1.87             Lymph %   17.2             MCH   30.6             MCHC   31.1             MCV   98             Mono #   0.89             Mono %   8.2             MPV   11.2             MRSA SCREEN BY PCR         Not Detected       Neut %   68.7             nRBC   0             Platelet Count   193             POC BE       7         POC HCO3       33.2         POC PCO2       62.8         POC PH       7.332         POC PO2       43         POC SATURATED O2       74         POC TCO2       35         POCT Glucose 74     86           Potassium   3.1                3.0             PROTEIN TOTAL   5.7             RBC   3.24             RDW   14.6             Sample       VENOUS         SARS-CoV2 (COVID-19) Qualitative PCR         Negative       Sodium   147                146             Vancomycin, Random   10.0             WBC   10.90                                      Significant Imaging:  Imaging results within the past 24 hours have been reviewed.  Assessment/Plan:     GI  PEG tube malfunction  Patient with GJ tube dependence, presenting with tube malfunction. IR consulted for exchange under fluoroscopy. Tube changed 4/2.   GI consulted d/t recurrent vomiting with initiation of tube feeds. CTAP with GJ tube in satisfactory position.    4/8 - GI consulted for not tolerating TF. TF have been held for planned EEG. IR consulted for conversion of pt's GJ tube to a low profile CARINA-KEY GJ tube, tube was ordered. Respiratory distress overnight requiring higher level of care, pt moved to MICU.     Recommendations:  - no TF currently running due to planned EEG   - 5mg IV metoclopramide  Q8h   - If patient/family would like to transition to CARINA-KEY low profile GJ tube, IR ordered GJ tube  - we will sign off re-consult if any further concerns once TF restarted     Thank you for involving us in the care of Jesus Posey. Please call with any additional questions, concerns or changes in the patient's clinical status. We will sign off.       Case discussed with Dr Briseno   Thank you for your consult. I will sign off. Please contact us if you have any additional questions.    Kena Lancaster, SAMIP-C  Gastroenterology  [facility] - Medical ICU

## 2025-04-08 NOTE — HPI
Patient is a 19-year-old male admitted to hospital medicine on 4/1 for GJ-tube malfunction; otherwise at baseline. On 4/7, patient's breathing was labored sating 83%RA with green emesis on chest. Stepped up to MICU on 4/7 due to acute hypoxic respiratory failure requiring up to 15 L HFNC in the setting of likely aspiration pneumonitis. Isolated febrile episode of 100.7F. CXR concerning for BL pulmonary opacities. Per chart review, continued to experience worsening encephalopathy. Low temp and bradycardiac throughout admission most likely related to dysautonomia. NSGY consulted to evaluate encephalopathic behavior in relation to baclofen pump. Baclofen dose increased by +5.8% during refill on 3/26/25.

## 2025-04-08 NOTE — PLAN OF CARE
MICU DAILY GOALS     Family/Goals of care/Code Status   Code Status: Full Code    24H Vital Sign Range  Temp:  [97.5 °F (36.4 °C)-100.7 °F (38.2 °C)]   Pulse:  []   Resp:  [13-30]   BP: ()/(39-78)   SpO2:  [84 %-100 %]      Shift Events (include procedures and significant events)   No acute events throughout shift.    AWAKE RASS: Goal -    Actual - RASS (Perkins Agitation-Sedation Scale): alert and calm    Restraint necessity: Not necessary   BREATHE SBT: Not intubated    Coordinate A & B, analgesics/sedatives Pain: managed   SAT: Not intubated   Delirium CAM-ICU:     Early(intubated/ Progressive (non-intubated) Mobility MOVE Screen (INTUBATED ONLY): Not intubated    Activity: Activity Management: Patient unable to perform activities   Feeding/Nutrition Diet order: Diet/Nutrition Received: NPO,     Thrombus DVT prophylaxis:     HOB Elevation Head of Bed (HOB) Positioning: HOB elevated   Ulcer Prophylaxis GI: yes   Glucose control managed     Skin Skin assessment:     Sacrum intact/not altered? No  Heels intact/not altered? No  Surgical wound? No    CHECK ONE!   (no altered skin or altered skin) and sub boxes:  [] No Altered Skin Integrity Present    []Prevention Measures Documented    [x] Altered Skin Integrity Present or Discovered   [x] LDA already present in EPIC, daily doc completed              [] LDA added if not already in EPIC (describe/stage wound).               [] Wound Image Taken (required on admit,                   transfer/discharge and every Tuesday)    Wound Care Consulted? Yes   Bowel Function no issues    Indwelling Catheter Necessity         External cath   De-escalation Antibiotics No        VS and assessment per flow sheet, patient progressing towards goals as tolerated, plan of care reviewed with family, all concerns addressed, will continue to monitor.

## 2025-04-08 NOTE — SUBJECTIVE & OBJECTIVE
Subjective:     Interval History: GI consulted for not tolerating TF. TF have been held for planned EEG. IR consulted for conversion of pt's GJ tube to a low profile CARINA-KEY GJ tube, tube was ordered. Respiratory distress overnight requiring higher level of care, pt moved to MICU.     Review of Systems   Unable to perform ROS: Acuity of condition     Objective:     Vital Signs (Most Recent):  Temp: 97.9 °F (36.6 °C) (04/08/25 0800)  Pulse: 74 (04/08/25 1054)  Resp: 20 (04/08/25 0750)  BP: (!) 91/58 (04/08/25 1054)  SpO2: 96 % (04/08/25 1054) Vital Signs (24h Range):  Temp:  [97.5 °F (36.4 °C)-98.1 °F (36.7 °C)] 97.9 °F (36.6 °C)  Pulse:  [] 74  Resp:  [14-30] 20  SpO2:  [90 %-100 %] 96 %  BP: ()/(39-77) 91/58     Weight: 49 kg (108 lb 0.4 oz) (04/02/25 0639)  Body mass index is 16.92 kg/m².      Intake/Output Summary (Last 24 hours) at 4/8/2025 1119  Last data filed at 4/8/2025 0900  Gross per 24 hour   Intake 80 ml   Output --   Net 80 ml       Lines/Drains/Airways       Drain  Duration                  Gastrostomy/Enterostomy LUQ -- days    Male External Urinary Catheter 04/07/25 1646 Small <1 day              Peripheral Intravenous Line  Duration                  Peripheral IV - Single Lumen 04/01/25 0337 20 G 1 3/4 in Anterior;Left Upper Arm 7 days         Peripheral IV - Single Lumen 04/07/25 1521 20 G Anterior;Right Forearm <1 day                     Physical Exam  Constitutional:       General: He is not in acute distress.     Comments: Cachectic, eyes open but does not respond    Cardiovascular:      Rate and Rhythm: Normal rate.      Heart sounds: Normal heart sounds.   Pulmonary:      Effort: Pulmonary effort is normal. No respiratory distress.   Abdominal:      Comments: GJ tube in place   Musculoskeletal:      Right lower leg: No edema.      Left lower leg: No edema.   Neurological:      Mental Status: Mental status is at baseline.      Comments: Not following commands  Not responsive    Contractures in extremities         Significant Labs:  Recent Lab Results  (Last 5 results in the past 24 hours)        04/08/25  0915   04/08/25  0318   04/08/25  0124   04/07/25  1556   04/07/25  1553        RSV Ag by Molecular Method         Negative       Influenza A, Molecular         Negative       Influenza B, Molecular         Negative       Albumin   2.1             ALP   117             Allens Test       N/A         ALT   14             Anion Gap   10                9             AST   14             Baso #   0.05             Basophil %   0.5             BILIRUBIN TOTAL   0.4  Comment: For infants and newborns, interpretation of results should be based   on gestational age, weight and in agreement with clinical   observations.    Premature Infant recommended reference ranges:   0-24 hours:  <8.0 mg/dL   24-48 hours: <12.0 mg/dL   3-5 days:    <15.0 mg/dL   6-29 days:   <15.0 mg/dL             Site       Other         BUN   5                5             Calcium   7.7                7.7             Chloride   112                111             CO2   25                26             Creatinine   0.5                0.6             DelSys       Nasal Can         eGFR   >60  Comment: Estimated GFR calculated using the CKD-EPI creatinine (2021) equation.                >60  Comment: Estimated GFR calculated using the CKD-EPI creatinine (2021) equation.             Eos #   0.53             Eos %   4.9             Glucose   149                151             Gran # (ANC)   7.51             Hematocrit   31.8             Hemoglobin   9.9             Immature Grans (Abs)   0.05  Comment: Mild elevation in immature granulocytes is non specific and can be seen in a variety of conditions including stress response, acute inflammation, trauma and pregnancy. Correlation with other laboratory and clinical findings is essential.             Immature Granulocytes   0.5             Lymph #   1.87             Lymph %   17.2              MCH   30.6             MCHC   31.1             MCV   98             Mono #   0.89             Mono %   8.2             MPV   11.2             MRSA SCREEN BY PCR         Not Detected       Neut %   68.7             nRBC   0             Platelet Count   193             POC BE       7         POC HCO3       33.2         POC PCO2       62.8         POC PH       7.332         POC PO2       43         POC SATURATED O2       74         POC TCO2       35         POCT Glucose 74     86           Potassium   3.1                3.0             PROTEIN TOTAL   5.7             RBC   3.24             RDW   14.6             Sample       VENOUS         SARS-CoV2 (COVID-19) Qualitative PCR         Negative       Sodium   147                146             Vancomycin, Random   10.0             WBC   10.90                                      Significant Imaging:  Imaging results within the past 24 hours have been reviewed.

## 2025-04-08 NOTE — NURSING
Patients L pupil +2 and brisk, R +4 and brisk. Informed team of this finding, no new orders given.

## 2025-04-08 NOTE — PROGRESS NOTES
Pharmacokinetic Assessment Follow Up: IV Vancomycin    Therapy with vancomycin complete and/or consult discontinued by provider. Pharmacy will sign off, please re-consult as needed.    Arabella Joshua, LawrenceD

## 2025-04-08 NOTE — CONSULTS
[facility]  Wound Care    Patient Name:  Jesus Posey   MRN:  [id number]  Date: 4/8/2025  Diagnosis: Acute hypoxic respiratory failure    History:     Past Medical History:   Diagnosis Date    Atelectasis 10/08/2024    Seen on imaging   IS  Deep breathing exercises      Traumatic brain injury        Social History[1]    Precautions:     Allergies as of 03/31/2025    (No Known Allergies)       WO Assessment Details/Treatment     Wound care follow-up assessment completed for sacrum, PEG site and L heel.  Chart reviewed for this encounter.  See flowsheets for findings.    Pt found lying in bed, OK for care at this time.  L heel w/ continued DTI, largely unchanged from last assessment, continue BPCO.  PEG site w/ skin tear, appears to be improving from last assessment, will switch to Mepilex foam dressing to support moist wound healing.  Pt turned into lateral position and cleansed w/ no rinse bath wipes d/t stool incontinence. Sacrum previously intact scar tissue, now open partial thickness - Mepilex foam dressing applied.     04/08/25 1114   WOCN Assessment   WOCN Total Time (mins) 30   Visit Date 04/08/25   Visit Time 1114   Consult Type Follow Up;New   WO Speciality Wound   Intervention assessed;changed;applied;chart review;coordination of care;orders   Teaching on-going        Wound 04/01/25 0623 Pressure Injury Left Heel   Date First Assessed/Time First Assessed: 04/01/25 0623   Present on Original Admission: Yes  Primary Wound Type: Pressure Injury  Side: Left  Location: Heel  Is this injury device related?: No   Wound Image    Pressure Injury Stage DTPI   Dressing Appearance Open to air   Drainage Amount None   Drainage Characteristics/Odor No odor   Appearance Intact;Dry;Purple   Periwound Area Intact;Dry        Wound 04/02/25 1058 Pressure Injury Sacral spine   Date First Assessed/Time First Assessed: 04/02/25 1058   Present on Original Admission: Yes  Primary Wound Type: (c) Pressure Injury   Location: Sacral spine   Wound Image    Pressure Injury Stage 2   Dressing Appearance Open to air   Drainage Amount Scant   Drainage Characteristics/Odor Serosanguineous   Appearance Pink;Red;Moist   Tissue loss description Partial thickness   Periwound Area Intact;Pink;Scar tissue   Wound Edges Open   Care Cleansed with:;Soap and water   Dressing Applied;Foam   Dressing Change Due 04/11/25        Wound 04/04/25 1106 Skin Tear Left Lower quadrant   Date First Assessed/Time First Assessed: 04/04/25 1106   Present on Original Admission: Yes  Primary Wound Type: Skin Tear  Side: Left  Location: Lower quadrant   Wound Image    Dressing Appearance Moist drainage   Drainage Amount Scant   Drainage Characteristics/Odor Serosanguineous   Appearance Pink;Red;Moist   Tissue loss description Full thickness   Periwound Area Intact;Dry   Wound Edges Irregular;Open   Care Cleansed with:;Sterile normal saline;Applied:;Skin Barrier   Dressing Applied;Foam   Dressing Change Due 04/11/25          [1]   Social History  Socioeconomic History    Marital status: Single   Tobacco Use    Smoking status: Never    Smokeless tobacco: Never     Social Drivers of Health     Financial Resource Strain: Low Risk  (4/2/2025)    Overall Financial Resource Strain (CARDIA)     Difficulty of Paying Living Expenses: Not hard at all   Food Insecurity: No Food Insecurity (4/2/2025)    Hunger Vital Sign     Worried About Running Out of Food in the Last Year: Never true     Ran Out of Food in the Last Year: Never true   Transportation Needs: No Transportation Needs (4/2/2025)    PRAPARE - Transportation     Lack of Transportation (Medical): No     Lack of Transportation (Non-Medical): No   Physical Activity: Inactive (4/2/2025)    Exercise Vital Sign     Days of Exercise per Week: 0 days     Minutes of Exercise per Session: 0 min   Stress: Patient Unable To Answer (4/2/2025)    [employer] [place] [employer] - Occupational Stress Questionnaire      Feeling of Stress : Patient unable to answer   Housing Stability: Low Risk  (4/2/2025)    Housing Stability Vital Sign     Unable to Pay for Housing in the Last Year: No     Homeless in the Last Year: No

## 2025-04-08 NOTE — ASSESSMENT & PLAN NOTE
Patient with GJ tube dependence, presenting with tube malfunction. IR consulted for exchange under fluoroscopy. Tube changed 4/2.   GI consulted d/t recurrent vomiting with initiation of tube feeds. CTAP with GJ tube in satisfactory position.    4/8 - GI consulted for not tolerating TF. TF have been held for planned EEG. IR consulted for conversion of pt's GJ tube to a low profile CARINA-KEY GJ tube, tube was ordered. Respiratory distress overnight requiring higher level of care, pt moved to MICU.     Recommendations:  - no TF currently running due to planned EEG   - 5mg IV metoclopramide Q8h   - If patient/family would like to transition to CARINA-KEY low profile GJ tube, IR ordered GJ tube  - we will sign off re-consult if any further concerns once TF restarted     Thank you for involving us in the care of Jesus Posey. Please call with any additional questions, concerns or changes in the patient's clinical status. We will sign off.

## 2025-04-08 NOTE — PROGRESS NOTES
EEG Hook up  No sign of skin breakdown noticed  Skin Integrity: Normal     Goyo Gilliam   04/08/2025 2:22 PM

## 2025-04-08 NOTE — PLAN OF CARE
MICU DAILY GOALS     Family/Goals of care/Code Status   Code Status: Full Code    24H Vital Sign Range  Temp:  [96.3 °F (35.7 °C)-97.9 °F (36.6 °C)]   Pulse:  []   Resp:  [14-30]   BP: ()/(39-80)   SpO2:  [81 %-100 %]      Shift Events (include procedures and significant events)   Pt continues to be bradycardic this shift. HR lowest 36. BP maintained on levo. Team aware of HR. Will continue to monitor.     AWAKE RASS: Goal -    Actual - RASS (Perkins Agitation-Sedation Scale): alert and calm    Restraint necessity: Not necessary   BREATHE SBT: NA    Coordinate A & B, analgesics/sedatives Pain: managed   SAT: NA   Delirium CAM-ICU:     Early(intubated/ Progressive (non-intubated) Mobility MOVE Screen (INTUBATED ONLY): NA    Activity: Activity Management: Patient unable to perform activities   Feeding/Nutrition Diet order: Diet/Nutrition Received: NPO,     Thrombus DVT prophylaxis: VTE Core Measure: Pharmacological prophylaxis initiated/maintained   HOB Elevation Head of Bed (HOB) Positioning: HOB at 30-45 degrees   Ulcer Prophylaxis GI: yes   Glucose control managed     Skin Skin assessment:     Sacrum intact/not altered? No  Heels intact/not altered? No  Surgical wound? No    CHECK ONE!   (no altered skin or altered skin) and sub boxes:  [] No Altered Skin Integrity Present    []Prevention Measures Documented    [x] Altered Skin Integrity Present or Discovered   [x] LDA already present in EPIC, daily doc completed              [] LDA added if not already in EPIC (describe/stage wound).               [] Wound Image Taken (required on admit,                   transfer/discharge and every Tuesday)    Wound Care Consulted? Yes   Bowel Function Creamy/loose     Indwelling Catheter Necessity         N/a   De-escalation Antibiotics No        VS and assessment per flow sheet, patient progressing towards goals as tolerated, plan of care reviewed with family, all concerns addressed, will continue to monitor.
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
## 2025-04-08 NOTE — SUBJECTIVE & OBJECTIVE
Prescriptions Prior to Admission[1]    Review of patient's allergies indicates:  No Known Allergies    Past Medical History:   Diagnosis Date    Atelectasis 10/08/2024    Seen on imaging   IS  Deep breathing exercises      Traumatic brain injury      Past Surgical History:   Procedure Laterality Date    BACLOFEN PUMP IMPLANTATION N/A 10/7/2024    Procedure: INSERTION, INTRATHECAL BACLOFEN PUMP;  Surgeon: Estefany Rogers MD;  Location: [facility] OR [address] [place] [address];  Service: Neurosurgery;  Laterality: N/A;  Anes: Gen  Bed: Reg, Reversed  Pos: Left Lat  Rad: C-arm  Medtronic Pump: 20cc, Baclofen 500 mcg/mL    CRANIECTOMY Right     CRANIOPLASTY FOR CRANIAL DEFECT Right     GASTROSTOMY TUBE PLACEMENT      LUMBAR PUNCTURE N/A 9/23/2024    Procedure: Lumbar Puncture;  Surgeon: Estefany Rogers MD;  Location: [facility] OR [address] [place] [address];  Service: Neurosurgery;  Laterality: N/A;  LP for Baclofen Trial    PLACEMENT OF JEJUNOSTOMY TUBE       Family History    None       Tobacco Use    Smoking status: Never    Smokeless tobacco: Never   Substance and Sexual Activity    Alcohol use: Not on file    Drug use: Not on file    Sexual activity: Not on file       Objective:     Weight: 49 kg (108 lb 0.4 oz)  Body mass index is 16.92 kg/m².  Vital Signs (Most Recent):  Temp: 97.9 °F (36.6 °C) (04/08/25 0300)  Pulse: (!) 42 (04/08/25 0750)  Resp: 20 (04/08/25 0750)  BP: 106/62 (04/08/25 0515)  SpO2: 100 % (04/08/25 0750) Vital Signs (24h Range):  Temp:  [97.5 °F (36.4 °C)-99.9 °F (37.7 °C)] 97.9 °F (36.6 °C)  Pulse:  [] 42  Resp:  [13-30] 20  SpO2:  [84 %-100 %] 100 %  BP: ()/(39-77) 106/62                              Gastrostomy/Enterostomy LUQ (Active)   Securement secured to abdomen 04/08/25 0500   Interventions Prior to Feeding patency checked 04/08/25 0500   Feeding Type other (see comments) 04/06/25 0800   Clamp Status/Tolerance clamped 04/08/25 0500   Feeding Action feeding continued 04/08/25 0500   Dressing dry and intact 04/08/25 0500  "  Insertion Site no redness;no warmth 04/08/25 0500   Site Care site cleansed w/ sterile normal saline 04/07/25 1501   Flush/Irrigation flushed w/;water 04/07/25 1501   Current Rate (mL/hr) 40 mL/hr 04/04/25 2030   Goal Rate (mL/hr) 60 mL/hr 04/04/25 2030   Intake (mL) 80 mL 04/04/25 2030   Water Bolus (mL) 200 mL 04/04/25 1236   Tube Output(mL)(Include Discarded Residual) 0 mL 04/04/25 2030   Formula Name nutren 1.5 04/03/25 2100   Tube Feeding Intake (mL) 252 04/04/25 1850   Residual Amount (ml) 0 ml 04/04/25 2030   Length Of Feeding (Min) 500 04/03/25 2100       Male External Urinary Catheter 04/07/25 1646 Small (Active)   Collection Container Suction canister 04/08/25 0500   Securement Method secured to top of thigh w/ adhesive device 04/07/25 1630   Skin no redness;no breakdown 04/07/25 1630   Tolerance no signs/symptoms of discomfort 04/07/25 1630   Catheter Change Date 04/07/25 04/07/25 1630   Catheter Change Time 1647 04/07/25 1630     General: Cachectic. Awake, elevated to upright in bed.   Head: normocephalic, atraumatic  Pulmonary: No evidence of acute distress; however, wet cough evident during encounter  Cardiovascular: RRR  Neurologic: L eye open but did not track provider. Did not attempt to speak, move extremities, or follow commands  Eyes: L EOMI. R ptosis  Motor Strength: RUE contracted. Spasticity appreciated on PROM of b/l upper and lower extremities.   Skin: No erythema, fluid collection or appreciated tenderness at baclofen pump site. G tube in place       Significant Labs:  Recent Labs   Lab 04/07/25  0801 04/08/25 0318    146*   K 3.8 3.0*    111*   CO2 27 26   BUN 5* 5*   CREATININE 0.5 0.6   CALCIUM 9.2 7.7*     Recent Labs   Lab 04/07/25  0801 04/08/25  0318   WBC 8.25 10.90   HGB 13.6* 9.9*   HCT 42.2 31.8*    193     No results for input(s): "LABPT", "INR", "APTT" in the last 48 hours.  Microbiology Results (last 7 days)       Procedure Component Value Units Date/Time "    Blood culture [[id number]]  (Normal) Collected: 25 1459    Order Status: Completed Specimen: Blood from Peripheral, Forearm, Left Updated: 25 2301     Blood Culture No Growth After 6 Hours    Blood culture [[id number]]  (Normal) Collected: 25 1459    Order Status: Completed Specimen: Blood from Peripheral, Forearm, Right Updated: 25 2202     Blood Culture No Growth After 6 Hours    MRSA Screen by PCR [[id number]]  (Normal) Collected: 25 1553    Order Status: Completed Specimen: Nasal Swab Updated: 25 1808     MRSA PCR Screen Not Detected    Blood culture [[id number]]  (Normal) Collected: 25 1236    Order Status: Completed Specimen: Blood from Peripheral, Ankle, Right Updated: 25 1401     Blood Culture No Growth After 5 Days    Blood Culture #2 **CANNOT BE ORDERED STAT** [[id number]]  (Normal) Collected: 25 1236    Order Status: Completed Specimen: Blood from Peripheral, Ankle, Left Updated: 25 1401     Blood Culture No Growth After 5 Days    Blood culture [[id number]]     Order Status: Canceled Specimen: Blood     Blood Culture #2 **CANNOT BE ORDERED STAT** [[id number]]     Order Status: Canceled Specimen: Blood           All pertinent labs from the last 24 hours have been reviewed.    Significant Diagnostics:  I have reviewed and interpreted all pertinent imaging results/findings within the past 24 hours.         [1]   Medications Prior to Admission   Medication Sig Dispense Refill Last Dose/Taking    baclofen 25 mg/5 mL (5 mg/mL) Susp oral liquid 25 mg by Per G Tube route 4 (four) times daily.   Taking    diazePAM (VALIUM) 5 mg/5 mL (1 mg/mL) oral solution SMARTSI Milliliter(s) Gastro Tube 3 Times Daily   Taking    glycopyrrolate (CUVPOSA) 1 mg/5 mL (0.2 mg/mL) Soln 40 mcg/kg by Per G Tube route 3 (three) times daily as needed.   Taking As Needed    hydroCHLOROthiazide (HYDRODIURIL) 12.5 MG Tab 12.5 mg by Per G Tube route once daily.   Taking     HYDROcodone-acetaminophen (NORCO) 5-325 mg per tablet 1 tablet by Per G Tube route every 4 (four) hours as needed for Pain. 30 tablet 0 Taking As Needed    levETIRAcetam (KEPPRA) 100 mg/mL Soln 5 mLs (500 mg total) by Per G Tube route 2 (two) times daily. 300 mL 11 Taking    polyethylene glycol (GLYCOLAX) 17 gram/dose powder 17 g by Per G Tube route 2 (two) times daily as needed for Constipation.   Taking As Needed    QUILLIVANT XR 5 mg/mL (25 mg/5 mL) SR24 7 mLs by Per G Tube route Daily.   Taking

## 2025-04-08 NOTE — ASSESSMENT & PLAN NOTE
Nutrition consulted. Most recent weight and BMI monitored-     Measurements:  Wt Readings from Last 1 Encounters:   04/02/25 49 kg (108 lb 0.4 oz)   Body mass index is 16.92 kg/m².    Patient has been screened and assessed by RD.    Malnutrition Type:  Context:    Level:      Malnutrition Characteristic Summary:       Interventions/Recommendations (treatment strategy):   Restart tube feeds
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
## 2025-04-08 NOTE — SUBJECTIVE & OBJECTIVE
Interval History/Significant Events: increased norepi requirement up to 0.06 likely in the setting of hypovolemic shock 2/2 poor po intake + superimposed sepsis. Restarting feeds. Repeat CBC without concerns for active bleeding.    Review of Systems  Objective:     Vital Signs (Most Recent):  Temp: 97.8 °F (36.6 °C) (04/08/25 1159)  Pulse: 69 (04/08/25 1201)  Resp: 20 (04/08/25 1201)  BP: (!) 91/58 (04/08/25 1054)  SpO2: 100 % (04/08/25 1201) Vital Signs (24h Range):  Temp:  [97.5 °F (36.4 °C)-98.1 °F (36.7 °C)] 97.8 °F (36.6 °C)  Pulse:  [] 69  Resp:  [14-30] 20  SpO2:  [90 %-100 %] 100 %  BP: ()/(39-77) 91/58   Weight: 49 kg (108 lb 0.4 oz)  Body mass index is 16.92 kg/m².      Intake/Output Summary (Last 24 hours) at 4/8/2025 1323  Last data filed at 4/8/2025 1200  Gross per 24 hour   Intake 140 ml   Output --   Net 140 ml          Physical Exam  Constitutional:       General: He is not in acute distress.     Comments: Cachectic, eyes open but does not respond    Eyes:      Conjunctiva/sclera: Conjunctivae normal.   Cardiovascular:      Rate and Rhythm: Normal rate.      Pulses: Normal pulses.      Heart sounds: Normal heart sounds.   Pulmonary:      Effort: Pulmonary effort is normal. No respiratory distress.      Breath sounds: Normal breath sounds.      Comments: 2L NC  Abdominal:      General: Bowel sounds are normal. There is no distension.      Palpations: Abdomen is soft.      Tenderness: There is no abdominal tenderness.      Comments: JG tube in place   Musculoskeletal:      Right lower leg: No edema.      Left lower leg: No edema.   Skin:     General: Skin is warm and dry.      Capillary Refill: Capillary refill takes less than 2 seconds.   Neurological:      Mental Status: Mental status is at baseline.      Comments: following commands   responsive   Contractures in extremities            Vents:  Oxygen Concentration (%): 100 (04/02/25 2108)  Lines/Drains/Airways       Drain  Duration                   Gastrostomy/Enterostomy LUQ -- days    Male External Urinary Catheter 04/07/25 1646 Small <1 day              Peripheral Intravenous Line  Duration                  Peripheral IV - Single Lumen 04/01/25 0337 20 G 1 3/4 in Anterior;Left Upper Arm 7 days         Peripheral IV - Single Lumen 04/07/25 1521 20 G Anterior;Right Forearm <1 day                  Significant Labs:    CBC/Anemia Profile:  Recent Labs   Lab 04/07/25  0801 04/08/25  0318 04/08/25  1123   WBC 8.25 10.90 8.86   HGB 13.6* 9.9* 11.5*   HCT 42.2 31.8* 36.8*    193 215   MCV 94 98 97   RDW 14.6* 14.6* 14.6*        Chemistries:  Recent Labs   Lab 04/07/25  0801 04/08/25 0318    147*  146*   K 3.8 3.1*  3.0*    112*  111*   CO2 27 25  26   BUN 5* 5*  5*   CREATININE 0.5 0.5  0.6   CALCIUM 9.2 7.7*  7.7*   ALBUMIN  --  2.1*   BILITOT  --  0.4   ALKPHOS  --  117   ALT  --  14   AST  --  14   GLUCOSE 59* 149*  151*       All pertinent labs within the past 24 hours have been reviewed.    Significant Imaging:  I have reviewed all pertinent imaging results/findings within the past 24 hours.
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
## 2025-04-08 NOTE — ASSESSMENT & PLAN NOTE
Patient with GJ tube dependence, presenting with tube malfunction. IR consulted for exchange under fluoroscopy. Tube changed 4/2.   GI consulted d/t recurrent vomiting with initiation of tube feeds. CTAP with GJ tube in satisfactory position.     Plan:  - 5mg IV metoclopramide Q8h   -Restart tube feeds

## 2025-04-09 LAB
ABSOLUTE EOSINOPHIL (OHS): 0.7 K/UL
ABSOLUTE MONOCYTE (OHS): 0.83 K/UL (ref 0.3–1)
ABSOLUTE NEUTROPHIL COUNT (OHS): 4.55 K/UL (ref 1.8–7.7)
ALBUMIN SERPL BCP-MCNC: 2.4 G/DL (ref 3.5–5.2)
ALLENS TEST: ABNORMAL
ALP SERPL-CCNC: 123 UNIT/L (ref 40–150)
ALT SERPL W/O P-5'-P-CCNC: 13 UNIT/L (ref 10–44)
ANION GAP (OHS): 6 MMOL/L (ref 8–16)
ASCENDING AORTA: 2.22 CM
AST SERPL-CCNC: 13 UNIT/L (ref 11–45)
AV AREA BY CONTINUOUS VTI: 2.7 CM2
AV INDEX (PROSTH): 0.81
AV LVOT MEAN GRADIENT: 1 MMHG
AV LVOT PEAK GRADIENT: 2 MMHG
AV MEAN GRADIENT: 2 MMHG
AV PEAK GRADIENT: 3 MMHG
AV VALVE AREA BY VELOCITY RATIO: 2.7 CM²
AV VALVE AREA: 2.8 CM2
AV VELOCITY RATIO: 0.78
BASOPHILS # BLD AUTO: 0.05 K/UL
BASOPHILS NFR BLD AUTO: 0.6 %
BILIRUB SERPL-MCNC: 0.4 MG/DL (ref 0.1–1)
BILIRUB UR QL STRIP.AUTO: NEGATIVE
BUN SERPL-MCNC: 5 MG/DL (ref 6–20)
CALCIUM SERPL-MCNC: 8.7 MG/DL (ref 8.7–10.5)
CHLORIDE SERPL-SCNC: 104 MMOL/L (ref 95–110)
CLARITY UR: CLEAR
CO2 SERPL-SCNC: 32 MMOL/L (ref 23–29)
COLOR UR AUTO: YELLOW
CORTIS SERPL-MCNC: 3.3 UG/DL
CREAT SERPL-MCNC: 0.5 MG/DL (ref 0.5–1.4)
CV ECHO LV RWT: 0.47 CM
DOP CALC AO PEAK VEL: 0.9 M/S
DOP CALC AO VTI: 18.7 CM
DOP CALC LVOT AREA: 3.5 CM2
DOP CALC LVOT DIAMETER: 2.1 CM
DOP CALC LVOT PEAK VEL: 0.7 M/S
DOP CALC LVOT STROKE VOLUME: 52.6 CM3
DOP CALCLVOT PEAK VEL VTI: 15.2 CM
E WAVE DECELERATION TIME: 150 MS
E/A RATIO: 5.72
E/E' RATIO: 11 M/S
ECHO EF ESTIMATED: 75 %
ECHO LV POSTERIOR WALL: 0.9 CM (ref 0.6–1.1)
EJECTION FRACTION: 65 %
ERYTHROCYTE [DISTWIDTH] IN BLOOD BY AUTOMATED COUNT: 15 % (ref 11.5–14.5)
FRACTIONAL SHORTENING: 44.7 % (ref 28–44)
GFR SERPLBLD CREATININE-BSD FMLA CKD-EPI: >60 ML/MIN/1.73/M2
GLUCOSE SERPL-MCNC: 82 MG/DL (ref 70–110)
GLUCOSE UR QL STRIP: NEGATIVE
HCO3 UR-SCNC: 36.4 MMOL/L (ref 24–28)
HCT VFR BLD AUTO: 30.6 % (ref 40–54)
HGB BLD-MCNC: 9.7 GM/DL (ref 14–18)
HGB UR QL STRIP: NEGATIVE
IMM GRANULOCYTES # BLD AUTO: 0.04 K/UL (ref 0–0.04)
IMM GRANULOCYTES NFR BLD AUTO: 0.5 % (ref 0–0.5)
INTERVENTRICULAR SEPTUM: 0.9 CM (ref 0.6–1.1)
KETONES UR QL STRIP: ABNORMAL
LA MAJOR: 4.8 CM
LA MINOR: 4.4 CM
LA WIDTH: 2.6 CM
LACTATE SERPL-SCNC: 0.7 MMOL/L (ref 0.5–2.2)
LEFT ATRIUM SIZE: 2.9 CM
LEFT ATRIUM VOLUME MOD: 28 ML
LEFT ATRIUM VOLUME: 29 CM3
LEFT INTERNAL DIMENSION IN SYSTOLE: 2.1 CM (ref 2.1–4)
LEFT VENTRICLE DIASTOLIC VOLUME: 60 ML
LEFT VENTRICLE SYSTOLIC VOLUME: 15 ML
LEFT VENTRICULAR INTERNAL DIMENSION IN DIASTOLE: 3.8 CM (ref 3.5–6)
LEFT VENTRICULAR MASS: 101.1 G
LEUKOCYTE ESTERASE UR QL STRIP: NEGATIVE
LV LATERAL E/E' RATIO: 9.4
LV SEPTAL E/E' RATIO: 12.9
LYMPHOCYTES # BLD AUTO: 2.19 K/UL (ref 1–4.8)
MCH RBC QN AUTO: 30.6 PG (ref 27–31)
MCHC RBC AUTO-ENTMCNC: 31.7 G/DL (ref 32–36)
MCV RBC AUTO: 97 FL (ref 82–98)
MV A" WAVE DURATION": 59.94 MS
MV PEAK A VEL: 0.18 M/S
MV PEAK E VEL: 1.03 M/S
NITRITE UR QL STRIP: NEGATIVE
NUCLEATED RBC (/100WBC) (OHS): 0 /100 WBC
OHS CV RV/LV RATIO: 0.84 CM
OHS QRS DURATION: 86 MS
OHS QRS DURATION: 90 MS
OHS QTC CALCULATION: 362 MS
OHS QTC CALCULATION: 386 MS
OSMOLALITY UR: 647 MOSM/KG (ref 50–1200)
PCO2 BLDA: 66.4 MMHG (ref 35–45)
PH SMN: 7.35 [PH] (ref 7.35–7.45)
PH UR STRIP: 8 [PH]
PLATELET # BLD AUTO: 216 K/UL (ref 150–450)
PMV BLD AUTO: 11.7 FL (ref 9.2–12.9)
PO2 BLDA: 36 MMHG (ref 40–60)
POC BE: 11 MMOL/L
POC SATURATED O2: 63 % (ref 95–100)
POC TCO2: 38 MMOL/L (ref 24–29)
POCT GLUCOSE: 109 MG/DL (ref 70–110)
POCT GLUCOSE: 142 MG/DL (ref 70–110)
POCT GLUCOSE: 94 MG/DL (ref 70–110)
POTASSIUM SERPL-SCNC: 3.7 MMOL/L (ref 3.5–5.1)
PROT SERPL-MCNC: 6.3 GM/DL (ref 6–8.4)
PROT UR QL STRIP: ABNORMAL
PULM VEIN A" WAVE DURATION": 59.94 MS
PULM VEIN S/D RATIO: 0.76
PULMONIC VEIN PEAK A VELOCITY: 0.1 M/S
PV PEAK D VEL: 0.67 M/S
PV PEAK S VEL: 0.51 M/S
RA MAJOR: 3.15 CM
RA PRESSURE ESTIMATED: 0 MMHG
RA WIDTH: 2.77 CM
RBC # BLD AUTO: 3.17 M/UL (ref 4.6–6.2)
RELATIVE EOSINOPHIL (OHS): 8.4 %
RELATIVE LYMPHOCYTE (OHS): 26.2 % (ref 18–48)
RELATIVE MONOCYTE (OHS): 9.9 % (ref 4–15)
RELATIVE NEUTROPHIL (OHS): 54.4 % (ref 38–73)
RIGHT ATRIAL AREA: 8.3 CM2
RIGHT VENTRICLE DIASTOLIC BASEL DIMENSION: 3.2 CM
RV TISSUE DOPPLER FREE WALL SYSTOLIC VELOCITY 1 (APICAL 4 CHAMBER VIEW): 11.61 CM/S
SAMPLE: ABNORMAL
SINUS: 2.8 CM
SITE: ABNORMAL
SODIUM SERPL-SCNC: 142 MMOL/L (ref 136–145)
SODIUM UR-SCNC: 162 MMOL/L (ref 20–250)
SP GR UR STRIP: 1.03
STJ: 2.2 CM
TDI LATERAL: 0.11 M/S
TDI SEPTAL: 0.08 M/S
TDI: 0.1 M/S
TRICUSPID ANNULAR PLANE SYSTOLIC EXCURSION: 2.03 CM
UROBILINOGEN UR STRIP-ACNC: NEGATIVE EU/DL
WBC # BLD AUTO: 8.36 K/UL (ref 3.9–12.7)

## 2025-04-09 PROCEDURE — 82533 TOTAL CORTISOL: CPT | Performed by: STUDENT IN AN ORGANIZED HEALTH CARE EDUCATION/TRAINING PROGRAM

## 2025-04-09 PROCEDURE — 83935 ASSAY OF URINE OSMOLALITY: CPT

## 2025-04-09 PROCEDURE — 99900035 HC TECH TIME PER 15 MIN (STAT)

## 2025-04-09 PROCEDURE — 27100171 HC OXYGEN HIGH FLOW UP TO 24 HOURS

## 2025-04-09 PROCEDURE — 83605 ASSAY OF LACTIC ACID: CPT

## 2025-04-09 PROCEDURE — 94761 N-INVAS EAR/PLS OXIMETRY MLT: CPT

## 2025-04-09 PROCEDURE — 94640 AIRWAY INHALATION TREATMENT: CPT

## 2025-04-09 PROCEDURE — 25000003 PHARM REV CODE 250: Performed by: STUDENT IN AN ORGANIZED HEALTH CARE EDUCATION/TRAINING PROGRAM

## 2025-04-09 PROCEDURE — 25000242 PHARM REV CODE 250 ALT 637 W/ HCPCS

## 2025-04-09 PROCEDURE — 81003 URINALYSIS AUTO W/O SCOPE: CPT

## 2025-04-09 PROCEDURE — 63600175 PHARM REV CODE 636 W HCPCS: Performed by: INTERNAL MEDICINE

## 2025-04-09 PROCEDURE — 25000242 PHARM REV CODE 250 ALT 637 W/ HCPCS: Performed by: STUDENT IN AN ORGANIZED HEALTH CARE EDUCATION/TRAINING PROGRAM

## 2025-04-09 PROCEDURE — 80053 COMPREHEN METABOLIC PANEL: CPT | Performed by: INTERNAL MEDICINE

## 2025-04-09 PROCEDURE — 27000635 HC IPV DISPOSABLE BREATHING CIRCUIT

## 2025-04-09 PROCEDURE — 84300 ASSAY OF URINE SODIUM: CPT

## 2025-04-09 PROCEDURE — 82803 BLOOD GASES ANY COMBINATION: CPT

## 2025-04-09 PROCEDURE — 20000000 HC ICU ROOM

## 2025-04-09 PROCEDURE — 63600175 PHARM REV CODE 636 W HCPCS

## 2025-04-09 PROCEDURE — 25000003 PHARM REV CODE 250

## 2025-04-09 PROCEDURE — 85025 COMPLETE CBC W/AUTO DIFF WBC: CPT | Performed by: STUDENT IN AN ORGANIZED HEALTH CARE EDUCATION/TRAINING PROGRAM

## 2025-04-09 PROCEDURE — 63600175 PHARM REV CODE 636 W HCPCS: Performed by: STUDENT IN AN ORGANIZED HEALTH CARE EDUCATION/TRAINING PROGRAM

## 2025-04-09 PROCEDURE — 99232 SBSQ HOSP IP/OBS MODERATE 35: CPT | Mod: ,,, | Performed by: NURSE PRACTITIONER

## 2025-04-09 PROCEDURE — 99291 CRITICAL CARE FIRST HOUR: CPT | Mod: ,,, | Performed by: INTERNAL MEDICINE

## 2025-04-09 RX ORDER — SODIUM BICARBONATE 325 MG/1
324 TABLET ORAL ONCE
Status: COMPLETED | OUTPATIENT
Start: 2025-04-09 | End: 2025-04-09

## 2025-04-09 RX ORDER — PANTOPRAZOLE SODIUM 40 MG/10ML
40 INJECTION, POWDER, LYOPHILIZED, FOR SOLUTION INTRAVENOUS DAILY
Status: DISCONTINUED | OUTPATIENT
Start: 2025-04-09 | End: 2025-04-24 | Stop reason: HOSPADM

## 2025-04-09 RX ADMIN — SODIUM CHLORIDE SOLN NEBU 3% 4 ML: 3 NEBU SOLN at 07:04

## 2025-04-09 RX ADMIN — PIPERACILLIN SODIUM AND TAZOBACTAM SODIUM 4.5 G: 4; .5 INJECTION, POWDER, FOR SOLUTION INTRAVENOUS at 08:04

## 2025-04-09 RX ADMIN — DIAZEPAM 5 MG: 5 SOLUTION ORAL at 08:04

## 2025-04-09 RX ADMIN — PANTOPRAZOLE SODIUM 40 MG: 40 INJECTION, POWDER, FOR SOLUTION INTRAVENOUS at 03:04

## 2025-04-09 RX ADMIN — SODIUM BICARBONATE 325 MG: 325 TABLET ORAL at 06:04

## 2025-04-09 RX ADMIN — ENOXAPARIN SODIUM 30 MG: 40 INJECTION SUBCUTANEOUS at 05:04

## 2025-04-09 RX ADMIN — ALBUTEROL SULFATE 2.5 MG: 2.5 SOLUTION RESPIRATORY (INHALATION) at 07:04

## 2025-04-09 RX ADMIN — SODIUM CHLORIDE SOLN NEBU 3% 4 ML: 3 NEBU SOLN at 12:04

## 2025-04-09 RX ADMIN — ALBUTEROL SULFATE 2.5 MG: 2.5 SOLUTION RESPIRATORY (INHALATION) at 12:04

## 2025-04-09 RX ADMIN — LEVETIRACETAM 500 MG: 100 INJECTION INTRAVENOUS at 08:04

## 2025-04-09 RX ADMIN — GLYCOPYRROLATE 1 MG: 1 LIQUID ORAL at 08:04

## 2025-04-09 RX ADMIN — PANCRELIPASE 1 TABLET: 10440; 39150; 39150 TABLET ORAL at 06:04

## 2025-04-09 RX ADMIN — PIPERACILLIN SODIUM AND TAZOBACTAM SODIUM 4.5 G: 4; .5 INJECTION, POWDER, FOR SOLUTION INTRAVENOUS at 03:04

## 2025-04-09 RX ADMIN — DIAZEPAM 5 MG: 5 SOLUTION ORAL at 03:04

## 2025-04-09 RX ADMIN — SODIUM CHLORIDE SOLN NEBU 3% 4 ML: 3 NEBU SOLN at 08:04

## 2025-04-09 RX ADMIN — METOCLOPRAMIDE 5 MG: 5 INJECTION, SOLUTION INTRAMUSCULAR; INTRAVENOUS at 09:04

## 2025-04-09 RX ADMIN — Medication: at 08:04

## 2025-04-09 RX ADMIN — ALBUTEROL SULFATE 2.5 MG: 2.5 SOLUTION RESPIRATORY (INHALATION) at 08:04

## 2025-04-09 RX ADMIN — GLYCOPYRROLATE 1 MG: 1 LIQUID ORAL at 03:04

## 2025-04-09 RX ADMIN — HYDROCORTISONE SODIUM SUCCINATE 100 MG: 100 INJECTION, POWDER, FOR SOLUTION INTRAMUSCULAR; INTRAVENOUS at 10:04

## 2025-04-09 RX ADMIN — METHYLPHENIDATE HYDROCHLORIDE 5 MG: 5 TABLET ORAL at 06:04

## 2025-04-09 RX ADMIN — METOCLOPRAMIDE 5 MG: 5 INJECTION, SOLUTION INTRAMUSCULAR; INTRAVENOUS at 06:04

## 2025-04-09 NOTE — SUBJECTIVE & OBJECTIVE
Interval History/Significant Events: Overnight requiring Bipap up to TV 150cc/kg. Patient straight cathed for urinary retention. Junctional rhyth on EKG. EEG w/o seizures.     Review of Systems  Objective:     Vital Signs (Most Recent):  Temp: (!) 94 °F (34.4 °C) (04/09/25 0700)  Pulse: (!) 48 (04/09/25 1300)  Resp: 15 (04/09/25 1300)  BP: 103/69 (04/09/25 1300)  SpO2: 100 % (04/09/25 1300) Vital Signs (24h Range):  Temp:  [94 °F (34.4 °C)-97.5 °F (36.4 °C)] 94 °F (34.4 °C)  Pulse:  [] 48  Resp:  [15-29] 15  SpO2:  [81 %-100 %] 100 %  BP: ()/(50-94) 103/69   Weight: 49 kg (108 lb 0.4 oz)  Body mass index is 16.92 kg/m².      Intake/Output Summary (Last 24 hours) at 4/9/2025 1354  Last data filed at 4/9/2025 1300  Gross per 24 hour   Intake 603.73 ml   Output --   Net 603.73 ml          Physical Exam  Constitutional:       General: He is not in acute distress.     Comments: Cachectic, eyes open but does not respond    Eyes:      Conjunctiva/sclera: Conjunctivae normal.   Cardiovascular:      Rate and Rhythm: Normal rate.      Pulses: Normal pulses.      Heart sounds: Normal heart sounds.   Pulmonary:      Effort: Pulmonary effort is normal. No respiratory distress.      Breath sounds: Normal breath sounds.      Comments: 2L NC  Abdominal:      General: Bowel sounds are normal. There is no distension.      Palpations: Abdomen is soft.      Tenderness: There is no abdominal tenderness.      Comments: JG tube in place   Musculoskeletal:      Right lower leg: No edema.      Left lower leg: No edema.   Skin:     General: Skin is warm and dry.      Capillary Refill: Capillary refill takes less than 2 seconds.   Neurological:      Mental Status: Mental status is at baseline.      Comments: following commands   responsive   Contractures in extremities    Right midriatic pupil unresponsive. Left reactive pupil with cataract             Vents:  Oxygen Concentration (%): 40 (04/09/25 0349)  Lines/Drains/Airways        Drain  Duration                  Gastrostomy/Enterostomy LUQ -- days              Peripheral Intravenous Line  Duration                  Peripheral IV - Single Lumen 04/01/25 0337 20 G 1 3/4 in Anterior;Left Upper Arm 8 days         Peripheral IV - Single Lumen 04/07/25 1521 20 G Anterior;Right Forearm 1 day                  Significant Labs:    CBC/Anemia Profile:  Recent Labs   Lab 04/08/25 0318 04/08/25  1123 04/09/25  0243   WBC 10.90 8.86 8.36   HGB 9.9* 11.5* 9.7*   HCT 31.8* 36.8* 30.6*    215 216   MCV 98 97 97   RDW 14.6* 14.6* 15.0*        Chemistries:  Recent Labs   Lab 04/08/25 0318 04/08/25  1619 04/09/25  0522   *  146* 146* 142   K 3.1*  3.0* 3.9 3.7   *  111* 107 104   CO2 25  26 29 32*   BUN 5*  5* 5* 5*   CREATININE 0.5  0.6 0.5 0.5   CALCIUM 7.7*  7.7* 8.7 8.7   ALBUMIN 2.1*  --  2.4*   BILITOT 0.4  --  0.4   ALKPHOS 117  --  123   ALT 14  --  13   AST 14  --  13   GLUCOSE 149*  151* 108 82       All pertinent labs within the past 24 hours have been reviewed.    Significant Imaging:  I have reviewed all pertinent imaging results/findings within the past 24 hours.

## 2025-04-09 NOTE — PROCEDURES
DATE: 4/8/25    EEG NUMBER: FH     REFERRING PHYSICIAN:  Dr. Maki      This EEG was performed to assess for subclinical seizures      ELECTROENCEPHALOGRAM REPORT     METHODOLOGY:  Electroencephalographic (EEG) recording is with electrodes placed according to the International 10-20 placement system.  Thirty two (32) channels of digital signal are simultaneously recorded from the scalp and may include EKG, EMG, and/or eye monitors.   Recording band pass was 0.1 to 512 hz.  Digital video recording of the patient is simultaneously recorded with the EEG.  The nursing staff report clinical symptoms and may press an event button when the patient has symptoms of clinical interest to the treating physicians.  EEG and video recording is stored and archived in digital format.  The entire recording is visually reviewed, and the times identified by computer analysis as being spikes or seizures are reviewed again.  Activation procedures which include photic stimulation, hyperventilation and instructing patients to perform simple task are done in selected patients.   Compresses spectral analysis (CSA) is also performed on the activity recorded from each individual channel.  This is displayed as a power display of frequencies from 0 to 30 Hz over time.   The CSA analysis is done and displayed continuously.  This is reviewed for asymmetries in power between homologous areas of the scalp and for presence of changes in power which can be seen when seizures occur.  Sections of suspected abnormalities on the CSA is then compared with the original EEG recording.                Intoo software was also utilized in the review of this study.  This software suite analyzes the EEG recording in multiple domains.  Coherence and rhythmicity is computed to identify EEG sections which may contain organized seizures.  Each channel undergoes analysis to detect presence of spike and sharp waves which have special and morphological  characteristic of epileptic activity.  The routine EEG recording is converted from spacial into frequency domain.  This is then displayed comparing homologous areas to identify areas of significant asymmetry.  Algorithm to identify non-cortically generated artifact is used to separate eye movement, EMG and other artifact from the EEG.     EEG FINDINGS:  The recording was obtained with a number of standard bipolar and referential montages during wakefulness, drowsiness and sleep.  In the alert state, the background was asymmetric.  Relative suppression of the right hemisphere was noted.  Low-amplitude mixed alpha and beta activity was noted in both hemispheres.  Mild disorganization of the background was noted.  A posterior dominant rhythm of up to 12 hertz was noted which was better formed in the left hemisphere.  During sleep vertex waves and spindles were noted in the left hemisphere. There were no interictal epileptiform abnormalities and no clinical or electrographic seizures were recorded.    The EKG channel revealed a sinus rhythm.     IMPRESSION:  This is an abnormal EEG during wakefulness, drowsiness and sleep.  Asymmetric suppression and slowing of the right hemisphere was noted.  Mild disorganization of the background was noted.     CLINICAL CORRELATION:  The patient is a  19-year-old male with history of traumatic brain injury who is currently maintained on Keppra.  This is an abnormal EEG during wakefulness, drowsiness and sleep.  The background asymmetry suggestive structural abnormality in the right hemisphere.  The overall degree of disorganization is suggestive of a mild encephalopathy likely toxic metabolic encephalopathy.  The excessive beta activity is likely secondary to a drug effect.  There is no evidence of an epileptic process on this recording.  No seizures were recorded during this study.

## 2025-04-09 NOTE — ASSESSMENT & PLAN NOTE
Patient with Hypoxic Respiratory failure which is Acute with chronic hypercapnic respiratory failure. he is not on home oxygen. Supplemental oxygen was provided and noted- Oxygen Concentration (%):  [40] 40    .   Signs/symptoms of respiratory failure include- respiratory distress. Contributing diagnoses includes - ARDS and Aspiration Labs and images were reviewed. Patient Has recent ABG, which has been reviewed. Will treat underlying causes and adjust management of respiratory failure as follows-     Suspect aspiration pneumonia in the setting of hospital acquired pneumonia with recurrent vomiting with supperimposed atelectasis.  Suggest broadening antibiotics for HAP.    Plan:  -BCX 4/7 NGTD  - NC  2L O2.   - osyn.   -Pulmonary toilet: bronchodilators PRN, hypertonic saline.  IPPV.

## 2025-04-09 NOTE — ASSESSMENT & PLAN NOTE
Patient with Hypoxic Respiratory failure which is Acute with chronic hypercapnic respiratory failure. he is not on home oxygen. Supplemental oxygen was provided and noted- Oxygen Concentration (%):  [40] 40    .   Signs/symptoms of respiratory failure include- respiratory distress. Contributing diagnoses includes - ARDS and Aspiration Labs and images were reviewed. Patient Has recent ABG, which has been reviewed. Will treat underlying causes and adjust management of respiratory failure as follows-     Suspect aspiration pneumonia in the setting of hospital acquired pneumonia with recurrent vomiting with supperimposed atelectasis.  IV Zosyn.  Concomitant hypothermia and bradycardia in the setting of adrenal insufficiency.   Off pressor support on 4/10, responsive to IV hydrocortisone.   Plan:  -BCX 4/7 NGTD  - NC  2L O2.   - IV zosyn. Day 4/5 Lactate wnl.    -Step down to floors.   -IV hydrocortisone 100 mg TID.  -Pulmonary toilet: bronchodilators PRN, hypertonic saline.  IPPV.

## 2025-04-09 NOTE — PROGRESS NOTES
[facility]  Critical Care Medicine  Progress Note    Patient Name: Jesus Posey  MRN: [id number]  Admission Date: 4/1/2025  Hospital Length of Stay: 8 days  Code Status: Full Code  Attending Provider: Shiraz Maki MD  Primary Care Provider: Pallavi, Primary Doctor   Principal Problem: Acute hypoxic respiratory failure    Subjective:     HPI:  Mr. 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      As he is nonverbal, father reported leakage from his GJ tube which resembled his tube feeds. Last tube exchange was last month.     Admitted to Hospital Medicine in the setting of GJ tube malfunction. Intermittently hypothermic and bradycardic secondary to dysautonomia. Infectious workup has been negative. On IV maintenance fluids. IR replaced tube on 4/2.   During hospital course with repeated episodes of intolerance to PO intake and emesis. Repeat CT AP with G Tube with good positioning. Stepped up to MICU on 4/7 due to acute hypoxic respiratory failure requiring up to 15 L  HFNC  in the setting of likely aspiration pneumonitis. Isolated febrile episode of 100.7F. CXR concerning for BL pulmonary opacities. Started on Unasyn by Hospital Medicine.      Hospital/ICU Course:  Stepped up to ICU in 4/7 the setting of acute hypoxic respiratory failure in the swtting of basilar atelectasis and mucus plugging,  with sepsis secondary to PNA requiring up to 15 L HFNC. Started on Vanc-Zosyn.  During ICU course requiring up to norepi 0.06 mcg likely in the setting of hypovolemia 2/2 poor intake. Encephalopathy improving. CTH w/o acute processes. Lactate improved. EEG c/w toxic/drug-induced encephalopathy. No seizures. Persistenty bradycardic and hypothermic with EKG junctional rhythm on 30s.On Zosyn. Cortisol/TSH wnl. Ordering TTE.     Interval History/Significant Events: Overnight requiring Bipap up to TV  150cc/kg. Patient straight cathed for urinary retention. Junctional rhyth on EKG. EEG w/o seizures.     Review of Systems  Objective:     Vital Signs (Most Recent):  Temp: (!) 94 °F (34.4 °C) (04/09/25 0700)  Pulse: (!) 48 (04/09/25 1300)  Resp: 15 (04/09/25 1300)  BP: 103/69 (04/09/25 1300)  SpO2: 100 % (04/09/25 1300) Vital Signs (24h Range):  Temp:  [94 °F (34.4 °C)-97.5 °F (36.4 °C)] 94 °F (34.4 °C)  Pulse:  [] 48  Resp:  [15-29] 15  SpO2:  [81 %-100 %] 100 %  BP: ()/(50-94) 103/69   Weight: 49 kg (108 lb 0.4 oz)  Body mass index is 16.92 kg/m².      Intake/Output Summary (Last 24 hours) at 4/9/2025 1354  Last data filed at 4/9/2025 1300  Gross per 24 hour   Intake 603.73 ml   Output --   Net 603.73 ml          Physical Exam  Constitutional:       General: He is not in acute distress.     Comments: Cachectic, eyes open but does not respond    Eyes:      Conjunctiva/sclera: Conjunctivae normal.   Cardiovascular:      Rate and Rhythm: Normal rate.      Pulses: Normal pulses.      Heart sounds: Normal heart sounds.   Pulmonary:      Effort: Pulmonary effort is normal. No respiratory distress.      Breath sounds: Normal breath sounds.      Comments: 2L NC  Abdominal:      General: Bowel sounds are normal. There is no distension.      Palpations: Abdomen is soft.      Tenderness: There is no abdominal tenderness.      Comments: JG tube in place   Musculoskeletal:      Right lower leg: No edema.      Left lower leg: No edema.   Skin:     General: Skin is warm and dry.      Capillary Refill: Capillary refill takes less than 2 seconds.   Neurological:      Mental Status: Mental status is at baseline.      Comments: following commands   responsive   Contractures in extremities    Right midriatic pupil unresponsive. Left reactive pupil with cataract             Vents:  Oxygen Concentration (%): 40 (04/09/25 0349)  Lines/Drains/Airways       Drain  Duration                  Gastrostomy/Enterostomy LUQ -- days               Peripheral Intravenous Line  Duration                  Peripheral IV - Single Lumen 04/01/25 0337 20 G 1 3/4 in Anterior;Left Upper Arm 8 days         Peripheral IV - Single Lumen 04/07/25 1521 20 G Anterior;Right Forearm 1 day                  Significant Labs:    CBC/Anemia Profile:  Recent Labs   Lab 04/08/25 0318 04/08/25  1123 04/09/25  0243   WBC 10.90 8.86 8.36   HGB 9.9* 11.5* 9.7*   HCT 31.8* 36.8* 30.6*    215 216   MCV 98 97 97   RDW 14.6* 14.6* 15.0*        Chemistries:  Recent Labs   Lab 04/08/25 0318 04/08/25  1619 04/09/25  0522   *  146* 146* 142   K 3.1*  3.0* 3.9 3.7   *  111* 107 104   CO2 25  26 29 32*   BUN 5*  5* 5* 5*   CREATININE 0.5  0.6 0.5 0.5   CALCIUM 7.7*  7.7* 8.7 8.7   ALBUMIN 2.1*  --  2.4*   BILITOT 0.4  --  0.4   ALKPHOS 117  --  123   ALT 14  --  13   AST 14  --  13   GLUCOSE 149*  151* 108 82       All pertinent labs within the past 24 hours have been reviewed.    Significant Imaging:  I have reviewed all pertinent imaging results/findings within the past 24 hours.    ABG  Recent Labs   Lab 04/09/25  0445   PH 7.347*   PO2 36*   PCO2 66.4*   HCO3 36.4*   BE 11*     Assessment/Plan:     Neuro  Toxic metabolic encephalopathy  May be secondary to sedation with diazepam with superimposed HAP and sepsis.   -Will order ABG and CT head ruled out acute hypercapnic rx failure , possible CVA.  -EEG c/w toxic and drug-induced encephalopathy. Ruled out seizures  Much improved mental status today. More awake and better arousal.      Functional quadriplegia  -Requires assistance with all ADLs.  Plan: Consult PT/OT    Traumatic brain injury  Continue current management with diazepam and keppra for seizure prophylaxis.        Spastic quadriparesis  Mobility protocol.     Pulmonary  * Pulmonary septic shock with acute hypoxic respiratory failure  Patient with Hypoxic Respiratory failure which is Acute with chronic hypercapnic respiratory failure. he is  not on home oxygen. Supplemental oxygen was provided and noted- Oxygen Concentration (%):  [40] 40    .   Signs/symptoms of respiratory failure include- respiratory distress. Contributing diagnoses includes - ARDS and Aspiration Labs and images were reviewed. Patient Has recent ABG, which has been reviewed. Will treat underlying causes and adjust management of respiratory failure as follows-     Suspect aspiration pneumonia in the setting of hospital acquired pneumonia with recurrent vomiting with supperimposed atelectasis.  IV Zosyn.  Concomitant hypothermia and bradycardia. Suspect hypothermia may be secondary to PMH of TBI in the setting of spinal cord injury with superimposed sepsis and bradycardia secondary to hypothermia.  Differential of distributive sepsis given bradycardia and hypothermia include AI, Myxedematous coma ruled out by studies. Cardiogenic and Obstructive less likely given TTE with normal EF, no effusions and no right heart strain.     Plan:  -BCX 4/7 NGTD  - NC  2L O2.   - IV zosyn. Lactate wnl.    - Continue norepi support. 0.02  -Pulmonary toilet: bronchodilators PRN, hypertonic saline.  IPPV.    Renal/  Hypokalemia  resolved    Hypernatremia  RESOLVED with tube feeds.  sNa 142.    Endocrine  Severe malnutrition  Nutrition consulted. Most recent weight and BMI monitored-     Measurements:  Wt Readings from Last 1 Encounters:   04/02/25 49 kg (108 lb 0.4 oz)   Body mass index is 16.92 kg/m².    Patient has been screened and assessed by RD.    Malnutrition Type:  Context:    Level:      Malnutrition Characteristic Summary:       Interventions/Recommendations (treatment strategy):   Restart tube feeds      GI  Malfunction of jejunostomy tube  Continue PEG tube feeds. Tolerating them well.    PEG tube malfunction  Patient with GJ tube dependence, presenting with tube malfunction. IR consulted for exchange under fluoroscopy. Tube changed 4/2.   GI consulted d/t recurrent vomiting with initiation  of tube feeds. CTAP with GJ tube in satisfactory position.     Plan:  - 5mg IV metoclopramide Q8h   -Restart tube feeds       Critical Care Daily Checklist:    A: Awake: RASS Goal/Actual Goal:    Actual:     B: Spontaneous Breathing Trial Performed?     C: SAT & SBT Coordinated?  NO                      D: Delirium: CAM-ICU     E: Early Mobility Performed? No   F: Feeding Goal: Goals: Meet % een/epn via EN by next RD f/u, maintain weight during admission  Status: Nutrition Goal Status: new   Current Diet Order   No orders of the defined types were placed in this encounter.      AS: Analgesia/Sedation Baclofen, diazepam, keppra   T: Thromboembolic Prophylaxis Enoxaparin 30 mg sQ   H: HOB > 300 Yes   U: Stress Ulcer Prophylaxis (if needed) Will start protonix. For shock   G: Glucose Control 100s no insulin rq   B: Bowel Function Stool Occurrence: 1   I: Indwelling Catheter (Lines & [place]uddl[place]) Necessity Left upper arm and right forearnm: 2 PIV. Gastrostomy LUQ   D: De-escalation of Antimicrobials/Pharmacotherapies Vanc-zosyn    Plan for the day/ETD TBD    Code Status:  Family/Goals of Care: Full Code  TBD       Critical secondary to Patient has a condition that poses threat to life and bodily function: Severe Respiratory Distress      Critical care was time spent personally by me on the following activities: development of treatment plan with patient or surrogate and bedside caregivers, discussions with consultants, evaluation of patient's response to treatment, examination of patient, ordering and performing treatments and interventions, ordering and review of laboratory studies, ordering and review of radiographic studies, pulse oximetry, re-evaluation of patient's condition. This critical care time did not overlap with that of any other provider or involve time for any procedures.     Silvio Green MD  Critical Care Medicine  [facility] - Medical ICU

## 2025-04-09 NOTE — ASSESSMENT & PLAN NOTE
May be secondary to sedation with diazepam with superimposed HAP and sepsis.   -Will order ABG and CT head ruled out acute hypercapnic rx failure , possible CVA.  -EEG c/w toxic and drug-induced encephalopathy. Ruled out seizures  Much improved mental status today. More awake and better arousal.

## 2025-04-09 NOTE — PLAN OF CARE
MICU DAILY GOALS     Family/Goals of care/Code Status   Code Status: Full Code    24H Vital Sign Range  Temp:  [94 °F (34.4 °C)-97.5 °F (36.4 °C)]   Pulse:  [33-60]   Resp:  [15-29]   BP: ()/(51-94)   SpO2:  [92 %-100 %]      Shift Events (include procedures and significant events)   No acute events throughout shift    AWAKE RASS: Goal -    Actual - RASS (Perkins Agitation-Sedation Scale): alert and calm    Restraint necessity: Not necessary   BREATHE SBT: Not intubated    Coordinate A & B, analgesics/sedatives Pain: managed   SAT: Not intubated   Delirium CAM-ICU:     Early(intubated/ Progressive (non-intubated) Mobility MOVE Screen (INTUBATED ONLY): Not intubated    Activity: Activity Management: Patient unable to perform activities   Feeding/Nutrition Diet order: Diet/Nutrition Received: NPO, tube feeding,     Thrombus DVT prophylaxis: VTE Core Measure: Pharmacological prophylaxis initiated/maintained   HOB Elevation Head of Bed (HOB) Positioning: HOB at 30-45 degrees   Ulcer Prophylaxis GI: yes   Glucose control managed     Skin Skin assessment:     Sacrum intact/not altered? No  Heels intact/not altered? No  Surgical wound? Yes    CHECK ONE!   (no altered skin or altered skin) and sub boxes:  [] No Altered Skin Integrity Present    []Prevention Measures Documented    [x] Altered Skin Integrity Present or Discovered   [x] LDA already present in EPIC, daily doc completed              [] LDA added if not already in EPIC (describe/stage wound).               [x] Wound Image Taken (required on admit,                   transfer/discharge and every Tuesday)    Wound Care Consulted? Yes   Bowel Function diarrhea    Indwelling Catheter Necessity            De-escalation Antibiotics Yes        VS and assessment per flow sheet, patient progressing towards goals as tolerated, plan of care reviewed with family, all concerns addressed, will continue to monitor.

## 2025-04-10 PROBLEM — L89.152 PRESSURE INJURY OF SACRAL REGION, STAGE 2: Status: ACTIVE | Noted: 2025-04-10

## 2025-04-10 PROBLEM — R79.89 LOW SERUM CORTISOL LEVEL: Status: ACTIVE | Noted: 2025-04-10

## 2025-04-10 LAB
ABSOLUTE EOSINOPHIL (OHS): 0.04 K/UL
ABSOLUTE MONOCYTE (OHS): 0.08 K/UL (ref 0.3–1)
ABSOLUTE NEUTROPHIL COUNT (OHS): 5.26 K/UL (ref 1.8–7.7)
ALBUMIN SERPL BCP-MCNC: 2.7 G/DL (ref 3.5–5.2)
ALP SERPL-CCNC: 124 UNIT/L (ref 40–150)
ALT SERPL W/O P-5'-P-CCNC: 17 UNIT/L (ref 10–44)
ANION GAP (OHS): 7 MMOL/L (ref 8–16)
ANION GAP (OHS): 9 MMOL/L (ref 8–16)
AST SERPL-CCNC: 29 UNIT/L (ref 11–45)
BASOPHILS # BLD AUTO: 0.03 K/UL
BASOPHILS NFR BLD AUTO: 0.5 %
BILIRUB SERPL-MCNC: 0.3 MG/DL (ref 0.1–1)
BUN SERPL-MCNC: 3 MG/DL (ref 6–20)
BUN SERPL-MCNC: 4 MG/DL (ref 6–20)
CALCIUM SERPL-MCNC: 8.5 MG/DL (ref 8.7–10.5)
CALCIUM SERPL-MCNC: 9.1 MG/DL (ref 8.7–10.5)
CHLORIDE SERPL-SCNC: 108 MMOL/L (ref 95–110)
CHLORIDE SERPL-SCNC: 108 MMOL/L (ref 95–110)
CO2 SERPL-SCNC: 30 MMOL/L (ref 23–29)
CO2 SERPL-SCNC: 33 MMOL/L (ref 23–29)
CREAT SERPL-MCNC: 0.5 MG/DL (ref 0.5–1.4)
CREAT SERPL-MCNC: 0.6 MG/DL (ref 0.5–1.4)
ERYTHROCYTE [DISTWIDTH] IN BLOOD BY AUTOMATED COUNT: 14.4 % (ref 11.5–14.5)
GFR SERPLBLD CREATININE-BSD FMLA CKD-EPI: >60 ML/MIN/1.73/M2
GFR SERPLBLD CREATININE-BSD FMLA CKD-EPI: >60 ML/MIN/1.73/M2
GLUCOSE SERPL-MCNC: 120 MG/DL (ref 70–110)
GLUCOSE SERPL-MCNC: 156 MG/DL (ref 70–110)
HCT VFR BLD AUTO: 33 % (ref 40–54)
HGB BLD-MCNC: 10.7 GM/DL (ref 14–18)
IMM GRANULOCYTES # BLD AUTO: 0.02 K/UL (ref 0–0.04)
IMM GRANULOCYTES NFR BLD AUTO: 0.3 % (ref 0–0.5)
LYMPHOCYTES # BLD AUTO: 0.56 K/UL (ref 1–4.8)
MAGNESIUM SERPL-MCNC: 2.3 MG/DL (ref 1.6–2.6)
MCH RBC QN AUTO: 30.8 PG (ref 27–31)
MCHC RBC AUTO-ENTMCNC: 32.4 G/DL (ref 32–36)
MCV RBC AUTO: 95 FL (ref 82–98)
NUCLEATED RBC (/100WBC) (OHS): 0 /100 WBC
PHOSPHATE SERPL-MCNC: 4.2 MG/DL (ref 2.7–4.5)
PLATELET # BLD AUTO: 209 K/UL (ref 150–450)
PMV BLD AUTO: 11.4 FL (ref 9.2–12.9)
POCT GLUCOSE: 103 MG/DL (ref 70–110)
POCT GLUCOSE: 122 MG/DL (ref 70–110)
POCT GLUCOSE: 125 MG/DL (ref 70–110)
POCT GLUCOSE: 133 MG/DL (ref 70–110)
POCT GLUCOSE: 136 MG/DL (ref 70–110)
POCT GLUCOSE: 96 MG/DL (ref 70–110)
POTASSIUM SERPL-SCNC: 3.9 MMOL/L (ref 3.5–5.1)
POTASSIUM SERPL-SCNC: 4.3 MMOL/L (ref 3.5–5.1)
POTASSIUM SERPL-SCNC: 5.7 MMOL/L (ref 3.5–5.1)
PROT SERPL-MCNC: 7.2 GM/DL (ref 6–8.4)
RBC # BLD AUTO: 3.47 M/UL (ref 4.6–6.2)
RELATIVE EOSINOPHIL (OHS): 0.7 %
RELATIVE LYMPHOCYTE (OHS): 9.3 % (ref 18–48)
RELATIVE MONOCYTE (OHS): 1.3 % (ref 4–15)
RELATIVE NEUTROPHIL (OHS): 87.9 % (ref 38–73)
SODIUM SERPL-SCNC: 147 MMOL/L (ref 136–145)
SODIUM SERPL-SCNC: 148 MMOL/L (ref 136–145)
WBC # BLD AUTO: 5.99 K/UL (ref 3.9–12.7)

## 2025-04-10 PROCEDURE — 25000003 PHARM REV CODE 250: Performed by: INTERNAL MEDICINE

## 2025-04-10 PROCEDURE — 82310 ASSAY OF CALCIUM: CPT

## 2025-04-10 PROCEDURE — 25000242 PHARM REV CODE 250 ALT 637 W/ HCPCS

## 2025-04-10 PROCEDURE — 84132 ASSAY OF SERUM POTASSIUM: CPT

## 2025-04-10 PROCEDURE — 51798 US URINE CAPACITY MEASURE: CPT

## 2025-04-10 PROCEDURE — 27000221 HC OXYGEN, UP TO 24 HOURS

## 2025-04-10 PROCEDURE — 63600175 PHARM REV CODE 636 W HCPCS

## 2025-04-10 PROCEDURE — 94640 AIRWAY INHALATION TREATMENT: CPT

## 2025-04-10 PROCEDURE — 83735 ASSAY OF MAGNESIUM: CPT | Performed by: INTERNAL MEDICINE

## 2025-04-10 PROCEDURE — 25000003 PHARM REV CODE 250: Performed by: STUDENT IN AN ORGANIZED HEALTH CARE EDUCATION/TRAINING PROGRAM

## 2025-04-10 PROCEDURE — 99222 1ST HOSP IP/OBS MODERATE 55: CPT | Mod: ,,, | Performed by: INTERNAL MEDICINE

## 2025-04-10 PROCEDURE — 20000000 HC ICU ROOM

## 2025-04-10 PROCEDURE — 51701 INSERT BLADDER CATHETER: CPT

## 2025-04-10 PROCEDURE — 63600175 PHARM REV CODE 636 W HCPCS: Performed by: INTERNAL MEDICINE

## 2025-04-10 PROCEDURE — 25000242 PHARM REV CODE 250 ALT 637 W/ HCPCS: Performed by: STUDENT IN AN ORGANIZED HEALTH CARE EDUCATION/TRAINING PROGRAM

## 2025-04-10 PROCEDURE — 99900035 HC TECH TIME PER 15 MIN (STAT)

## 2025-04-10 PROCEDURE — 85025 COMPLETE CBC W/AUTO DIFF WBC: CPT | Performed by: STUDENT IN AN ORGANIZED HEALTH CARE EDUCATION/TRAINING PROGRAM

## 2025-04-10 PROCEDURE — 99232 SBSQ HOSP IP/OBS MODERATE 35: CPT | Mod: ,,, | Performed by: PHYSICIAN ASSISTANT

## 2025-04-10 PROCEDURE — 80053 COMPREHEN METABOLIC PANEL: CPT

## 2025-04-10 PROCEDURE — 25000003 PHARM REV CODE 250

## 2025-04-10 PROCEDURE — 94761 N-INVAS EAR/PLS OXIMETRY MLT: CPT

## 2025-04-10 PROCEDURE — 84100 ASSAY OF PHOSPHORUS: CPT | Performed by: INTERNAL MEDICINE

## 2025-04-10 PROCEDURE — 99233 SBSQ HOSP IP/OBS HIGH 50: CPT | Mod: ,,, | Performed by: INTERNAL MEDICINE

## 2025-04-10 PROCEDURE — 63600175 PHARM REV CODE 636 W HCPCS: Performed by: STUDENT IN AN ORGANIZED HEALTH CARE EDUCATION/TRAINING PROGRAM

## 2025-04-10 RX ORDER — NOREPINEPHRINE BITARTRATE/D5W 4MG/250ML
0-.2 PLASTIC BAG, INJECTION (ML) INTRAVENOUS CONTINUOUS
Status: DISCONTINUED | OUTPATIENT
Start: 2025-04-10 | End: 2025-04-10

## 2025-04-10 RX ORDER — GLUCAGON 1 MG
1 KIT INJECTION
Status: DISCONTINUED | OUTPATIENT
Start: 2025-04-10 | End: 2025-04-24 | Stop reason: HOSPADM

## 2025-04-10 RX ORDER — NOREPINEPHRINE BITARTRATE 0.02 MG/ML
0-3 INJECTION, SOLUTION INTRAVENOUS CONTINUOUS
Status: DISCONTINUED | OUTPATIENT
Start: 2025-04-10 | End: 2025-04-12

## 2025-04-10 RX ORDER — IBUPROFEN 200 MG
24 TABLET ORAL
Status: DISCONTINUED | OUTPATIENT
Start: 2025-04-10 | End: 2025-04-18

## 2025-04-10 RX ORDER — IBUPROFEN 200 MG
16 TABLET ORAL
Status: DISCONTINUED | OUTPATIENT
Start: 2025-04-10 | End: 2025-04-18

## 2025-04-10 RX ORDER — SODIUM CHLORIDE 0.9 % (FLUSH) 0.9 %
10 SYRINGE (ML) INJECTION
Status: DISCONTINUED | OUTPATIENT
Start: 2025-04-10 | End: 2025-04-24 | Stop reason: HOSPADM

## 2025-04-10 RX ORDER — MUPIROCIN 20 MG/G
OINTMENT TOPICAL 2 TIMES DAILY
Status: COMPLETED | OUTPATIENT
Start: 2025-04-10 | End: 2025-04-15

## 2025-04-10 RX ORDER — NOREPINEPHRINE BITARTRATE 0.02 MG/ML
INJECTION, SOLUTION INTRAVENOUS
Status: DISPENSED
Start: 2025-04-10 | End: 2025-04-11

## 2025-04-10 RX ORDER — NOREPINEPHRINE BITARTRATE 0.02 MG/ML
0-.2 INJECTION, SOLUTION INTRAVENOUS CONTINUOUS
Status: DISCONTINUED | OUTPATIENT
Start: 2025-04-10 | End: 2025-04-10

## 2025-04-10 RX ORDER — HEPARIN 100 UNIT/ML
10 SYRINGE INTRAVENOUS
Status: DISCONTINUED | OUTPATIENT
Start: 2025-04-10 | End: 2025-04-24 | Stop reason: HOSPADM

## 2025-04-10 RX ADMIN — ALBUTEROL SULFATE 2.5 MG: 2.5 SOLUTION RESPIRATORY (INHALATION) at 08:04

## 2025-04-10 RX ADMIN — Medication: at 08:04

## 2025-04-10 RX ADMIN — PANTOPRAZOLE SODIUM 40 MG: 40 INJECTION, POWDER, FOR SOLUTION INTRAVENOUS at 08:04

## 2025-04-10 RX ADMIN — SODIUM CHLORIDE SOLN NEBU 3% 4 ML: 3 NEBU SOLN at 08:04

## 2025-04-10 RX ADMIN — ALBUTEROL SULFATE 2.5 MG: 2.5 SOLUTION RESPIRATORY (INHALATION) at 12:04

## 2025-04-10 RX ADMIN — METOCLOPRAMIDE 5 MG: 5 INJECTION, SOLUTION INTRAMUSCULAR; INTRAVENOUS at 08:04

## 2025-04-10 RX ADMIN — METOCLOPRAMIDE 5 MG: 5 INJECTION, SOLUTION INTRAMUSCULAR; INTRAVENOUS at 02:04

## 2025-04-10 RX ADMIN — HYDROCORTISONE SODIUM SUCCINATE 100 MG: 100 INJECTION, POWDER, FOR SOLUTION INTRAMUSCULAR; INTRAVENOUS at 08:04

## 2025-04-10 RX ADMIN — DIAZEPAM 5 MG: 5 SOLUTION ORAL at 08:04

## 2025-04-10 RX ADMIN — SODIUM CHLORIDE, POTASSIUM CHLORIDE, SODIUM LACTATE AND CALCIUM CHLORIDE 1000 ML: 600; 310; 30; 20 INJECTION, SOLUTION INTRAVENOUS at 02:04

## 2025-04-10 RX ADMIN — SODIUM CHLORIDE SOLN NEBU 3% 4 ML: 3 NEBU SOLN at 12:04

## 2025-04-10 RX ADMIN — GLYCOPYRROLATE 1 MG: 1 LIQUID ORAL at 09:04

## 2025-04-10 RX ADMIN — DIAZEPAM 5 MG: 5 SOLUTION ORAL at 04:04

## 2025-04-10 RX ADMIN — MUPIROCIN: 20 OINTMENT TOPICAL at 08:04

## 2025-04-10 RX ADMIN — LEVETIRACETAM 500 MG: 100 INJECTION INTRAVENOUS at 08:04

## 2025-04-10 RX ADMIN — METOCLOPRAMIDE 5 MG: 5 INJECTION, SOLUTION INTRAMUSCULAR; INTRAVENOUS at 05:04

## 2025-04-10 RX ADMIN — GLYCOPYRROLATE 1 MG: 1 LIQUID ORAL at 04:04

## 2025-04-10 RX ADMIN — PIPERACILLIN SODIUM AND TAZOBACTAM SODIUM 4.5 G: 4; .5 INJECTION, POWDER, FOR SOLUTION INTRAVENOUS at 04:04

## 2025-04-10 RX ADMIN — METHYLPHENIDATE HYDROCHLORIDE 5 MG: 5 TABLET ORAL at 05:04

## 2025-04-10 RX ADMIN — HYDROCORTISONE SODIUM SUCCINATE 100 MG: 100 INJECTION, POWDER, FOR SOLUTION INTRAMUSCULAR; INTRAVENOUS at 05:04

## 2025-04-10 RX ADMIN — GLYCOPYRROLATE 1 MG: 1 LIQUID ORAL at 08:04

## 2025-04-10 RX ADMIN — ENOXAPARIN SODIUM 30 MG: 40 INJECTION SUBCUTANEOUS at 04:04

## 2025-04-10 RX ADMIN — PIPERACILLIN SODIUM AND TAZOBACTAM SODIUM 4.5 G: 4; .5 INJECTION, POWDER, FOR SOLUTION INTRAVENOUS at 08:04

## 2025-04-10 RX ADMIN — HYDROCORTISONE SODIUM SUCCINATE 100 MG: 100 INJECTION, POWDER, FOR SOLUTION INTRAMUSCULAR; INTRAVENOUS at 02:04

## 2025-04-10 RX ADMIN — PIPERACILLIN SODIUM AND TAZOBACTAM SODIUM 4.5 G: 4; .5 INJECTION, POWDER, FOR SOLUTION INTRAVENOUS at 12:04

## 2025-04-10 NOTE — CONSULTS
"Interventional Radiology  Consult/History & Physical Note    Consult Requested By: Jhonatan Whitehead DO  Reason for Consult: GJ tube, pt more stable    SUBJECTIVE:     Chief Complaint: conversion of GJ tube    History of Present Illness:  Jesus Posey is a 19 y.o. 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 Hospital course notable for IR GJ tube exchange on 4/2/25. The entirety of pt's GJ tube was coiled within the stomach at the start of the procedure. His CARINA-BLEDSOE GJ tube was exchanged for a properly positioned CARINA GJ tube. Per IR procedure note, if patient/family would like to transition to CARINA-KEY low profile GJ tube, advised to call IR to schedule so appropriate tube stoma length can be ordered (patient previously had 14F x 3.0cm CARINA-KEY GJ which was not in stock at time of replacement). Hospital course also notable for hypothermia, vomiting and inability to tolerate tubes feeds (CT a/p pn 4/6 shows GJ tube in good position), and step up to the MICU on 4/7 for acute respiratory failure and fever. Pt now improving, weaned off of pressors and is stepping back down to the floor     Interventional Radiology has been consulted for conversion of pt's GJ tube to a low profile CARINA-KEY GJ tube. Per IR supply team, correct size CARINA-KEY GJ tube (18 Fr x 3.0 cm) will hopefully arrive tomorrow AM. The pt is currently receiving TF via his CARINA GJ tube with no issues. The pt's WBC is 5.99 and is trending down. Pt is afebrile, BP 90-100s/60s, HR 5-60s, currently requiring 3L O2 via NC. He  does not have a history of PIO requiring nightly CPAP or difficulty breathing when lying flat. He has been receiving prophylactic lovenox while admitted. He has been NPO since midnight.    Review of Systems   Unable to perform ROS: Patient nonverbal       Scheduled " Meds:   albuterol sulfate  2.5 mg Nebulization Q6H    balsam peru-castor oiL   Topical (Top) BID    diazePAM  5 mg Per G Tube TID    enoxparin  30 mg Subcutaneous Daily    glycopyrrolate  1 mg Per J Tube TID    hydrocortisone sodium succinate  100 mg Intravenous Q8H    levETIRAcetam (Keppra) IV (PEDS and ADULTS)  500 mg Intravenous Q12H    methylphenidate HCl  5 mg Per G Tube QAM    metoclopramide  5 mg Intravenous Q8H    mupirocin   Nasal BID    pantoprazole  40 mg Intravenous Daily    piperacillin-tazobactam (Zosyn) IV (PEDS and ADULTS) (extended infusion is not appropriate)  4.5 g Intravenous Q8H    sodium chloride 3%  4 mL Nebulization Q6H     Continuous Infusions:   baclofen   Intrathecal Continuous         PRN Meds:  Current Facility-Administered Medications:     acetaminophen, 650 mg, Per J Tube, Q6H PRN    albuterol-ipratropium, 3 mL, Nebulization, Q4H PRN    artificial tears, 1 drop, Both Eyes, QID PRN    dextrose 50%, 12.5 g, Intravenous, PRN    dextrose 50%, 25 g, Intravenous, PRN    glucagon (human recombinant), 1 mg, Intramuscular, PRN    glucose, 16 g, Oral, PRN    glucose, 24 g, Oral, PRN    heparin, porcine (PF), 10 Units, Intravenous, PRN    naloxone, 0.02 mg, Intravenous, PRN    ondansetron, 4 mg, Intravenous, Q6H PRN    sodium chloride 0.9%, 1-10 mL, Intravenous, Q12H PRN    sodium chloride 0.9%, 10 mL, Intravenous, PRN    sodium chloride 0.9%, 10 mL, Intravenous, PRN    Review of patient's allergies indicates:  No Known Allergies    Past Medical History:   Diagnosis Date    Atelectasis 10/08/2024    Seen on imaging   IS  Deep breathing exercises      Traumatic brain injury      Past Surgical History:   Procedure Laterality Date    BACLOFEN PUMP IMPLANTATION N/A 10/7/2024    Procedure: INSERTION, INTRATHECAL BACLOFEN PUMP;  Surgeon: Estefany Rogers MD;  Location: [facility] OR [address] [place] [address];  Service: Neurosurgery;  Laterality: N/A;  Anes: Gen  Bed: Reg, Reversed  Pos: Left Lat  Rad: C-arm  Medtronic Pump:  20cc, Baclofen 500 mcg/mL    CRANIECTOMY Right     CRANIOPLASTY FOR CRANIAL DEFECT Right     GASTROSTOMY TUBE PLACEMENT      LUMBAR PUNCTURE N/A 9/23/2024    Procedure: Lumbar Puncture;  Surgeon: Estefany Rogers MD;  Location: [facility] OR [address] [place] [address];  Service: Neurosurgery;  Laterality: N/A;  LP for Baclofen Trial    PLACEMENT OF JEJUNOSTOMY TUBE       No family history on file.  Social History[1]    OBJECTIVE:     Vital Signs (Most Recent)  Temp: 97.4 °F (36.3 °C) (04/10/25 1100)  Pulse: 64 (04/10/25 1300)  Resp: 12 (04/10/25 1300)  BP: 97/60 (04/10/25 1300)  SpO2: 100 % (04/10/25 1300)    Physical Exam:  Physical Exam  Vitals and nursing note reviewed.   Constitutional:       General: He is not in acute distress.     Appearance: He is ill-appearing.   HENT:      Head: Normocephalic and atraumatic.      Right Ear: External ear normal.      Left Ear: External ear normal.      Nose:      Comments: NC in place  Eyes:      Extraocular Movements: Extraocular movements intact.      Conjunctiva/sclera: Conjunctivae normal.      Pupils: Pupils are equal, round, and reactive to light.   Cardiovascular:      Rate and Rhythm: Normal rate.   Pulmonary:      Effort: Pulmonary effort is normal. No respiratory distress.   Abdominal:      General: Abdomen is flat. There is no distension.      Comments: GJ tube   Musculoskeletal:      Comments: contractures   Skin:     General: Skin is warm and dry.      Coloration: Skin is not jaundiced.   Neurological:      Mental Status: He is alert.      Comments: nonverbal         Laboratory  I have reviewed all pertinent lab results within the past 24 hours.  CBC:   Recent Labs   Lab 04/10/25  0309   WBC 5.99   RBC 3.47*   HGB 10.7*   HCT 33.0*      MCV 95   MCH 30.8   MCHC 32.4     BMP:   Recent Labs   Lab 04/10/25  0309 04/10/25  0522   *  --    K 5.7* 3.9     --    CO2 33*  --    BUN 3*  --    CREATININE 0.6  --    CALCIUM 9.1  --    MG  --  2.3     CMP:   Recent Labs   Lab  04/10/25  0309 04/10/25  0522   CALCIUM 9.1  --    ALBUMIN 2.7*  --    *  --    K 5.7* 3.9   CO2 33*  --      --    BUN 3*  --    CREATININE 0.6  --    ALKPHOS 124  --    ALT 17  --    AST 29  --    BILITOT 0.3  --      LFTs:   Recent Labs   Lab 04/10/25  0309   ALT 17   AST 29   ALKPHOS 124   BILITOT 0.3   ALBUMIN 2.7*     Coagulation:   Recent Labs   Lab 04/10/25  0309   LABPROT 7.2     Microbiology Results (last 7 days)       Procedure Component Value Units Date/Time    Blood culture [[id number]]  (Normal) Collected: 04/07/25 1459    Order Status: Completed Specimen: Blood from Peripheral, Forearm, Left Updated: 04/09/25 1701     Blood Culture No Growth After 48 Hours    Blood culture [[id number]]  (Normal) Collected: 04/07/25 1459    Order Status: Completed Specimen: Blood from Peripheral, Forearm, Right Updated: 04/09/25 1701     Blood Culture No Growth After 48 Hours    Culture, Respiratory with Gram Stain [[id number]]     Order Status: Sent Specimen: Sputum, Expectorated     MRSA Screen by PCR [[id number]]  (Normal) Collected: 04/07/25 1553    Order Status: Completed Specimen: Nasal Swab Updated: 04/07/25 1808     MRSA PCR Screen Not Detected    Blood culture [[id number]]  (Normal) Collected: 04/01/25 1236    Order Status: Completed Specimen: Blood from Peripheral, Ankle, Right Updated: 04/06/25 1401     Blood Culture No Growth After 5 Days    Blood Culture #2 **CANNOT BE ORDERED STAT** [[id number]]  (Normal) Collected: 04/01/25 1236    Order Status: Completed Specimen: Blood from Peripheral, Ankle, Left Updated: 04/06/25 1401     Blood Culture No Growth After 5 Days            ASA/Mallampati  ASA: 3  Mallampati: 2    Imaging:  Recent imaging studies reviewed.    ASSESSMENT/PLAN:     Assessment:  19 y.o. male with a PMHx of TBI leading to quadriplegia, seizure disorder, GJ tube dependence, cachexia who has been referred to IR for conversion of pt's GJ tube to a low profile CARINA-KEY GJ tube. Per IR  supply team, correct size CARINA-KEY GJ tube (18 Fr x 3.0 cm) will hopefully be delivered tomorrow AM, in which case we will proceed with GJ conversion tomorrow so long as pt remains stable. The procedure was discussed in great detail with the pt/pt's family including thorough explanations of the potential risks and benefits of GJ tube conversion. Risks include but are not limited to loss of access into the jejunum, sepsis, severe infection, hemorrhage, bowel injury, catheter dislodgement, catheter blockage and need for additional procedures. The pt is a candidate for conversion of pt's GJ tube to a low profile CARINA-KEY GJ tube under local anesthesia so long as he remains stable enough to travel down to IR. Plan discussed with ordering physician and pt/pt's family who verbalized understanding of the plan and would like to proceed. Will obtain consent from pt's father.    Plan:  Will proceed with conversion of pt's GJ tube to a low profile CARINA-KEY GJ tube under local anesthesia on 4/11/25.   Please keep pt NPO at midnight on 4/11/25  Anticoagulation history reviewed.   Coagulation labs reviewed.  Thank you for the consult. Please contact with questions via [employer] secure chat or spectra.    Time spent during patient care today was 75 minutes. This includes time spent before the visit reviewing the chart, discussing case with staff physician and ordering provider, time spent during the face to face patient visit, and time spent after the visit on documentation. Time excludes procedure time.     Faye Lancaster PA-C  Interventional Radiology  Spectra: [id number]          [1]   Social History  Tobacco Use    Smoking status: Never    Smokeless tobacco: Never

## 2025-04-10 NOTE — SUBJECTIVE & OBJECTIVE
Interval History/Significant Events: Off pressors by 4/10 7:30 am. Stepping down to floors. Had 2 BM. Has been getting tube feeds.     Review of Systems  Objective:     Vital Signs (Most Recent):  Temp: 97.4 °F (36.3 °C) (04/10/25 1100)  Pulse: (!) 59 (04/10/25 1205)  Resp: 19 (04/10/25 1205)  BP: (!) 88/54 (04/10/25 1205)  SpO2: 100 % (04/10/25 1205) Vital Signs (24h Range):  Temp:  [96.1 °F (35.6 °C)-98.3 °F (36.8 °C)] 97.4 °F (36.3 °C)  Pulse:  [39-85] 59  Resp:  [9-30] 19  SpO2:  [90 %-100 %] 100 %  BP: ()/(50-69) 88/54   Weight: 49 kg (108 lb 0.4 oz)  Body mass index is 16.92 kg/m².      Intake/Output Summary (Last 24 hours) at 4/10/2025 1239  Last data filed at 4/10/2025 1200  Gross per 24 hour   Intake 1305.75 ml   Output --   Net 1305.75 ml          Physical Exam  Constitutional:       General: He is not in acute distress.     Comments: Cachectic, eyes open but does not respond    Eyes:      Conjunctiva/sclera: Conjunctivae normal.   Cardiovascular:      Rate and Rhythm: Normal rate.      Pulses: Normal pulses.      Heart sounds: Normal heart sounds.   Pulmonary:      Effort: Pulmonary effort is normal. No respiratory distress.      Breath sounds: Normal breath sounds.      Comments: 2L NC  Abdominal:      General: Bowel sounds are normal. There is no distension.      Palpations: Abdomen is soft.      Tenderness: There is no abdominal tenderness.      Comments: JG tube in place   Musculoskeletal:      Right lower leg: No edema.      Left lower leg: No edema.   Skin:     General: Skin is warm and dry.      Capillary Refill: Capillary refill takes less than 2 seconds.   Neurological:      Mental Status: Mental status is at baseline.      Comments: Awake    Contractures in extremities    Right midriatic pupil unresponsive. Left reactive pupil with cataract             Vents:  Oxygen Concentration (%): 40 (04/09/25 0349)  Lines/Drains/Airways       Drain  Duration                  Gastrostomy/Enterostomy  LUQ -- days              Peripheral Intravenous Line  Duration                  Peripheral IV - Single Lumen 04/01/25 0337 20 G 1 3/4 in Anterior;Left Upper Arm 9 days         Peripheral IV - Single Lumen 04/07/25 1521 20 G Anterior;Right Forearm 2 days                  Significant Labs:    CBC/Anemia Profile:  Recent Labs   Lab 04/09/25  0243 04/10/25  0309   WBC 8.36 5.99   HGB 9.7* 10.7*   HCT 30.6* 33.0*    209   MCV 97 95   RDW 15.0* 14.4        Chemistries:  Recent Labs   Lab 04/08/25  1619 04/09/25  0522 04/10/25  0309 04/10/25  0522   * 142 148*  --    K 3.9 3.7 5.7* 3.9    104 108  --    CO2 29 32* 33*  --    BUN 5* 5* 3*  --    CREATININE 0.5 0.5 0.6  --    CALCIUM 8.7 8.7 9.1  --    ALBUMIN  --  2.4* 2.7*  --    BILITOT  --  0.4 0.3  --    ALKPHOS  --  123 124  --    ALT  --  13 17  --    AST  --  13 29  --    GLUCOSE 108 82 120*  --    MG  --   --   --  2.3   PHOS  --   --   --  4.2       All pertinent labs within the past 24 hours have been reviewed.    Significant Imaging:  I have reviewed all pertinent imaging results/findings within the past 24 hours.

## 2025-04-10 NOTE — PROGRESS NOTES
[facility]  Adult Nutrition  Progress Note    SUMMARY   Recommendations  TF RECS: Nutren 1.5 @ 60 mL/hr x 24 hours to provide 1440 total fluid volume, 2160 kcals, 98 g PRO, 1100 mL fluid,144 % DRI  At home Tube feeding regimen: Nutren 1.5 @ 65 mL/hr x 14 hours to provide 910 total fluid volume, 1365 kcals, 62 g protein, 691 mL fluid, 91% DRI - [facility] per MD (provides 81% EEN/63% EPN)  Add Iván BID as TF modifier  to provide 90 kcal, 2.5 g protein, 7 g L-Arginine, 7 g L-Glutamine per serving to aid in wound healing     --Do not mix Iván with formula in a tube-feeding bag  --Pour one packet of Iván in a clean, small (approximately 6- to 8-fl-oz) container for mixing  --Add 4 fl oz (120 mL) water at room temperature  --Mix well with disposable spoon or tongue blade until all particles are completely hydrated  --Verify correct tube placement  --Flush feeding tube with 30 mL water  --Administer Iván through feeding tube using a 60-mL or larger syringe  --Flush with an additional 30 mL water (a smaller amount of water can be used to flush the tube if the patient is on a fluid- restricted diet)  Monitor labs, meds, weight, skin    Goals: Meet % een/epn via EN by next RD f/u, maintain weight during admission  Nutrition Goal Status: progressing towards goal  Communication of RD Recs: other (comment) (poc)    Nutrition Discharge Planning    Nutrition Discharge Planning: Resume home/ nursing home regimen    Assessment and Plan    Nutrition Problem  Increased nutrient needs (protein, calorie)     Related to (etiology):   Wound healing     Signs and Symptoms (as evidenced by):   pressure injury to heal- deep tissue, BMI 16.92     Interventions/Recommendations (treatment strategy):  Collaboration of nutritional care with other providers.  EN  Iván     Nutrition Diagnosis Status:   Continues     Malnutrition Assessment  Pending NFPE    Reason for Assessment    Reason For Assessment: RD follow-up  Diagnosis:  "gastrointestinal disease (PEG tube malfunction)  General Information Comments: RD f/u, spoke to RN at bedside. TF infusing at 10 ml/hr via PEG. Pt asleep at the time. Wts stable. RD following- NFPE at follow up as clinically warranted.     Nutrition Related Social Determinants of Health: SDOH: None Identified  Food Insecurity: No Food Insecurity (4/2/2025)    Hunger Vital Sign     Worried About Running Out of Food in the Last Year: Never true     Ran Out of Food in the Last Year: Never true     Nutrition/Diet History    Spiritual, Cultural Beliefs, Jewish Practices, Values that Affect Care: no  Food Allergies: NKFA    Anthropometrics    Height: 5' 7" (170.2 cm)  Height (inches): 67 in  Height Method: Stated  Weight: 49 kg (108 lb 0.4 oz)  Weight (lb): 108.03 lb  Weight Method: Estimated  Ideal Body Weight (IBW), Male: 148 lb  % Ideal Body Weight, Male (lb): 72.97 %  BMI (Calculated): 16.9  BMI Grade: less than 18.5 - underweight       Lab/Procedures/Meds    Pertinent Labs Reviewed: reviewed  Pertinent Labs Comments: BUN: 5 (L)  Pertinent Medications Reviewed: reviewed  Pertinent Medications Comments: Enoxaparin, abx, norepinephrine, NaCl    Estimated/Assessed Needs    Weight Used For Calorie Calculations: 67.1 kg (148 lb) (IBW)  Energy Calorie Requirements (kcal): 2755-6170 (25-30 kcal/kg of IBW)  Energy Need Method: Kcal/kg  Protein Requirements:  (1.5-2.5 g/kg)  Weight Used For Protein Calculations: 49 kg (108 lb 0.4 oz) (actual body weight)     Estimated Fluid Requirement Method: RDA Method  RDA Method (mL): 1678         Nutrition Prescription Ordered    Current Diet Order: NPO  Current Nutrition Support Formula Ordered: Nutren 1.5  Current Nutrition Support Rate Ordered: 10 (ml) (ml/hr)    Evaluation of Received Nutrient/Fluid Intake    Enteral Calories (kcal): 360  Enteral Protein (gm): 16  Enteral (Free Water) Fluid (mL): 183  % Kcal Needs: 21  % Protein Needs: 16  I/O: +1.6 L since admit  Comments: " LBM (4/9, loose)  % Intake of Estimated Energy Needs: 0 - 25 %  % Meal Intake: NPO    Nutrition Risk    Level of Risk/Frequency of Follow-up: moderate (f/u 1-2x/week)     Monitor and Evaluation    Monitor and Evaluation: Enteral and parenteral nutrition administration, Weight, Electrolyte and renal panel, Glucose/endocrine profile, Gastrointestinal profile, Inflammatory profile, Lipid profile, Skin, Nutrition focused physical findings     Nutrition Follow-Up    RD Follow-up?: Yes

## 2025-04-10 NOTE — ASSESSMENT & PLAN NOTE
May be secondary to sedation with diazepam with superimposed HAP and sepsis   -CT head ruled out CVA. ABG c/w acute hypercapnic rx failure  -EEG c/w toxic and drug-induced encephalopathy. Ruled out seizures  Much improved mental status today. More awake and better arousal.

## 2025-04-10 NOTE — ASSESSMENT & PLAN NOTE
Continue current management with diazepam for contractures and keppra for seizure prophylaxis.

## 2025-04-10 NOTE — PLAN OF CARE
Recommendations  TF RECS: Nutren 1.5 @ 60 mL/hr x 24 hours to provide 1440 total fluid volume, 2160 kcals, 98 g PRO, 1100 mL fluid,144 % DRI  At home Tube feeding regimen: Nutren 1.5 @ 65 mL/hr x 14 hours to provide 910 total fluid volume, 1365 kcals, 62 g protein, 691 mL fluid, 91% DRI - [facility] per MD (provides 81% EEN/63% EPN)  Add Iván BID as TF modifier  to provide 90 kcal, 2.5 g protein, 7 g L-Arginine, 7 g L-Glutamine per serving to aid in wound healing     --Do not mix Iván with formula in a tube-feeding bag  --Pour one packet of Iván in a clean, small (approximately 6- to 8-fl-oz) container for mixing  --Add 4 fl oz (120 mL) water at room temperature  --Mix well with disposable spoon or tongue blade until all particles are completely hydrated  --Verify correct tube placement  --Flush feeding tube with 30 mL water  --Administer Iván through feeding tube using a 60-mL or larger syringe  --Flush with an additional 30 mL water (a smaller amount of water can be used to flush the tube if the patient is on a fluid- restricted diet)  Monitor labs, meds, weight, skin     Goals: Meet % een/epn via EN by next RD f/u, maintain weight during admission, display s/s of wound healing during admission  Nutrition Goal Status: continues   Communication of RD Recs: (poc)

## 2025-04-10 NOTE — SUBJECTIVE & OBJECTIVE
"Interval HPI:   Endocrinology consulted    PMH, PSH, FH, SH  reviewed     ROS:  Unable to obtain due to: non verbal status, critical illness      Labs Reviewed and Include:  BASELINE Creatinine:   [unfilled]  [unfilled]  [facility]@  Recent Labs   Lab 04/10/25  0309 04/10/25  0522 04/10/25  1414   CALCIUM 9.1  --  8.5*   ALBUMIN 2.7*  --   --    *  --  147*   K 5.7*   < > 4.3   CO2 33*  --  30*     --  108   BUN 3*  --  4*   CREATININE 0.6  --  0.5   ALKPHOS 124  --   --    ALT 17  --   --    AST 29  --   --    BILITOT 0.3  --   --     < > = values in this interval not displayed.     No results found for: "HGBA1C"    Nutritional status:   Body mass index is 16.92 kg/m².  Lab Results   Component Value Date    ALBUMIN 2.7 (L) 04/10/2025    ALBUMIN 2.4 (L) 04/09/2025    ALBUMIN 2.1 (L) 04/08/2025     No results found for: "PREALBUMIN"    Estimated Creatinine Clearance: 163.3 mL/min (based on SCr of 0.5 mg/dL).         PHYSICAL EXAMINATION:  Vitals:    04/10/25 1300   BP: 97/60   Pulse: 64   Resp: 12   Temp:      Body mass index is 16.92 kg/m².     Physical Exam  Constitutional:       Appearance: He is ill-appearing.      Comments: Cachectic   Cardiovascular:      Rate and Rhythm: Bradycardia present.   Pulmonary:      Effort: No respiratory distress.   Neurological:      Mental Status: Mental status is at baseline.      Comments: Non-verbal          "

## 2025-04-10 NOTE — ASSESSMENT & PLAN NOTE
In the setting of aspiration pneumonia, currently on IV antibiotics.  Continue management per primary.

## 2025-04-10 NOTE — HPI
Mr. Posey is a 18 yo male with PMHx of TBI, qudariplegia, cachexia, epilepsy, PEG tube placement, who initially presented with G-J  tube malfunction and is currently admitted in MICU due to acute hypoxic respiratory failure and sepsis 2/2 pneumonia, treated with empiric antibiotics and requiring pressor support.  (Pt is nonverbal at baseline, history is obtained from mother during encounter)  Patient has been persistently bradycardic since arrival.  Persistent hyponatremia or hyperkalemia are not seen on chart review.  TSH within normal limits.  A morning cortisol drawn around 5:30 a.m. on 04/09/2025 was low at 3.3.  Patient was subsequently started on stress dose steroids IV hydrocortisone 100 mg q.8h on 4/9 at 10 pm.  Today morning 4/10, he was weaned off pressor support for a few hours, however he is currently placed back on Levophed.  Endocrinology is consulted for adrenal insufficiency.  Patient has no previous diagnosis of adrenal insufficiency and has no known chronic oral steroid exposure.

## 2025-04-10 NOTE — ASSESSMENT & PLAN NOTE
- follow up for assessment of sacral wound.  - pt presents with GJ-tube malfunction.   - partial thickness tissue loss to sacrum with pink, moist wound base. Recent injury to area noted initially with pink scar tissue.  - continue treatment per wound care RN recs.  - mildred intouch surface.  - boots and pillows for offloading.  - turn q2h.  - saskia score documented at 9 with a nutrition sub score of 2.  - RD following. TFs ordered.  - nursing to maintain pressure injury prevention measures and continue wound care per orders.

## 2025-04-10 NOTE — CONSULTS
[facility]  Endocrinology  Consult Note    Consult Requested by: Shiraz Maki MD   Reason for admit: Acute hypoxic respiratory failure    HISTORY OF PRESENT ILLNESS:  Mr. Posey is a 18 yo male with PMHx of TBI, qudariplegia, cachexia, epilepsy, PEG tube placement, who initially presented with G-J  tube malfunction and is currently admitted in MICU due to acute hypoxic respiratory failure and sepsis 2/2 pneumonia, treated with empiric antibiotics and requiring pressor support.  (Pt is nonverbal at baseline, history is obtained from mother during encounter)  Patient has been persistently bradycardic since arrival.  Persistent hyponatremia or hyperkalemia are not seen on chart review.  TSH within normal limits.  A morning cortisol drawn around 5:30 a.m. on 04/09/2025 was low at 3.3.  Patient was subsequently started on stress dose steroids IV hydrocortisone 100 mg q.8h on 4/9 at 10 pm.  Today morning 4/10, he was weaned off pressor support for a few hours, however he is currently placed back on Levophed.  Endocrinology is consulted for adrenal insufficiency.  Patient has no previous diagnosis of adrenal insufficiency and has no known chronic oral steroid exposure.    Medications and/or Treatments Impacting Glycemic Control:  Immunotherapy:    Immunosuppressants       None          Steroids:   Hormones (From admission, onward)      Start     Stop Route Frequency Ordered    04/09/25 2200  hydrocortisone sodium succinate injection 100 mg         -- IV Every 8 hours 04/09/25 1717          Pressors:    Autonomic Drugs (From admission, onward)      Start     Stop Route Frequency Ordered    04/10/25 1715  NORepinephrine 4 mg in sodium chloride 0.9% 250 mL infusion (premix)        Question Answer Comment   Begin at (in mcg/kg/min): 0.02    Titrate by: (in mcg/kg/min (RANGE PREFERRED) 0.02 - 0.2    Titrate interval: (in minutes) (RANGE PREFERRED) 2 - 5    Titrate to maintain: (MAP or SBP) MAP    Titrate to  keep MAP within the range or GREATER than (if single number): (in mmHg) 65 - 75    Maximum dose: (in mcg/kg/min) 3        -- IV Continuous 04/10/25 1602    04/10/25 1406  NORepinephrine bitartrate-NaCl 4 mg/250 mL (16 mcg/mL) infusion Soln        Note to Pharmacy: Created by cabinet override    04/11/25 0214   04/10/25 1406    04/08/25 2245  NORepinephrine 4 mg in sodium chloride 0.9% 250 mL infusion (premix)        Question Answer Comment   Begin at (in mcg/kg/min): 0.02    Titrate by: (in mcg/kg/min (RANGE PREFERRED) 0.02 - 0.2    Titrate interval: (in minutes) (RANGE PREFERRED) 2 - 5    Titrate to maintain: (MAP or SBP) MAP    Titrate to keep MAP within the range or GREATER than (if single number): (in mmHg) 65 - 75    Maximum dose: (in mcg/kg/min) 0.2        04/09/25 2145 IV Continuous 04/08/25 2143    04/07/25 2115  NORepinephrine 4 mg in sodium chloride 0.9% 250 mL infusion (premix)        Question Answer Comment   Begin at (in mcg/kg/min): 0.02    Titrate by: (in mcg/kg/min (RANGE PREFERRED) 0.02 - 0.2    Titrate interval: (in minutes) (RANGE PREFERRED) 2 - 5    Titrate to maintain: (MAP or SBP) MAP    Titrate to keep MAP within the range or GREATER than (if single number): (in mmHg) 65 - 75    Maximum dose: (in mcg/kg/min) 0.2        04/2004 IV Continuous 04/07/25 2003            Prescriptions Prior to Admission[1]    Current Medications[2]    Interval HPI:   Endocrinology consulted    PMH, PSH, FH, SH  reviewed     ROS:  Unable to obtain due to: non verbal status, critical illness      Labs Reviewed and Include:  BASELINE Creatinine:   [unfilled]  [unfilled]  [unfilled]  Recent Labs   Lab 04/10/25  0309 04/10/25  0522 04/10/25  1414   CALCIUM 9.1  --  8.5*   ALBUMIN 2.7*  --   --    *  --  147*   K 5.7*   < > 4.3   CO2 33*  --  30*     --  108   BUN 3*  --  4*   CREATININE 0.6  --  0.5   ALKPHOS 124  --   --    ALT 17  --   --    AST 29  --   --    BILITOT 0.3  --   --     < > = values in  "this interval not displayed.     No results found for: "HGBA1C"    Nutritional status:   Body mass index is 16.92 kg/m².  Lab Results   Component Value Date    ALBUMIN 2.7 (L) 04/10/2025    ALBUMIN 2.4 (L) 04/09/2025    ALBUMIN 2.1 (L) 04/08/2025     No results found for: "PREALBUMIN"    Estimated Creatinine Clearance: 163.3 mL/min (based on SCr of 0.5 mg/dL).         PHYSICAL EXAMINATION:  Vitals:    04/10/25 1300   BP: 97/60   Pulse: 64   Resp: 12   Temp:      Body mass index is 16.92 kg/m².     Physical Exam  Constitutional:       Appearance: He is ill-appearing.      Comments: Cachectic   Cardiovascular:      Rate and Rhythm: Bradycardia present.   Pulmonary:      Effort: No respiratory distress.   Neurological:      Mental Status: Mental status is at baseline.      Comments: Non-verbal        .     ASSESSMENT and PLAN:    Pulmonary  * Pulmonary septic shock with acute hypoxic respiratory failure  Secondary to pneumonia, currently on IV antibiotics.  Management per primary.    Endocrine  Low serum cortisol level  Endocrinology consulted for possible adrenal insufficiency in the setting of hypotension requiring pressor support, bradycardia and intermittent hypothermia, with a low serum cortisol level (3.3 at 5.30 am on 4/9/25).   No persistent hyponatremia or hyperkalemia on labs. No hx chronic po steroid exposure.  TSH wnl.  Currently he is on stress dose steroids with IV hydrocortisone 100 mg q.8h.  After initiating steroids he was briefly off pressors but is now back on Levophed.  Continue stress dose steroids in the setting of critical illness and taper off once patient's clinical status has improved.  We will do an ACTH stimulation test once patient is off steroids to confirm/rule out adrenal insufficiency.      Discussed with Dr. Magaña.    DISCHARGE NEEDS: will assess daily    Danya Denise MD  Endocrinology  [facility] - Medical ICU        [1]   Medications Prior to Admission   Medication Sig " Dispense Refill Last Dose/Taking    baclofen 25 mg/5 mL (5 mg/mL) Susp oral liquid 25 mg by Per G Tube route 4 (four) times daily.   Taking    diazePAM (VALIUM) 5 mg/5 mL (1 mg/mL) oral solution SMARTSI Milliliter(s) Gastro Tube 3 Times Daily   Taking    glycopyrrolate (CUVPOSA) 1 mg/5 mL (0.2 mg/mL) Soln 40 mcg/kg by Per G Tube route 3 (three) times daily as needed.   Taking As Needed    hydroCHLOROthiazide (HYDRODIURIL) 12.5 MG Tab 12.5 mg by Per G Tube route once daily.   Taking    HYDROcodone-acetaminophen (NORCO) 5-325 mg per tablet 1 tablet by Per G Tube route every 4 (four) hours as needed for Pain. 30 tablet 0 Taking As Needed    levETIRAcetam (KEPPRA) 100 mg/mL Soln 5 mLs (500 mg total) by Per G Tube route 2 (two) times daily. 300 mL 11 Taking    polyethylene glycol (GLYCOLAX) 17 gram/dose powder 17 g by Per G Tube route 2 (two) times daily as needed for Constipation.   Taking As Needed    QUILLIVANT XR 5 mg/mL (25 mg/5 mL) SR24 7 mLs by Per G Tube route Daily.   Taking   [2]   Current Facility-Administered Medications   Medication Dose Route Frequency Provider Last Rate Last Admin    acetaminophen oral solution 650 mg  650 mg Per J Tube Q6H PRN Shiraz Coronado MD        albuterol sulfate nebulizer solution 2.5 mg  2.5 mg Nebulization Q6H Alicia Baron MD   2.5 mg at 04/10/25 1249    albuterol-ipratropium 2.5 mg-0.5 mg/3 mL nebulizer solution 3 mL  3 mL Nebulization Q4H PRN Arcelia Starr DO   3 mL at 25 0943    artificial tears 0.5 % ophthalmic solution 1 drop  1 drop Both Eyes QID PRN Estrella Shaw MD        baclofen 500 mcg/mL intrathecal injection [MAR PLACEHOLDER]   Intrathecal Continuous Brueder, Shiraz P., MD        balsam peru-castor oiL Oint   Topical (Top) BID Karon Ohara, NP   Given at 04/10/25 0851    dextrose 50% injection 12.5 g  12.5 g Intravenous PRN Silvio Finney MD        dextrose 50% injection 25 g  25 g Intravenous PRN Nathaniel Green,  MD Silvio        diazePAM 1 mg/mL oral solution 5 mg  5 mg Per G Tube TID Shiraz Coronado MD   5 mg at 04/10/25 1600    enoxaparin injection 30 mg  30 mg Subcutaneous Daily Shiraz aMki MD   30 mg at 04/09/25 1750    glucagon (human recombinant) injection 1 mg  1 mg Intramuscular PRN Silvio Finney MD        glucose chewable tablet 16 g  16 g Oral PRN Silvio Finney MD        glucose chewable tablet 24 g  24 g Oral PRN Silvio Finney MD        glycopyrrolate 1 mg/5 mL (0.2 mg/mL) oral solution 1 mg  1 mg Per J Tube TID Shiraz Coronado MD   1 mg at 04/10/25 1600    heparin, porcine (PF) 100 unit/mL injection flush 10 Units  10 Units Intravenous PRN Ricky Julio MD        hydrocortisone sodium succinate injection 100 mg  100 mg Intravenous Q8H Silvio Finney MD   100 mg at 04/10/25 1422    levETIRAcetam injection 500 mg  500 mg Intravenous Q12H Shiraz Coronado MD   500 mg at 04/10/25 0851    methylphenidate HCl tablet 5 mg  5 mg Per G Tube Estrella Woo MD   5 mg at 04/10/25 0557    metoclopramide injection 5 mg  5 mg Intravenous Q8H Shiraz Coronado MD   5 mg at 04/10/25 1422    mupirocin 2 % ointment   Nasal BID Shiraz Maki MD        naloxone 0.4 mg/mL injection 0.02 mg  0.02 mg Intravenous PRN Albaro Nicole MD        NORepinephrine 4 mg in sodium chloride 0.9% 250 mL infusion (premix)  0-3 mcg/kg/min Intravenous Continuous Jhonatan Whitehead DO        NORepinephrine bitartrate-NaCl 4 mg/250 mL (16 mcg/mL) infusion Soln             ondansetron injection 4 mg  4 mg Intravenous Q6H PRN Shiraz Coronado MD   4 mg at 04/06/25 0533    pantoprazole injection 40 mg  40 mg Intravenous Daily Silvio Finney MD   40 mg at 04/10/25 0851    piperacillin-tazobactam (ZOSYN) 4.5 g in D5W 100 mL IVPB (MB+)  4.5 g Intravenous Q8H Shiraz Maki MD   Stopped at 04/10/25  1206    sodium chloride 0.9% flush 1-10 mL  1-10 mL Intravenous Q12H PRN Albaro Nicole MD        sodium chloride 0.9% flush 10 mL  10 mL Intravenous PRN Silvio Finney MD        sodium chloride 0.9% flush 10 mL  10 mL Intravenous PRN Silvio Finney MD        sodium chloride 3% nebulizer solution 4 mL  4 mL Nebulization Q6H Alicia Baron MD   4 mL at 04/10/25 1249     Facility-Administered Medications Ordered in Other Encounters   Medication Dose Route Frequency Provider Last Rate Last Admin    onabotulinumtoxina injection 600 Units  600 Units   Michael Nunez MD   600 Units at 07/31/24 1428

## 2025-04-10 NOTE — PLAN OF CARE
[facility]  Discharge Reassessment    Primary Care Provider: Pallavi Primary Doctor    Expected Discharge Date: 4/14/2025    SW assigned to patient's care team.  SW met with pt and stepmotherJosie, to discuss discharge planning once patient is medically ready.  Plans for pt to stepdown. Pt is bedbound- Inquired about DME (low pressure mattress and porsche lift) at the time of discharge.  SW advise once patient stepdown and get closer to d/c, medical team can place order for equipment and ODME team will be able to process order.  Pt will need ambulance transport home.      Discharge Plan A and Plan B have been determined by review of patient's clinical status, future medical and therapeutic needs, and coverage/benefits for post-acute care in coordination with multidisciplinary team members.    Reassessment (most recent)       Discharge Reassessment - 04/10/25 142          Discharge Reassessment    Assessment Type Discharge Planning Reassessment     Did the patient's condition or plan change since previous assessment? No     Discharge Plan discussed with: --   Pt's stepmotherJosie, at bedside.    Communicated EDINSON with patient/caregiver Date not available/Unable to determine     Discharge Plan A Home;Home with family;Home Health     Discharge Plan B Home;Home with family     DME Needed Upon Discharge  other (see comments)   Request low pressure mattress and porsche lift    Transition of Care Barriers None     Why the patient remains in the hospital Requires continued medical care        Post-Acute Status    Post-Acute Authorization Other     Coverage [employer]     Other Status See Comments   TBD    Discharge Delays None known at this time                   Mariam Loyd LMSW  Part-Time-  [facility]  Ext. [phone]

## 2025-04-10 NOTE — ASSESSMENT & PLAN NOTE
Patient with Hypoxic Respiratory failure which is Acute with chronic hypercapnic respiratory failure. he is not on home oxygen. Supplemental oxygen was provided and noted-      .   Signs/symptoms of respiratory failure include- respiratory distress. Contributing diagnoses includes - ARDS and Aspiration Labs and images were reviewed. Patient Has recent ABG, which has been reviewed. Will treat underlying causes and adjust management of respiratory failure as follows-     Suspect aspiration pneumonia in the setting of hospital acquired pneumonia with recurrent vomiting with supperimposed atelectasis.  IV Zosyn.  Concomitant hypothermia and bradycardia in the setting of adrenal insufficiency.   Off pressor support briefly on 4/10, but  back again same day. On IV hydrocortisone.   Plan:  -BCX 4/7 NGTD  -at room air.   -IV zosyn. Last day on 4/12. Lactate wnl.    -IV hydrocortisone 100 mg TID.  -Pulmonary toilet: bronchodilators PRN, hypertonic saline.  IPPV.  Acute hypoxic rx failure resolved.

## 2025-04-10 NOTE — ASSESSMENT & PLAN NOTE
Endocrinology consulted for possible adrenal insufficiency in the setting of hypotension requiring pressor support, bradycardia and intermittent hypothermia, with a low serum cortisol level (3.3 at 5.30 am on 4/9/25).   No persistent hyponatremia or hyperkalemia on labs. No hx chronic po steroid exposure.  TSH wnl.  Currently he is on stress dose steroids with IV hydrocortisone 100 mg q.8h.  After initiating steroids he was briefly off pressors but is now back on Levophed.  Continue stress dose steroids in the setting of critical illness and taper off once patient's clinical status has improved.  We will do an ACTH stimulation test once patient is off steroids to confirm/rule out adrenal insufficiency.

## 2025-04-10 NOTE — RESIDENT HANDOFF
Handoff     [place] [facility] [place] 2 Room Number: [id number] A     Patient Name: Jesus Posey MRN: [id number]     Date of Birth: [birth year] Allergies: Patient has no known allergies.     Age: 19 y.o. Admit Date: 4/1/2025     Sex: male  BMI: Body mass index is 16.92 kg/m².     Code Status: Full Code        Illness Level (current clinical status): Watcher - No    Reason for Admission: Acute hypoxic respiratory failure    Brief HPI (pertinent PMH and diagnosis or differential diagnosis)   Mr. 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    As he is nonverbal, father reported leakage from his GJ tube which resembled his tube feeds. Last tube exchange was last month.   Procedure Date: N/A    Hospital Course (updated, brief assessment by system or problem, significant events):     Admitted to  on 4/1 for GJ tube dysfunction.  IR replaced tube on 4/2.   During hospital course with repeated episodes of intolerance to PO intake and emesis. Repeat CT AP with G Tube with good positioning, no obstruction  Stepped up to MICU on 4/7 due to acute hypoxic respiratory failure 2/2 basilar atelectasis and mucus plugging with  episode requiring up to 15 L  HFNC which was weaned down to 1-2L NC over that same day. Also with septic shock 2/2 pulmonary origin on IV  zosyn, and on IV steroids for adrenal crisis. During ICU course had been encephalopathic due to acute on chronic hypercapnic resp failure. Improving with BIPAP use.       Tasks (specific, using if-then statements): touchbase with IR KAYLAH Jean Baptiste for placement of new GJ tube he needs. Still pending delivery of the supplies, as of 4/10.    Contingency Plan (special circumstances anticipated and plan): If worsening mental status get VBG and start on BIPAP.     Estimated Discharge Date: TBD    Discharge Disposition: Home or Self Care    Mentored By:  Dr. Maki

## 2025-04-10 NOTE — SUBJECTIVE & OBJECTIVE
Interval History/Significant Events:  Off pressors since 4/10 7pm. Had 2 BM. Had been receiving dextrose for hypoglycemia.      Review of Systems  Objective:     Vital Signs (Most Recent):  Temp: 97.4 °F (36.3 °C) (04/10/25 1100)  Pulse: 64 (04/10/25 1300)  Resp: 12 (04/10/25 1300)  BP: 97/60 (04/10/25 1300)  SpO2: 100 % (04/10/25 1300) Vital Signs (24h Range):  Temp:  [96.1 °F (35.6 °C)-98.3 °F (36.8 °C)] 97.4 °F (36.3 °C)  Pulse:  [39-85] 64  Resp:  [9-30] 12  SpO2:  [90 %-100 %] 100 %  BP: ()/(50-63) 97/60   Weight: 49 kg (108 lb 0.4 oz)  Body mass index is 16.92 kg/m².      Intake/Output Summary (Last 24 hours) at 4/10/2025 1428  Last data filed at 4/10/2025 1200  Gross per 24 hour   Intake 1266.77 ml   Output --   Net 1266.77 ml          Physical Exam  Constitutional:       General: He is not in acute distress.     Comments: Cachectic, eyes open but does not respond    Eyes:      Conjunctiva/sclera: Conjunctivae normal.   Cardiovascular:      Rate and Rhythm: Normal rate.      Pulses: Normal pulses.      Heart sounds: Normal heart sounds.   Pulmonary:      Effort: Pulmonary effort is normal. No respiratory distress.      Breath sounds: Normal breath sounds.   Abdominal:      General: Bowel sounds are normal. There is no distension.      Palpations: Abdomen is soft.      Tenderness: There is no abdominal tenderness.      Comments: JG tube in place   Musculoskeletal:      Right lower leg: No edema.      Left lower leg: No edema.   Skin:     General: Skin is warm and dry.      Capillary Refill: Capillary refill takes less than 2 seconds.   Neurological:      Mental Status: Mental status is at baseline.      Comments: Awake    Contractures in extremities    Right midriatic pupil unresponsive. Left reactive pupil with cataract             Vents:  Oxygen Concentration (%): 40 (04/09/25 0349)  Lines/Drains/Airways       Drain  Duration                  Gastrostomy/Enterostomy LUQ -- days              Peripheral  Intravenous Line  Duration                  Peripheral IV - Single Lumen 04/01/25 0337 20 G 1 3/4 in Anterior;Left Upper Arm 9 days         Peripheral IV - Single Lumen 04/07/25 1521 20 G Anterior;Right Forearm 2 days                  Significant Labs:    CBC/Anemia Profile:  Recent Labs   Lab 04/09/25  0243 04/10/25  0309   WBC 8.36 5.99   HGB 9.7* 10.7*   HCT 30.6* 33.0*    209   MCV 97 95   RDW 15.0* 14.4        Chemistries:  Recent Labs   Lab 04/08/25  1619 04/09/25  0522 04/10/25  0309 04/10/25  0522   * 142 148*  --    K 3.9 3.7 5.7* 3.9    104 108  --    CO2 29 32* 33*  --    BUN 5* 5* 3*  --    CREATININE 0.5 0.5 0.6  --    CALCIUM 8.7 8.7 9.1  --    ALBUMIN  --  2.4* 2.7*  --    BILITOT  --  0.4 0.3  --    ALKPHOS  --  123 124  --    ALT  --  13 17  --    AST  --  13 29  --    GLUCOSE 108 82 120*  --    MG  --   --   --  2.3   PHOS  --   --   --  4.2       All pertinent labs within the past 24 hours have been reviewed.    Significant Imaging:  I have reviewed all pertinent imaging results/findings within the past 24 hours.

## 2025-04-10 NOTE — EICU
Virtual ICU Quality Rounds    Admit Date: 4/1/2025  Hospital Day: 9    ICU Day: 3d 6h    VICU Surveillance Screening                    LDA reconciliation : Yes

## 2025-04-10 NOTE — PROGRESS NOTES
[facility]  Critical Care Medicine  Progress Note    Patient Name: Jesus Posey  MRN: [id number]  Admission Date: 4/1/2025  Hospital Length of Stay: 9 days  Code Status: Full Code  Attending Provider: Shiraz Maki MD  Primary Care Provider: Pallavi, Primary Doctor   Principal Problem: Acute hypoxic respiratory failure    Subjective:     HPI:  Mr. 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      As he is nonverbal, father reported leakage from his GJ tube which resembled his tube feeds. Last tube exchange was last month.     Admitted to Hospital Medicine in the setting of GJ tube malfunction. Intermittently hypothermic and bradycardic secondary to dysautonomia. Infectious workup has been negative. On IV maintenance fluids. IR replaced tube on 4/2.   During hospital course with repeated episodes of intolerance to PO intake and emesis. Repeat CT AP with G Tube with good positioning. Stepped up to MICU on 4/7 due to acute hypoxic respiratory failure requiring up to 15 L  HFNC  in the setting of likely aspiration pneumonitis. Isolated febrile episode of 100.7F. CXR concerning for BL pulmonary opacities. Started on Unasyn by Hospital Medicine.      Hospital/ICU Course:  Stepped up to ICU in 4/7 the setting of acute hypoxic respiratory failure in the swtting of basilar atelectasis and mucus plugging,  with sepsis secondary to PNA requiring up to 15 L HFNC. Started on Vanc-Zosyn.  During ICU course requiring up to norepi 0.06 mcg likely in the setting of hypovolemia 2/2 poor intake. Encephalopathy improving. CTH w/o acute processes. Lactate improved. EEG c/w toxic/drug-induced encephalopathy. No seizures. Persistenty bradycardic and hypothermic with EKG junctional rhythm on 30s.On Zosyn. TSH wnl. Ordering TTE. Cortisol < 3 . Started on IV hydrocortisone 100 mg TID. Off pressors on 4/10 at 7:30 am,  but back on pressors by 2pm.      Interval History/Significant Events:  Off pressors by 4/10 7:30 am.Back again on pressors at 2pm. Had 2 BM. Has been getting tube feeds.     Review of Systems  Objective:     Vital Signs (Most Recent):  Temp: 97.4 °F (36.3 °C) (04/10/25 1100)  Pulse: 64 (04/10/25 1300)  Resp: 12 (04/10/25 1300)  BP: 97/60 (04/10/25 1300)  SpO2: 100 % (04/10/25 1300) Vital Signs (24h Range):  Temp:  [96.1 °F (35.6 °C)-98.3 °F (36.8 °C)] 97.4 °F (36.3 °C)  Pulse:  [39-85] 64  Resp:  [9-30] 12  SpO2:  [90 %-100 %] 100 %  BP: ()/(50-63) 97/60   Weight: 49 kg (108 lb 0.4 oz)  Body mass index is 16.92 kg/m².      Intake/Output Summary (Last 24 hours) at 4/10/2025 1428  Last data filed at 4/10/2025 1200  Gross per 24 hour   Intake 1266.77 ml   Output --   Net 1266.77 ml          Physical Exam  Constitutional:       General: He is not in acute distress.     Comments: Cachectic, eyes open but does not respond    Eyes:      Conjunctiva/sclera: Conjunctivae normal.   Cardiovascular:      Rate and Rhythm: Normal rate.      Pulses: Normal pulses.      Heart sounds: Normal heart sounds.   Pulmonary:      Effort: Pulmonary effort is normal. No respiratory distress.      Breath sounds: Normal breath sounds.      Comments: 2L NC  Abdominal:      General: Bowel sounds are normal. There is no distension.      Palpations: Abdomen is soft.      Tenderness: There is no abdominal tenderness.      Comments: JG tube in place   Musculoskeletal:      Right lower leg: No edema.      Left lower leg: No edema.   Skin:     General: Skin is warm and dry.      Capillary Refill: Capillary refill takes less than 2 seconds.   Neurological:      Mental Status: Mental status is at baseline.      Comments: Awake    Contractures in extremities    Right midriatic pupil unresponsive. Left reactive pupil with cataract             Vents:  Oxygen Concentration (%): 40 (04/09/25 0349)  Lines/Drains/Airways       Drain  Duration                   Gastrostomy/Enterostomy LUQ -- days              Peripheral Intravenous Line  Duration                  Peripheral IV - Single Lumen 04/01/25 0337 20 G 1 3/4 in Anterior;Left Upper Arm 9 days         Peripheral IV - Single Lumen 04/07/25 1521 20 G Anterior;Right Forearm 2 days                  Significant Labs:    CBC/Anemia Profile:  Recent Labs   Lab 04/09/25  0243 04/10/25  0309   WBC 8.36 5.99   HGB 9.7* 10.7*   HCT 30.6* 33.0*    209   MCV 97 95   RDW 15.0* 14.4        Chemistries:  Recent Labs   Lab 04/08/25  1619 04/09/25  0522 04/10/25  0309 04/10/25  0522   * 142 148*  --    K 3.9 3.7 5.7* 3.9    104 108  --    CO2 29 32* 33*  --    BUN 5* 5* 3*  --    CREATININE 0.5 0.5 0.6  --    CALCIUM 8.7 8.7 9.1  --    ALBUMIN  --  2.4* 2.7*  --    BILITOT  --  0.4 0.3  --    ALKPHOS  --  123 124  --    ALT  --  13 17  --    AST  --  13 29  --    GLUCOSE 108 82 120*  --    MG  --   --   --  2.3   PHOS  --   --   --  4.2       All pertinent labs within the past 24 hours have been reviewed.    Significant Imaging:  I have reviewed all pertinent imaging results/findings within the past 24 hours.    ABG  Recent Labs   Lab 04/09/25  0445   PH 7.347*   PO2 36*   PCO2 66.4*   HCO3 36.4*   BE 11*     Assessment/Plan:     Neuro  Toxic metabolic encephalopathy  May be secondary to sedation with diazepam with superimposed HAP and sepsis.   -CT head ruled out CVA. ABG c/w acute hypercapnic rx failure  -EEG c/w toxic and drug-induced encephalopathy. Ruled out seizures  Much improved mental status today. More awake and better arousal.      Functional quadriplegia  -Requires assistance with all ADLs.  Plan: Consult PT/OT    Traumatic brain injury  Continue current management with diazepam for contractures and keppra for seizure prophylaxis.        Spastic quadriparesis  Mobility protocol.     Pulmonary  * Pulmonary septic shock with acute hypoxic respiratory failure  Patient with Hypoxic Respiratory  failure which is Acute with chronic hypercapnic respiratory failure. he is not on home oxygen. Supplemental oxygen was provided and noted-      .   Signs/symptoms of respiratory failure include- respiratory distress. Contributing diagnoses includes - ARDS and Aspiration Labs and images were reviewed. Patient Has recent ABG, which has been reviewed. Will treat underlying causes and adjust management of respiratory failure as follows-     Suspect aspiration pneumonia in the setting of hospital acquired pneumonia with recurrent vomiting with supperimposed atelectasis.  IV Zosyn.  Concomitant hypothermia and bradycardia in the setting of adrenal insufficiency.   Off pressor support briefly on 4/10, but  back again same day. On IV hydrocortisone.   Plan:  -BCX 4/7 NGTD  - NC  2L O2.   - IV zosyn. Day 4/5 Lactate wnl.    -IV hydrocortisone 100 mg TID.  -Pulmonary toilet: bronchodilators PRN, hypertonic saline.  IPPV.    Renal/  Hypokalemia  resolved    Hypernatremia  Repleting free water with tube feeds.  sNa 147    Endocrine  Severe malnutrition  Nutrition consulted. Most recent weight and BMI monitored-     Measurements:  Wt Readings from Last 1 Encounters:   04/02/25 49 kg (108 lb 0.4 oz)   Body mass index is 16.92 kg/m².    Patient has been screened and assessed by RD.    Malnutrition Type:  Context:    Level:      Malnutrition Characteristic Summary:       Interventions/Recommendations (treatment strategy):   Restart tube feeds      GI  Malfunction of jejunostomy tube  Continue PEG tube feeds. Tolerating them well.    PEG tube malfunction  Patient with GJ tube dependence, presenting with tube malfunction. IR consulted for exchange under fluoroscopy. Tube changed 4/2.   GI consulted d/t recurrent vomiting with initiation of tube feeds. CTAP with GJ tube in satisfactory position.     Plan:  - 5mg IV metoclopramide Q8h   -Restart tube feeds    Other  Hypothermia  RESOLVED. Likely secondary to adrenal  insufficiency       Critical Care Daily Checklist:    A: Awake: RASS Goal/Actual Goal:    Actual:     B: Spontaneous Breathing Trial Performed?     C: SAT & SBT Coordinated?  NO                      D: Delirium: CAM-ICU     E: Early Mobility Performed? No   F: Feeding Goal: Goals: Meet % een/epn via EN by next RD f/u, maintain weight during admission  Status: Nutrition Goal Status: progressing towards goal   Current Diet Order   No orders of the defined types were placed in this encounter.      AS: Analgesia/Sedation ketorolac   T: Thromboembolic Prophylaxis enoxaparin   H: HOB > 300 Yes   U: Stress Ulcer Prophylaxis (if needed) Pantoprazole 40IV   G: Glucose Control 120 no ins req.   B: Bowel Function Stool Occurrence: 1   I: Indwelling Catheter (Lines & [place]) Necessity 2 PIV left arm and right forearm   D: De-escalation of Antimicrobials/Pharmacotherapies Zosyn , last day on 4/11    Plan for the day/ETD Remains on MICU    Code Status:  Family/Goals of Care: Full Code         Critical secondary to Patient has a condition that poses threat to life and bodily function: Severe Respiratory Distress      Critical care was time spent personally by me on the following activities: development of treatment plan with patient or surrogate and bedside caregivers, discussions with consultants, evaluation of patient's response to treatment, examination of patient, ordering and performing treatments and interventions, ordering and review of laboratory studies, ordering and review of radiographic studies, pulse oximetry, re-evaluation of patient's condition. This critical care time did not overlap with that of any other provider or involve time for any procedures.     Silvio Green MD  Critical Care Medicine  [facility] - Medical ICU

## 2025-04-10 NOTE — SUBJECTIVE & OBJECTIVE
Subjective:     HPI:  Jesus Posey is a 19 year old male with history of TBI leading to quadriplegia, seizure disorder, PEG dependence, cachexia who presents with GJ-tube malfunction.      As he is nonverbal, father at bedside provides history.      He reports that yesterday, they noted leakage from his GJ tube which resembled his tube feeds. Otherwise, he has had no new issues.      Tube was exchanged last month.     Patient admitted to hospital medicine service for further management. Skin integrity DARINEL consulted for evaluation of skin injury.    Hospital Course:   No notes on file          Scheduled Meds:   albuterol sulfate  2.5 mg Nebulization Q6H    balsam peru-castor oiL   Topical (Top) BID    diazePAM  5 mg Per G Tube TID    enoxparin  30 mg Subcutaneous Daily    glycopyrrolate  1 mg Per J Tube TID    hydrocortisone sodium succinate  100 mg Intravenous Q8H    levETIRAcetam (Keppra) IV (PEDS and ADULTS)  500 mg Intravenous Q12H    methylphenidate HCl  5 mg Per G Tube QAM    metoclopramide  5 mg Intravenous Q8H    pantoprazole  40 mg Intravenous Daily    piperacillin-tazobactam (Zosyn) IV (PEDS and ADULTS) (extended infusion is not appropriate)  4.5 g Intravenous Q8H    sodium chloride 3%  4 mL Nebulization Q6H     Continuous Infusions:   baclofen   Intrathecal Continuous        NORepinephrine bitartrate-NaCl  0-0.2 mcg/kg/min Intravenous Continuous 1.8 mL/hr at 04/10/25 0615 0.01 mcg/kg/min at 04/10/25 0615     PRN Meds:  Current Facility-Administered Medications:     acetaminophen, 650 mg, Per J Tube, Q6H PRN    albuterol-ipratropium, 3 mL, Nebulization, Q4H PRN    artificial tears, 1 drop, Both Eyes, QID PRN    heparin, porcine (PF), 10 Units, Intravenous, PRN    naloxone, 0.02 mg, Intravenous, PRN    ondansetron, 4 mg, Intravenous, Q6H PRN    sodium chloride 0.9%, 1-10 mL, Intravenous, Q12H PRN    Review of Systems   Skin:  Positive for wound.     Objective:     Vital Signs (Most Recent):  Temp: 97 °F  (36.1 °C) (04/10/25 0701)  Pulse: (!) 43 (04/10/25 0900)  Resp: 17 (04/10/25 0900)  BP: 96/62 (04/10/25 0900)  SpO2: 100 % (04/10/25 0900) Vital Signs (24h Range):  Temp:  [96.1 °F (35.6 °C)-98.3 °F (36.8 °C)] 97 °F (36.1 °C)  Pulse:  [39-77] 43  Resp:  [9-25] 17  SpO2:  [94 %-100 %] 100 %  BP: ()/(50-69) 96/62     Weight: 49 kg (108 lb 0.4 oz)  Body mass index is 16.92 kg/m².     Physical Exam  Constitutional:       Appearance: Normal appearance.   Skin:     General: Skin is warm and dry.      Findings: Lesion present.   Neurological:      Mental Status: He is alert.          Laboratory:  All pertinent labs reviewed within the last 24 hours.    Diagnostic Results:  None

## 2025-04-10 NOTE — PROGRESS NOTES
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      As he is nonverbal, father at bedside provides history.      He reports that yesterday, they noted leakage from his GJ tube which resembled his tube feeds. Otherwise, he has had no new issues.      Tube was exchanged last month.     Patient admitted to hospital medicine service for further management. Skin integrity DARINEL consulted for evaluation of skin injury.    Hospital Course:   No notes on file          Scheduled Meds:   albuterol sulfate  2.5 mg Nebulization Q6H    balsam peru-castor oiL   Topical (Top) BID    diazePAM  5 mg Per G Tube TID    enoxparin  30 mg Subcutaneous Daily    glycopyrrolate  1 mg Per J Tube TID    hydrocortisone sodium succinate  100 mg Intravenous Q8H    levETIRAcetam (Keppra) IV (PEDS and ADULTS)  500 mg Intravenous Q12H    methylphenidate HCl  5 mg Per G Tube QAM    metoclopramide  5 mg Intravenous Q8H    pantoprazole  40 mg Intravenous Daily    piperacillin-tazobactam (Zosyn) IV (PEDS and ADULTS) (extended infusion is not appropriate)  4.5 g Intravenous Q8H    sodium chloride 3%  4 mL Nebulization Q6H     Continuous Infusions:   baclofen   Intrathecal Continuous        NORepinephrine bitartrate-NaCl  0-0.2 mcg/kg/min Intravenous Continuous 1.8 mL/hr at 04/10/25 0615 0.01 mcg/kg/min at 04/10/25 0615     PRN Meds:  Current Facility-Administered Medications:     acetaminophen, 650 mg, Per J Tube, Q6H PRN    albuterol-ipratropium, 3 mL, Nebulization, Q4H PRN    artificial tears, 1 drop, Both Eyes, QID PRN    heparin, porcine (PF), 10 Units,  Intravenous, PRN    naloxone, 0.02 mg, Intravenous, PRN    ondansetron, 4 mg, Intravenous, Q6H PRN    sodium chloride 0.9%, 1-10 mL, Intravenous, Q12H PRN    Review of Systems   Skin:  Positive for wound.     Objective:     Vital Signs (Most Recent):  Temp: 97 °F (36.1 °C) (04/10/25 0701)  Pulse: (!) 43 (04/10/25 0900)  Resp: 17 (04/10/25 0900)  BP: 96/62 (04/10/25 0900)  SpO2: 100 % (04/10/25 0900) Vital Signs (24h Range):  Temp:  [96.1 °F (35.6 °C)-98.3 °F (36.8 °C)] 97 °F (36.1 °C)  Pulse:  [39-77] 43  Resp:  [9-25] 17  SpO2:  [94 %-100 %] 100 %  BP: ()/(50-69) 96/62     Weight: 49 kg (108 lb 0.4 oz)  Body mass index is 16.92 kg/m².     Physical Exam  Constitutional:       Appearance: Normal appearance.   Skin:     General: Skin is warm and dry.      Findings: Lesion present.   Neurological:      Mental Status: He is alert.          Laboratory:  All pertinent labs reviewed within the last 24 hours.    Diagnostic Results:  None    Assessment/Plan:              DARINEL Skin Integrity Evaluation      Skin Integrity DARINEL evaluation of patient as part of the comprehensive skin care team.     He has been admitted for 8 days. Skin injury was noted on 4/2/25. POA yes.    Sacrum        Orthopedic  Pressure injury of sacral region, stage 2  - follow up for assessment of sacral wound.  - pt presents with GJ-tube malfunction.   - partial thickness tissue loss to sacrum with pink, moist wound base. Recent injury to area noted initially with pink scar tissue.  - continue treatment per wound care RN recs.  - mildred intouch surface.  - boots and pillows for offloading.  - turn q2h.  - saskai score documented at 9 with a nutrition sub score of 2.  - RD following. TFs ordered.  - nursing to maintain pressure injury prevention measures and continue wound care per orders.      Karon Ohara NP  Skin Integrity DARINEL  [facility]

## 2025-04-11 LAB
ABSOLUTE EOSINOPHIL (OHS): 0 K/UL
ABSOLUTE MONOCYTE (OHS): 0.21 K/UL (ref 0.3–1)
ABSOLUTE NEUTROPHIL COUNT (OHS): 4.88 K/UL (ref 1.8–7.7)
ALBUMIN SERPL BCP-MCNC: 2.6 G/DL (ref 3.5–5.2)
ALP SERPL-CCNC: 108 UNIT/L (ref 40–150)
ALT SERPL W/O P-5'-P-CCNC: 12 UNIT/L (ref 10–44)
ANION GAP (OHS): 7 MMOL/L (ref 8–16)
ANION GAP (OHS): 7 MMOL/L (ref 8–16)
AST SERPL-CCNC: 10 UNIT/L (ref 11–45)
BASOPHILS # BLD AUTO: 0.01 K/UL
BASOPHILS NFR BLD AUTO: 0.2 %
BILIRUB SERPL-MCNC: 0.2 MG/DL (ref 0.1–1)
BUN SERPL-MCNC: 5 MG/DL (ref 6–20)
BUN SERPL-MCNC: 6 MG/DL (ref 6–20)
CALCIUM SERPL-MCNC: 8.8 MG/DL (ref 8.7–10.5)
CALCIUM SERPL-MCNC: 8.9 MG/DL (ref 8.7–10.5)
CHLORIDE SERPL-SCNC: 111 MMOL/L (ref 95–110)
CHLORIDE SERPL-SCNC: 111 MMOL/L (ref 95–110)
CO2 SERPL-SCNC: 30 MMOL/L (ref 23–29)
CO2 SERPL-SCNC: 34 MMOL/L (ref 23–29)
CREAT SERPL-MCNC: 0.5 MG/DL (ref 0.5–1.4)
CREAT SERPL-MCNC: 0.6 MG/DL (ref 0.5–1.4)
ERYTHROCYTE [DISTWIDTH] IN BLOOD BY AUTOMATED COUNT: 14.4 % (ref 11.5–14.5)
GFR SERPLBLD CREATININE-BSD FMLA CKD-EPI: >60 ML/MIN/1.73/M2
GFR SERPLBLD CREATININE-BSD FMLA CKD-EPI: >60 ML/MIN/1.73/M2
GLUCOSE SERPL-MCNC: 115 MG/DL (ref 70–110)
GLUCOSE SERPL-MCNC: 164 MG/DL (ref 70–110)
HCT VFR BLD AUTO: 31.5 % (ref 40–54)
HGB BLD-MCNC: 9.8 GM/DL (ref 14–18)
IMM GRANULOCYTES # BLD AUTO: 0.04 K/UL (ref 0–0.04)
IMM GRANULOCYTES NFR BLD AUTO: 0.7 % (ref 0–0.5)
LYMPHOCYTES # BLD AUTO: 0.87 K/UL (ref 1–4.8)
MAGNESIUM SERPL-MCNC: 2.3 MG/DL (ref 1.6–2.6)
MCH RBC QN AUTO: 29.9 PG (ref 27–31)
MCHC RBC AUTO-ENTMCNC: 31.1 G/DL (ref 32–36)
MCV RBC AUTO: 96 FL (ref 82–98)
NUCLEATED RBC (/100WBC) (OHS): 0 /100 WBC
OHS QRS DURATION: 100 MS
OHS QTC CALCULATION: 371 MS
PHOSPHATE SERPL-MCNC: 2.9 MG/DL (ref 2.7–4.5)
PLATELET # BLD AUTO: 213 K/UL (ref 150–450)
PMV BLD AUTO: 11.3 FL (ref 9.2–12.9)
POCT GLUCOSE: 102 MG/DL (ref 70–110)
POCT GLUCOSE: 117 MG/DL (ref 70–110)
POCT GLUCOSE: 133 MG/DL (ref 70–110)
POCT GLUCOSE: 140 MG/DL (ref 70–110)
POCT GLUCOSE: 149 MG/DL (ref 70–110)
POCT GLUCOSE: 90 MG/DL (ref 70–110)
POTASSIUM SERPL-SCNC: 3.1 MMOL/L (ref 3.5–5.1)
POTASSIUM SERPL-SCNC: 3.7 MMOL/L (ref 3.5–5.1)
PROT SERPL-MCNC: 6.5 GM/DL (ref 6–8.4)
RBC # BLD AUTO: 3.28 M/UL (ref 4.6–6.2)
RELATIVE EOSINOPHIL (OHS): 0 %
RELATIVE LYMPHOCYTE (OHS): 14.5 % (ref 18–48)
RELATIVE MONOCYTE (OHS): 3.5 % (ref 4–15)
RELATIVE NEUTROPHIL (OHS): 81.1 % (ref 38–73)
SODIUM SERPL-SCNC: 148 MMOL/L (ref 136–145)
SODIUM SERPL-SCNC: 152 MMOL/L (ref 136–145)
WBC # BLD AUTO: 6.01 K/UL (ref 3.9–12.7)

## 2025-04-11 PROCEDURE — 99900035 HC TECH TIME PER 15 MIN (STAT)

## 2025-04-11 PROCEDURE — 85025 COMPLETE CBC W/AUTO DIFF WBC: CPT | Performed by: STUDENT IN AN ORGANIZED HEALTH CARE EDUCATION/TRAINING PROGRAM

## 2025-04-11 PROCEDURE — 25000003 PHARM REV CODE 250

## 2025-04-11 PROCEDURE — 80053 COMPREHEN METABOLIC PANEL: CPT

## 2025-04-11 PROCEDURE — 20600001 HC STEP DOWN PRIVATE ROOM

## 2025-04-11 PROCEDURE — 63600175 PHARM REV CODE 636 W HCPCS: Performed by: INTERNAL MEDICINE

## 2025-04-11 PROCEDURE — BD16ZZZ FLUOROSCOPY OF UPPER GI AND SMALL BOWEL: ICD-10-PCS | Performed by: STUDENT IN AN ORGANIZED HEALTH CARE EDUCATION/TRAINING PROGRAM

## 2025-04-11 PROCEDURE — 63600175 PHARM REV CODE 636 W HCPCS: Performed by: STUDENT IN AN ORGANIZED HEALTH CARE EDUCATION/TRAINING PROGRAM

## 2025-04-11 PROCEDURE — 25000242 PHARM REV CODE 250 ALT 637 W/ HCPCS: Performed by: STUDENT IN AN ORGANIZED HEALTH CARE EDUCATION/TRAINING PROGRAM

## 2025-04-11 PROCEDURE — 0D2DXUZ CHANGE FEEDING DEVICE IN LOWER INTESTINAL TRACT, EXTERNAL APPROACH: ICD-10-PCS | Performed by: STUDENT IN AN ORGANIZED HEALTH CARE EDUCATION/TRAINING PROGRAM

## 2025-04-11 PROCEDURE — 83735 ASSAY OF MAGNESIUM: CPT | Performed by: INTERNAL MEDICINE

## 2025-04-11 PROCEDURE — 94761 N-INVAS EAR/PLS OXIMETRY MLT: CPT

## 2025-04-11 PROCEDURE — 99233 SBSQ HOSP IP/OBS HIGH 50: CPT | Mod: ,,, | Performed by: INTERNAL MEDICINE

## 2025-04-11 PROCEDURE — 25000003 PHARM REV CODE 250: Performed by: STUDENT IN AN ORGANIZED HEALTH CARE EDUCATION/TRAINING PROGRAM

## 2025-04-11 PROCEDURE — 63600175 PHARM REV CODE 636 W HCPCS

## 2025-04-11 PROCEDURE — 25000003 PHARM REV CODE 250: Performed by: INTERNAL MEDICINE

## 2025-04-11 PROCEDURE — 25000242 PHARM REV CODE 250 ALT 637 W/ HCPCS

## 2025-04-11 PROCEDURE — 25500020 PHARM REV CODE 255: Performed by: INTERNAL MEDICINE

## 2025-04-11 PROCEDURE — 94640 AIRWAY INHALATION TREATMENT: CPT

## 2025-04-11 PROCEDURE — 27200966 HC CLOSED SUCTION SYSTEM

## 2025-04-11 PROCEDURE — 84100 ASSAY OF PHOSPHORUS: CPT | Performed by: INTERNAL MEDICINE

## 2025-04-11 RX ORDER — ATROPINE SULFATE 0.1 MG/ML
1 INJECTION INTRAVENOUS
Status: DISCONTINUED | OUTPATIENT
Start: 2025-04-11 | End: 2025-04-12

## 2025-04-11 RX ORDER — SODIUM CHLORIDE, SODIUM LACTATE, POTASSIUM CHLORIDE, CALCIUM CHLORIDE 600; 310; 30; 20 MG/100ML; MG/100ML; MG/100ML; MG/100ML
INJECTION, SOLUTION INTRAVENOUS CONTINUOUS
Status: DISCONTINUED | OUTPATIENT
Start: 2025-04-11 | End: 2025-04-11

## 2025-04-11 RX ORDER — SODIUM CHLORIDE, SODIUM LACTATE, POTASSIUM CHLORIDE, CALCIUM CHLORIDE 600; 310; 30; 20 MG/100ML; MG/100ML; MG/100ML; MG/100ML
INJECTION, SOLUTION INTRAVENOUS CONTINUOUS
Status: ACTIVE | OUTPATIENT
Start: 2025-04-11 | End: 2025-04-12

## 2025-04-11 RX ORDER — POTASSIUM CHLORIDE 7.45 MG/ML
10 INJECTION INTRAVENOUS
Status: COMPLETED | OUTPATIENT
Start: 2025-04-11 | End: 2025-04-11

## 2025-04-11 RX ADMIN — Medication: at 08:04

## 2025-04-11 RX ADMIN — PIPERACILLIN SODIUM AND TAZOBACTAM SODIUM 4.5 G: 4; .5 INJECTION, POWDER, FOR SOLUTION INTRAVENOUS at 11:04

## 2025-04-11 RX ADMIN — PIPERACILLIN SODIUM AND TAZOBACTAM SODIUM 4.5 G: 4; .5 INJECTION, POWDER, FOR SOLUTION INTRAVENOUS at 09:04

## 2025-04-11 RX ADMIN — POTASSIUM CHLORIDE 10 MEQ: 7.46 INJECTION, SOLUTION INTRAVENOUS at 09:04

## 2025-04-11 RX ADMIN — GLYCOPYRROLATE 1 MG: 1 LIQUID ORAL at 03:04

## 2025-04-11 RX ADMIN — PIPERACILLIN SODIUM AND TAZOBACTAM SODIUM 4.5 G: 4; .5 INJECTION, POWDER, FOR SOLUTION INTRAVENOUS at 12:04

## 2025-04-11 RX ADMIN — SODIUM CHLORIDE 500 ML: 0.45 INJECTION, SOLUTION INTRAVENOUS at 05:04

## 2025-04-11 RX ADMIN — IOHEXOL 50 ML: 300 INJECTION, SOLUTION INTRAVENOUS at 02:04

## 2025-04-11 RX ADMIN — POTASSIUM CHLORIDE 10 MEQ: 7.46 INJECTION, SOLUTION INTRAVENOUS at 08:04

## 2025-04-11 RX ADMIN — DIAZEPAM 5 MG: 5 SOLUTION ORAL at 08:04

## 2025-04-11 RX ADMIN — SODIUM CHLORIDE SOLN NEBU 3% 4 ML: 3 NEBU SOLN at 12:04

## 2025-04-11 RX ADMIN — Medication: at 09:04

## 2025-04-11 RX ADMIN — METOCLOPRAMIDE 5 MG: 5 INJECTION, SOLUTION INTRAMUSCULAR; INTRAVENOUS at 03:04

## 2025-04-11 RX ADMIN — POTASSIUM CHLORIDE 10 MEQ: 7.46 INJECTION, SOLUTION INTRAVENOUS at 06:04

## 2025-04-11 RX ADMIN — METHYLPHENIDATE HYDROCHLORIDE 5 MG: 5 TABLET ORAL at 08:04

## 2025-04-11 RX ADMIN — DIAZEPAM 5 MG: 5 SOLUTION ORAL at 03:04

## 2025-04-11 RX ADMIN — GLYCOPYRROLATE 1 MG: 1 LIQUID ORAL at 09:04

## 2025-04-11 RX ADMIN — ALBUTEROL SULFATE 2.5 MG: 2.5 SOLUTION RESPIRATORY (INHALATION) at 08:04

## 2025-04-11 RX ADMIN — HYDROCORTISONE SODIUM SUCCINATE 100 MG: 100 INJECTION, POWDER, FOR SOLUTION INTRAMUSCULAR; INTRAVENOUS at 03:04

## 2025-04-11 RX ADMIN — SODIUM CHLORIDE SOLN NEBU 3% 4 ML: 3 NEBU SOLN at 07:04

## 2025-04-11 RX ADMIN — SODIUM CHLORIDE SOLN NEBU 3% 4 ML: 3 NEBU SOLN at 08:04

## 2025-04-11 RX ADMIN — PIPERACILLIN SODIUM AND TAZOBACTAM SODIUM 4.5 G: 4; .5 INJECTION, POWDER, FOR SOLUTION INTRAVENOUS at 03:04

## 2025-04-11 RX ADMIN — MUPIROCIN: 20 OINTMENT TOPICAL at 08:04

## 2025-04-11 RX ADMIN — DIAZEPAM 5 MG: 5 SOLUTION ORAL at 09:04

## 2025-04-11 RX ADMIN — LEVETIRACETAM 500 MG: 100 INJECTION INTRAVENOUS at 09:04

## 2025-04-11 RX ADMIN — LEVETIRACETAM 500 MG: 100 INJECTION INTRAVENOUS at 08:04

## 2025-04-11 RX ADMIN — HYDROCORTISONE SODIUM SUCCINATE 100 MG: 100 INJECTION, POWDER, FOR SOLUTION INTRAMUSCULAR; INTRAVENOUS at 05:04

## 2025-04-11 RX ADMIN — SODIUM CHLORIDE, POTASSIUM CHLORIDE, SODIUM LACTATE AND CALCIUM CHLORIDE: 600; 310; 30; 20 INJECTION, SOLUTION INTRAVENOUS at 08:04

## 2025-04-11 RX ADMIN — ENOXAPARIN SODIUM 30 MG: 40 INJECTION SUBCUTANEOUS at 05:04

## 2025-04-11 RX ADMIN — HYDROCORTISONE SODIUM SUCCINATE 100 MG: 100 INJECTION, POWDER, FOR SOLUTION INTRAMUSCULAR; INTRAVENOUS at 09:04

## 2025-04-11 RX ADMIN — METOCLOPRAMIDE 5 MG: 5 INJECTION, SOLUTION INTRAMUSCULAR; INTRAVENOUS at 09:04

## 2025-04-11 RX ADMIN — ALBUTEROL SULFATE 2.5 MG: 2.5 SOLUTION RESPIRATORY (INHALATION) at 07:04

## 2025-04-11 RX ADMIN — ALBUTEROL SULFATE 2.5 MG: 2.5 SOLUTION RESPIRATORY (INHALATION) at 12:04

## 2025-04-11 RX ADMIN — DEXTROSE MONOHYDRATE 500 ML: 50 INJECTION, SOLUTION INTRAVENOUS at 08:04

## 2025-04-11 RX ADMIN — PANTOPRAZOLE SODIUM 40 MG: 40 INJECTION, POWDER, FOR SOLUTION INTRAVENOUS at 08:04

## 2025-04-11 RX ADMIN — METOCLOPRAMIDE 5 MG: 5 INJECTION, SOLUTION INTRAMUSCULAR; INTRAVENOUS at 05:04

## 2025-04-11 RX ADMIN — POTASSIUM CHLORIDE 10 MEQ: 7.46 INJECTION, SOLUTION INTRAVENOUS at 05:04

## 2025-04-11 RX ADMIN — GLYCOPYRROLATE 1 MG: 1 LIQUID ORAL at 08:04

## 2025-04-11 RX ADMIN — MUPIROCIN: 20 OINTMENT TOPICAL at 09:04

## 2025-04-11 NOTE — NURSING
1930-- Levophed off    2100-- Bradycardia to tachycardia (max 121), with drop in O2 saturation to 88% on 2L NC. Increased to 5L-O2 sat at 91%. Audible congestion heard, accompanied by weak cough, unable to clear secretions effectively. Team notified of change in vitals. Notified resident that I was going to bladder scan and suggested we NT suction and/or oral suction with a catheter (unable to get Yankauer past patient's teeth due to clenching).     2130-- Bladder scan showed 418 mL. Intermittent catheter with output of 450 mL. HR improved slightly to low 100s.     2200-- Oral/NT suctioned with 10 Fr catheter. Copious amounts of clear to cloudy secretions. O2 Saturation immediately improved. Switched to room air, satting 98%; HR 93. Resident updated.       **HR, BP, O2 Saturation remained stable throughout shift. Lowest HR 46. Still on Room Air. Levophed remained off. Tube Feeds held at midnight for GJ tube conversion in IR today. Potassium replaced as ordered.

## 2025-04-11 NOTE — PROGRESS NOTES
[facility]  Critical Care Medicine  Progress Note    Patient Name: Jesus Posey  MRN: [id number]  Admission Date: 4/1/2025  Hospital Length of Stay: 10 days  Code Status: Full Code  Attending Provider: Shiraz Maki MD  Primary Care Provider: Pallavi, Primary Doctor   Principal Problem: Acute hypoxic respiratory failure    Subjective:     HPI:  Mr. 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      As he is nonverbal, father reported leakage from his GJ tube which resembled his tube feeds. Last tube exchange was last month.     Admitted to Hospital Medicine in the setting of GJ tube malfunction. Intermittently hypothermic and bradycardic secondary to dysautonomia. Infectious workup has been negative. On IV maintenance fluids. IR replaced tube on 4/2.   During hospital course with repeated episodes of intolerance to PO intake and emesis. Repeat CT AP with G Tube with good positioning. Stepped up to MICU on 4/7 due to acute hypoxic respiratory failure requiring up to 15 L  HFNC  in the setting of likely aspiration pneumonitis. Isolated febrile episode of 100.7F. CXR concerning for BL pulmonary opacities. Started on Unasyn by Hospital Medicine.      Hospital/ICU Course:  Stepped up to ICU in 4/7 the setting of acute hypoxic respiratory failure in the swtting of basilar atelectasis and mucus plugging,  with sepsis secondary to PNA requiring up to 15 L HFNC. Started on Vanc-Zosyn.  During ICU course requiring up to norepi 0.06 mcg likely in the setting of hypovolemia 2/2 poor intake. Encephalopathy improving. CTH w/o acute processes. Lactate improved. EEG c/w toxic/drug-induced encephalopathy. No seizures. Persistenty bradycardic and hypothermic with EKG junctional rhythm on 30s.On Zosyn. TSH wnl. Ordering TTE. Cortisol < 3 . Started on IV hydrocortisone 100 mg TID. Off pressors on 4/10 at 7:30  am, but back on pressors by 2pm, again off by 4/10 7pm.  Repleting dextrose for hypoglycemia.    Interval History/Significant Events:  Off pressors since 4/10 7pm. Had 2 BM. Had been receiving dextrose for hypoglycemia.      Review of Systems  Objective:     Vital Signs (Most Recent):  Temp: 97.4 °F (36.3 °C) (04/10/25 1100)  Pulse: 64 (04/10/25 1300)  Resp: 12 (04/10/25 1300)  BP: 97/60 (04/10/25 1300)  SpO2: 100 % (04/10/25 1300) Vital Signs (24h Range):  Temp:  [96.1 °F (35.6 °C)-98.3 °F (36.8 °C)] 97.4 °F (36.3 °C)  Pulse:  [39-85] 64  Resp:  [9-30] 12  SpO2:  [90 %-100 %] 100 %  BP: ()/(50-63) 97/60   Weight: 49 kg (108 lb 0.4 oz)  Body mass index is 16.92 kg/m².      Intake/Output Summary (Last 24 hours) at 4/10/2025 1428  Last data filed at 4/10/2025 1200  Gross per 24 hour   Intake 1266.77 ml   Output --   Net 1266.77 ml          Physical Exam  Constitutional:       General: He is not in acute distress.     Comments: Cachectic, eyes open but does not respond    Eyes:      Conjunctiva/sclera: Conjunctivae normal.   Cardiovascular:      Rate and Rhythm: Normal rate.      Pulses: Normal pulses.      Heart sounds: Normal heart sounds.   Pulmonary:      Effort: Pulmonary effort is normal. No respiratory distress.      Breath sounds: Normal breath sounds.   Abdominal:      General: Bowel sounds are normal. There is no distension.      Palpations: Abdomen is soft.      Tenderness: There is no abdominal tenderness.      Comments: JG tube in place   Musculoskeletal:      Right lower leg: No edema.      Left lower leg: No edema.   Skin:     General: Skin is warm and dry.      Capillary Refill: Capillary refill takes less than 2 seconds.   Neurological:      Mental Status: Mental status is at baseline.      Comments: Awake    Contractures in extremities    Right midriatic pupil unresponsive. Left reactive pupil with cataract             Vents:  Oxygen Concentration (%): 40 (04/09/25 0349)  Lines/Drains/Airways        Drain  Duration                  Gastrostomy/Enterostomy LUQ -- days              Peripheral Intravenous Line  Duration                  Peripheral IV - Single Lumen 04/01/25 0337 20 G 1 3/4 in Anterior;Left Upper Arm 9 days         Peripheral IV - Single Lumen 04/07/25 1521 20 G Anterior;Right Forearm 2 days                  Significant Labs:    CBC/Anemia Profile:  Recent Labs   Lab 04/09/25  0243 04/10/25  0309   WBC 8.36 5.99   HGB 9.7* 10.7*   HCT 30.6* 33.0*    209   MCV 97 95   RDW 15.0* 14.4        Chemistries:  Recent Labs   Lab 04/08/25  1619 04/09/25  0522 04/10/25  0309 04/10/25  0522   * 142 148*  --    K 3.9 3.7 5.7* 3.9    104 108  --    CO2 29 32* 33*  --    BUN 5* 5* 3*  --    CREATININE 0.5 0.5 0.6  --    CALCIUM 8.7 8.7 9.1  --    ALBUMIN  --  2.4* 2.7*  --    BILITOT  --  0.4 0.3  --    ALKPHOS  --  123 124  --    ALT  --  13 17  --    AST  --  13 29  --    GLUCOSE 108 82 120*  --    MG  --   --   --  2.3   PHOS  --   --   --  4.2       All pertinent labs within the past 24 hours have been reviewed.    Significant Imaging:  I have reviewed all pertinent imaging results/findings within the past 24 hours.    ABG  Recent Labs   Lab 04/09/25  0445   PH 7.347*   PO2 36*   PCO2 66.4*   HCO3 36.4*   BE 11*     Assessment/Plan:     Neuro  Toxic metabolic encephalopathy  May be secondary to sedation with diazepam with superimposed HAP and sepsis   -CT head ruled out CVA. ABG c/w acute hypercapnic rx failure  -EEG c/w toxic and drug-induced encephalopathy. Ruled out seizures  Much improved mental status today. More awake and better arousal.      Functional quadriplegia  -Requires assistance with all ADLs.  Plan: Consult PT/OT    Traumatic brain injury  Continue current management with diazepam for contractures and keppra for seizure prophylaxis.        Spastic quadriparesis  Mobility protocol.     Pulmonary  * Pulmonary septic shock with acute hypoxic respiratory failure  Patient  with Hypoxic Respiratory failure which is Acute with chronic hypercapnic respiratory failure. he is not on home oxygen. Supplemental oxygen was provided and noted-      .   Signs/symptoms of respiratory failure include- respiratory distress. Contributing diagnoses includes - ARDS and Aspiration Labs and images were reviewed. Patient Has recent ABG, which has been reviewed. Will treat underlying causes and adjust management of respiratory failure as follows-     Suspect aspiration pneumonia in the setting of hospital acquired pneumonia with recurrent vomiting with supperimposed atelectasis.  IV Zosyn.  Concomitant hypothermia and bradycardia in the setting of adrenal insufficiency.   Off pressor support briefly on 4/10, but  back again same day. On IV hydrocortisone.   Plan:  -BCX 4/7 NGTD  -at room air.   -IV zosyn. Last day on 4/12. Lactate wnl.    -IV hydrocortisone 100 mg TID.  -Pulmonary toilet: bronchodilators PRN, hypertonic saline.  IPPV.  Acute hypoxic rx failure resolved.     Renal/  Hypokalemia  resolved    Hypernatremia  Repleting free water with tube feeds.  sNa 147    Endocrine  Severe malnutrition  Nutrition consulted. Most recent weight and BMI monitored-     Measurements:  Wt Readings from Last 1 Encounters:   04/02/25 49 kg (108 lb 0.4 oz)   Body mass index is 16.92 kg/m².    Patient has been screened and assessed by RD.    Malnutrition Type:  Context:    Level:      Malnutrition Characteristic Summary:       Interventions/Recommendations (treatment strategy):   Restart tube feeds      GI  Malfunction of jejunostomy tube  Continue PEG tube feeds. Tolerating them well.    PEG tube malfunction  Patient with GJ tube dependence, presenting with tube malfunction. IR consulted for exchange under fluoroscopy. Tube changed 4/2.   GI consulted d/t recurrent vomiting with initiation of tube feeds. CTAP with GJ tube in satisfactory position.     Plan:  - 5mg IV metoclopramide Q8h   -Restart tube  feeds    Other  Hypothermia  RESOLVED. Likely secondary to adrenal insufficiency       Critical Care Daily Checklist:    A: Awake: RASS Goal/Actual Goal:    Actual:     B: Spontaneous Breathing Trial Performed?     C: SAT & SBT Coordinated?  NO                      D: Delirium: CAM-ICU     E: Early Mobility Performed? No   F: Feeding Goal: Goals: Meet % een/epn via EN by next RD f/u, maintain weight during admission  Status: Nutrition Goal Status: progressing towards goal   Current Diet Order   Procedures    Diet NPO      AS: Analgesia/Sedation NA   T: Thromboembolic Prophylaxis enoxaparin   H: HOB > 300 Yes   U: Stress Ulcer Prophylaxis (if needed) Pantoprazole 40 IV   G: Glucose Control 120 no ins req   B: Bowel Function Stool Occurrence: 1   I: Indwelling Catheter (Lines & Huddleston) Necessity 2 PIV left arm and right forearm   D: De-escalation of Antimicrobials/Pharmacotherapies Zosyn, last day on 4/11    Plan for the day/ETD Remains on MICU    Code Status:  Family/Goals of Care: Full Code         Critical secondary to Patient has a condition that poses threat to life and bodily function: sepsis      Critical care was time spent personally by me on the following activities: development of treatment plan with patient or surrogate and bedside caregivers, discussions with consultants, evaluation of patient's response to treatment, examination of patient, ordering and performing treatments and interventions, ordering and review of laboratory studies, ordering and review of radiographic studies, pulse oximetry, re-evaluation of patient's condition. This critical care time did not overlap with that of any other provider or involve time for any procedures.     Silvio Green MD  Critical Care Medicine  [facility] - Medical ICU

## 2025-04-11 NOTE — PROCEDURES
Interventional Radiology Post-Procedure Note    Pre Op Diagnosis: feeding intolerance   Post Op Diagnosis: Same    Procedure: CARINA to BETSEY GJ exchange    Procedure performed by: Ange Key MD    Written Informed Consent Obtained: Yes  Specimen Removed: YES existing GJ tube  Estimated Blood Loss: Minimal    Findings:   Fluoroscopic guided GJ tube exchange, CARINA exchanged for 18F x 3.0cm BETSEY.     Patient tolerated procedure well. Tube is ready for use.      Ange Key MD  Interventional Radiology

## 2025-04-11 NOTE — EICU
Intervention Initiated From:  [facility] / EICU    Aneudy intervened regarding:  Rounding (Video assessment)  VICU Night Rounds Checklist  Video rounds and LDA reconciliation

## 2025-04-11 NOTE — PLAN OF CARE
GJ tube to low profile GJ procedure completed. Pt tolerated procedure well. No distress noted, respirations even and unlabored.  RN at bedside for procedure.

## 2025-04-11 NOTE — RESIDENT HANDOFF
Handoff     Primary Team: Networked reference to record [facility]  Room Number: 7094/7094 A     Patient Name: Jesus Posey MRN: [id number]     Date of Birth: [birth year] Allergies: Patient has no known allergies.     Age: 19 y.o. Admit Date: 4/1/2025     Sex: male  BMI: Body mass index is 16.92 kg/m².     Code Status: Full Code        Illness Level (current clinical status): Watcher - No    Reason for Admission: Acute hypoxic respiratory failure    Brief HPI (pertinent PMH and diagnosis or differential diagnosis):     20 yo w/ h/o TBI c/b quadriplegia and spasticity s/p baclofen pump and on diazepam, PEG dependence and cachexia who presented to the MICU on 4/7 w/ c/f aspiration and acute hypoxic, septic shock with adrenal crisis and worsening encephalopathy. Required pressors until 4/10. On stress dose steroids.  On IV zosyn. Hypoxia, shock and encephalopathy have resolved. S/p. IR for BETSEY. Stepdown if hemodynamics are stable afterwards.        Procedure Date: N/A    Hospital Course (updated, brief assessment by system or problem, significant events):     Tasks (specific, using if-then statements):   Follow-up on BMP. Replete free water, and continue tube feeds.   Follow-up on accu checks, replete as needed.  Contingency Plan (special circumstances anticipated and plan):   -If glu <70 give PRN dextrose IV    Estimated Discharge Date: TBD    Discharge Disposition: Home or Self Care    Mentored By: Dr. Maki

## 2025-04-11 NOTE — CARE UPDATE
Kalpesh is a 19-year-old M PMH TBI/Epilepsy, GJ tube dependance, with acute hypoxemic respiratory failure secondary to basilar atelectasis and mucus plugging, , sepsis 2/2 PNA , chronic hypercapnic rx failure, acute encephalopathy , adrenal crisis on zosyn IV and IV hydrocortisone     Overnight: Off levo since 7pm.  has been normothermic . receiving dextrose for hypoglycemia    Neuro:no changes, awake but altered awareness.  Baclofen, diazepam, keppra  Cardio: off levo . -130/60-70s  Pulse 40-110s NSR   Lung: on room air.   Abd: soft, non tender , Bowel sounds. 2 BM last 12 hrs.   LFTs wnl  Kidney: 450 cc BUNCR 5/0.5  152/3.1/111/34  Skin/ext w/o changes  ID sepsis - zosyn Bcx 4/7 ngtd  Heme: 10/6K/200K  Endo: normothermic adrenal insuff glu 117-142      Critical Care Daily Checklist:     A: Awake: RASS Goal/Actual Goal:    Actual:     B: Spontaneous Breathing Trial Performed?    C: SAT & SBT Coordinated?  NO                      D: Delirium: CAM-ICU    E: Early Mobility Performed? No   F: Feeding Tube feeds  Goal: Goals: Meet % een/epn via EN by next RD f/u, maintain weight during admission  Status: Nutrition Goal Status: progressing towards goal   Current Diet Order   No orders of the defined types were placed in this encounter.      AS: Analgesia/Sedation NA   T: Thromboembolic Prophylaxis enoxaparin   H: HOB > 300 Yes   U: Stress Ulcer Prophylaxis (if needed) Pantoprazole 40IV   G: Glucose Control 120 no ins req.   B: Bowel Function Stool Occurrence: 1   I: Indwelling Catheter (Lines & Huddleston) Necessity 2 PIV left arm and right forearm   D: De-escalation of Antimicrobials/Pharmacotherapies Zosyn , last day on 4/11     Plan for the day/ETD Remains on MICU     Code Status:  Family/Goals of Care: Full Code

## 2025-04-11 NOTE — PLAN OF CARE
MICU DAILY GOALS     Family/Goals of care/Code Status   Code Status: Full Code    24H Vital Sign Range  Temp:  [96.1 °F (35.6 °C)-97.5 °F (36.4 °C)]   Pulse:  []   Resp:  [14-31]   BP: ()/(51-75)   SpO2:  [89 %-100 %]      Shift Events (include procedures and significant events)   Pt went to IR for maco exchange, tolerated well. Pt bradycardic throughout shift but asymptomatic, team aware.     AWAKE RASS: Goal -    Actual - RASS (Perkins Agitation-Sedation Scale): alert and calm    Restraint necessity: Not necessary   BREATHE SBT: Not intubated    Coordinate A & B, analgesics/sedatives Pain: managed   SAT: Not intubated   Delirium CAM-ICU:     Early(intubated/ Progressive (non-intubated) Mobility MOVE Screen (INTUBATED ONLY): Not intubated    Activity: Activity Management: Patient unable to perform activities   Feeding/Nutrition Diet order: Diet/Nutrition Received: NPO,     Thrombus DVT prophylaxis: VTE Core Measure: Pharmacological prophylaxis initiated/maintained   HOB Elevation Head of Bed (HOB) Positioning: HOB at 30 degrees   Ulcer Prophylaxis GI: yes   Glucose control managed     Skin Skin assessment:     Sacrum intact/not altered? No  Heels intact/not altered? No  Surgical wound? Yes    CHECK ONE!   (no altered skin or altered skin) and sub boxes:  [] No Altered Skin Integrity Present    []Prevention Measures Documented    [x] Altered Skin Integrity Present or Discovered   [] LDA already present in EPIC, daily doc completed              [] LDA added if not already in EPIC (describe/stage wound).               [] Wound Image Taken (required on admit,                   transfer/discharge and every Tuesday)    Wound Care Consulted? Yes   Bowel Function no issues    Indwelling Catheter Necessity            De-escalation Antibiotics No        VS and assessment per flow sheet, patient progressing towards goals as tolerated, plan of care reviewed with  pt and family , all concerns addressed.

## 2025-04-11 NOTE — PLAN OF CARE
Pt arrived to IR room 190 for GJ tube change to low profile GJ. Pt oriented to unit and staff. Plan of care reviewed with patient. Comfort measures utilized. Pt safely transferred from stretcher to procedural table. Fall risk reviewed with patient, fall risk interventions maintained. Safety strap applied, positioner pillows utilized to minimize pressure points. Blankets applied. Pt prepped and draped utilizing standard sterile technique. Patient placed on continuous monitoring, as required by sedation policy. Timeouts completed utilizing standard universal time-out, per department and facility policy. RN to remain at bedside, continuous monitoring maintained. Pt resting comfortably. Denies pain/discomfort. Will continue to monitor. See flow sheets for monitoring, medication administration, and updates.

## 2025-04-12 LAB
ABSOLUTE EOSINOPHIL (OHS): 0.01 K/UL
ABSOLUTE MONOCYTE (OHS): 0.85 K/UL (ref 0.3–1)
ABSOLUTE NEUTROPHIL COUNT (OHS): 5.9 K/UL (ref 1.8–7.7)
ALBUMIN SERPL BCP-MCNC: 2.5 G/DL (ref 3.5–5.2)
ALP SERPL-CCNC: 118 UNIT/L (ref 40–150)
ALT SERPL W/O P-5'-P-CCNC: 12 UNIT/L (ref 10–44)
ANION GAP (OHS): 11 MMOL/L (ref 8–16)
ANION GAP (OHS): 12 MMOL/L (ref 8–16)
AST SERPL-CCNC: 10 UNIT/L (ref 11–45)
BACTERIA BLD CULT: NORMAL
BACTERIA BLD CULT: NORMAL
BASOPHILS # BLD AUTO: 0.02 K/UL
BASOPHILS NFR BLD AUTO: 0.2 %
BILIRUB SERPL-MCNC: 0.1 MG/DL (ref 0.1–1)
BUN SERPL-MCNC: 6 MG/DL (ref 6–20)
BUN SERPL-MCNC: 7 MG/DL (ref 6–20)
CALCIUM SERPL-MCNC: 8.6 MG/DL (ref 8.7–10.5)
CALCIUM SERPL-MCNC: 8.6 MG/DL (ref 8.7–10.5)
CHLORIDE SERPL-SCNC: 108 MMOL/L (ref 95–110)
CHLORIDE SERPL-SCNC: 112 MMOL/L (ref 95–110)
CO2 SERPL-SCNC: 29 MMOL/L (ref 23–29)
CO2 SERPL-SCNC: 30 MMOL/L (ref 23–29)
CREAT SERPL-MCNC: 0.5 MG/DL (ref 0.5–1.4)
CREAT SERPL-MCNC: 0.5 MG/DL (ref 0.5–1.4)
ERYTHROCYTE [DISTWIDTH] IN BLOOD BY AUTOMATED COUNT: 14.6 % (ref 11.5–14.5)
GFR SERPLBLD CREATININE-BSD FMLA CKD-EPI: >60 ML/MIN/1.73/M2
GFR SERPLBLD CREATININE-BSD FMLA CKD-EPI: >60 ML/MIN/1.73/M2
GLUCOSE SERPL-MCNC: 107 MG/DL (ref 70–110)
GLUCOSE SERPL-MCNC: 140 MG/DL (ref 70–110)
HCT VFR BLD AUTO: 36.8 % (ref 40–54)
HGB BLD-MCNC: 11.7 GM/DL (ref 14–18)
IMM GRANULOCYTES # BLD AUTO: 0.16 K/UL (ref 0–0.04)
IMM GRANULOCYTES NFR BLD AUTO: 2 % (ref 0–0.5)
LYMPHOCYTES # BLD AUTO: 1.21 K/UL (ref 1–4.8)
MAGNESIUM SERPL-MCNC: 2 MG/DL (ref 1.6–2.6)
MCH RBC QN AUTO: 30.7 PG (ref 27–31)
MCHC RBC AUTO-ENTMCNC: 31.8 G/DL (ref 32–36)
MCV RBC AUTO: 97 FL (ref 82–98)
NUCLEATED RBC (/100WBC) (OHS): 0 /100 WBC
PHOSPHATE SERPL-MCNC: 1.8 MG/DL (ref 2.7–4.5)
PLATELET # BLD AUTO: 232 K/UL (ref 150–450)
PMV BLD AUTO: 11.2 FL (ref 9.2–12.9)
POCT GLUCOSE: 107 MG/DL (ref 70–110)
POCT GLUCOSE: 138 MG/DL (ref 70–110)
POCT GLUCOSE: 140 MG/DL (ref 70–110)
POCT GLUCOSE: 156 MG/DL (ref 70–110)
POCT GLUCOSE: 187 MG/DL (ref 70–110)
POTASSIUM SERPL-SCNC: 2.6 MMOL/L (ref 3.5–5.1)
POTASSIUM SERPL-SCNC: 3.6 MMOL/L (ref 3.5–5.1)
PROT SERPL-MCNC: 6.4 GM/DL (ref 6–8.4)
RBC # BLD AUTO: 3.81 M/UL (ref 4.6–6.2)
RELATIVE EOSINOPHIL (OHS): 0.1 %
RELATIVE LYMPHOCYTE (OHS): 14.8 % (ref 18–48)
RELATIVE MONOCYTE (OHS): 10.4 % (ref 4–15)
RELATIVE NEUTROPHIL (OHS): 72.5 % (ref 38–73)
SODIUM SERPL-SCNC: 149 MMOL/L (ref 136–145)
SODIUM SERPL-SCNC: 153 MMOL/L (ref 136–145)
WBC # BLD AUTO: 8.15 K/UL (ref 3.9–12.7)

## 2025-04-12 PROCEDURE — 94640 AIRWAY INHALATION TREATMENT: CPT

## 2025-04-12 PROCEDURE — 63600175 PHARM REV CODE 636 W HCPCS: Performed by: STUDENT IN AN ORGANIZED HEALTH CARE EDUCATION/TRAINING PROGRAM

## 2025-04-12 PROCEDURE — 93005 ELECTROCARDIOGRAM TRACING: CPT

## 2025-04-12 PROCEDURE — 25000003 PHARM REV CODE 250: Performed by: INTERNAL MEDICINE

## 2025-04-12 PROCEDURE — 25000242 PHARM REV CODE 250 ALT 637 W/ HCPCS

## 2025-04-12 PROCEDURE — 25000003 PHARM REV CODE 250: Performed by: STUDENT IN AN ORGANIZED HEALTH CARE EDUCATION/TRAINING PROGRAM

## 2025-04-12 PROCEDURE — 36415 COLL VENOUS BLD VENIPUNCTURE: CPT | Performed by: STUDENT IN AN ORGANIZED HEALTH CARE EDUCATION/TRAINING PROGRAM

## 2025-04-12 PROCEDURE — 85025 COMPLETE CBC W/AUTO DIFF WBC: CPT | Performed by: STUDENT IN AN ORGANIZED HEALTH CARE EDUCATION/TRAINING PROGRAM

## 2025-04-12 PROCEDURE — 93010 ELECTROCARDIOGRAM REPORT: CPT | Mod: ,,, | Performed by: INTERNAL MEDICINE

## 2025-04-12 PROCEDURE — 80053 COMPREHEN METABOLIC PANEL: CPT

## 2025-04-12 PROCEDURE — 63600175 PHARM REV CODE 636 W HCPCS: Performed by: INTERNAL MEDICINE

## 2025-04-12 PROCEDURE — 83735 ASSAY OF MAGNESIUM: CPT | Performed by: INTERNAL MEDICINE

## 2025-04-12 PROCEDURE — 63600175 PHARM REV CODE 636 W HCPCS

## 2025-04-12 PROCEDURE — 25000242 PHARM REV CODE 250 ALT 637 W/ HCPCS: Performed by: STUDENT IN AN ORGANIZED HEALTH CARE EDUCATION/TRAINING PROGRAM

## 2025-04-12 PROCEDURE — 20600001 HC STEP DOWN PRIVATE ROOM

## 2025-04-12 PROCEDURE — 84100 ASSAY OF PHOSPHORUS: CPT | Performed by: INTERNAL MEDICINE

## 2025-04-12 RX ORDER — GLYCOPYRROLATE 1 MG/5ML
1 SOLUTION ORAL 3 TIMES DAILY
Status: DISCONTINUED | OUTPATIENT
Start: 2025-04-12 | End: 2025-04-24 | Stop reason: HOSPADM

## 2025-04-12 RX ORDER — SODIUM,POTASSIUM PHOSPHATES 280-250MG
2 POWDER IN PACKET (EA) ORAL 2 TIMES DAILY
Status: COMPLETED | OUTPATIENT
Start: 2025-04-12 | End: 2025-04-12

## 2025-04-12 RX ORDER — POTASSIUM CHLORIDE 7.45 MG/ML
10 INJECTION INTRAVENOUS
Status: COMPLETED | OUTPATIENT
Start: 2025-04-12 | End: 2025-04-12

## 2025-04-12 RX ORDER — ALBUTEROL SULFATE 2.5 MG/.5ML
2.5 SOLUTION RESPIRATORY (INHALATION) EVERY 6 HOURS PRN
Status: DISCONTINUED | OUTPATIENT
Start: 2025-04-12 | End: 2025-04-24 | Stop reason: HOSPADM

## 2025-04-12 RX ORDER — SODIUM CHLORIDE FOR INHALATION 3 %
4 VIAL, NEBULIZER (ML) INHALATION EVERY 6 HOURS PRN
Status: DISCONTINUED | OUTPATIENT
Start: 2025-04-12 | End: 2025-04-24 | Stop reason: HOSPADM

## 2025-04-12 RX ORDER — ACETAMINOPHEN 650 MG/20.3ML
650 LIQUID ORAL EVERY 6 HOURS PRN
Status: DISCONTINUED | OUTPATIENT
Start: 2025-04-12 | End: 2025-04-24 | Stop reason: HOSPADM

## 2025-04-12 RX ADMIN — DIAZEPAM 5 MG: 5 SOLUTION ORAL at 08:04

## 2025-04-12 RX ADMIN — LEVETIRACETAM 500 MG: 100 INJECTION INTRAVENOUS at 08:04

## 2025-04-12 RX ADMIN — POTASSIUM & SODIUM PHOSPHATES POWDER PACK 280-160-250 MG 2 PACKET: 280-160-250 PACK at 08:04

## 2025-04-12 RX ADMIN — Medication: at 08:04

## 2025-04-12 RX ADMIN — POTASSIUM CHLORIDE 10 MEQ: 7.46 INJECTION, SOLUTION INTRAVENOUS at 04:04

## 2025-04-12 RX ADMIN — PANTOPRAZOLE SODIUM 40 MG: 40 INJECTION, POWDER, FOR SOLUTION INTRAVENOUS at 08:04

## 2025-04-12 RX ADMIN — METOCLOPRAMIDE 5 MG: 5 INJECTION, SOLUTION INTRAMUSCULAR; INTRAVENOUS at 01:04

## 2025-04-12 RX ADMIN — METOCLOPRAMIDE 5 MG: 5 INJECTION, SOLUTION INTRAMUSCULAR; INTRAVENOUS at 05:04

## 2025-04-12 RX ADMIN — MUPIROCIN: 20 OINTMENT TOPICAL at 08:04

## 2025-04-12 RX ADMIN — PIPERACILLIN SODIUM AND TAZOBACTAM SODIUM 4.5 G: 4; .5 INJECTION, POWDER, FOR SOLUTION INTRAVENOUS at 08:04

## 2025-04-12 RX ADMIN — HYDROCORTISONE SODIUM SUCCINATE 100 MG: 100 INJECTION, POWDER, FOR SOLUTION INTRAMUSCULAR; INTRAVENOUS at 09:04

## 2025-04-12 RX ADMIN — METHYLPHENIDATE HYDROCHLORIDE 5 MG: 5 TABLET ORAL at 06:04

## 2025-04-12 RX ADMIN — POTASSIUM CHLORIDE 10 MEQ: 7.46 INJECTION, SOLUTION INTRAVENOUS at 01:04

## 2025-04-12 RX ADMIN — SODIUM CHLORIDE SOLN NEBU 3% 4 ML: 3 NEBU SOLN at 01:04

## 2025-04-12 RX ADMIN — GLYCOPYRROLATE 1 MG: 1 LIQUID ORAL at 08:04

## 2025-04-12 RX ADMIN — POTASSIUM & SODIUM PHOSPHATES POWDER PACK 280-160-250 MG 2 PACKET: 280-160-250 PACK at 01:04

## 2025-04-12 RX ADMIN — METOCLOPRAMIDE 5 MG: 5 INJECTION, SOLUTION INTRAMUSCULAR; INTRAVENOUS at 09:04

## 2025-04-12 RX ADMIN — HYDROCORTISONE SODIUM SUCCINATE 100 MG: 100 INJECTION, POWDER, FOR SOLUTION INTRAMUSCULAR; INTRAVENOUS at 01:04

## 2025-04-12 RX ADMIN — HYDROCORTISONE SODIUM SUCCINATE 100 MG: 100 INJECTION, POWDER, FOR SOLUTION INTRAMUSCULAR; INTRAVENOUS at 05:04

## 2025-04-12 RX ADMIN — GLYCOPYRROLATE 1 MG: 1 LIQUID ORAL at 02:04

## 2025-04-12 RX ADMIN — POTASSIUM CHLORIDE 10 MEQ: 7.46 INJECTION, SOLUTION INTRAVENOUS at 02:04

## 2025-04-12 RX ADMIN — POTASSIUM CHLORIDE 10 MEQ: 7.46 INJECTION, SOLUTION INTRAVENOUS at 03:04

## 2025-04-12 RX ADMIN — ALBUTEROL SULFATE 2.5 MG: 2.5 SOLUTION RESPIRATORY (INHALATION) at 01:04

## 2025-04-12 RX ADMIN — ENOXAPARIN SODIUM 30 MG: 40 INJECTION SUBCUTANEOUS at 04:04

## 2025-04-12 RX ADMIN — DIAZEPAM 5 MG: 5 SOLUTION ORAL at 02:04

## 2025-04-12 NOTE — NURSING
Unable to get temp on pt per pct. Primary nurse attempted to get an axillary/rectal temp but was unsuccessful. Charge nurse, rapid and md notified. Recommended to place pt on nael hugger. Ok to place pt on nael hugger an re attempt to get temp. Pt placed on nael hugger. Step mother/Primary nurse at bedside. Will cont poc

## 2025-04-12 NOTE — ASSESSMENT & PLAN NOTE
Patient's most recent potassium results are listed below.   Recent Labs     04/11/25  1059 04/12/25  0851 04/12/25  1735   K 3.7 2.6* 3.6     Plan  - Replete potassium per protocol  - Monitor potassium Daily  - Patient's hypokalemia is stable

## 2025-04-12 NOTE — ASSESSMENT & PLAN NOTE
May be secondary to sedation with diazepam with superimposed HAP and sepsis   - CT head ruled out CVA. ABG c/w acute hypercapnic rx failure  - EEG c/w toxic and drug-induced encephalopathy. Ruled out seizures

## 2025-04-12 NOTE — ASSESSMENT & PLAN NOTE
Hypernatremia is likely due to Dehydration from missed free water flushes. The patient's most recent sodium results are listed below.    Recent Labs     04/11/25  1059 04/12/25  0851 04/12/25  1735   * 153* 149*     Plan  - Aim to correct the sodium by 8-10mEq in 24 hours.   - Resume tube feeds and free water flushes as able

## 2025-04-12 NOTE — PROGRESS NOTES
[facility] - Telemetry [facility] Medicine  Progress Note    Patient Name: Jesus Posey  MRN: [id number]  Patient Class: IP- Inpatient   Admission Date: 4/1/2025  Length of Stay: 11 days  Attending Physician: Billy Martin MD  Primary Care Provider: Pallavi, Primary Doctor        Subjective     Principal Problem:Acute hypoxic respiratory failure        HPI:  Mr. 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      As he is nonverbal, father reported leakage from his GJ tube which resembled his tube feeds. Last tube exchange was last month.      Admitted to Hospital Medicine in the setting of GJ tube malfunction. Intermittently hypothermic and bradycardic secondary to dysautonomia. Infectious workup has been negative. On IV maintenance fluids. IR replaced tube on 4/2.   During hospital course with repeated episodes of intolerance to PO intake and emesis. Repeat CT AP with G Tube with good positioning. Stepped up to MICU on 4/7 due to acute hypoxic respiratory failure requiring up to 15 L  HFNC  in the setting of likely aspiration pneumonitis. Isolated febrile episode of 100.7F. CXR concerning for BL pulmonary opacities. Started on Unasyn by Hospital Medicine.    Overview/Hospital Course:  Stepped up to ICU in 4/7 the setting of acute hypoxic respiratory failure in the swtting of basilar atelectasis and mucus plugging, with sepsis secondary to PNA requiring up to 15 L HFNC. Started on Vanc-Zosyn. During ICU course requiring up to norepi 0.06 mcg likely in the setting of hypovolemia 2/2 poor intake. Encephalopathy improving. CTH w/o acute processes. Lactate improved. EEG c/w toxic/drug-induced encephalopathy. No seizures. Persistenty bradycardic and hypothermic with EKG junctional rhythm on 30s.On Zosyn. TSH wnl. Ordering TTE. Cortisol < 3 . Started on IV hydrocortisone 100 mg TID. Off pressors on 4/10 at  7:30 am, but back on pressors by 2pm, again off by 4/10 7pm. Repleting dextrose for hypoglycemi         Interval History:    Step-down from ICU.  Phosphorus replaced via G-tube.  Family updated at bedside.  Completed pneumonia treatment and Zosyn.        Review of Systems   All other systems reviewed and are negative.     Objective:      Vital Signs (Most Recent):  Temp: 98.6 °F (37 °C) (04/13/25 1223)  Pulse: 64 (04/13/25 1300)  Resp: (!) 22 (04/13/25 1223)  BP: 110/62 (04/13/25 1223)  SpO2: 100 % (04/13/25 1223) Vital Signs (24h Range):  Temp:  [96.8 °F (36 °C)-98.9 °F (37.2 °C)] 98.6 °F (37 °C)  Pulse:  [37-97] 64  Resp:  [16-26] 22  SpO2:  [96 %-100 %] 100 %  BP: ()/(52-81) 110/62      Weight: 49 kg (108 lb 0.4 oz)  Body mass index is 16.92 kg/m².     Intake/Output Summary (Last 24 hours) at 4/13/2025 1424  Last data filed at 4/13/2025 0900      Gross per 24 hour   Intake 3913 ml   Output --   Net 3913 ml         Physical Exam  Constitutional:       General: He is not in acute distress.     Comments: Cachectic, eyes open but does not respond    Eyes:      Conjunctiva/sclera: Conjunctivae normal.   Cardiovascular:      Rate and Rhythm: Normal rate.      Pulses: Normal pulses.      Heart sounds: Normal heart sounds.   Pulmonary:      Effort: Pulmonary effort is normal. No respiratory distress.      Breath sounds: Normal breath sounds.   Abdominal:      General: Bowel sounds are normal. There is no distension.      Palpations: Abdomen is soft.      Tenderness: There is no abdominal tenderness.      Comments: JG tube in place   Musculoskeletal:      Right lower leg: No edema.      Left lower leg: No edema.   Skin:     General: Skin is warm and dry.   Neurological:      Mental Status: Mental status is at baseline.      Comments: Awake     Contractures in extremities     Right midriatic pupil unresponsive. Left reactive pupil with cataract                    Significant Labs: All pertinent labs within the past  24 hours have been reviewed.  CBC:        Recent Labs   Lab 04/12/25  0851    WBC 8.15    HGB 11.7*    HCT 36.8*           CMP:         Recent Labs   Lab 04/12/25  0851 04/12/25  1735    * 149*    K 2.6* 3.6    * 108    CO2 29 30*    BUN 7 6    CREATININE 0.5 0.5    CALCIUM 8.6* 8.6*    ALBUMIN 2.5*  --     BILITOT 0.1  --     ALKPHOS 118  --     AST 10*  --     ALT 12  --     ANIONGAP 12 11          Significant Imaging: I have reviewed all pertinent imaging results/findings within the past 24 hours.      Assessment & Plan  Pulmonary septic shock with acute hypoxic respiratory failure  Low serum cortisol level  Patient with Hypoxic Respiratory failure which is Acute with chronic hypercapnic respiratory failure. he is not on home oxygen. Supplemental oxygen was provided and noted-       .   Signs/symptoms of respiratory failure include- respiratory distress. Contributing diagnoses includes - ARDS and Aspiration Labs and images were reviewed. Patient Has recent ABG, which has been reviewed. Will treat underlying causes and adjust management of respiratory failure as follows-      Suspect aspiration pneumonia in the setting of hospital acquired pneumonia with recurrent vomiting with supperimposed atelectasis.   - s/p IV Zosyn x5 days  - Concomitant hypothermia and bradycardia in the setting of adrenal insufficiency.   - Off pressor support briefly on 4/10, but  back again same day.   - IPPV. Acute hypoxic rx failure resolved.     - BCX 4/7 NGTD  - Lactate wnl.    - IV hydrocortisone 100 mg TID, wean as tolerated  - Endocrine consulted  -- Will do an ACTH stimulation test once patient is off steroids to confirm/rule out adrenal insufficiency.   - Pulmonary toilet: bronchodilators PRN, hypertonic saline.        PEG tube malfunction  Malfunction of jejunostomy tube  Patient with GJ tube dependence, presenting with tube malfunction. IR consulted for exchange under fluoroscopy. Tube changed 4/2.   GI  consulted d/t recurrent vomiting with initiation of tube feeds. CTAP with GJ tube in satisfactory position.     - 5mg IV metoclopramide Q8h   - Restart tube feeds    Toxic metabolic encephalopathy  May be secondary to sedation with diazepam with superimposed HAP and sepsis   - CT head ruled out CVA. ABG c/w acute hypercapnic rx failure  - EEG c/w toxic and drug-induced encephalopathy. Ruled out seizures    Spastic quadriparesis  - Intrathecal baclofen pump in place.   - Mobility protocol.     Functional quadriplegia  - Intrathecal baclofen pump in place. Supportive care.     Cachexia  Resume tube feeds after GJ tube replaced as able    Hypothermia  Per family, this is common when pt is hospitalized  - Suspect low temps and bradycardia are related to dysautonomia  - Likely secondary to adrenal insufficiency     Hypernatremia  Hypernatremia is likely due to Dehydration from missed free water flushes. The patient's most recent sodium results are listed below.    Recent Labs     04/11/25  1059 04/12/25  0851 04/12/25  1735   * 153* 149*     Plan  - Aim to correct the sodium by 8-10mEq in 24 hours.   - Resume tube feeds and free water flushes as able    Hypokalemia  Patient's most recent potassium results are listed below.   Recent Labs     04/11/25  1059 04/12/25  0851 04/12/25  1735   K 3.7 2.6* 3.6     Plan  - Replete potassium per protocol  - Monitor potassium Daily  - Patient's hypokalemia is stable    Severe malnutrition  Body mass index (BMI) less than 19  Nutrition consulted. Most recent weight and BMI monitored-     Measurements:  Wt Readings from Last 1 Encounters:   04/02/25 49 kg (108 lb 0.4 oz)   Body mass index is 16.92 kg/m².    Patient has been screened and assessed by RD.    Malnutrition Type:  Context:    Level:      Malnutrition Characteristic Summary:       Interventions/Recommendations (treatment strategy):       Nutrition consulted. Most recent weight and BMI monitored-     Traumatic brain  injury  Continue current management with diazepam for contractures and keppra for seizure prophylaxis.     Pressure injury of sacral region, stage 2  Deep tissue injury  - Wound care    VTE Risk Mitigation (From admission, onward)           Ordered     heparin, porcine (PF) 100 unit/mL injection flush 10 Units  As needed (PRN)         04/10/25 0504     enoxaparin injection 30 mg  Daily         04/07/25 1830     IP VTE HIGH RISK PATIENT  Once         04/07/25 1403     Place sequential compression device  Until discontinued         04/01/25 0209                    Discharge Planning   EDINSON: 4/15/2025     Code Status: Full Code   Medical Readiness for Discharge Date:   Discharge Plan A: Home, Home with family, Home Health   Discharge Delays: None known at this time                    Billy Martin MD  Department of Hospital Medicine   [facility] - Telemetry Stepdown

## 2025-04-12 NOTE — ASSESSMENT & PLAN NOTE
Patient with GJ tube dependence, presenting with tube malfunction. IR consulted for exchange under fluoroscopy. Tube changed 4/2.   GI consulted d/t recurrent vomiting with initiation of tube feeds. CTAP with GJ tube in satisfactory position.     - 5mg IV metoclopramide Q8h   - Restart tube feeds

## 2025-04-12 NOTE — PROGRESS NOTES
04/12/25 1704   Vital Signs   Temp 96.8 °F (36 °C)   Temp Source Rectal   Pulse (!) 45   Heart Rate Source Monitor   Resp 16   SpO2 99 %   Pulse Oximetry Type Continuous   Device (Oxygen Therapy) room air     Rectal tempt 96.8. nael pedersen still on. Rapid response/md /charge nurse notified. Step mother at bedside. Will cont poc

## 2025-04-12 NOTE — ASSESSMENT & PLAN NOTE
Per family, this is common when pt is hospitalized  - Suspect low temps and bradycardia are related to dysautonomia  - Likely secondary to adrenal insufficiency

## 2025-04-12 NOTE — ASSESSMENT & PLAN NOTE
Patient with Hypoxic Respiratory failure which is Acute with chronic hypercapnic respiratory failure. he is not on home oxygen. Supplemental oxygen was provided and noted-       .   Signs/symptoms of respiratory failure include- respiratory distress. Contributing diagnoses includes - ARDS and Aspiration Labs and images were reviewed. Patient Has recent ABG, which has been reviewed. Will treat underlying causes and adjust management of respiratory failure as follows-      Suspect aspiration pneumonia in the setting of hospital acquired pneumonia with recurrent vomiting with supperimposed atelectasis.   - s/p IV Zosyn x5 days  - Concomitant hypothermia and bradycardia in the setting of adrenal insufficiency.   - Off pressor support briefly on 4/10, but  back again same day.   - IPPV. Acute hypoxic rx failure resolved.     - BCX 4/7 NGTD  - Lactate wnl.    - IV hydrocortisone 100 mg TID, wean as tolerated  - Endocrine consulted  -- Will do an ACTH stimulation test once patient is off steroids to confirm/rule out adrenal insufficiency.   - Pulmonary toilet: bronchodilators PRN, hypertonic saline.

## 2025-04-12 NOTE — PLAN OF CARE
Problem: Adult Inpatient Plan of Care  Goal: Plan of Care Review  Outcome: Progressing     Problem: Skin Injury Risk Increased  Goal: Skin Health and Integrity  Outcome: Progressing     Problem: Wound  Goal: Optimal Coping  Outcome: Progressing

## 2025-04-12 NOTE — ASSESSMENT & PLAN NOTE
Nutrition consulted. Most recent weight and BMI monitored-     Measurements:  Wt Readings from Last 1 Encounters:   04/02/25 49 kg (108 lb 0.4 oz)   Body mass index is 16.92 kg/m².    Patient has been screened and assessed by RD.    Malnutrition Type:  Context:    Level:      Malnutrition Characteristic Summary:       Interventions/Recommendations (treatment strategy):       Nutrition consulted. Most recent weight and BMI monitored-

## 2025-04-12 NOTE — NURSING TRANSFER
Nursing Transfer Note      4/11/2025   11:58 PM    Nurse giving handoff:Parker CRAWFORD RN  Nurse receiving handoff:Allyson PEREZ    Reason patient is being transferred: step down    Transfer To: 8079 from 70    Transfer via bed    Transfer with cardiac monitoring    Transported by RN and PCT    Transfer Vital Signs:  Blood Pressure:    Heart Rate:  O2:  Temperature:  Respirations:    Telemetry: Rate 40  Order for Tele Monitor? Yes    Additional Lines: Suction    Medicines sent: yes    Any special needs or follow-up needed: none    Patient belongings transferred with patient: Yes    Chart send with patient: Yes    Notified: marina    Patient reassessed at: [id number] (date, time)  1  Upon arrival to floor: cardiac monitor applied, patient oriented to room, call bell in reach, and bed in lowest position

## 2025-04-13 PROBLEM — E83.39 HYPOPHOSPHATEMIA: Status: ACTIVE | Noted: 2025-04-13

## 2025-04-13 LAB
ABSOLUTE NEUTROPHIL MANUAL (OHS): 9.1 K/UL
ALBUMIN SERPL BCP-MCNC: 2.5 G/DL (ref 3.5–5.2)
ALP SERPL-CCNC: 128 UNIT/L (ref 40–150)
ALT SERPL W/O P-5'-P-CCNC: 41 UNIT/L (ref 10–44)
ANION GAP (OHS): 10 MMOL/L (ref 8–16)
ANISOCYTOSIS BLD QL SMEAR: SLIGHT
AST SERPL-CCNC: 47 UNIT/L (ref 11–45)
BILIRUB SERPL-MCNC: 0.1 MG/DL (ref 0.1–1)
BUN SERPL-MCNC: 8 MG/DL (ref 6–20)
CALCIUM SERPL-MCNC: 8.4 MG/DL (ref 8.7–10.5)
CHLORIDE SERPL-SCNC: 112 MMOL/L (ref 95–110)
CO2 SERPL-SCNC: 26 MMOL/L (ref 23–29)
CREAT SERPL-MCNC: 0.5 MG/DL (ref 0.5–1.4)
ERYTHROCYTE [DISTWIDTH] IN BLOOD BY AUTOMATED COUNT: 14.9 % (ref 11.5–14.5)
GFR SERPLBLD CREATININE-BSD FMLA CKD-EPI: >60 ML/MIN/1.73/M2
GLUCOSE SERPL-MCNC: 122 MG/DL (ref 70–110)
HCT VFR BLD AUTO: 39.9 % (ref 40–54)
HGB BLD-MCNC: 12.3 GM/DL (ref 14–18)
LYMPHOCYTES NFR BLD MANUAL: 15 % (ref 18–48)
MAGNESIUM SERPL-MCNC: 2 MG/DL (ref 1.6–2.6)
MCH RBC QN AUTO: 30.3 PG (ref 27–31)
MCHC RBC AUTO-ENTMCNC: 30.8 G/DL (ref 32–36)
MCV RBC AUTO: 98 FL (ref 82–98)
METAMYELOCYTES NFR BLD MANUAL: 1 %
MONOCYTES NFR BLD MANUAL: 6 % (ref 4–15)
MYELOCYTES NFR BLD MANUAL: 2 %
NEUTROPHILS NFR BLD MANUAL: 75 % (ref 38–73)
NEUTS BAND NFR BLD MANUAL: 1 %
NUCLEATED RBC (/100WBC) (OHS): 1 /100 WBC
OHS QRS DURATION: 114 MS
OHS QTC CALCULATION: 363 MS
PHOSPHATE SERPL-MCNC: 1 MG/DL (ref 2.7–4.5)
PLATELET # BLD AUTO: 214 K/UL (ref 150–450)
PLATELET BLD QL SMEAR: ABNORMAL
PMV BLD AUTO: 12.2 FL (ref 9.2–12.9)
POCT GLUCOSE: 110 MG/DL (ref 70–110)
POCT GLUCOSE: 133 MG/DL (ref 70–110)
POCT GLUCOSE: 153 MG/DL (ref 70–110)
POCT GLUCOSE: 187 MG/DL (ref 70–110)
POTASSIUM SERPL-SCNC: 3.6 MMOL/L (ref 3.5–5.1)
PROT SERPL-MCNC: 6.4 GM/DL (ref 6–8.4)
RBC # BLD AUTO: 4.06 M/UL (ref 4.6–6.2)
SODIUM SERPL-SCNC: 148 MMOL/L (ref 136–145)
WBC # BLD AUTO: 11.99 K/UL (ref 3.9–12.7)

## 2025-04-13 PROCEDURE — 84100 ASSAY OF PHOSPHORUS: CPT | Performed by: INTERNAL MEDICINE

## 2025-04-13 PROCEDURE — 63600175 PHARM REV CODE 636 W HCPCS: Performed by: STUDENT IN AN ORGANIZED HEALTH CARE EDUCATION/TRAINING PROGRAM

## 2025-04-13 PROCEDURE — 94761 N-INVAS EAR/PLS OXIMETRY MLT: CPT

## 2025-04-13 PROCEDURE — 80053 COMPREHEN METABOLIC PANEL: CPT

## 2025-04-13 PROCEDURE — 25000242 PHARM REV CODE 250 ALT 637 W/ HCPCS: Performed by: STUDENT IN AN ORGANIZED HEALTH CARE EDUCATION/TRAINING PROGRAM

## 2025-04-13 PROCEDURE — 63600175 PHARM REV CODE 636 W HCPCS: Performed by: INTERNAL MEDICINE

## 2025-04-13 PROCEDURE — 85007 BL SMEAR W/DIFF WBC COUNT: CPT | Performed by: STUDENT IN AN ORGANIZED HEALTH CARE EDUCATION/TRAINING PROGRAM

## 2025-04-13 PROCEDURE — 25000003 PHARM REV CODE 250: Performed by: STUDENT IN AN ORGANIZED HEALTH CARE EDUCATION/TRAINING PROGRAM

## 2025-04-13 PROCEDURE — 63600175 PHARM REV CODE 636 W HCPCS

## 2025-04-13 PROCEDURE — 83735 ASSAY OF MAGNESIUM: CPT | Performed by: INTERNAL MEDICINE

## 2025-04-13 PROCEDURE — 36415 COLL VENOUS BLD VENIPUNCTURE: CPT | Performed by: STUDENT IN AN ORGANIZED HEALTH CARE EDUCATION/TRAINING PROGRAM

## 2025-04-13 PROCEDURE — 20600001 HC STEP DOWN PRIVATE ROOM

## 2025-04-13 RX ORDER — SODIUM,POTASSIUM PHOSPHATES 280-250MG
2 POWDER IN PACKET (EA) ORAL EVERY 6 HOURS
Status: DISCONTINUED | OUTPATIENT
Start: 2025-04-13 | End: 2025-04-15

## 2025-04-13 RX ADMIN — MUPIROCIN: 20 OINTMENT TOPICAL at 10:04

## 2025-04-13 RX ADMIN — DIAZEPAM 5 MG: 5 SOLUTION ORAL at 10:04

## 2025-04-13 RX ADMIN — METOCLOPRAMIDE 5 MG: 5 INJECTION, SOLUTION INTRAMUSCULAR; INTRAVENOUS at 01:04

## 2025-04-13 RX ADMIN — MUPIROCIN: 20 OINTMENT TOPICAL at 08:04

## 2025-04-13 RX ADMIN — METOCLOPRAMIDE 5 MG: 5 INJECTION, SOLUTION INTRAMUSCULAR; INTRAVENOUS at 10:04

## 2025-04-13 RX ADMIN — GLYCOPYRROLATE 1 MG: 1 LIQUID ORAL at 03:04

## 2025-04-13 RX ADMIN — METHYLPHENIDATE HYDROCHLORIDE 5 MG: 5 TABLET ORAL at 06:04

## 2025-04-13 RX ADMIN — METOCLOPRAMIDE 5 MG: 5 INJECTION, SOLUTION INTRAMUSCULAR; INTRAVENOUS at 06:04

## 2025-04-13 RX ADMIN — Medication: at 08:04

## 2025-04-13 RX ADMIN — SODIUM PHOSPHATE, MONOBASIC, MONOHYDRATE AND SODIUM PHOSPHATE, DIBASIC, ANHYDROUS 39.9 MMOL: 142; 276 INJECTION, SOLUTION INTRAVENOUS at 09:04

## 2025-04-13 RX ADMIN — HYDROCORTISONE SODIUM SUCCINATE 75 MG: 100 INJECTION, POWDER, FOR SOLUTION INTRAMUSCULAR; INTRAVENOUS at 01:04

## 2025-04-13 RX ADMIN — LEVETIRACETAM 500 MG: 100 INJECTION INTRAVENOUS at 10:04

## 2025-04-13 RX ADMIN — Medication: at 10:04

## 2025-04-13 RX ADMIN — LEVETIRACETAM 500 MG: 100 INJECTION INTRAVENOUS at 08:04

## 2025-04-13 RX ADMIN — GLYCOPYRROLATE 1 MG: 1 LIQUID ORAL at 10:04

## 2025-04-13 RX ADMIN — POTASSIUM & SODIUM PHOSPHATES POWDER PACK 280-160-250 MG 2 PACKET: 280-160-250 PACK at 05:04

## 2025-04-13 RX ADMIN — HYDROCORTISONE SODIUM SUCCINATE 75 MG: 100 INJECTION, POWDER, FOR SOLUTION INTRAMUSCULAR; INTRAVENOUS at 10:04

## 2025-04-13 RX ADMIN — PANTOPRAZOLE SODIUM 40 MG: 40 INJECTION, POWDER, FOR SOLUTION INTRAVENOUS at 08:04

## 2025-04-13 RX ADMIN — ENOXAPARIN SODIUM 30 MG: 40 INJECTION SUBCUTANEOUS at 05:04

## 2025-04-13 RX ADMIN — DIAZEPAM 5 MG: 5 SOLUTION ORAL at 03:04

## 2025-04-13 RX ADMIN — POTASSIUM & SODIUM PHOSPHATES POWDER PACK 280-160-250 MG 2 PACKET: 280-160-250 PACK at 10:04

## 2025-04-13 RX ADMIN — HYDROCORTISONE SODIUM SUCCINATE 100 MG: 100 INJECTION, POWDER, FOR SOLUTION INTRAMUSCULAR; INTRAVENOUS at 06:04

## 2025-04-13 NOTE — PLAN OF CARE
Problem: Adult Inpatient Plan of Care  Goal: Plan of Care Review  Outcome: Progressing  Goal: Patient-Specific Goal (Individualized)  Outcome: Progressing     Problem: Skin Injury Risk Increased  Goal: Skin Health and Integrity  Outcome: Progressing     Problem: Wound  Goal: Absence of Infection Signs and Symptoms  Outcome: Progressing  Goal: Skin Health and Integrity  Outcome: Progressing     Patient remains nonverbal, nil signs of pain or discomfort. Medicate per MAR, Nutren 1.5 continues at 60 ml/hr. Suction intermittently to help control oral secretions. BM X 2, Urine output noted. Nil acute events noted throughout the shift. Car and observation continues.

## 2025-04-13 NOTE — SUBJECTIVE & OBJECTIVE
Interval History:    No events overnight.  Phosphorus replaced IV and via G-tube.  Fever flushes decreased due to congestion.  Family updated at bedside.  Weaning stress dose steroids.      Review of Systems   All other systems reviewed and are negative.    Objective:     Vital Signs (Most Recent):  Temp: 98.6 °F (37 °C) (04/13/25 1223)  Pulse: 64 (04/13/25 1300)  Resp: (!) 22 (04/13/25 1223)  BP: 110/62 (04/13/25 1223)  SpO2: 100 % (04/13/25 1223) Vital Signs (24h Range):  Temp:  [96.8 °F (36 °C)-98.9 °F (37.2 °C)] 98.6 °F (37 °C)  Pulse:  [37-97] 64  Resp:  [16-26] 22  SpO2:  [96 %-100 %] 100 %  BP: ()/(52-81) 110/62     Weight: 49 kg (108 lb 0.4 oz)  Body mass index is 16.92 kg/m².    Intake/Output Summary (Last 24 hours) at 4/13/2025 1424  Last data filed at 4/13/2025 0900  Gross per 24 hour   Intake 3913 ml   Output --   Net 3913 ml         Physical Exam  Constitutional:       General: He is not in acute distress.     Comments: Cachectic, eyes open but does not respond    Eyes:      Conjunctiva/sclera: Conjunctivae normal.   Cardiovascular:      Rate and Rhythm: Normal rate.      Pulses: Normal pulses.      Heart sounds: Normal heart sounds.   Pulmonary:      Effort: Pulmonary effort is normal. No respiratory distress.      Breath sounds: Normal breath sounds.   Abdominal:      General: Bowel sounds are normal. There is no distension.      Palpations: Abdomen is soft.      Tenderness: There is no abdominal tenderness.      Comments: JG tube in place   Musculoskeletal:      Right lower leg: No edema.      Left lower leg: No edema.   Skin:     General: Skin is warm and dry.   Neurological:      Mental Status: Mental status is at baseline.      Comments: Awake    Contractures in extremities    Right midriatic pupil unresponsive. Left reactive pupil with cataract                Significant Labs: All pertinent labs within the past 24 hours have been reviewed.  CBC:   Recent Labs   Lab 04/12/25  0851  04/13/25  0824   WBC 8.15 11.99   HGB 11.7* 12.3*   HCT 36.8* 39.9*     --      CMP:   Recent Labs   Lab 04/12/25  0851 04/12/25  1735 04/13/25  0649   * 149* 148*   K 2.6* 3.6 3.6   * 108 112*   CO2 29 30* 26   BUN 7 6 8   CREATININE 0.5 0.5 0.5   CALCIUM 8.6* 8.6* 8.4*   ALBUMIN 2.5*  --  2.5*   BILITOT 0.1  --  0.1   ALKPHOS 118  --  128   AST 10*  --  47*   ALT 12  --  41   ANIONGAP 12 11 10       Significant Imaging: I have reviewed all pertinent imaging results/findings within the past 24 hours.

## 2025-04-13 NOTE — SUBJECTIVE & OBJECTIVE
"Interval HPI:   Overnight events:  No events overnight from Endocrinology standpoint. Patient's ACTH stimulation test was attempted today but was unsuccessful. Has been off of HC since yesterday morning 4/20.    /62   Pulse 75   Temp 98.2 °F (36.8 °C)   Resp (!) 22   Ht 5' 7" (1.702 m)   Wt 49 kg (108 lb 0.4 oz)   SpO2 100%   BMI 16.92 kg/m²     Labs Reviewed and Include    Recent Labs   Lab 04/12/25  0851 04/12/25  1735   CALCIUM 8.6* 8.6*   ALBUMIN 2.5*  --    * 149*   K 2.6* 3.6   CO2 29 30*   * 108   BUN 7 6   CREATININE 0.5 0.5   ALKPHOS 118  --    ALT 12  --    AST 10*  --    BILITOT 0.1  --      Lab Results   Component Value Date    WBC 8.15 04/12/2025    HGB 11.7 (L) 04/12/2025    HCT 36.8 (L) 04/12/2025    MCV 97 04/12/2025     04/12/2025     Recent Labs   Lab 04/08/25  1619   TSH 2.596     No results found for: "HGBA1C"    Nutritional status:   Body mass index is 16.92 kg/m².  Lab Results   Component Value Date    ALBUMIN 2.5 (L) 04/12/2025    ALBUMIN 2.6 (L) 04/11/2025    ALBUMIN 2.7 (L) 04/10/2025     No results found for: "PREALBUMIN"    Estimated Creatinine Clearance: 163.3 mL/min (based on SCr of 0.5 mg/dL).    Accu-Checks  Recent Labs     04/11/25  1220 04/11/25  1643 04/11/25  2154 04/12/25  0352 04/12/25  0820 04/12/25  1156 04/12/25  1625 04/12/25 2012 04/12/25  2356 04/13/25  0503   POCTGLUCOSE 140* 90 133* 187* 156* 138* 107 140* 187* 153*       Current Medications and/or Treatments Impacting Glycemic Control  Immunotherapy:    Immunosuppressants       None          Steroids:   Hormones (From admission, onward)      Start     Stop Route Frequency Ordered    04/09/25 2200  hydrocortisone sodium succinate injection 100 mg         -- IV Every 8 hours 04/09/25 1717          Pressors:    Autonomic Drugs (From admission, onward)      Start     Stop Route Frequency Ordered    04/08/25 1922  NORepinephrine 4 mg in sodium chloride 0.9% 250 mL infusion (premix)      "   Question Answer Comment   Begin at (in mcg/kg/min): 0.02    Titrate by: (in mcg/kg/min (RANGE PREFERRED) 0.02 - 0.2    Titrate interval: (in minutes) (RANGE PREFERRED) 2 - 5    Titrate to maintain: (MAP or SBP) MAP    Titrate to keep MAP within the range or GREATER than (if single number): (in mmHg) 65 - 75    Maximum dose: (in mcg/kg/min) 0.2        04/09/25 2145 IV Continuous 04/08/25 2143 04/07/25 2115  NORepinephrine 4 mg in sodium chloride 0.9% 250 mL infusion (premix)        Question Answer Comment   Begin at (in mcg/kg/min): 0.02    Titrate by: (in mcg/kg/min (RANGE PREFERRED) 0.02 - 0.2    Titrate interval: (in minutes) (RANGE PREFERRED) 2 - 5    Titrate to maintain: (MAP or SBP) MAP    Titrate to keep MAP within the range or GREATER than (if single number): (in mmHg) 65 - 75    Maximum dose: (in mcg/kg/min) 0.2        04/2004 IV Continuous 04/07/25 2003          Hyperglycemia/Diabetes Medications:   Antihyperglycemics (From admission, onward)      None

## 2025-04-13 NOTE — PROGRESS NOTES
[facility] - Telemetry [facility] Hospital Medicine  Progress Note    Patient Name: Jesus Posey  MRN: [id number]  Patient Class: IP- Inpatient   Admission Date: 4/1/2025  Length of Stay: 12 days  Attending Physician: Billy Martin MD  Primary Care Provider: Pallavi, Primary Doctor        Subjective     Principal Problem:Acute hypoxic respiratory failure        HPI:  Mr. 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      As he is nonverbal, father reported leakage from his GJ tube which resembled his tube feeds. Last tube exchange was last month.      Admitted to Hospital Medicine in the setting of GJ tube malfunction. Intermittently hypothermic and bradycardic secondary to dysautonomia. Infectious workup has been negative. On IV maintenance fluids. IR replaced tube on 4/2.   During hospital course with repeated episodes of intolerance to PO intake and emesis. Repeat CT AP with G Tube with good positioning. Stepped up to MICU on 4/7 due to acute hypoxic respiratory failure requiring up to 15 L  HFNC  in the setting of likely aspiration pneumonitis. Isolated febrile episode of 100.7F. CXR concerning for BL pulmonary opacities. Started on Unasyn by Hospital Medicine.    Overview/Hospital Course:  Stepped up to ICU in 4/7 the setting of acute hypoxic respiratory failure in the swtting of basilar atelectasis and mucus plugging, with sepsis secondary to PNA requiring up to 15 L HFNC. Started on Vanc-Zosyn. During ICU course requiring up to norepi 0.06 mcg likely in the setting of hypovolemia 2/2 poor intake. Encephalopathy improving. CTH w/o acute processes. Lactate improved. EEG c/w toxic/drug-induced encephalopathy. No seizures. Persistenty bradycardic and hypothermic with EKG junctional rhythm on 30s.On Zosyn. TSH wnl. Ordering TTE. Cortisol < 3 . Started on IV hydrocortisone 100 mg TID. Off pressors on 4/10 at  7:30 am, but back on pressors by 2pm, again off by 4/10 7pm. Repleting dextrose for hypoglycemi     Interval History:    No events overnight.  Phosphorus replaced IV and via G-tube.  Fever flushes decreased due to congestion.  Family updated at bedside.  Weaning stress dose steroids.      Review of Systems   All other systems reviewed and are negative.    Objective:     Vital Signs (Most Recent):  Temp: 98.6 °F (37 °C) (04/13/25 1223)  Pulse: 64 (04/13/25 1300)  Resp: (!) 22 (04/13/25 1223)  BP: 110/62 (04/13/25 1223)  SpO2: 100 % (04/13/25 1223) Vital Signs (24h Range):  Temp:  [96.8 °F (36 °C)-98.9 °F (37.2 °C)] 98.6 °F (37 °C)  Pulse:  [37-97] 64  Resp:  [16-26] 22  SpO2:  [96 %-100 %] 100 %  BP: ()/(52-81) 110/62     Weight: 49 kg (108 lb 0.4 oz)  Body mass index is 16.92 kg/m².    Intake/Output Summary (Last 24 hours) at 4/13/2025 1424  Last data filed at 4/13/2025 0900  Gross per 24 hour   Intake 3913 ml   Output --   Net 3913 ml         Physical Exam  Constitutional:       General: He is not in acute distress.     Comments: Cachectic, eyes open but does not respond    Eyes:      Conjunctiva/sclera: Conjunctivae normal.   Cardiovascular:      Rate and Rhythm: Normal rate.      Pulses: Normal pulses.      Heart sounds: Normal heart sounds.   Pulmonary:      Effort: Pulmonary effort is normal. No respiratory distress.      Breath sounds: Normal breath sounds.   Abdominal:      General: Bowel sounds are normal. There is no distension.      Palpations: Abdomen is soft.      Tenderness: There is no abdominal tenderness.      Comments: JG tube in place   Musculoskeletal:      Right lower leg: No edema.      Left lower leg: No edema.   Skin:     General: Skin is warm and dry.   Neurological:      Mental Status: Mental status is at baseline.      Comments: Awake    Contractures in extremities    Right midriatic pupil unresponsive. Left reactive pupil with cataract                Significant Labs: All pertinent  labs within the past 24 hours have been reviewed.  CBC:   Recent Labs   Lab 04/12/25  0851 04/13/25  0824   WBC 8.15 11.99   HGB 11.7* 12.3*   HCT 36.8* 39.9*     --      CMP:   Recent Labs   Lab 04/12/25  0851 04/12/25  1735 04/13/25  0649   * 149* 148*   K 2.6* 3.6 3.6   * 108 112*   CO2 29 30* 26   BUN 7 6 8   CREATININE 0.5 0.5 0.5   CALCIUM 8.6* 8.6* 8.4*   ALBUMIN 2.5*  --  2.5*   BILITOT 0.1  --  0.1   ALKPHOS 118  --  128   AST 10*  --  47*   ALT 12  --  41   ANIONGAP 12 11 10       Significant Imaging: I have reviewed all pertinent imaging results/findings within the past 24 hours.      Assessment & Plan  Pulmonary septic shock with acute hypoxic respiratory failure  Low serum cortisol level  Patient with Hypoxic Respiratory failure which is Acute with chronic hypercapnic respiratory failure. he is not on home oxygen. Supplemental oxygen was provided and noted-       .   Signs/symptoms of respiratory failure include- respiratory distress. Contributing diagnoses includes - ARDS and Aspiration Labs and images were reviewed. Patient Has recent ABG, which has been reviewed. Will treat underlying causes and adjust management of respiratory failure as follows-      Suspect aspiration pneumonia in the setting of hospital acquired pneumonia with recurrent vomiting with supperimposed atelectasis.   - s/p IV Zosyn x5 days  - Concomitant hypothermia and bradycardia in the setting of adrenal insufficiency.   - Off pressor support briefly on 4/10, but  back again same day.   - IPPV. Acute hypoxic rx failure resolved.     - BCX 4/7 NGTD  - Lactate wnl.    - IV hydrocortisone 75 mg TID, wean as tolerated  - Endocrine consulted  -- Will do an ACTH stimulation test once patient is off steroids to confirm/rule out adrenal insufficiency.   - Pulmonary toilet: bronchodilators PRN, hypertonic saline.        PEG tube malfunction  Malfunction of jejunostomy tube  Patient with GJ tube dependence, presenting with  tube malfunction. IR consulted for exchange under fluoroscopy. Tube changed 4/2.   GI consulted d/t recurrent vomiting with initiation of tube feeds. CTAP with GJ tube in satisfactory position.     - 5mg IV metoclopramide Q8h   - Restart tube feeds    Toxic metabolic encephalopathy  May be secondary to sedation with diazepam with superimposed HAP and sepsis   - CT head ruled out CVA. ABG c/w acute hypercapnic rx failure  - EEG c/w toxic and drug-induced encephalopathy. Ruled out seizures    Spastic quadriparesis  - Intrathecal baclofen pump in place.   - Mobility protocol.     Functional quadriplegia  - Intrathecal baclofen pump in place. Supportive care.     Cachexia  Resume tube feeds after GJ tube replaced as able    Hypothermia  Per family, this is common when pt is hospitalized  - Suspect low temps and bradycardia are related to dysautonomia  - Likely secondary to adrenal insufficiency     Hypernatremia  Hypernatremia is likely due to Dehydration from missed free water flushes. The patient's most recent sodium results are listed below.    Recent Labs     04/12/25  0851 04/12/25  1735 04/13/25  0649   * 149* 148*     Plan  - Aim to correct the sodium by 8-10mEq in 24 hours.   - Resume tube feeds and free water flushes as able    Hypokalemia  Patient's most recent potassium results are listed below.   Recent Labs     04/12/25  0851 04/12/25  1735 04/13/25  0649   K 2.6* 3.6 3.6     Plan  - Replete potassium per protocol  - Monitor potassium Daily  - Patient's hypokalemia is stable    Severe malnutrition  Body mass index (BMI) less than 19  Nutrition consulted. Most recent weight and BMI monitored-     Measurements:  Wt Readings from Last 1 Encounters:   04/02/25 49 kg (108 lb 0.4 oz)   Body mass index is 16.92 kg/m².    Patient has been screened and assessed by RD.    Malnutrition Type:  Context:    Level:      Malnutrition Characteristic Summary:       Interventions/Recommendations (treatment strategy):        Nutrition consulted. Most recent weight and BMI monitored-     Traumatic brain injury  Continue current management with diazepam for contractures and keppra for seizure prophylaxis.     Pressure injury of sacral region, stage 2  Deep tissue injury  - Wound care    Hypophosphatemia  Patient's most recent phosphorus results are listed below.   Recent Labs     04/11/25  0336 04/12/25  0851 04/13/25  0649   PHOS 2.9 1.8* 1.0*     Plan  - Will treat hypophosphatemia with IV and PO replacement  - Patient's hypophosphatemia is stable  - Likely secondary to repeated    VTE Risk Mitigation (From admission, onward)           Ordered     heparin, porcine (PF) 100 unit/mL injection flush 10 Units  As needed (PRN)         04/10/25 0504     enoxaparin injection 30 mg  Daily         04/07/25 1830     IP VTE HIGH RISK PATIENT  Once         04/07/25 1403     Place sequential compression device  Until discontinued         04/01/25 0209                    Discharge Planning   EDINSON: 4/15/2025     Code Status: Full Code   Medical Readiness for Discharge Date:   Discharge Plan A: Home, Home with family, Home Health   Discharge Delays: None known at this time                    Billy Martin MD  Department of Hospital Medicine   [facility] - Telemetry Stepdown

## 2025-04-13 NOTE — ASSESSMENT & PLAN NOTE
Hypernatremia is likely due to Dehydration from missed free water flushes. The patient's most recent sodium results are listed below.    Recent Labs     04/12/25  0851 04/12/25  1735 04/13/25  0649   * 149* 148*     Plan  - Aim to correct the sodium by 8-10mEq in 24 hours.   - Resume tube feeds and free water flushes as able

## 2025-04-13 NOTE — PLAN OF CARE
"[facility] Care Plan    POC reviewed with Jesus Posey and family at 0300. Family verbalized understanding. Questions and concerns addressed. No acute events today. Pt progressing toward goals. Will continue to monitor. See below and flowsheets for full assessment and VS info.     -Bowel movement x2  -Tube feed continued @ 60 ml/hr  -250 ml q4h free water flushes  -Bath given and linen changed        Is this a stroke patient? no    Neuro:  [place] Coma Scale  Best Eye Response: 3-->(E3) to speech  Best Motor Response: 4-->(M4) withdraws from pain  Best Verbal Response: 1-->(V1) none  [place] Coma Scale Score: 8  Assessment Qualifiers: patient not sedated/intubated  Pupil PERRLA: yes     24 hr Temp:  [96.8 °F (36 °C)-98.9 °F (37.2 °C)]     CV:   Rhythm: normal sinus rhythm  BP goals:   SBP < 160  MAP > 65    Resp:      Oxygen Concentration (%): 40    Plan: N/A    GI/:     Diet/Nutrition Received: NPO, tube feeding  Last Bowel Movement: 04/13/25  Voiding Characteristics: incontinence    Intake/Output Summary (Last 24 hours) at 4/13/2025 0641  Last data filed at 4/13/2025 0502  Gross per 24 hour   Intake 2160 ml   Output --   Net 2160 ml     Unmeasured Output  Urine Occurrence: 1  Stool Occurrence: 1  Emesis Occurrence: 1  Pad Count: 2    Labs/Accuchecks:  Recent Labs   Lab 04/12/25  0851   WBC 8.15   RBC 3.81*   HGB 11.7*   HCT 36.8*         Recent Labs   Lab 04/12/25  0851 04/12/25  1735   * 149*   K 2.6* 3.6   CO2 29 30*   * 108   BUN 7 6   CREATININE 0.5 0.5   ALKPHOS 118  --    ALT 12  --    AST 10*  --    BILITOT 0.1  --     No results for input(s): "PROTIME", "INR", "APTT", "HEPANTIXA" in the last 168 hours. No results for input(s): "CPK", "CPKMB", "TROPONINI", "MB" in the last 168 hours.    Electrolytes: Electrolytes replaced  Accuchecks: Q6H    Gtts:   baclofen   Intrathecal Continuous           LDA/Wounds:    Sly Risk Assessment  Sensory Perception: 2-->very limited  Moisture: " 3-->occasionally moist  Activity: 1-->bedfast  Mobility: 1-->completely immobile  Nutrition: 2-->probably inadequate  Friction and Shear: 2-->potential problem  Sly Score: 11  Is your sly score 12 or less? yes heel boots on          Restraints:        [facility]

## 2025-04-13 NOTE — PLAN OF CARE
"ENDOCRINOLOGY PLAN OF CARE   04/13/2025    HPI: "Mr. Posey is a 18 yo male with PMHx of TBI, qudariplegia, cachexia, epilepsy, PEG tube placement, who initially presented with G-J  tube malfunction and is currently admitted in MICU due to acute hypoxic respiratory failure and sepsis 2/2 pneumonia, treated with empiric antibiotics and requiring pressor support.  (Pt is nonverbal at baseline, history is obtained from mother during encounter)  Patient has been persistently bradycardic since arrival.  Persistent hyponatremia or hyperkalemia are not seen on chart review.  TSH within normal limits.  A morning cortisol drawn around 5:30 a.m. on 04/09/2025 was low at 3.3.  Patient was subsequently started on stress dose steroids IV hydrocortisone 100 mg q.8h on 4/9 at 10 pm.  Today morning 4/10, he was weaned off pressor support for a few hours, however he is currently placed back on Levophed.  Endocrinology is consulted for adrenal insufficiency.  Patient has no previous diagnosis of adrenal insufficiency and has no known chronic oral steroid exposure."    Interval history:  Remains on stress dose steroids.  Off Levophed since 7:00 p.m. on 04/10.  Blood pressure has improved but remains bradycardic.    Vitals and labs reviewed:  /62   Pulse 75   Temp 98.2 °F (36.8 °C)   Resp (!) 22   Ht 5' 7" (1.702 m)   Wt 49 kg (108 lb 0.4 oz)   SpO2 100%   BMI 16.92 kg/m²     Labs Reviewed and Include    Recent Labs   Lab 04/12/25  0851 04/12/25  1735   CALCIUM 8.6* 8.6*   ALBUMIN 2.5*  --    * 149*   K 2.6* 3.6   CO2 29 30*   * 108   BUN 7 6   CREATININE 0.5 0.5   ALKPHOS 118  --    ALT 12  --    AST 10*  --    BILITOT 0.1  --      Lab Results   Component Value Date    WBC 8.15 04/12/2025    HGB 11.7 (L) 04/12/2025    HCT 36.8 (L) 04/12/2025    MCV 97 04/12/2025     04/12/2025        Lab 04/08/25  1619   TSH 2.596       ASSESSMENT and PLAN:      Endocrine  Low serum cortisol level  Endocrinology consulted " for possible adrenal insufficiency in the setting of hypotension requiring pressor support, bradycardia and intermittent hypothermia, with a low serum cortisol level (3.3 at 5.30 am on 4/9/25).   No persistent hyponatremia or hyperkalemia on labs. No hx chronic po steroid exposure.  TSH wnl.  Currently he is on stress dose steroids with IV hydrocortisone 100 mg q.8h- started on 4/9.  Levophed was discontinued the next day, however he was back on it after a few hours, finally weaned off by 7:00 p.m. on 04/10.      Continue stress dose steroids in the setting of critical illness and taper off once patient's clinical status has improved. We will do an ACTH stimulation test once patient is off steroids to assess HPA axis.        Discussed with Dr. Magaña.

## 2025-04-13 NOTE — ASSESSMENT & PLAN NOTE
Patient's most recent potassium results are listed below.   Recent Labs     04/12/25  0851 04/12/25  1735 04/13/25  0649   K 2.6* 3.6 3.6     Plan  - Replete potassium per protocol  - Monitor potassium Daily  - Patient's hypokalemia is stable

## 2025-04-13 NOTE — ASSESSMENT & PLAN NOTE
Patient's most recent phosphorus results are listed below.   Recent Labs     04/11/25  0336 04/12/25  0851 04/13/25  0649   PHOS 2.9 1.8* 1.0*     Plan  - Will treat hypophosphatemia with IV and PO replacement  - Patient's hypophosphatemia is stable  - Likely secondary to repeated

## 2025-04-13 NOTE — ASSESSMENT & PLAN NOTE
Patient with Hypoxic Respiratory failure which is Acute with chronic hypercapnic respiratory failure. he is not on home oxygen. Supplemental oxygen was provided and noted-       .   Signs/symptoms of respiratory failure include- respiratory distress. Contributing diagnoses includes - ARDS and Aspiration Labs and images were reviewed. Patient Has recent ABG, which has been reviewed. Will treat underlying causes and adjust management of respiratory failure as follows-      Suspect aspiration pneumonia in the setting of hospital acquired pneumonia with recurrent vomiting with supperimposed atelectasis.   - s/p IV Zosyn x5 days  - Concomitant hypothermia and bradycardia in the setting of adrenal insufficiency.   - Off pressor support briefly on 4/10, but  back again same day.   - IPPV. Acute hypoxic rx failure resolved.     - BCX 4/7 NGTD  - Lactate wnl.    - IV hydrocortisone 75 mg TID, wean as tolerated  - Endocrine consulted  -- Will do an ACTH stimulation test once patient is off steroids to confirm/rule out adrenal insufficiency.   - Pulmonary toilet: bronchodilators PRN, hypertonic saline.

## 2025-04-14 LAB
ABSOLUTE EOSINOPHIL (OHS): 0.02 K/UL
ABSOLUTE MONOCYTE (OHS): 0.64 K/UL (ref 0.3–1)
ABSOLUTE NEUTROPHIL COUNT (OHS): 9.26 K/UL (ref 1.8–7.7)
ALBUMIN SERPL BCP-MCNC: 2.5 G/DL (ref 3.5–5.2)
ALP SERPL-CCNC: 138 UNIT/L (ref 40–150)
ALT SERPL W/O P-5'-P-CCNC: 57 UNIT/L (ref 10–44)
ANION GAP (OHS): 10 MMOL/L (ref 8–16)
AST SERPL-CCNC: 44 UNIT/L (ref 11–45)
BASOPHILS # BLD AUTO: 0.06 K/UL
BASOPHILS NFR BLD AUTO: 0.5 %
BILIRUB SERPL-MCNC: 0.1 MG/DL (ref 0.1–1)
BUN SERPL-MCNC: 12 MG/DL (ref 6–20)
CALCIUM SERPL-MCNC: 8.3 MG/DL (ref 8.7–10.5)
CHLORIDE SERPL-SCNC: 106 MMOL/L (ref 95–110)
CO2 SERPL-SCNC: 30 MMOL/L (ref 23–29)
CREAT SERPL-MCNC: 0.5 MG/DL (ref 0.5–1.4)
ERYTHROCYTE [DISTWIDTH] IN BLOOD BY AUTOMATED COUNT: 14.5 % (ref 11.5–14.5)
GFR SERPLBLD CREATININE-BSD FMLA CKD-EPI: >60 ML/MIN/1.73/M2
GLUCOSE SERPL-MCNC: 131 MG/DL (ref 70–110)
HCT VFR BLD AUTO: 35.9 % (ref 40–54)
HGB BLD-MCNC: 11.4 GM/DL (ref 14–18)
IMM GRANULOCYTES # BLD AUTO: 0.48 K/UL (ref 0–0.04)
IMM GRANULOCYTES NFR BLD AUTO: 4 % (ref 0–0.5)
LYMPHOCYTES # BLD AUTO: 1.69 K/UL (ref 1–4.8)
MAGNESIUM SERPL-MCNC: 2 MG/DL (ref 1.6–2.6)
MCH RBC QN AUTO: 30.7 PG (ref 27–31)
MCHC RBC AUTO-ENTMCNC: 31.8 G/DL (ref 32–36)
MCV RBC AUTO: 97 FL (ref 82–98)
NUCLEATED RBC (/100WBC) (OHS): 1 /100 WBC
PHOSPHATE SERPL-MCNC: 1.6 MG/DL (ref 2.7–4.5)
PLATELET # BLD AUTO: 297 K/UL (ref 150–450)
PLATELET BLD QL SMEAR: NORMAL
PMV BLD AUTO: 11.5 FL (ref 9.2–12.9)
POCT GLUCOSE: 146 MG/DL (ref 70–110)
POCT GLUCOSE: 150 MG/DL (ref 70–110)
POCT GLUCOSE: 169 MG/DL (ref 70–110)
POCT GLUCOSE: 172 MG/DL (ref 70–110)
POTASSIUM SERPL-SCNC: 3.5 MMOL/L (ref 3.5–5.1)
PROT SERPL-MCNC: 6.5 GM/DL (ref 6–8.4)
RBC # BLD AUTO: 3.71 M/UL (ref 4.6–6.2)
RELATIVE EOSINOPHIL (OHS): 0.2 %
RELATIVE LYMPHOCYTE (OHS): 13.9 % (ref 18–48)
RELATIVE MONOCYTE (OHS): 5.3 % (ref 4–15)
RELATIVE NEUTROPHIL (OHS): 76.1 % (ref 38–73)
SODIUM SERPL-SCNC: 146 MMOL/L (ref 136–145)
WBC # BLD AUTO: 12.15 K/UL (ref 3.9–12.7)

## 2025-04-14 PROCEDURE — 63600175 PHARM REV CODE 636 W HCPCS

## 2025-04-14 PROCEDURE — 20600001 HC STEP DOWN PRIVATE ROOM

## 2025-04-14 PROCEDURE — 80053 COMPREHEN METABOLIC PANEL: CPT

## 2025-04-14 PROCEDURE — 63600175 PHARM REV CODE 636 W HCPCS: Performed by: STUDENT IN AN ORGANIZED HEALTH CARE EDUCATION/TRAINING PROGRAM

## 2025-04-14 PROCEDURE — 25000242 PHARM REV CODE 250 ALT 637 W/ HCPCS: Performed by: STUDENT IN AN ORGANIZED HEALTH CARE EDUCATION/TRAINING PROGRAM

## 2025-04-14 PROCEDURE — 84100 ASSAY OF PHOSPHORUS: CPT | Performed by: INTERNAL MEDICINE

## 2025-04-14 PROCEDURE — 36415 COLL VENOUS BLD VENIPUNCTURE: CPT

## 2025-04-14 PROCEDURE — 25000003 PHARM REV CODE 250: Performed by: STUDENT IN AN ORGANIZED HEALTH CARE EDUCATION/TRAINING PROGRAM

## 2025-04-14 PROCEDURE — 63600175 PHARM REV CODE 636 W HCPCS: Performed by: INTERNAL MEDICINE

## 2025-04-14 PROCEDURE — 85025 COMPLETE CBC W/AUTO DIFF WBC: CPT | Performed by: STUDENT IN AN ORGANIZED HEALTH CARE EDUCATION/TRAINING PROGRAM

## 2025-04-14 PROCEDURE — 83735 ASSAY OF MAGNESIUM: CPT | Performed by: INTERNAL MEDICINE

## 2025-04-14 RX ADMIN — PANTOPRAZOLE SODIUM 40 MG: 40 INJECTION, POWDER, FOR SOLUTION INTRAVENOUS at 09:04

## 2025-04-14 RX ADMIN — POTASSIUM & SODIUM PHOSPHATES POWDER PACK 280-160-250 MG 2 PACKET: 280-160-250 PACK at 06:04

## 2025-04-14 RX ADMIN — LEVETIRACETAM 500 MG: 100 INJECTION INTRAVENOUS at 10:04

## 2025-04-14 RX ADMIN — MUPIROCIN: 20 OINTMENT TOPICAL at 09:04

## 2025-04-14 RX ADMIN — DIAZEPAM 5 MG: 5 SOLUTION ORAL at 04:04

## 2025-04-14 RX ADMIN — GLYCOPYRROLATE 1 MG: 1 LIQUID ORAL at 09:04

## 2025-04-14 RX ADMIN — GLYCOPYRROLATE 1 MG: 1 LIQUID ORAL at 04:04

## 2025-04-14 RX ADMIN — POTASSIUM & SODIUM PHOSPHATES POWDER PACK 280-160-250 MG 2 PACKET: 280-160-250 PACK at 12:04

## 2025-04-14 RX ADMIN — Medication: at 10:04

## 2025-04-14 RX ADMIN — HYDROCORTISONE SODIUM SUCCINATE 50 MG: 100 INJECTION INTRAMUSCULAR; INTRAVENOUS at 10:04

## 2025-04-14 RX ADMIN — METHYLPHENIDATE HYDROCHLORIDE 5 MG: 5 TABLET ORAL at 06:04

## 2025-04-14 RX ADMIN — ENOXAPARIN SODIUM 30 MG: 40 INJECTION SUBCUTANEOUS at 06:04

## 2025-04-14 RX ADMIN — MUPIROCIN: 20 OINTMENT TOPICAL at 10:04

## 2025-04-14 RX ADMIN — HYDROCORTISONE SODIUM SUCCINATE 75 MG: 100 INJECTION, POWDER, FOR SOLUTION INTRAMUSCULAR; INTRAVENOUS at 06:04

## 2025-04-14 RX ADMIN — POTASSIUM PHOSPHATE, MONOBASIC AND POTASSIUM PHOSPHATE, DIBASIC 30 MMOL: 224; 236 INJECTION, SOLUTION, CONCENTRATE INTRAVENOUS at 01:04

## 2025-04-14 RX ADMIN — HYDROCORTISONE SODIUM SUCCINATE 50 MG: 100 INJECTION INTRAMUSCULAR; INTRAVENOUS at 01:04

## 2025-04-14 RX ADMIN — DIAZEPAM 5 MG: 5 SOLUTION ORAL at 10:04

## 2025-04-14 RX ADMIN — METOCLOPRAMIDE 5 MG: 5 INJECTION, SOLUTION INTRAMUSCULAR; INTRAVENOUS at 01:04

## 2025-04-14 RX ADMIN — METOCLOPRAMIDE 5 MG: 5 INJECTION, SOLUTION INTRAMUSCULAR; INTRAVENOUS at 10:04

## 2025-04-14 RX ADMIN — POTASSIUM & SODIUM PHOSPHATES POWDER PACK 280-160-250 MG 2 PACKET: 280-160-250 PACK at 11:04

## 2025-04-14 RX ADMIN — METOCLOPRAMIDE 5 MG: 5 INJECTION, SOLUTION INTRAMUSCULAR; INTRAVENOUS at 06:04

## 2025-04-14 RX ADMIN — Medication: at 09:04

## 2025-04-14 RX ADMIN — LEVETIRACETAM 500 MG: 100 INJECTION INTRAVENOUS at 09:04

## 2025-04-14 RX ADMIN — GLYCOPYRROLATE 1 MG: 1 LIQUID ORAL at 10:04

## 2025-04-14 RX ADMIN — DIAZEPAM 5 MG: 5 SOLUTION ORAL at 09:04

## 2025-04-14 NOTE — PROGRESS NOTES
[facility] - Telemetry [facility] Medicine  Progress Note    Patient Name: Jesus Posey  MRN: [id number]  Patient Class: IP- Inpatient   Admission Date: 4/1/2025  Length of Stay: 13 days  Attending Physician: Billy Martin MD  Primary Care Provider: Pallavi, Primary Doctor        Subjective     Principal Problem:Acute hypoxic respiratory failure        HPI:  Mr. 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      As he is nonverbal, father reported leakage from his GJ tube which resembled his tube feeds. Last tube exchange was last month.      Admitted to Hospital Medicine in the setting of GJ tube malfunction. Intermittently hypothermic and bradycardic secondary to dysautonomia. Infectious workup has been negative. On IV maintenance fluids. IR replaced tube on 4/2.   During hospital course with repeated episodes of intolerance to PO intake and emesis. Repeat CT AP with G Tube with good positioning. Stepped up to MICU on 4/7 due to acute hypoxic respiratory failure requiring up to 15 L  HFNC  in the setting of likely aspiration pneumonitis. Isolated febrile episode of 100.7F. CXR concerning for BL pulmonary opacities. Started on Unasyn by Hospital Medicine.    Overview/Hospital Course:  Stepped up to ICU in 4/7 the setting of acute hypoxic respiratory failure in the swtting of basilar atelectasis and mucus plugging, with sepsis secondary to PNA requiring up to 15 L HFNC. Started on Vanc-Zosyn. During ICU course requiring up to norepi 0.06 mcg likely in the setting of hypovolemia 2/2 poor intake. Encephalopathy improving. CTH w/o acute processes. Lactate improved. EEG c/w toxic/drug-induced encephalopathy. No seizures. Persistenty bradycardic and hypothermic with EKG junctional rhythm on 30s.On Zosyn. TSH wnl. Ordering TTE. Cortisol < 3 . Started on IV hydrocortisone 100 mg TID. Off pressors on 4/10 at  7:30 am, but back on pressors by 2pm, again off by 4/10 7pm. Repleting dextrose for hypoglycemi     Interval History:   No events overnight.  Family updated at bedside.  Wean stress dose steroids.  Hyperglycemia episodes improving with reduction of steroids.  Continue placement IV and via G tube of phosphorus.      Review of Systems   Unable to perform ROS: Patient nonverbal     Objective:     Vital Signs (Most Recent):  Temp: 97.9 °F (36.6 °C) (04/14/25 1126)  Pulse: (!) 49 (04/14/25 1234)  Resp: 15 (04/14/25 1126)  BP: 122/74 (04/14/25 1126)  SpO2: 100 % (04/14/25 1424) Vital Signs (24h Range):  Temp:  [97.2 °F (36.2 °C)-98.7 °F (37.1 °C)] 97.9 °F (36.6 °C)  Pulse:  [46-69] 49  Resp:  [15-23] 15  SpO2:  [94 %-100 %] 100 %  BP: (105-122)/(65-74) 122/74     Weight: 49 kg (108 lb 0.4 oz)  Body mass index is 16.92 kg/m².    Intake/Output Summary (Last 24 hours) at 4/14/2025 1510  Last data filed at 4/14/2025 1218  Gross per 24 hour   Intake 1000 ml   Output --   Net 1000 ml         Physical Exam  Constitutional:       General: He is not in acute distress.     Comments: Cachectic, eyes open but does not respond    Eyes:      Conjunctiva/sclera: Conjunctivae normal.   Cardiovascular:      Rate and Rhythm: Normal rate.      Pulses: Normal pulses.      Heart sounds: Normal heart sounds.   Pulmonary:      Effort: Pulmonary effort is normal. No respiratory distress.      Breath sounds: Normal breath sounds.   Abdominal:      General: Bowel sounds are normal. There is no distension.      Palpations: Abdomen is soft.      Tenderness: There is no abdominal tenderness.      Comments: JG tube in place   Musculoskeletal:      Right lower leg: No edema.      Left lower leg: No edema.   Skin:     General: Skin is warm and dry.   Neurological:      Mental Status: Mental status is at baseline.      Comments: Awake    Contractures in extremities    Right midriatic pupil unresponsive. Left reactive pupil with cataract                 Significant Labs: All pertinent labs within the past 24 hours have been reviewed.  CBC:   Recent Labs   Lab 04/13/25  0824 04/14/25  0916   WBC 11.99 12.15   HGB 12.3* 11.4*   HCT 39.9* 35.9*    297     CMP:   Recent Labs   Lab 04/12/25  1735 04/13/25  0649 04/14/25  0916   * 148* 146*   K 3.6 3.6 3.5    112* 106   CO2 30* 26 30*   BUN 6 8 12   CREATININE 0.5 0.5 0.5   CALCIUM 8.6* 8.4* 8.3*   ALBUMIN  --  2.5* 2.5*   BILITOT  --  0.1 0.1   ALKPHOS  --  128 138   AST  --  47* 44   ALT  --  41 57*   ANIONGAP 11 10 10       Significant Imaging: I have reviewed all pertinent imaging results/findings within the past 24 hours.      Assessment & Plan  Pulmonary septic shock with acute hypoxic respiratory failure  Low serum cortisol level  Patient with Hypoxic Respiratory failure which is Acute with chronic hypercapnic respiratory failure. he is not on home oxygen. Supplemental oxygen was provided and noted-       .   Signs/symptoms of respiratory failure include- respiratory distress. Contributing diagnoses includes - ARDS and Aspiration Labs and images were reviewed. Patient Has recent ABG, which has been reviewed. Will treat underlying causes and adjust management of respiratory failure as follows-      Suspect aspiration pneumonia in the setting of hospital acquired pneumonia with recurrent vomiting with supperimposed atelectasis.   - s/p IV Zosyn x5 days  - Concomitant hypothermia and bradycardia in the setting of adrenal insufficiency.   - Off pressor support briefly on 4/10, but  back again same day.   - IPPV. Acute hypoxic rx failure resolved.     - BCX 4/7 NGTD  - Lactate wnl.    - Weaning SDS  - Endocrine consulted  -- Will do an ACTH stimulation test once patient is off steroids to confirm/rule out adrenal insufficiency.   - Pulmonary toilet: bronchodilators PRN, hypertonic saline.        Hypophosphatemia  Patient's most recent phosphorus results are listed below.   Recent Labs      04/12/25  0851 04/13/25  0649 04/14/25  0916   PHOS 1.8* 1.0* 1.6*     Plan  - Will treat hypophosphatemia with IV and PO replacement  - Patient's hypophosphatemia is stable  - Likely secondary to repeated    PEG tube malfunction  Malfunction of jejunostomy tube  Patient with GJ tube dependence, presenting with tube malfunction. IR consulted for exchange under fluoroscopy. Tube changed 4/2.   GI consulted d/t recurrent vomiting with initiation of tube feeds. CTAP with GJ tube in satisfactory position.     - 5mg IV metoclopramide Q8h   - Restart tube feeds    Toxic metabolic encephalopathy  May be secondary to sedation with diazepam with superimposed HAP and sepsis   - CT head ruled out CVA. ABG c/w acute hypercapnic rx failure  - EEG c/w toxic and drug-induced encephalopathy. Ruled out seizures    Spastic quadriparesis  - Intrathecal baclofen pump in place.   - Mobility protocol.     Functional quadriplegia  - Intrathecal baclofen pump in place. Supportive care.     Cachexia  Resume tube feeds after GJ tube replaced as able    Hypothermia  Per family, this is common when pt is hospitalized  - Suspect low temps and bradycardia are related to dysautonomia  - Likely secondary to adrenal insufficiency     Hypernatremia  Hypernatremia is likely due to Dehydration from missed free water flushes. The patient's most recent sodium results are listed below.    Recent Labs     04/12/25  1735 04/13/25  0649 04/14/25  0916   * 148* 146*     Plan  - Aim to correct the sodium by 8-10mEq in 24 hours.   - Resume tube feeds and free water flushes as able    Hypokalemia  Patient's most recent potassium results are listed below.   Recent Labs     04/12/25  1735 04/13/25  0649 04/14/25  0916   K 3.6 3.6 3.5     Plan  - Replete potassium per protocol  - Monitor potassium Daily  - Patient's hypokalemia is stable    Severe malnutrition  Body mass index (BMI) less than 19  Nutrition consulted. Most recent weight and BMI monitored-      Measurements:  Wt Readings from Last 1 Encounters:   04/02/25 49 kg (108 lb 0.4 oz)   Body mass index is 16.92 kg/m².    Patient has been screened and assessed by RD.    Malnutrition Type:  Context:    Level:      Malnutrition Characteristic Summary:       Interventions/Recommendations (treatment strategy):       Nutrition consulted. Most recent weight and BMI monitored-     Traumatic brain injury  Continue current management with diazepam for contractures and keppra for seizure prophylaxis.     Pressure injury of sacral region, stage 2  Deep tissue injury  - Wound care    VTE Risk Mitigation (From admission, onward)           Ordered     heparin, porcine (PF) 100 unit/mL injection flush 10 Units  As needed (PRN)         04/10/25 0504     enoxaparin injection 30 mg  Daily         04/07/25 1830     IP VTE HIGH RISK PATIENT  Once         04/07/25 1403     Place sequential compression device  Until discontinued         04/01/25 0209                    Discharge Planning   EDINSON: 4/17/2025     Code Status: Full Code   Medical Readiness for Discharge Date:   Discharge Plan A: Home, Home with family, Home Health   Discharge Delays: None known at this time                    Billy Martin MD  Department of Hospital Medicine   [facility] - Telemetry Stepdown

## 2025-04-14 NOTE — ASSESSMENT & PLAN NOTE
Hypernatremia is likely due to Dehydration from missed free water flushes. The patient's most recent sodium results are listed below.    Recent Labs     04/12/25  1735 04/13/25  0649 04/14/25  0916   * 148* 146*     Plan  - Aim to correct the sodium by 8-10mEq in 24 hours.   - Resume tube feeds and free water flushes as able

## 2025-04-14 NOTE — PLAN OF CARE
Went introduce myself to family. Pt's family requesting a hospital bed, low air mattress and a porsche lift upon D/C.  MD notified. Discharge Plan A and Plan B have been determined by review of patient's clinical status, future medical and therapeutic needs, and coverage/benefits for post-acute care in coordination with multidisciplinary team members.  Celso Espinoza, [facility]  [phone]

## 2025-04-14 NOTE — ASSESSMENT & PLAN NOTE
Patient's most recent phosphorus results are listed below.   Recent Labs     04/12/25  0851 04/13/25  0649 04/14/25  0916   PHOS 1.8* 1.0* 1.6*     Plan  - Will treat hypophosphatemia with IV and PO replacement  - Patient's hypophosphatemia is stable  - Likely secondary to repeated

## 2025-04-14 NOTE — PLAN OF CARE
Problem: Adult Inpatient Plan of Care  Goal: Plan of Care Review  Outcome: Progressing  Goal: Patient-Specific Goal (Individualized)  Outcome: Progressing  Goal: Absence of Hospital-Acquired Illness or Injury  Outcome: Progressing  Goal: Optimal Comfort and Wellbeing  Outcome: Progressing  Goal: Readiness for Transition of Care  Outcome: Progressing     Problem: Skin Injury Risk Increased  Goal: Skin Health and Integrity  Outcome: Progressing     Problem: Wound  Goal: Optimal Coping  Outcome: Unable to Meet  Goal: Optimal Functional Ability  Outcome: Unable to Meet  Goal: Absence of Infection Signs and Symptoms  Outcome: Progressing  Goal: Improved Oral Intake  Outcome: Unable to Meet  Goal: Optimal Pain Control and Function  Outcome: Unable to Meet  Goal: Skin Health and Integrity  Outcome: Progressing  Goal: Optimal Wound Healing  Outcome: Progressing     Problem: Delirium  Goal: Optimal Coping  Outcome: Unable to Meet  Goal: Improved Behavioral Control  Outcome: Unable to Meet  Goal: Improved Attention and Thought Clarity  Outcome: Unable to Meet  Goal: Improved Sleep  Outcome: Progressing     Problem: Fall Injury Risk  Goal: Absence of Fall and Fall-Related Injury  Outcome: Progressing

## 2025-04-14 NOTE — PLAN OF CARE
Problem: Adult Inpatient Plan of Care  Goal: Plan of Care Review  Outcome: Progressing  Goal: Patient-Specific Goal (Individualized)  Outcome: Progressing  Goal: Absence of Hospital-Acquired Illness or Injury  Outcome: Progressing  Goal: Optimal Comfort and Wellbeing  Outcome: Progressing  Goal: Readiness for Transition of Care  Outcome: Progressing     Problem: Skin Injury Risk Increased  Goal: Skin Health and Integrity  Outcome: Progressing     Problem: Wound  Goal: Optimal Coping  Outcome: Progressing  Goal: Optimal Functional Ability  Outcome: Progressing  Goal: Absence of Infection Signs and Symptoms  Outcome: Progressing  Goal: Improved Oral Intake  Outcome: Progressing  Goal: Optimal Pain Control and Function  Outcome: Progressing  Goal: Skin Health and Integrity  Outcome: Progressing  Goal: Optimal Wound Healing  Outcome: Progressing     Problem: Delirium  Goal: Optimal Coping  Outcome: Progressing  Goal: Improved Behavioral Control  Outcome: Progressing  Goal: Improved Attention and Thought Clarity  Outcome: Progressing  Goal: Improved Sleep  Outcome: Progressing     Problem: Fall Injury Risk  Goal: Absence of Fall and Fall-Related Injury  Outcome: Progressing

## 2025-04-14 NOTE — ASSESSMENT & PLAN NOTE
Patient's most recent potassium results are listed below.   Recent Labs     04/12/25  1735 04/13/25  0649 04/14/25  0916   K 3.6 3.6 3.5     Plan  - Replete potassium per protocol  - Monitor potassium Daily  - Patient's hypokalemia is stable

## 2025-04-14 NOTE — ASSESSMENT & PLAN NOTE
Patient with Hypoxic Respiratory failure which is Acute with chronic hypercapnic respiratory failure. he is not on home oxygen. Supplemental oxygen was provided and noted-       .   Signs/symptoms of respiratory failure include- respiratory distress. Contributing diagnoses includes - ARDS and Aspiration Labs and images were reviewed. Patient Has recent ABG, which has been reviewed. Will treat underlying causes and adjust management of respiratory failure as follows-      Suspect aspiration pneumonia in the setting of hospital acquired pneumonia with recurrent vomiting with supperimposed atelectasis.   - s/p IV Zosyn x5 days  - Concomitant hypothermia and bradycardia in the setting of adrenal insufficiency.   - Off pressor support briefly on 4/10, but  back again same day.   - IPPV. Acute hypoxic rx failure resolved.     - BCX 4/7 NGTD  - Lactate wnl.    - Weaning SDS  - Endocrine consulted  -- Will do an ACTH stimulation test once patient is off steroids to confirm/rule out adrenal insufficiency.   - Pulmonary toilet: bronchodilators PRN, hypertonic saline.

## 2025-04-14 NOTE — PLAN OF CARE
04/16/2025      Jesus Posey  [address] [facility] [place] [address]          Hospital Medicine Dept.  [facility] [address] [facility] [place] [address] [phone] after hours  [phone] fax Jesus Posey has been hospitalized at the [facility] since 4/1/2025.  Please excuse the patient's mother and father from duties while hospitalized.     Please contact me if you have any questions.                  Billy Martin MD  04/16/2025

## 2025-04-14 NOTE — SUBJECTIVE & OBJECTIVE
Interval History:   No events overnight.  Family updated at bedside.  Wean stress dose steroids.  Hyperglycemia episodes improving with reduction of steroids.  Continue placement IV and via G tube of phosphorus.      Review of Systems   Unable to perform ROS: Patient nonverbal     Objective:     Vital Signs (Most Recent):  Temp: 97.9 °F (36.6 °C) (04/14/25 1126)  Pulse: (!) 49 (04/14/25 1234)  Resp: 15 (04/14/25 1126)  BP: 122/74 (04/14/25 1126)  SpO2: 100 % (04/14/25 1424) Vital Signs (24h Range):  Temp:  [97.2 °F (36.2 °C)-98.7 °F (37.1 °C)] 97.9 °F (36.6 °C)  Pulse:  [46-69] 49  Resp:  [15-23] 15  SpO2:  [94 %-100 %] 100 %  BP: (105-122)/(65-74) 122/74     Weight: 49 kg (108 lb 0.4 oz)  Body mass index is 16.92 kg/m².    Intake/Output Summary (Last 24 hours) at 4/14/2025 1510  Last data filed at 4/14/2025 1218  Gross per 24 hour   Intake 1000 ml   Output --   Net 1000 ml         Physical Exam  Constitutional:       General: He is not in acute distress.     Comments: Cachectic, eyes open but does not respond    Eyes:      Conjunctiva/sclera: Conjunctivae normal.   Cardiovascular:      Rate and Rhythm: Normal rate.      Pulses: Normal pulses.      Heart sounds: Normal heart sounds.   Pulmonary:      Effort: Pulmonary effort is normal. No respiratory distress.      Breath sounds: Normal breath sounds.   Abdominal:      General: Bowel sounds are normal. There is no distension.      Palpations: Abdomen is soft.      Tenderness: There is no abdominal tenderness.      Comments: JG tube in place   Musculoskeletal:      Right lower leg: No edema.      Left lower leg: No edema.   Skin:     General: Skin is warm and dry.   Neurological:      Mental Status: Mental status is at baseline.      Comments: Awake    Contractures in extremities    Right midriatic pupil unresponsive. Left reactive pupil with cataract                Significant Labs: All pertinent labs within the past 24 hours have been reviewed.  CBC:   Recent Labs    Lab 04/13/25  0824 04/14/25  0916   WBC 11.99 12.15   HGB 12.3* 11.4*   HCT 39.9* 35.9*    297     CMP:   Recent Labs   Lab 04/12/25  1735 04/13/25  0649 04/14/25  0916   * 148* 146*   K 3.6 3.6 3.5    112* 106   CO2 30* 26 30*   BUN 6 8 12   CREATININE 0.5 0.5 0.5   CALCIUM 8.6* 8.4* 8.3*   ALBUMIN  --  2.5* 2.5*   BILITOT  --  0.1 0.1   ALKPHOS  --  128 138   AST  --  47* 44   ALT  --  41 57*   ANIONGAP 11 10 10       Significant Imaging: I have reviewed all pertinent imaging results/findings within the past 24 hours.

## 2025-04-15 LAB
ABSOLUTE EOSINOPHIL (OHS): 0.01 K/UL
ABSOLUTE MONOCYTE (OHS): 0.65 K/UL (ref 0.3–1)
ABSOLUTE NEUTROPHIL COUNT (OHS): 8.29 K/UL (ref 1.8–7.7)
ALBUMIN SERPL BCP-MCNC: 2.5 G/DL (ref 3.5–5.2)
ALP SERPL-CCNC: 121 UNIT/L (ref 40–150)
ALT SERPL W/O P-5'-P-CCNC: 91 UNIT/L (ref 10–44)
ANION GAP (OHS): 9 MMOL/L (ref 8–16)
AST SERPL-CCNC: 76 UNIT/L (ref 11–45)
BASOPHILS # BLD AUTO: 0.06 K/UL
BASOPHILS NFR BLD AUTO: 0.5 %
BILIRUB SERPL-MCNC: 0.1 MG/DL (ref 0.1–1)
BUN SERPL-MCNC: 10 MG/DL (ref 6–20)
CALCIUM SERPL-MCNC: 8.4 MG/DL (ref 8.7–10.5)
CHLORIDE SERPL-SCNC: 109 MMOL/L (ref 95–110)
CO2 SERPL-SCNC: 29 MMOL/L (ref 23–29)
CREAT SERPL-MCNC: 0.4 MG/DL (ref 0.5–1.4)
ERYTHROCYTE [DISTWIDTH] IN BLOOD BY AUTOMATED COUNT: 14.7 % (ref 11.5–14.5)
GFR SERPLBLD CREATININE-BSD FMLA CKD-EPI: >60 ML/MIN/1.73/M2
GLUCOSE SERPL-MCNC: 119 MG/DL (ref 70–110)
HCT VFR BLD AUTO: 35.7 % (ref 40–54)
HGB BLD-MCNC: 11.3 GM/DL (ref 14–18)
IMM GRANULOCYTES # BLD AUTO: 0.51 K/UL (ref 0–0.04)
IMM GRANULOCYTES NFR BLD AUTO: 4.6 % (ref 0–0.5)
LYMPHOCYTES # BLD AUTO: 1.63 K/UL (ref 1–4.8)
MAGNESIUM SERPL-MCNC: 2.2 MG/DL (ref 1.6–2.6)
MCH RBC QN AUTO: 30.5 PG (ref 27–31)
MCHC RBC AUTO-ENTMCNC: 31.7 G/DL (ref 32–36)
MCV RBC AUTO: 97 FL (ref 82–98)
NUCLEATED RBC (/100WBC) (OHS): 1 /100 WBC
PHOSPHATE SERPL-MCNC: 2.5 MG/DL (ref 2.7–4.5)
PLATELET # BLD AUTO: 294 K/UL (ref 150–450)
PMV BLD AUTO: 11.8 FL (ref 9.2–12.9)
POCT GLUCOSE: 120 MG/DL (ref 70–110)
POCT GLUCOSE: 99 MG/DL (ref 70–110)
POTASSIUM SERPL-SCNC: 3.4 MMOL/L (ref 3.5–5.1)
PROT SERPL-MCNC: 6.3 GM/DL (ref 6–8.4)
RBC # BLD AUTO: 3.7 M/UL (ref 4.6–6.2)
RELATIVE EOSINOPHIL (OHS): 0.1 %
RELATIVE LYMPHOCYTE (OHS): 14.6 % (ref 18–48)
RELATIVE MONOCYTE (OHS): 5.8 % (ref 4–15)
RELATIVE NEUTROPHIL (OHS): 74.4 % (ref 38–73)
SODIUM SERPL-SCNC: 147 MMOL/L (ref 136–145)
WBC # BLD AUTO: 11.15 K/UL (ref 3.9–12.7)

## 2025-04-15 PROCEDURE — 63600175 PHARM REV CODE 636 W HCPCS

## 2025-04-15 PROCEDURE — 63600175 PHARM REV CODE 636 W HCPCS: Performed by: STUDENT IN AN ORGANIZED HEALTH CARE EDUCATION/TRAINING PROGRAM

## 2025-04-15 PROCEDURE — 84100 ASSAY OF PHOSPHORUS: CPT | Performed by: INTERNAL MEDICINE

## 2025-04-15 PROCEDURE — 36415 COLL VENOUS BLD VENIPUNCTURE: CPT

## 2025-04-15 PROCEDURE — 63600175 PHARM REV CODE 636 W HCPCS: Performed by: INTERNAL MEDICINE

## 2025-04-15 PROCEDURE — 25000003 PHARM REV CODE 250: Performed by: STUDENT IN AN ORGANIZED HEALTH CARE EDUCATION/TRAINING PROGRAM

## 2025-04-15 PROCEDURE — 84132 ASSAY OF SERUM POTASSIUM: CPT

## 2025-04-15 PROCEDURE — 25000242 PHARM REV CODE 250 ALT 637 W/ HCPCS: Performed by: STUDENT IN AN ORGANIZED HEALTH CARE EDUCATION/TRAINING PROGRAM

## 2025-04-15 PROCEDURE — 83735 ASSAY OF MAGNESIUM: CPT | Performed by: INTERNAL MEDICINE

## 2025-04-15 PROCEDURE — 85025 COMPLETE CBC W/AUTO DIFF WBC: CPT | Performed by: STUDENT IN AN ORGANIZED HEALTH CARE EDUCATION/TRAINING PROGRAM

## 2025-04-15 PROCEDURE — 20600001 HC STEP DOWN PRIVATE ROOM

## 2025-04-15 RX ORDER — METOCLOPRAMIDE HYDROCHLORIDE 5 MG/5ML
5 SOLUTION ORAL EVERY 8 HOURS
Status: DISCONTINUED | OUTPATIENT
Start: 2025-04-15 | End: 2025-04-15

## 2025-04-15 RX ORDER — SODIUM,POTASSIUM PHOSPHATES 280-250MG
2 POWDER IN PACKET (EA) ORAL EVERY 6 HOURS
Status: DISCONTINUED | OUTPATIENT
Start: 2025-04-15 | End: 2025-04-19

## 2025-04-15 RX ORDER — METOCLOPRAMIDE HYDROCHLORIDE 5 MG/5ML
5 SOLUTION ORAL EVERY 8 HOURS
Status: DISCONTINUED | OUTPATIENT
Start: 2025-04-15 | End: 2025-04-24 | Stop reason: HOSPADM

## 2025-04-15 RX ORDER — LEVETIRACETAM 100 MG/ML
500 SOLUTION ORAL 2 TIMES DAILY
Status: DISCONTINUED | OUTPATIENT
Start: 2025-04-15 | End: 2025-04-24 | Stop reason: HOSPADM

## 2025-04-15 RX ADMIN — HYDROCORTISONE SODIUM SUCCINATE 25 MG: 100 INJECTION INTRAMUSCULAR; INTRAVENOUS at 09:04

## 2025-04-15 RX ADMIN — Medication: at 09:04

## 2025-04-15 RX ADMIN — GLYCOPYRROLATE 1 MG: 1 LIQUID ORAL at 10:04

## 2025-04-15 RX ADMIN — METOCLOPRAMIDE 5 MG: 5 SOLUTION ORAL at 09:04

## 2025-04-15 RX ADMIN — METHYLPHENIDATE HYDROCHLORIDE 5 MG: 5 TABLET ORAL at 06:04

## 2025-04-15 RX ADMIN — POTASSIUM & SODIUM PHOSPHATES POWDER PACK 280-160-250 MG 2 PACKET: 280-160-250 PACK at 06:04

## 2025-04-15 RX ADMIN — POTASSIUM & SODIUM PHOSPHATES POWDER PACK 280-160-250 MG 2 PACKET: 280-160-250 PACK at 02:04

## 2025-04-15 RX ADMIN — ENOXAPARIN SODIUM 30 MG: 40 INJECTION SUBCUTANEOUS at 05:04

## 2025-04-15 RX ADMIN — LEVETIRACETAM 500 MG: 100 INJECTION INTRAVENOUS at 09:04

## 2025-04-15 RX ADMIN — METOCLOPRAMIDE 5 MG: 5 SOLUTION ORAL at 03:04

## 2025-04-15 RX ADMIN — DIAZEPAM 5 MG: 5 SOLUTION ORAL at 09:04

## 2025-04-15 RX ADMIN — POTASSIUM & SODIUM PHOSPHATES POWDER PACK 280-160-250 MG 2 PACKET: 280-160-250 PACK at 05:04

## 2025-04-15 RX ADMIN — PANTOPRAZOLE SODIUM 40 MG: 40 INJECTION, POWDER, FOR SOLUTION INTRAVENOUS at 09:04

## 2025-04-15 RX ADMIN — HYDROCORTISONE SODIUM SUCCINATE 25 MG: 100 INJECTION INTRAMUSCULAR; INTRAVENOUS at 03:04

## 2025-04-15 RX ADMIN — GLYCOPYRROLATE 1 MG: 1 LIQUID ORAL at 05:04

## 2025-04-15 RX ADMIN — DIAZEPAM 5 MG: 5 SOLUTION ORAL at 03:04

## 2025-04-15 RX ADMIN — LEVETIRACETAM 500 MG: 500 SOLUTION ORAL at 09:04

## 2025-04-15 RX ADMIN — HYDROCORTISONE SODIUM SUCCINATE 50 MG: 100 INJECTION INTRAMUSCULAR; INTRAVENOUS at 06:04

## 2025-04-15 RX ADMIN — METOCLOPRAMIDE 5 MG: 5 INJECTION, SOLUTION INTRAMUSCULAR; INTRAVENOUS at 06:04

## 2025-04-15 RX ADMIN — GLYCOPYRROLATE 1 MG: 1 LIQUID ORAL at 09:04

## 2025-04-15 RX ADMIN — MUPIROCIN: 20 OINTMENT TOPICAL at 09:04

## 2025-04-15 NOTE — PROGRESS NOTES
[facility] - Telemetry [facility] Medicine  Progress Note    Patient Name: Jesus Posey  MRN: [id number]  Patient Class: IP- Inpatient   Admission Date: 4/1/2025  Length of Stay: 14 days  Attending Physician: Billy Martin MD  Primary Care Provider: Pallavi, Primary Doctor        Subjective     Principal Problem:Acute hypoxic respiratory failure        HPI:  Mr. 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      As he is nonverbal, father reported leakage from his GJ tube which resembled his tube feeds. Last tube exchange was last month.      Admitted to Hospital Medicine in the setting of GJ tube malfunction. Intermittently hypothermic and bradycardic secondary to dysautonomia. Infectious workup has been negative. On IV maintenance fluids. IR replaced tube on 4/2.   During hospital course with repeated episodes of intolerance to PO intake and emesis. Repeat CT AP with G Tube with good positioning. Stepped up to MICU on 4/7 due to acute hypoxic respiratory failure requiring up to 15 L  HFNC  in the setting of likely aspiration pneumonitis. Isolated febrile episode of 100.7F. CXR concerning for BL pulmonary opacities. Started on Unasyn by Hospital Medicine.    Overview/Hospital Course:  Stepped up to ICU in 4/7 the setting of acute hypoxic respiratory failure in the swtting of basilar atelectasis and mucus plugging, with sepsis secondary to PNA requiring up to 15 L HFNC. Started on Vanc-Zosyn. During ICU course requiring up to norepi 0.06 mcg likely in the setting of hypovolemia 2/2 poor intake. Encephalopathy improving. CTH w/o acute processes. Lactate improved. EEG c/w toxic/drug-induced encephalopathy. No seizures. Persistenty bradycardic and hypothermic with EKG junctional rhythm on 30s.On Zosyn. TSH wnl. Ordering TTE. Cortisol < 3 . Started on IV hydrocortisone 100 mg TID. Off pressors on 4/10 at  7:30 am, but back on pressors by 2pm, again off by 4/10 7pm. Repleting dextrose for hypoglycemi     Interval History:    No events overnight.  Continue replacement of potassium and phosphorus.  Weaning IV stress dose steroids.  Home DME ordered.      Review of Systems   Unable to perform ROS: Patient nonverbal     Objective:     Vital Signs (Most Recent):  Temp: 97.9 °F (36.6 °C) (04/15/25 0733)  Pulse: 80 (04/15/25 1056)  Resp: 17 (04/15/25 0733)  BP: 109/71 (04/15/25 0733)  SpO2: 100 % (04/15/25 0733) Vital Signs (24h Range):  Temp:  [97.5 °F (36.4 °C)-99.3 °F (37.4 °C)] 97.9 °F (36.6 °C)  Pulse:  [50-84] 80  Resp:  [15-17] 17  SpO2:  [100 %] 100 %  BP: (109-118)/(62-71) 109/71     Weight: 49 kg (108 lb 0.4 oz)  Body mass index is 16.92 kg/m².    Intake/Output Summary (Last 24 hours) at 4/15/2025 1435  Last data filed at 4/15/2025 0750  Gross per 24 hour   Intake 210 ml   Output --   Net 210 ml         Physical Exam  Constitutional:       General: He is not in acute distress.     Comments: Cachectic, eyes open but does not respond    Eyes:      Conjunctiva/sclera: Conjunctivae normal.   Cardiovascular:      Rate and Rhythm: Normal rate.      Pulses: Normal pulses.      Heart sounds: Normal heart sounds.   Pulmonary:      Effort: Pulmonary effort is normal. No respiratory distress.      Breath sounds: Normal breath sounds.   Abdominal:      General: Bowel sounds are normal. There is no distension.      Palpations: Abdomen is soft.      Tenderness: There is no abdominal tenderness.      Comments: JG tube in place   Musculoskeletal:      Right lower leg: No edema.      Left lower leg: No edema.   Skin:     General: Skin is warm and dry.   Neurological:      Mental Status: Mental status is at baseline.      Comments: Awake    Contractures in extremities    Right midriatic pupil unresponsive. Left reactive pupil with cataract                Significant Labs: All pertinent labs within the past 24 hours have been  reviewed.  CBC:   Recent Labs   Lab 04/14/25  0916 04/15/25  0551   WBC 12.15 11.15   HGB 11.4* 11.3*   HCT 35.9* 35.7*    294     CMP:   Recent Labs   Lab 04/14/25  0916 04/15/25  0551   * 147*   K 3.5 3.4*    109   CO2 30* 29   BUN 12 10   CREATININE 0.5 0.4*   CALCIUM 8.3* 8.4*   ALBUMIN 2.5* 2.5*   BILITOT 0.1 0.1   ALKPHOS 138 121   AST 44 76*   ALT 57* 91*   ANIONGAP 10 9       Significant Imaging: I have reviewed all pertinent imaging results/findings within the past 24 hours.      Assessment & Plan  Pulmonary septic shock with acute hypoxic respiratory failure  Low serum cortisol level  Patient with Hypoxic Respiratory failure which is Acute with chronic hypercapnic respiratory failure. he is not on home oxygen. Supplemental oxygen was provided and noted-       .   Signs/symptoms of respiratory failure include- respiratory distress. Contributing diagnoses includes - ARDS and Aspiration Labs and images were reviewed. Patient Has recent ABG, which has been reviewed. Will treat underlying causes and adjust management of respiratory failure as follows-      Suspect aspiration pneumonia in the setting of hospital acquired pneumonia with recurrent vomiting with supperimposed atelectasis.   - s/p IV Zosyn x5 days  - Concomitant hypothermia and bradycardia in the setting of adrenal insufficiency.   - Off pressor support briefly on 4/10, but  back again same day.   - IPPV. Acute hypoxic rx failure resolved.     - BCX 4/7 NGTD  - Lactate wnl.    - Weaning SDS  - Endocrine consulted  -- Will do an ACTH stimulation test once patient is off steroids to confirm/rule out adrenal insufficiency.   - Pulmonary toilet: bronchodilators PRN, hypertonic saline.        Hypophosphatemia  Patient's most recent phosphorus results are listed below.   Recent Labs     04/13/25  0649 04/14/25  0916 04/15/25  0551   PHOS 1.0* 1.6* 2.5*     Plan  - Will treat hypophosphatemia with IV and PO replacement  - Patient's  hypophosphatemia is stable  - Likely secondary to repeated    PEG tube malfunction  Malfunction of jejunostomy tube  Patient with GJ tube dependence, presenting with tube malfunction. IR consulted for exchange under fluoroscopy. Tube changed 4/2.   GI consulted d/t recurrent vomiting with initiation of tube feeds. CTAP with GJ tube in satisfactory position.     - 5mg G tube metoclopramide Q8h   - Restart tube feeds    Toxic metabolic encephalopathy  May be secondary to sedation with diazepam with superimposed HAP and sepsis   - CT head ruled out CVA. ABG c/w acute hypercapnic rx failure  - EEG c/w toxic and drug-induced encephalopathy. Ruled out seizures    Spastic quadriparesis  - Intrathecal baclofen pump in place.   - Mobility protocol.     Functional quadriplegia  - Intrathecal baclofen pump in place. Supportive care.     Cachexia  Resume tube feeds after GJ tube replaced as able    Hypothermia  Per family, this is common when pt is hospitalized  - Suspect low temps and bradycardia are related to dysautonomia  - Likely secondary to adrenal insufficiency     Hypernatremia  Hypernatremia is likely due to Dehydration from missed free water flushes. The patient's most recent sodium results are listed below.    Recent Labs     04/13/25  0649 04/14/25  0916 04/15/25  0551   * 146* 147*     Plan  - Aim to correct the sodium by 8-10mEq in 24 hours.   - Resume tube feeds and free water flushes as able    Hypokalemia  Patient's most recent potassium results are listed below.   Recent Labs     04/13/25  0649 04/14/25  0916 04/15/25  0551   K 3.6 3.5 3.4*     Plan  - Replete potassium per protocol  - Monitor potassium Daily  - Patient's hypokalemia is stable    Severe malnutrition  Body mass index (BMI) less than 19  Nutrition consulted. Most recent weight and BMI monitored-     Measurements:  Wt Readings from Last 1 Encounters:   04/02/25 49 kg (108 lb 0.4 oz)   Body mass index is 16.92 kg/m².    Patient has been  screened and assessed by RD.    Malnutrition Type:  Context:    Level:      Malnutrition Characteristic Summary:       Interventions/Recommendations (treatment strategy):       Nutrition consulted. Most recent weight and BMI monitored-     Traumatic brain injury  Continue current management with diazepam for contractures and keppra for seizure prophylaxis.     Pressure injury of sacral region, stage 2  Deep tissue injury  - Wound care    VTE Risk Mitigation (From admission, onward)           Ordered     heparin, porcine (PF) 100 unit/mL injection flush 10 Units  As needed (PRN)         04/10/25 0504     enoxaparin injection 30 mg  Daily         04/07/25 1830     IP VTE HIGH RISK PATIENT  Once         04/07/25 1403     Place sequential compression device  Until discontinued         04/01/25 0209                    Discharge Planning   EDINSON: 4/18/2025     Code Status: Full Code   Medical Readiness for Discharge Date:   Discharge Plan A: Home, Home with family, Home Health   Discharge Delays: None known at this time          Jesus requires a hospital bed due to him requiring positioning of the body in ways not feasible with an ordinary bed due to being completely immobile and cannot independently make changes in body position without the use of the bed.The positioning of the body cannot be sufficiently resolved by the use of pillows and wedges.             Billy Martin MD  Department of Hospital Medicine   [facility] - Telemetry Stepdown

## 2025-04-15 NOTE — CARE UPDATE
Jesus requires a hospital bed due to him requiring positioning of the body in ways not feasible with an ordinary bed due to being completely immobile and cannot independently make changes in body position without the use of the bed.The positioning of the body cannot be sufficiently resolved by the use of pillows and wedges.      Billy Martin MD  Attending Physician  Department of Hospital Medicine

## 2025-04-15 NOTE — SUBJECTIVE & OBJECTIVE
Interval History:    No events overnight.  Continue replacement of potassium and phosphorus.  Weaning IV stress dose steroids.  Home DME ordered.      Review of Systems   Unable to perform ROS: Patient nonverbal     Objective:     Vital Signs (Most Recent):  Temp: 97.9 °F (36.6 °C) (04/15/25 0733)  Pulse: 80 (04/15/25 1056)  Resp: 17 (04/15/25 0733)  BP: 109/71 (04/15/25 0733)  SpO2: 100 % (04/15/25 0733) Vital Signs (24h Range):  Temp:  [97.5 °F (36.4 °C)-99.3 °F (37.4 °C)] 97.9 °F (36.6 °C)  Pulse:  [50-84] 80  Resp:  [15-17] 17  SpO2:  [100 %] 100 %  BP: (109-118)/(62-71) 109/71     Weight: 49 kg (108 lb 0.4 oz)  Body mass index is 16.92 kg/m².    Intake/Output Summary (Last 24 hours) at 4/15/2025 1435  Last data filed at 4/15/2025 0750  Gross per 24 hour   Intake 210 ml   Output --   Net 210 ml         Physical Exam  Constitutional:       General: He is not in acute distress.     Comments: Cachectic, eyes open but does not respond    Eyes:      Conjunctiva/sclera: Conjunctivae normal.   Cardiovascular:      Rate and Rhythm: Normal rate.      Pulses: Normal pulses.      Heart sounds: Normal heart sounds.   Pulmonary:      Effort: Pulmonary effort is normal. No respiratory distress.      Breath sounds: Normal breath sounds.   Abdominal:      General: Bowel sounds are normal. There is no distension.      Palpations: Abdomen is soft.      Tenderness: There is no abdominal tenderness.      Comments: JG tube in place   Musculoskeletal:      Right lower leg: No edema.      Left lower leg: No edema.   Skin:     General: Skin is warm and dry.   Neurological:      Mental Status: Mental status is at baseline.      Comments: Awake    Contractures in extremities    Right midriatic pupil unresponsive. Left reactive pupil with cataract                Significant Labs: All pertinent labs within the past 24 hours have been reviewed.  CBC:   Recent Labs   Lab 04/14/25  0916 04/15/25  0551   WBC 12.15 11.15   HGB 11.4* 11.3*   HCT  35.9* 35.7*    294     CMP:   Recent Labs   Lab 04/14/25  0916 04/15/25  0551   * 147*   K 3.5 3.4*    109   CO2 30* 29   BUN 12 10   CREATININE 0.5 0.4*   CALCIUM 8.3* 8.4*   ALBUMIN 2.5* 2.5*   BILITOT 0.1 0.1   ALKPHOS 138 121   AST 44 76*   ALT 57* 91*   ANIONGAP 10 9       Significant Imaging: I have reviewed all pertinent imaging results/findings within the past 24 hours.

## 2025-04-15 NOTE — CARE UPDATE
Unit [facility] Care Support Interaction      I have reviewed the chart of Jesus Posey who is hospitalized for Acute hypoxic respiratory failure. The patient is currently located in the following unit: [facility]        I have assisted the primary physician in management of the following:           I have  provided the following support:     Skin - Integrity assessment and nurse to take picture of pt wound in buttocks.        Jessica Prater NP  Unit Based [facility]

## 2025-04-15 NOTE — ASSESSMENT & PLAN NOTE
Patient with GJ tube dependence, presenting with tube malfunction. IR consulted for exchange under fluoroscopy. Tube changed 4/2.   GI consulted d/t recurrent vomiting with initiation of tube feeds. CTAP with GJ tube in satisfactory position.     - 5mg G tube metoclopramide Q8h   - Restart tube feeds

## 2025-04-15 NOTE — ASSESSMENT & PLAN NOTE
Hypernatremia is likely due to Dehydration from missed free water flushes. The patient's most recent sodium results are listed below.    Recent Labs     04/13/25  0649 04/14/25  0916 04/15/25  0551   * 146* 147*     Plan  - Aim to correct the sodium by 8-10mEq in 24 hours.   - Resume tube feeds and free water flushes as able

## 2025-04-15 NOTE — PLAN OF CARE
Problem: Adult Inpatient Plan of Care  Goal: Plan of Care Review  Outcome: Progressing  Goal: Patient-Specific Goal (Individualized)  Outcome: Progressing  Goal: Absence of Hospital-Acquired Illness or Injury  Outcome: Progressing  Goal: Optimal Comfort and Wellbeing  Outcome: Progressing  Goal: Readiness for Transition of Care  Outcome: Progressing     Problem: Skin Injury Risk Increased  Goal: Skin Health and Integrity  Outcome: Progressing     Problem: Wound  Goal: Optimal Coping  Outcome: Progressing  Goal: Optimal Functional Ability  Outcome: Progressing  Goal: Absence of Infection Signs and Symptoms  Outcome: Progressing  Goal: Improved Oral Intake  Outcome: Progressing  Goal: Optimal Pain Control and Function  Outcome: Progressing  Goal: Skin Health and Integrity  Outcome: Progressing  Goal: Optimal Wound Healing  Outcome: Progressing     Problem: Delirium  Goal: Optimal Coping  Outcome: Progressing  Goal: Improved Behavioral Control  Outcome: Progressing  Goal: Improved Attention and Thought Clarity  Outcome: Progressing  Goal: Improved Sleep  Outcome: Progressing     Problem: Fall Injury Risk  Goal: Absence of Fall and Fall-Related Injury  Outcome: Progressing     POC in progress. Address questions and concerns with grandmother. AA. Non-verbal. Responsive by open eyes to verbal and tactile stimuli. Continue with tube feeding in progress. Tolerate well. Reposition. No s/s of discomfort or distress. Bed in lowest position.

## 2025-04-15 NOTE — ASSESSMENT & PLAN NOTE
Patient's most recent potassium results are listed below.   Recent Labs     04/13/25  0649 04/14/25  0916 04/15/25  0551   K 3.6 3.5 3.4*     Plan  - Replete potassium per protocol  - Monitor potassium Daily  - Patient's hypokalemia is stable

## 2025-04-15 NOTE — ASSESSMENT & PLAN NOTE
Patient's most recent phosphorus results are listed below.   Recent Labs     04/13/25  0649 04/14/25  0916 04/15/25  0551   PHOS 1.0* 1.6* 2.5*     Plan  - Will treat hypophosphatemia with IV and PO replacement  - Patient's hypophosphatemia is stable  - Likely secondary to repeated

## 2025-04-16 LAB
ABSOLUTE EOSINOPHIL (OHS): 0.02 K/UL
ABSOLUTE MONOCYTE (OHS): 1.06 K/UL (ref 0.3–1)
ABSOLUTE NEUTROPHIL COUNT (OHS): 9.12 K/UL (ref 1.8–7.7)
ALBUMIN SERPL BCP-MCNC: 2.6 G/DL (ref 3.5–5.2)
ALP SERPL-CCNC: 118 UNIT/L (ref 40–150)
ALT SERPL W/O P-5'-P-CCNC: 160 UNIT/L (ref 10–44)
AMMONIA PLAS-SCNC: 46 UMOL/L (ref 10–50)
ANION GAP (OHS): 8 MMOL/L (ref 8–16)
AST SERPL-CCNC: 128 UNIT/L (ref 11–45)
BASOPHILS # BLD AUTO: 0.03 K/UL
BASOPHILS NFR BLD AUTO: 0.2 %
BILIRUB SERPL-MCNC: 0.2 MG/DL (ref 0.1–1)
BUN SERPL-MCNC: 12 MG/DL (ref 6–20)
CALCIUM SERPL-MCNC: 8.4 MG/DL (ref 8.7–10.5)
CHLORIDE SERPL-SCNC: 103 MMOL/L (ref 95–110)
CO2 SERPL-SCNC: 31 MMOL/L (ref 23–29)
CREAT SERPL-MCNC: 0.5 MG/DL (ref 0.5–1.4)
ERYTHROCYTE [DISTWIDTH] IN BLOOD BY AUTOMATED COUNT: 15.2 % (ref 11.5–14.5)
GFR SERPLBLD CREATININE-BSD FMLA CKD-EPI: >60 ML/MIN/1.73/M2
GLUCOSE SERPL-MCNC: 92 MG/DL (ref 70–110)
HAV IGM SERPL QL IA: NORMAL
HBV CORE IGM SERPL QL IA: NORMAL
HBV SURFACE AG SERPL QL IA: NORMAL
HCT VFR BLD AUTO: 32.7 % (ref 40–54)
HCV AB SERPL QL IA: NORMAL
HGB BLD-MCNC: 10.2 GM/DL (ref 14–18)
IMM GRANULOCYTES # BLD AUTO: 0.34 K/UL (ref 0–0.04)
IMM GRANULOCYTES NFR BLD AUTO: 2.7 % (ref 0–0.5)
LYMPHOCYTES # BLD AUTO: 2.04 K/UL (ref 1–4.8)
MAGNESIUM SERPL-MCNC: 2.2 MG/DL (ref 1.6–2.6)
MCH RBC QN AUTO: 30.3 PG (ref 27–31)
MCHC RBC AUTO-ENTMCNC: 31.2 G/DL (ref 32–36)
MCV RBC AUTO: 97 FL (ref 82–98)
NUCLEATED RBC (/100WBC) (OHS): 1 /100 WBC
PHOSPHATE SERPL-MCNC: 2.8 MG/DL (ref 2.7–4.5)
PLATELET # BLD AUTO: 374 K/UL (ref 150–450)
PMV BLD AUTO: 11.6 FL (ref 9.2–12.9)
POCT GLUCOSE: 116 MG/DL (ref 70–110)
POCT GLUCOSE: 118 MG/DL (ref 70–110)
POCT GLUCOSE: 122 MG/DL (ref 70–110)
POCT GLUCOSE: 122 MG/DL (ref 70–110)
POCT GLUCOSE: 139 MG/DL (ref 70–110)
POTASSIUM SERPL-SCNC: 3.1 MMOL/L (ref 3.5–5.1)
PROT SERPL-MCNC: 6 GM/DL (ref 6–8.4)
RBC # BLD AUTO: 3.37 M/UL (ref 4.6–6.2)
RELATIVE EOSINOPHIL (OHS): 0.2 %
RELATIVE LYMPHOCYTE (OHS): 16.2 % (ref 18–48)
RELATIVE MONOCYTE (OHS): 8.4 % (ref 4–15)
RELATIVE NEUTROPHIL (OHS): 72.3 % (ref 38–73)
SODIUM SERPL-SCNC: 142 MMOL/L (ref 136–145)
WBC # BLD AUTO: 12.61 K/UL (ref 3.9–12.7)

## 2025-04-16 PROCEDURE — 25000242 PHARM REV CODE 250 ALT 637 W/ HCPCS: Performed by: STUDENT IN AN ORGANIZED HEALTH CARE EDUCATION/TRAINING PROGRAM

## 2025-04-16 PROCEDURE — 63600175 PHARM REV CODE 636 W HCPCS: Performed by: STUDENT IN AN ORGANIZED HEALTH CARE EDUCATION/TRAINING PROGRAM

## 2025-04-16 PROCEDURE — 20600001 HC STEP DOWN PRIVATE ROOM

## 2025-04-16 PROCEDURE — 63600175 PHARM REV CODE 636 W HCPCS: Performed by: INTERNAL MEDICINE

## 2025-04-16 PROCEDURE — 80074 ACUTE HEPATITIS PANEL: CPT | Performed by: STUDENT IN AN ORGANIZED HEALTH CARE EDUCATION/TRAINING PROGRAM

## 2025-04-16 PROCEDURE — 36415 COLL VENOUS BLD VENIPUNCTURE: CPT

## 2025-04-16 PROCEDURE — 84100 ASSAY OF PHOSPHORUS: CPT | Performed by: INTERNAL MEDICINE

## 2025-04-16 PROCEDURE — 83735 ASSAY OF MAGNESIUM: CPT | Performed by: INTERNAL MEDICINE

## 2025-04-16 PROCEDURE — 82140 ASSAY OF AMMONIA: CPT | Performed by: STUDENT IN AN ORGANIZED HEALTH CARE EDUCATION/TRAINING PROGRAM

## 2025-04-16 PROCEDURE — 85025 COMPLETE CBC W/AUTO DIFF WBC: CPT | Performed by: STUDENT IN AN ORGANIZED HEALTH CARE EDUCATION/TRAINING PROGRAM

## 2025-04-16 PROCEDURE — 80177 DRUG SCRN QUAN LEVETIRACETAM: CPT | Performed by: STUDENT IN AN ORGANIZED HEALTH CARE EDUCATION/TRAINING PROGRAM

## 2025-04-16 PROCEDURE — 25000003 PHARM REV CODE 250: Performed by: STUDENT IN AN ORGANIZED HEALTH CARE EDUCATION/TRAINING PROGRAM

## 2025-04-16 PROCEDURE — 63600175 PHARM REV CODE 636 W HCPCS

## 2025-04-16 PROCEDURE — 36415 COLL VENOUS BLD VENIPUNCTURE: CPT | Performed by: STUDENT IN AN ORGANIZED HEALTH CARE EDUCATION/TRAINING PROGRAM

## 2025-04-16 PROCEDURE — 80053 COMPREHEN METABOLIC PANEL: CPT

## 2025-04-16 RX ORDER — HYDROCORTISONE 5 MG/1
10 TABLET ORAL EVERY EVENING
Status: COMPLETED | OUTPATIENT
Start: 2025-04-17 | End: 2025-04-17

## 2025-04-16 RX ORDER — HYDROCORTISONE 5 MG/1
20 TABLET ORAL EVERY MORNING
Status: COMPLETED | OUTPATIENT
Start: 2025-04-17 | End: 2025-04-17

## 2025-04-16 RX ORDER — POTASSIUM CHLORIDE 7.45 MG/ML
10 INJECTION INTRAVENOUS
Status: COMPLETED | OUTPATIENT
Start: 2025-04-16 | End: 2025-04-16

## 2025-04-16 RX ADMIN — METHYLPHENIDATE HYDROCHLORIDE 5 MG: 5 TABLET ORAL at 06:04

## 2025-04-16 RX ADMIN — GLYCOPYRROLATE 1 MG: 1 LIQUID ORAL at 03:04

## 2025-04-16 RX ADMIN — POTASSIUM & SODIUM PHOSPHATES POWDER PACK 280-160-250 MG 2 PACKET: 280-160-250 PACK at 12:04

## 2025-04-16 RX ADMIN — LEVETIRACETAM 500 MG: 500 SOLUTION ORAL at 08:04

## 2025-04-16 RX ADMIN — POTASSIUM CHLORIDE 10 MEQ: 7.46 INJECTION, SOLUTION INTRAVENOUS at 10:04

## 2025-04-16 RX ADMIN — METOCLOPRAMIDE 5 MG: 5 SOLUTION ORAL at 03:04

## 2025-04-16 RX ADMIN — HYDROCORTISONE SODIUM SUCCINATE 25 MG: 100 INJECTION INTRAMUSCULAR; INTRAVENOUS at 03:04

## 2025-04-16 RX ADMIN — DIAZEPAM 5 MG: 5 SOLUTION ORAL at 03:04

## 2025-04-16 RX ADMIN — Medication: at 09:04

## 2025-04-16 RX ADMIN — Medication: at 08:04

## 2025-04-16 RX ADMIN — LEVETIRACETAM 500 MG: 500 SOLUTION ORAL at 09:04

## 2025-04-16 RX ADMIN — ENOXAPARIN SODIUM 30 MG: 40 INJECTION SUBCUTANEOUS at 05:04

## 2025-04-16 RX ADMIN — GLYCOPYRROLATE 1 MG: 1 LIQUID ORAL at 08:04

## 2025-04-16 RX ADMIN — HYDROCORTISONE SODIUM SUCCINATE 25 MG: 100 INJECTION INTRAMUSCULAR; INTRAVENOUS at 06:04

## 2025-04-16 RX ADMIN — GLYCOPYRROLATE 1 MG: 1 LIQUID ORAL at 09:04

## 2025-04-16 RX ADMIN — ACETAMINOPHEN 650 MG: 650 SOLUTION ORAL at 02:04

## 2025-04-16 RX ADMIN — DIAZEPAM 5 MG: 5 SOLUTION ORAL at 09:04

## 2025-04-16 RX ADMIN — DIAZEPAM 5 MG: 5 SOLUTION ORAL at 08:04

## 2025-04-16 RX ADMIN — PANTOPRAZOLE SODIUM 40 MG: 40 INJECTION, POWDER, FOR SOLUTION INTRAVENOUS at 10:04

## 2025-04-16 RX ADMIN — METOCLOPRAMIDE 5 MG: 5 SOLUTION ORAL at 09:04

## 2025-04-16 RX ADMIN — POTASSIUM CHLORIDE 10 MEQ: 7.46 INJECTION, SOLUTION INTRAVENOUS at 12:04

## 2025-04-16 RX ADMIN — POTASSIUM & SODIUM PHOSPHATES POWDER PACK 280-160-250 MG 2 PACKET: 280-160-250 PACK at 05:04

## 2025-04-16 RX ADMIN — POTASSIUM BICARBONATE 25 MEQ: 978 TABLET, EFFERVESCENT ORAL at 09:04

## 2025-04-16 RX ADMIN — HYDROCORTISONE SODIUM SUCCINATE 25 MG: 100 INJECTION INTRAMUSCULAR; INTRAVENOUS at 09:04

## 2025-04-16 RX ADMIN — POTASSIUM & SODIUM PHOSPHATES POWDER PACK 280-160-250 MG 2 PACKET: 280-160-250 PACK at 06:04

## 2025-04-16 RX ADMIN — POTASSIUM CHLORIDE 10 MEQ: 7.46 INJECTION, SOLUTION INTRAVENOUS at 11:04

## 2025-04-16 RX ADMIN — METOCLOPRAMIDE 5 MG: 5 SOLUTION ORAL at 06:04

## 2025-04-16 RX ADMIN — POTASSIUM BICARBONATE 25 MEQ: 978 TABLET, EFFERVESCENT ORAL at 08:04

## 2025-04-16 RX ADMIN — POTASSIUM CHLORIDE 10 MEQ: 7.46 INJECTION, SOLUTION INTRAVENOUS at 08:04

## 2025-04-16 NOTE — PROGRESS NOTES
[facility] - Tele[facility] Stepdown  Adult Nutrition  Progress Note    SUMMARY       Recommendations     --Continue TF via PEJ Nutren 1.5 at 60ml/hr to provide to provide 1440 total fluid volume, 2160 kcals, 98 g PRO, 253 g CHO, 1100 mL fluid,144 % DRI    a. At home Tube feeding regimen: Nutren 1.5 @ 65 mL/hr x 14 hours to provide 910 total fluid volume, 1365 kcals, 62 g protein, 691 mL fluid, 91% DRI      b. Monitor for s/s of intolerance such as residuals >500ml, n/v/d, or abdominal distention.      --Per last RD note: Add Iván BID as TF modifier  to provide 90 kcal, 2.5 g protein, 7 g L-Arginine, 7 g L-Glutamine per serving to aid in wound healing     Do not mix Iván with formula in a tube-feeding bag    Pour one packet of Iván in a clean, small (approximately 6- to 8-fl-oz) container for mixing    Add 4 fl oz (120 mL) water at room temperature    Mix well with disposable spoon or tongue blade until all particles are completely hydrated    Verify correct tube placement    Flush feeding tube with 30 mL water    Administer Iván through feeding tube using a 60-mL or larger syringe  Flush with an additional 30 mL water (a smaller amount of water can be used to flush the tube if the patient is on a fluid- restricted diet)      --Recommend weekly weights     --Nursing: please continue to document formula and rate on flowsheets  --RD to monitor weight, po intake, BG, skin    Goals:   1.  75% nutritional needs met with diet/EN/PN during admission    2. Maintain dry weight during admission    3.  BG within target range during admission    4. Display s/s of wound healing during admission    Nutrition Goal Status: progressing towards goal  Communication of RD Recs: other (comment) (poc)    Nutrition Discharge Planning    Nutrition Discharge Planning: Resume home/ nursing home regimen    Assessment and Plan    Nutrition Problem  Increased nutrient needs (protein, calorie)     Related to (etiology):   Wound healing     Signs  "and Symptoms (as evidenced by):   pressure injury to heal- deep tissue, BMI 16.92     Interventions/Recommendations (treatment strategy):  Collaboration of nutritional care with other providers.  EN  Iván     Nutrition Diagnosis Status:   Continues     Reason for Assessment    Reason For Assessment: RD follow-up  Diagnosis: gastrointestinal disease (PEG tube malfunction)  General Information Comments: Patient assessed today for follow up.  Patient remains admitted for septic shock and acute respiratory failure.  Patient currently with TF ordered via PEJ- Nutren 1.5@ 10 mL with goal of 60 mL/hr- running @ 60 mL- meeting needs.  +BM 4/14.  No GI s/s noted.  Encephalopathy improving.  Lactate improved. EEG c/w toxic/drug-induced encephalopathy. No new weight since admission.  New weight ordered by RD.  Noted deep tissue pressure injury on left heel, stage 2 pressure injury on sacral spine, full thickness skin tear LL quadrant, deep tissue pressure injury left toe- nutrition intervention indicated.    Nutrition/Diet History    Nutrition Intake History: a. At home Tube feeding regimen: Nutren 1.5 @ 65 mL/hr x 14 hours to provide 910 total fluid volume, 1365 kcals, 62 g protein, 691 mL fluid, 91% DRI  Spiritual, Cultural Beliefs, Bahai Practices, Values that Affect Care: no  Food Allergies: NKFA  Factors Affecting Nutritional Intake: NPO    Anthropometrics    Height: 5' 7" (170.2 cm)  Height (inches): 67 in  Height Method: Stated  Weight: 49 kg (108 lb 0.4 oz)  Weight (lb): 108.03 lb  Weight Method: Estimated  Ideal Body Weight (IBW), Male: 148 lb  % Ideal Body Weight, Male (lb): 72.97 %  BMI (Calculated): 16.9  BMI Grade: less than 18.5 - underweight       Lab/Procedures/Meds    Pertinent Labs Reviewed: reviewed  Pertinent Labs Comments: -131 x 24 hours, Na 147 (H), Ca 8.4 (L), Phos 2.5 (L), AST and ALT elevated-trending up  Pertinent Medications Reviewed: reviewed  Pertinent Medications Comments: Enoxaparin, " levetiacetam, pantoprazole, hydrocortisone sodium succinate, potassium sodium phosphates, baclofen    Estimated/Assessed Needs    Weight Used For Calorie Calculations: 67.1 kg (148 lb) (IBW)  Energy Calorie Requirements (kcal): 9209-7305 (25-30 kcal/kg of IBW)  Energy Need Method: Kcal/kg  Protein Requirements:  (1.5-2.5 g/kg)  Weight Used For Protein Calculations: 49 kg (108 lb 0.4 oz) (actual body weight)     Estimated Fluid Requirement Method: RDA Method  RDA Method (mL): 1678         Nutrition Prescription Ordered    Current Diet Order: NPO  Current Nutrition Support Formula Ordered: Nutren 1.5  Current Nutrition Support Rate Ordered:  (Goal 60mL/hr)    Evaluation of Received Nutrient/Fluid Intake    Enteral Calories (kcal): 2160  Enteral Protein (gm): 98  Enteral (Free Water) Fluid (mL): 1100  % Kcal Needs: 108%  % Protein Needs: 100%  I/O: -  Comments: -  % Intake of Estimated Energy Needs: 75 - 100 %  % Meal Intake: NPO    Nutrition Risk    Level of Risk/Frequency of Follow-up: moderate (f/u 1-2x/week)     Monitor and Evaluation    Monitor and Evaluation: Enteral and parenteral nutrition administration, Weight, Electrolyte and renal panel, Glucose/endocrine profile, Gastrointestinal profile, Inflammatory profile, Lipid profile, Skin, Nutrition focused physical findings     Nutrition Follow-Up    RD Follow-up?: Yes

## 2025-04-16 NOTE — ASSESSMENT & PLAN NOTE
Patient's most recent phosphorus results are listed below.   Recent Labs     04/14/25  0916 04/15/25  0551 04/16/25  0450   PHOS 1.6* 2.5* 2.8     Plan  - Will treat hypophosphatemia with IV and PO replacement  - Patient's hypophosphatemia is stable  - Likely secondary to repeated

## 2025-04-16 NOTE — CONSULTS
[facility] - Telemetry Stepdown  Wound Care    Patient Name:  Jesus Posey   MRN:  [id number]  Date: 4/16/2025  Diagnosis: Acute hypoxic respiratory failure    History:     Past Medical History:   Diagnosis Date    Atelectasis 10/08/2024    Seen on imaging   IS  Deep breathing exercises      Traumatic brain injury        Social History[1]    Precautions:     Allergies as of 03/31/2025    (No Known Allergies)       WO Assessment Details/Treatment     Patient seen for inpatient wound care consult. Patient is known to inpatient wound care. Upon assessment, right heel with blanchable erythema. No open wounds or active drainage noted. Left heel with non-blanchable purple discoloration indicative of deep tissue pressure injury. No open wounds or active drainage noted.         Reviewed chart for this encounter.  See flowsheet for findings.      Recommendations: Apply BPCO to bilateral heels BID and PRN for capillary stimulation. Ensure EHOB boots in use for offloading. No other issues or concerns at this time. Nursing to maintain pressure injury prevention measures. Will follow up 4/23/2025 or sooner if needed.         Discussed POC with patient and primary nurse.  See EMR for orders and patient education.    Bedside nursing to continue care and monitoring.  Bedside to maintain pressure injury prevention interventions.        04/16/25 1030   WOCN Assessment   WOCN Total Time (mins) 30   Visit Date 04/16/25   Visit Time 1030   Consult Type New   WOCN Speciality Wound   Intervention assessed;changed;applied;chart review;coordination of care;orders        Wound 04/01/25 0623 Pressure Injury Left Heel   Date First Assessed/Time First Assessed: 04/01/25 0623   Present on Original Admission: Yes  Primary Wound Type: Pressure Injury  Side: Left  Location: Heel  Is this injury device related?: No   Wound Image    Pressure Injury Stage DTPI   Dressing Appearance Open to air   Drainage Amount None   Drainage Characteristics/Odor No odor    Appearance Maroon;Purple   Tissue loss description Not applicable   Care   (BPCO)        Gastrostomy/Enterostomy 04/11/25 1430 Low profile gastrostomy device (LPGD) LUQ   Placement Date/Time: 04/11/25 1430   Present Prior to Hospital Arrival?: No  Inserted by: MD  Type: Low profile gastrostomy device (LPGD)  Tube Size (Fr.): 18 Fr.  Location: LUQ   Site Care site cleansed w/ soap and water       Orders placed.   Franklyn COLBERTN, RN, [facility]  04/16/2025         [1]   Social History  Socioeconomic History    Marital status: Single   Tobacco Use    Smoking status: Never    Smokeless tobacco: Never     Social Drivers of Health     Financial Resource Strain: Low Risk  (4/2/2025)    Overall Financial Resource Strain (CARDIA)     Difficulty of Paying Living Expenses: Not hard at all   Food Insecurity: No Food Insecurity (4/2/2025)    Hunger Vital Sign     Worried About Running Out of Food in the Last Year: Never true     Ran Out of Food in the Last Year: Never true   Transportation Needs: No Transportation Needs (4/2/2025)    PRAPARE - Transportation     Lack of Transportation (Medical): No     Lack of Transportation (Non-Medical): No   Physical Activity: Inactive (4/2/2025)    Exercise Vital Sign     Days of Exercise per Week: 0 days     Minutes of Exercise per Session: 0 min   Stress: Patient Unable To Answer (4/2/2025)    [place] [employer] - Occupational Stress Questionnaire     Feeling of Stress : Patient unable to answer   Housing Stability: Low Risk  (4/2/2025)    Housing Stability Vital Sign     Unable to Pay for Housing in the Last Year: No     Homeless in the Last Year: No

## 2025-04-16 NOTE — PLAN OF CARE
Problem: Adult Inpatient Plan of Care  Goal: Plan of Care Review  Outcome: Progressing  Goal: Patient-Specific Goal (Individualized)  Outcome: Progressing  Goal: Absence of Hospital-Acquired Illness or Injury  Outcome: Progressing  Goal: Optimal Comfort and Wellbeing  Outcome: Progressing  Goal: Readiness for Transition of Care  Outcome: Progressing     Problem: Skin Injury Risk Increased  Goal: Skin Health and Integrity  Outcome: Progressing     Problem: Wound  Goal: Optimal Coping  Outcome: Progressing  Goal: Optimal Functional Ability  Outcome: Progressing  Goal: Absence of Infection Signs and Symptoms  Outcome: Progressing  Goal: Improved Oral Intake  Outcome: Progressing  Goal: Optimal Pain Control and Function  Outcome: Progressing  Goal: Skin Health and Integrity  Outcome: Progressing  Goal: Optimal Wound Healing  Outcome: Progressing     Problem: Delirium  Goal: Optimal Coping  Outcome: Progressing  Goal: Improved Behavioral Control  Outcome: Progressing  Goal: Improved Attention and Thought Clarity  Outcome: Progressing  Goal: Improved Sleep  Outcome: Progressing     Problem: Infection  Goal: Absence of Infection Signs and Symptoms  Outcome: Progressing     Problem: Fall Injury Risk  Goal: Absence of Fall and Fall-Related Injury  Outcome: Progressing     POC in progress. Pt low-graded temp.  Electrolytes replacement. Tolerating feeding without problems. Reposition q2. Voiding without problems. No s/s of discomfort or distress. Bed in lowest position. Father @bedside.

## 2025-04-16 NOTE — ASSESSMENT & PLAN NOTE
Nutrition consulted. Most recent weight and BMI monitored-     Measurements:  Wt Readings from Last 1 Encounters:   04/16/25 53.5 kg (117 lb 15.1 oz)   Body mass index is 18.47 kg/m².    Patient has been screened and assessed by RD.    Malnutrition Type:  Context:    Level:      Malnutrition Characteristic Summary:       Interventions/Recommendations (treatment strategy):  --Continue TF via PEJ Nutren 1.5 at 60ml/hr to provide to provide 1440 total fluid volume, 2160 kcals, 98 g PRO, 253 g CHO, 1100 mL fluid,144 % DRI  a. At home Tube feeding regimen: Nutren 1.5 @ 65 mL/hr x 14 hours to provide 910 total fluid volume, 1365 kcals, 62 g protein, 691 mL fluid, 91% DRI  b. Monitor for s/s of intolerance such as residuals >500ml, n/v/d, or abdominal distention.  --Per last RD note: Add Iván BID as TF modifier  to provide 90 kcal, 2.5 g protein, 7 g L-Arginine, 7 g L-Glutamine per serving to aid in wound healing   --Do not mix Iván with formula in a tube-feeding bag  --Pour one packet of Iván in a clean, small (approximately 6- to 8-fl-oz) container for mixing  --Add 4 fl oz (120 mL) water at room temperature  --Mix well with disposable spoon or tongue blade until all particles are completely hydrated  --Verify correct tube placement  --Flush feeding tube with 30 mL water  --Administer Iván through feeding tube using a 60-mL or larger syringe  --Flush with an additional 30 mL water (a smaller amount of water can be used to flush the tube if the patient is on a fluid- restricted diet)  --Recommend weekly weights   --Nursing: please continue to document formula and rate on flowsheets  --RD to monitor weight, po intake, BG, skin    Nutrition consulted. Most recent weight and BMI monitored-

## 2025-04-16 NOTE — ASSESSMENT & PLAN NOTE
Patient's most recent potassium results are listed below.   Recent Labs     04/14/25  0916 04/15/25  0551 04/16/25  0450   K 3.5 3.4* 3.1*     Plan  - Replete potassium per protocol  - Monitor potassium Daily  - Patient's hypokalemia is stable

## 2025-04-16 NOTE — PLAN OF CARE
Recommendations     --Continue TF via PEJ Nutren 1.5 at 60ml/hr to provide to provide 1440 total fluid volume, 2160 kcals, 98 g PRO, 253 g CHO, 1100 mL fluid,144 % DRI    a. At home Tube feeding regimen: Nutren 1.5 @ 65 mL/hr x 14 hours to provide 910 total fluid volume, 1365 kcals, 62 g protein, 691 mL fluid, 91% DRI      b. Monitor for s/s of intolerance such as residuals >500ml, n/v/d, or abdominal distention.      --Per last RD note: Add Iván BID as TF modifier  to provide 90 kcal, 2.5 g protein, 7 g L-Arginine, 7 g L-Glutamine per serving to aid in wound healing     Do not mix Iván with formula in a tube-feeding bag    Pour one packet of Iván in a clean, small (approximately 6- to 8-fl-oz) container for mixing    Add 4 fl oz (120 mL) water at room temperature    Mix well with disposable spoon or tongue blade until all particles are completely hydrated    Verify correct tube placement    Flush feeding tube with 30 mL water    Administer Iván through feeding tube using a 60-mL or larger syringe  Flush with an additional 30 mL water (a smaller amount of water can be used to flush the tube if the patient is on a fluid- restricted diet)      --Recommend weekly weights     --Nursing: please continue to document formula and rate on flowsheets  --RD to monitor weight, po intake, BG, skin    Goals:   1.  75% nutritional needs met with diet/EN/PN during admission    2. Maintain dry weight during admission    3.  BG within target range during admission    4. Display s/s of wound healing during admission    Nutrition Goal Status: progressing towards goal

## 2025-04-16 NOTE — ASSESSMENT & PLAN NOTE
Hypernatremia is likely due to Dehydration from missed free water flushes. The patient's most recent sodium results are listed below.    Recent Labs     04/14/25  0916 04/15/25  0551 04/16/25  0450   * 147* 142     Plan  - Aim to correct the sodium by 8-10mEq in 24 hours.   - Resume tube feeds and free water flushes as able

## 2025-04-16 NOTE — PROGRESS NOTES
[facility] - Telemetry [facility] Medicine  Progress Note    Patient Name: Jesus Posey  MRN: [id number]  Patient Class: IP- Inpatient   Admission Date: 4/1/2025  Length of Stay: 15 days  Attending Physician: Billy Martin MD  Primary Care Provider: Pallavi, Primary Doctor        Subjective     Principal Problem:Acute hypoxic respiratory failure        HPI:  Mr. 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      As he is nonverbal, father reported leakage from his GJ tube which resembled his tube feeds. Last tube exchange was last month.      Admitted to Hospital Medicine in the setting of GJ tube malfunction. Intermittently hypothermic and bradycardic secondary to dysautonomia. Infectious workup has been negative. On IV maintenance fluids. IR replaced tube on 4/2.   During hospital course with repeated episodes of intolerance to PO intake and emesis. Repeat CT AP with G Tube with good positioning. Stepped up to MICU on 4/7 due to acute hypoxic respiratory failure requiring up to 15 L  HFNC  in the setting of likely aspiration pneumonitis. Isolated febrile episode of 100.7F. CXR concerning for BL pulmonary opacities. Started on Unasyn by Hospital Medicine.    Overview/Hospital Course:  Stepped up to ICU in 4/7 the setting of acute hypoxic respiratory failure in the swtting of basilar atelectasis and mucus plugging, with sepsis secondary to PNA requiring up to 15 L HFNC. Started on Vanc-Zosyn. During ICU course requiring up to norepi 0.06 mcg likely in the setting of hypovolemia 2/2 poor intake. Encephalopathy improving. CTH w/o acute processes. Lactate improved. EEG c/w toxic/drug-induced encephalopathy. No seizures. Persistenty bradycardic and hypothermic with EKG junctional rhythm on 30s.On Zosyn. TSH wnl. Ordering TTE. Cortisol < 3 . Started on IV hydrocortisone 100 mg TID. Off pressors on 4/10 at  7:30 am, but back on pressors by 2pm, again off by 4/10 7pm. Repleting dextrose for hypoglycemi     Interval History:   No events overnight.  Patient potassium PO and IV.  Weaning stress test steroids per Endocrine recommendations.  Follow-up Keppra level.  Chest x-ray ordered low-grade fever and continued congestion.  Mother updated at bedside.      Review of Systems   Unable to perform ROS: Patient nonverbal     Objective:     Vital Signs (Most Recent):  Temp: 99.5 °F (37.5 °C) (04/16/25 1116)  Pulse: 69 (04/16/25 1116)  Resp: 18 (04/16/25 1116)  BP: (!) 141/82 (04/16/25 1116)  SpO2: 100 % (04/16/25 1116) Vital Signs (24h Range):  Temp:  [97.5 °F (36.4 °C)-100.4 °F (38 °C)] 99.5 °F (37.5 °C)  Pulse:  [] 69  Resp:  [18-25] 18  SpO2:  [100 %] 100 %  BP: (106-141)/(56-82) 141/82     Weight: 53.5 kg (117 lb 15.1 oz)  Body mass index is 18.47 kg/m².    Intake/Output Summary (Last 24 hours) at 4/16/2025 1454  Last data filed at 4/16/2025 1236  Gross per 24 hour   Intake 2735 ml   Output 800 ml   Net 1935 ml         Physical Exam  Constitutional:       General: He is not in acute distress.     Comments: Cachectic, eyes open but does not respond    Eyes:      Conjunctiva/sclera: Conjunctivae normal.   Cardiovascular:      Rate and Rhythm: Normal rate and regular rhythm.      Pulses: Normal pulses.      Heart sounds: Normal heart sounds.   Pulmonary:      Effort: Pulmonary effort is normal. No respiratory distress.      Breath sounds: Rhonchi present.   Abdominal:      General: Bowel sounds are normal. There is no distension.      Palpations: Abdomen is soft.      Tenderness: There is no abdominal tenderness.      Comments: JG tube in place   Musculoskeletal:      Right lower leg: No edema.      Left lower leg: No edema.   Skin:     General: Skin is warm and dry.   Neurological:      Mental Status: Mental status is at baseline.      Comments: Awake    Contractures in extremities    Right midriatic pupil unresponsive.  Left reactive pupil with cataract                Significant Labs: All pertinent labs within the past 24 hours have been reviewed.  CBC:   Recent Labs   Lab 04/15/25  0551 04/16/25  0450   WBC 11.15 12.61   HGB 11.3* 10.2*   HCT 35.7* 32.7*    374     CMP:   Recent Labs   Lab 04/15/25  0551 04/16/25  0450   * 142   K 3.4* 3.1*    103   CO2 29 31*   BUN 10 12   CREATININE 0.4* 0.5   CALCIUM 8.4* 8.4*   ALBUMIN 2.5* 2.6*   BILITOT 0.1 0.2   ALKPHOS 121 118   AST 76* 128*   ALT 91* 160*   ANIONGAP 9 8       Significant Imaging: I have reviewed all pertinent imaging results/findings within the past 24 hours.      Assessment & Plan  Pulmonary septic shock with acute hypoxic respiratory failure  Low serum cortisol level  Patient with Hypoxic Respiratory failure which is Acute with chronic hypercapnic respiratory failure. he is not on home oxygen. Supplemental oxygen was provided and noted-       .   Signs/symptoms of respiratory failure include- respiratory distress. Contributing diagnoses includes - ARDS and Aspiration Labs and images were reviewed. Patient Has recent ABG, which has been reviewed. Will treat underlying causes and adjust management of respiratory failure as follows-      Suspect aspiration pneumonia in the setting of hospital acquired pneumonia with recurrent vomiting with supperimposed atelectasis.   - s/p IV Zosyn x5 days  - Concomitant hypothermia and bradycardia in the setting of adrenal insufficiency.   - Off pressor support briefly on 4/10, but  back again same day.   - IPPV. Acute hypoxic rx failure resolved.     - BCX 4/7 NGTD  - Lactate wnl.    - Weaning SDS off 4/17  - Endocrine consulted  -- Will do an ACTH stimulation test once patient is off steroids to confirm/rule out adrenal insufficiency.   - Pulmonary toilet: bronchodilators PRN, hypertonic saline.      Hypophosphatemia  Patient's most recent phosphorus results are listed below.   Recent Labs     04/14/25  0916  04/15/25  0551 04/16/25  0450   PHOS 1.6* 2.5* 2.8     Plan  - Will treat hypophosphatemia with IV and PO replacement  - Patient's hypophosphatemia is stable  - Likely secondary to repeated    PEG tube malfunction  Malfunction of jejunostomy tube  Patient with GJ tube dependence, presenting with tube malfunction. IR consulted for exchange under fluoroscopy. Tube changed 4/2.   GI consulted d/t recurrent vomiting with initiation of tube feeds. CTAP with GJ tube in satisfactory position.     - 5mg G tube metoclopramide Q8h   - Restart tube feeds    Hypernatremia  Hypernatremia is likely due to Dehydration from missed free water flushes. The patient's most recent sodium results are listed below.    Recent Labs     04/14/25  0916 04/15/25  0551 04/16/25  0450   * 147* 142     Plan  - Aim to correct the sodium by 8-10mEq in 24 hours.   - Resume tube feeds and free water flushes as able    Toxic metabolic encephalopathy  May be secondary to sedation with diazepam with superimposed HAP and sepsis   - CT head ruled out CVA. ABG c/w acute hypercapnic rx failure  - EEG c/w toxic and drug-induced encephalopathy. Ruled out seizures    Spastic quadriparesis  - Intrathecal baclofen pump in place.   - Mobility protocol.     Functional quadriplegia  - Intrathecal baclofen pump in place. Supportive care.     Cachexia  Resume tube feeds after GJ tube replaced as able    Hypothermia  Per family, this is common when pt is hospitalized  - Suspect low temps and bradycardia are related to dysautonomia  - Likely secondary to adrenal insufficiency     Hypokalemia  Patient's most recent potassium results are listed below.   Recent Labs     04/14/25  0916 04/15/25  0551 04/16/25  0450   K 3.5 3.4* 3.1*     Plan  - Replete potassium per protocol  - Monitor potassium Daily  - Patient's hypokalemia is stable    Severe malnutrition  Body mass index (BMI) less than 19  Nutrition consulted. Most recent weight and BMI monitored-      Measurements:  Wt Readings from Last 1 Encounters:   04/16/25 53.5 kg (117 lb 15.1 oz)   Body mass index is 18.47 kg/m².    Patient has been screened and assessed by RD.    Malnutrition Type:  Context:    Level:      Malnutrition Characteristic Summary:       Interventions/Recommendations (treatment strategy):  --Continue TF via PEJ Nutren 1.5 at 60ml/hr to provide to provide 1440 total fluid volume, 2160 kcals, 98 g PRO, 253 g CHO, 1100 mL fluid,144 % DRI  a. At home Tube feeding regimen: Nutren 1.5 @ 65 mL/hr x 14 hours to provide 910 total fluid volume, 1365 kcals, 62 g protein, 691 mL fluid, 91% DRI  b. Monitor for s/s of intolerance such as residuals >500ml, n/v/d, or abdominal distention.  --Per last RD note: Add Iván BID as TF modifier  to provide 90 kcal, 2.5 g protein, 7 g L-Arginine, 7 g L-Glutamine per serving to aid in wound healing   --Do not mix Iván with formula in a tube-feeding bag  --Pour one packet of Iván in a clean, small (approximately 6- to 8-fl-oz) container for mixing  --Add 4 fl oz (120 mL) water at room temperature  --Mix well with disposable spoon or tongue blade until all particles are completely hydrated  --Verify correct tube placement  --Flush feeding tube with 30 mL water  --Administer Iván through feeding tube using a 60-mL or larger syringe  --Flush with an additional 30 mL water (a smaller amount of water can be used to flush the tube if the patient is on a fluid- restricted diet)  --Recommend weekly weights   --Nursing: please continue to document formula and rate on flowsheets  --RD to monitor weight, po intake, BG, skin    Nutrition consulted. Most recent weight and BMI monitored-     Traumatic brain injury  Continue current management with diazepam for contractures and keppra for seizure prophylaxis.     Pressure injury of sacral region, stage 2  Deep tissue injury  - Wound care    VTE Risk Mitigation (From admission, onward)           Ordered     heparin, porcine (PF)  100 unit/mL injection flush 10 Units  As needed (PRN)         04/10/25 0504     enoxaparin injection 30 mg  Daily         04/07/25 1830     IP VTE HIGH RISK PATIENT  Once         04/07/25 1403     Place sequential compression device  Until discontinued         04/01/25 0209                    Discharge Planning   EDINSON: 4/18/2025     Code Status: Full Code   Medical Readiness for Discharge Date:   Discharge Plan A: Home, Home with family, Home Health   Discharge Delays: None known at this time                    Billy Martin MD  Department of Hospital Medicine   [facility] - Telemetry Stepdown

## 2025-04-16 NOTE — ASSESSMENT & PLAN NOTE
Patient with Hypoxic Respiratory failure which is Acute with chronic hypercapnic respiratory failure. he is not on home oxygen. Supplemental oxygen was provided and noted-       .   Signs/symptoms of respiratory failure include- respiratory distress. Contributing diagnoses includes - ARDS and Aspiration Labs and images were reviewed. Patient Has recent ABG, which has been reviewed. Will treat underlying causes and adjust management of respiratory failure as follows-      Suspect aspiration pneumonia in the setting of hospital acquired pneumonia with recurrent vomiting with supperimposed atelectasis.   - s/p IV Zosyn x5 days  - Concomitant hypothermia and bradycardia in the setting of adrenal insufficiency.   - Off pressor support briefly on 4/10, but  back again same day.   - IPPV. Acute hypoxic rx failure resolved.     - BCX 4/7 NGTD  - Lactate wnl.    - Weaning SDS off 4/17  - Endocrine consulted  -- Will do an ACTH stimulation test once patient is off steroids to confirm/rule out adrenal insufficiency.   - Pulmonary toilet: bronchodilators PRN, hypertonic saline.

## 2025-04-16 NOTE — SUBJECTIVE & OBJECTIVE
Interval History:   No events overnight.  Patient potassium PO and IV.  Weaning stress test steroids per Endocrine recommendations.  Follow-up Keppra level.  Chest x-ray ordered low-grade fever and continued congestion.  Mother updated at bedside.      Review of Systems   Unable to perform ROS: Patient nonverbal     Objective:     Vital Signs (Most Recent):  Temp: 99.5 °F (37.5 °C) (04/16/25 1116)  Pulse: 69 (04/16/25 1116)  Resp: 18 (04/16/25 1116)  BP: (!) 141/82 (04/16/25 1116)  SpO2: 100 % (04/16/25 1116) Vital Signs (24h Range):  Temp:  [97.5 °F (36.4 °C)-100.4 °F (38 °C)] 99.5 °F (37.5 °C)  Pulse:  [] 69  Resp:  [18-25] 18  SpO2:  [100 %] 100 %  BP: (106-141)/(56-82) 141/82     Weight: 53.5 kg (117 lb 15.1 oz)  Body mass index is 18.47 kg/m².    Intake/Output Summary (Last 24 hours) at 4/16/2025 1454  Last data filed at 4/16/2025 1236  Gross per 24 hour   Intake 2735 ml   Output 800 ml   Net 1935 ml         Physical Exam  Constitutional:       General: He is not in acute distress.     Comments: Cachectic, eyes open but does not respond    Eyes:      Conjunctiva/sclera: Conjunctivae normal.   Cardiovascular:      Rate and Rhythm: Normal rate and regular rhythm.      Pulses: Normal pulses.      Heart sounds: Normal heart sounds.   Pulmonary:      Effort: Pulmonary effort is normal. No respiratory distress.      Breath sounds: Rhonchi present.   Abdominal:      General: Bowel sounds are normal. There is no distension.      Palpations: Abdomen is soft.      Tenderness: There is no abdominal tenderness.      Comments: JG tube in place   Musculoskeletal:      Right lower leg: No edema.      Left lower leg: No edema.   Skin:     General: Skin is warm and dry.   Neurological:      Mental Status: Mental status is at baseline.      Comments: Awake    Contractures in extremities    Right midriatic pupil unresponsive. Left reactive pupil with cataract                Significant Labs: All pertinent labs within the  past 24 hours have been reviewed.  CBC:   Recent Labs   Lab 04/15/25  0551 04/16/25  0450   WBC 11.15 12.61   HGB 11.3* 10.2*   HCT 35.7* 32.7*    374     CMP:   Recent Labs   Lab 04/15/25  0551 04/16/25  0450   * 142   K 3.4* 3.1*    103   CO2 29 31*   BUN 10 12   CREATININE 0.4* 0.5   CALCIUM 8.4* 8.4*   ALBUMIN 2.5* 2.6*   BILITOT 0.1 0.2   ALKPHOS 121 118   AST 76* 128*   ALT 91* 160*   ANIONGAP 9 8       Significant Imaging: I have reviewed all pertinent imaging results/findings within the past 24 hours.

## 2025-04-16 NOTE — PLAN OF CARE
[facility] - Telemetry Stepdown  Discharge Reassessment    Primary Care Provider: No, Primary Doctor    Expected Discharge Date: 4/18/2025    Reassessment (most recent)       Discharge Reassessment - 04/16/25 1602          Discharge Reassessment    Assessment Type Discharge Planning Reassessment     Did the patient's condition or plan change since previous assessment? Yes     Discharge Plan discussed with: Parent(s)     Name(s) and Number(s) Kelly Galindo (at bedside) [phone]     Communicated EDINSON with patient/caregiver Date not available/Unable to determine     Discharge Plan A Home;Home with family     Discharge Plan B Home;Home with family   TBD    DME Needed Upon Discharge  hospital bed;lift device   Per Joise (stepmom) pt will need: hospital bed, porsche lift and low air mattress    Why the patient remains in the hospital Requires continued medical care        Post-Acute Status    Coverage MEDICA[employer] - [employer]     Discharge Delays None known at this time                 SW assigned to pt today.  Pt is not medically ready for discharge.  FELIX met teresa/ Josie (pt stepmom) at bedside for check-in/ update on discharge plan. Plan remain the same home with needed HME (hospital bed, porsche life and low air mattress). FELIX informed CM dept will work on equipments once it closer to pt discharge (once orders place). She confirmed address on file is where pt will be discharging. FELIX also informed CM dept will continue following along w/ pt treatment team for recommendation/needs.     Discharge Plan A and Plan B have been determined by review of patient's clinical status, future medical and therapeutic needs, and coverage/benefits for post-acute care in coordination with multidisciplinary team members.    Cherelle Soni, GISEL   [facility]    Case Management Dept  [phone]

## 2025-04-17 LAB
ABSOLUTE EOSINOPHIL (OHS): 0.04 K/UL
ABSOLUTE MONOCYTE (OHS): 0.65 K/UL (ref 0.3–1)
ABSOLUTE NEUTROPHIL COUNT (OHS): 10.69 K/UL (ref 1.8–7.7)
ALBUMIN SERPL BCP-MCNC: 2.6 G/DL (ref 3.5–5.2)
ALP SERPL-CCNC: 137 UNIT/L (ref 40–150)
ALT SERPL W/O P-5'-P-CCNC: 216 UNIT/L (ref 10–44)
ANION GAP (OHS): 10 MMOL/L (ref 8–16)
AST SERPL-CCNC: 105 UNIT/L (ref 11–45)
BASOPHILS # BLD AUTO: 0.03 K/UL
BASOPHILS NFR BLD AUTO: 0.2 %
BILIRUB SERPL-MCNC: 0.2 MG/DL (ref 0.1–1)
BUN SERPL-MCNC: 10 MG/DL (ref 6–20)
CALCIUM SERPL-MCNC: 8.6 MG/DL (ref 8.7–10.5)
CHLORIDE SERPL-SCNC: 112 MMOL/L (ref 95–110)
CO2 SERPL-SCNC: 29 MMOL/L (ref 23–29)
CREAT SERPL-MCNC: 0.5 MG/DL (ref 0.5–1.4)
ERYTHROCYTE [DISTWIDTH] IN BLOOD BY AUTOMATED COUNT: 16.1 % (ref 11.5–14.5)
GFR SERPLBLD CREATININE-BSD FMLA CKD-EPI: >60 ML/MIN/1.73/M2
GLUCOSE SERPL-MCNC: 136 MG/DL (ref 70–110)
HCT VFR BLD AUTO: 33 % (ref 40–54)
HGB BLD-MCNC: 10.3 GM/DL (ref 14–18)
IMM GRANULOCYTES # BLD AUTO: 0.3 K/UL (ref 0–0.04)
IMM GRANULOCYTES NFR BLD AUTO: 2.3 % (ref 0–0.5)
LYMPHOCYTES # BLD AUTO: 1.33 K/UL (ref 1–4.8)
MAGNESIUM SERPL-MCNC: 2.4 MG/DL (ref 1.6–2.6)
MCH RBC QN AUTO: 30.6 PG (ref 27–31)
MCHC RBC AUTO-ENTMCNC: 31.2 G/DL (ref 32–36)
MCV RBC AUTO: 98 FL (ref 82–98)
NUCLEATED RBC (/100WBC) (OHS): 0 /100 WBC
PHOSPHATE SERPL-MCNC: 3.5 MG/DL (ref 2.7–4.5)
PLATELET # BLD AUTO: 365 K/UL (ref 150–450)
PMV BLD AUTO: 11.3 FL (ref 9.2–12.9)
POCT GLUCOSE: 105 MG/DL (ref 70–110)
POCT GLUCOSE: 119 MG/DL (ref 70–110)
POCT GLUCOSE: 125 MG/DL (ref 70–110)
POCT GLUCOSE: 129 MG/DL (ref 70–110)
POCT GLUCOSE: 87 MG/DL (ref 70–110)
POCT GLUCOSE: 97 MG/DL (ref 70–110)
POTASSIUM SERPL-SCNC: 4.1 MMOL/L (ref 3.5–5.1)
PROT SERPL-MCNC: 6.2 GM/DL (ref 6–8.4)
RBC # BLD AUTO: 3.37 M/UL (ref 4.6–6.2)
RELATIVE EOSINOPHIL (OHS): 0.3 %
RELATIVE LYMPHOCYTE (OHS): 10.2 % (ref 18–48)
RELATIVE MONOCYTE (OHS): 5 % (ref 4–15)
RELATIVE NEUTROPHIL (OHS): 82 % (ref 38–73)
SODIUM SERPL-SCNC: 151 MMOL/L (ref 136–145)
WBC # BLD AUTO: 13.04 K/UL (ref 3.9–12.7)

## 2025-04-17 PROCEDURE — 25000003 PHARM REV CODE 250: Performed by: STUDENT IN AN ORGANIZED HEALTH CARE EDUCATION/TRAINING PROGRAM

## 2025-04-17 PROCEDURE — 80053 COMPREHEN METABOLIC PANEL: CPT

## 2025-04-17 PROCEDURE — 84100 ASSAY OF PHOSPHORUS: CPT | Performed by: INTERNAL MEDICINE

## 2025-04-17 PROCEDURE — 20600001 HC STEP DOWN PRIVATE ROOM

## 2025-04-17 PROCEDURE — 25000242 PHARM REV CODE 250 ALT 637 W/ HCPCS: Performed by: STUDENT IN AN ORGANIZED HEALTH CARE EDUCATION/TRAINING PROGRAM

## 2025-04-17 PROCEDURE — 63600175 PHARM REV CODE 636 W HCPCS

## 2025-04-17 PROCEDURE — 36415 COLL VENOUS BLD VENIPUNCTURE: CPT

## 2025-04-17 PROCEDURE — 83735 ASSAY OF MAGNESIUM: CPT | Performed by: INTERNAL MEDICINE

## 2025-04-17 PROCEDURE — 85025 COMPLETE CBC W/AUTO DIFF WBC: CPT | Performed by: STUDENT IN AN ORGANIZED HEALTH CARE EDUCATION/TRAINING PROGRAM

## 2025-04-17 PROCEDURE — 63600175 PHARM REV CODE 636 W HCPCS: Performed by: INTERNAL MEDICINE

## 2025-04-17 RX ORDER — HYDROCORTISONE 5 MG/1
20 TABLET ORAL DAILY
Status: DISCONTINUED | OUTPATIENT
Start: 2025-04-18 | End: 2025-04-18

## 2025-04-17 RX ADMIN — POTASSIUM & SODIUM PHOSPHATES POWDER PACK 280-160-250 MG 2 PACKET: 280-160-250 PACK at 12:04

## 2025-04-17 RX ADMIN — POTASSIUM BICARBONATE 25 MEQ: 978 TABLET, EFFERVESCENT ORAL at 08:04

## 2025-04-17 RX ADMIN — Medication: at 09:04

## 2025-04-17 RX ADMIN — HYDROCORTISONE 20 MG: 5 TABLET ORAL at 06:04

## 2025-04-17 RX ADMIN — GLYCOPYRROLATE 1 MG: 1 LIQUID ORAL at 08:04

## 2025-04-17 RX ADMIN — POTASSIUM & SODIUM PHOSPHATES POWDER PACK 280-160-250 MG 2 PACKET: 280-160-250 PACK at 06:04

## 2025-04-17 RX ADMIN — POTASSIUM BICARBONATE 25 MEQ: 978 TABLET, EFFERVESCENT ORAL at 09:04

## 2025-04-17 RX ADMIN — GLYCOPYRROLATE 1 MG: 1 LIQUID ORAL at 02:04

## 2025-04-17 RX ADMIN — HYDROCORTISONE 10 MG: 5 TABLET ORAL at 05:04

## 2025-04-17 RX ADMIN — POTASSIUM & SODIUM PHOSPHATES POWDER PACK 280-160-250 MG 2 PACKET: 280-160-250 PACK at 05:04

## 2025-04-17 RX ADMIN — GLYCOPYRROLATE 1 MG: 1 LIQUID ORAL at 09:04

## 2025-04-17 RX ADMIN — DIAZEPAM 5 MG: 5 SOLUTION ORAL at 02:04

## 2025-04-17 RX ADMIN — DIAZEPAM 5 MG: 5 SOLUTION ORAL at 09:04

## 2025-04-17 RX ADMIN — ENOXAPARIN SODIUM 30 MG: 40 INJECTION SUBCUTANEOUS at 05:04

## 2025-04-17 RX ADMIN — METOCLOPRAMIDE 5 MG: 5 SOLUTION ORAL at 09:04

## 2025-04-17 RX ADMIN — PANTOPRAZOLE SODIUM 40 MG: 40 INJECTION, POWDER, FOR SOLUTION INTRAVENOUS at 09:04

## 2025-04-17 RX ADMIN — METOCLOPRAMIDE 5 MG: 5 SOLUTION ORAL at 06:04

## 2025-04-17 RX ADMIN — DIAZEPAM 5 MG: 5 SOLUTION ORAL at 08:04

## 2025-04-17 RX ADMIN — LEVETIRACETAM 500 MG: 500 SOLUTION ORAL at 08:04

## 2025-04-17 RX ADMIN — SODIUM CHLORIDE, POTASSIUM CHLORIDE, SODIUM LACTATE AND CALCIUM CHLORIDE 500 ML: 600; 310; 30; 20 INJECTION, SOLUTION INTRAVENOUS at 05:04

## 2025-04-17 RX ADMIN — METHYLPHENIDATE HYDROCHLORIDE 5 MG: 5 TABLET ORAL at 06:04

## 2025-04-17 RX ADMIN — Medication: at 08:04

## 2025-04-17 RX ADMIN — LEVETIRACETAM 500 MG: 500 SOLUTION ORAL at 09:04

## 2025-04-17 RX ADMIN — METOCLOPRAMIDE 5 MG: 5 SOLUTION ORAL at 02:04

## 2025-04-17 NOTE — ASSESSMENT & PLAN NOTE
May be secondary to sedation with diazepam with superimposed HAP and sepsis   CT head ruled out CVA. ABG c/w acute hypercapnic rx failure  EEG c/w toxic and drug-induced encephalopathy. Ruled out seizures

## 2025-04-17 NOTE — PLAN OF CARE
Problem: Adult Inpatient Plan of Care  Goal: Plan of Care Review  Outcome: Progressing  Goal: Patient-Specific Goal (Individualized)  Outcome: Progressing  Goal: Absence of Hospital-Acquired Illness or Injury  Outcome: Progressing  Goal: Optimal Comfort and Wellbeing  Outcome: Progressing  Goal: Readiness for Transition of Care  Outcome: Progressing     Problem: Skin Injury Risk Increased  Goal: Skin Health and Integrity  Outcome: Progressing     Problem: Wound  Goal: Optimal Coping  Outcome: Progressing  Goal: Optimal Functional Ability  Outcome: Progressing  Goal: Absence of Infection Signs and Symptoms  Outcome: Progressing  Goal: Improved Oral Intake  Outcome: Progressing  Goal: Optimal Pain Control and Function  Outcome: Progressing  Goal: Skin Health and Integrity  Outcome: Progressing  Goal: Optimal Wound Healing  Outcome: Progressing     Problem: Delirium  Goal: Optimal Coping  Outcome: Progressing  Goal: Improved Behavioral Control  Outcome: Progressing  Goal: Improved Attention and Thought Clarity  Outcome: Progressing  Goal: Improved Sleep  Outcome: Progressing     Problem: Fall Injury Risk  Goal: Absence of Fall and Fall-Related Injury  Outcome: Progressing     Problem: Infection  Goal: Absence of Infection Signs and Symptoms  Outcome: Progressing

## 2025-04-17 NOTE — PLAN OF CARE
Pt's hospital bed, mattress and porsche lift will be delivered tomorrow 4/18/2025. [facility] has been in contact with the father and will call him when they are on their way for delivery.  MD notified.    Celso Espinoza, [facility]    [facility]  [phone]

## 2025-04-17 NOTE — ASSESSMENT & PLAN NOTE
Patient with GJ tube dependence, presenting with tube malfunction. IR consulted for exchange under fluoroscopy. Tube changed 4/2.   GI consulted d/t recurrent vomiting with initiation of tube feeds. CTAP with GJ tube in satisfactory position     5mg G tube metoclopramide Q8h   Restart tube feeds

## 2025-04-17 NOTE — ASSESSMENT & PLAN NOTE
Resolved  Per family, this is common when pt is hospitalized  Suspect low temps and bradycardia are related to dysautonomia  Likely secondary to adrenal insufficiency

## 2025-04-17 NOTE — PROGRESS NOTES
[facility] - Telemetry [facility]  Progress Note    Patient Name: Jesus Posey  MRN: [id number]  Patient Class: IP- Inpatient   Admission Date: 4/1/2025  Length of Stay: 16 days  Attending Physician: Balaji Edwards DO  Primary Care Provider: Pallavi, Primary Doctor        Subjective     Principal Problem:Acute hypoxic respiratory failure        HPI:  Mr. 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      As he is nonverbal, father reported leakage from his GJ tube which resembled his tube feeds. Last tube exchange was last month.      Admitted to Hospital Medicine in the setting of GJ tube malfunction. Intermittently hypothermic and bradycardic secondary to dysautonomia. Infectious workup has been negative. On IV maintenance fluids. IR replaced tube on 4/2.   During hospital course with repeated episodes of intolerance to PO intake and emesis. Repeat CT AP with G Tube with good positioning. Stepped up to MICU on 4/7 due to acute hypoxic respiratory failure requiring up to 15 L  HFNC  in the setting of likely aspiration pneumonitis. Isolated febrile episode of 100.7F. CXR concerning for BL pulmonary opacities. Started on Unasyn by Hospital Medicine.    Overview/Hospital Course:  Stepped up to ICU in 4/7 the setting of acute hypoxic respiratory failure in the swtting of basilar atelectasis and mucus plugging, with sepsis secondary to PNA requiring up to 15 L HFNC. Started on Vanc-Zosyn. During ICU course requiring up to norepi 0.06 mcg likely in the setting of hypovolemia 2/2 poor intake. Encephalopathy improving. CTH w/o acute processes. Lactate improved. EEG c/w toxic/drug-induced encephalopathy. No seizures. Persistenty bradycardic and hypothermic with EKG junctional rhythm on 30s.On Zosyn. TSH wnl. Ordering TTE. Cortisol < 3 . Started on IV hydrocortisone 100 mg TID. Off pressors on 4/10 at  7:30 am, but back on pressors by 2pm, again off by 4/10 7pm. Repleting dextrose for hypoglycemia.    Patient feeding tubes replaced without complication.  Tube feeds restarted without issue.  Stress dose steroids weaned with the help of Endocrinology.  Electrolytes replaced with concern for refeeding syndrome.  Noted to have occasional hemodynamically stable bradycardia.  Chronic medical issue when ill on chart review.  Completed 5 days of antibiotics with Zosyn for aspiration pneumonia.    Interval History:  Seen and evaluated on step down unit  No acute events overnight    Clinical status stable, unchanged  No new complaints    Objective:     Vital Signs (Most Recent):  Temp: 98 °F (36.7 °C) (04/17/25 1626)  Pulse: (!) 51 (04/17/25 1632)  Resp: 16 (04/17/25 1626)  BP: (!) 88/50 (04/17/25 1632)  SpO2: 100 % (04/17/25 1626) Vital Signs (24h Range):  Temp:  [97.9 °F (36.6 °C)-98.7 °F (37.1 °C)] 98 °F (36.7 °C)  Pulse:  [] 51  Resp:  [16-18] 16  SpO2:  [98 %-100 %] 100 %  BP: ()/(50-82) 88/50     Weight: 55 kg (121 lb 4.1 oz)  Body mass index is 18.99 kg/m².    Intake/Output Summary (Last 24 hours) at 4/17/2025 1636  Last data filed at 4/17/2025 0845  Gross per 24 hour   Intake 1150 ml   Output 1750 ml   Net -600 ml         Physical Exam  Constitutional:       General: He is not in acute distress.     Comments: Cachectic, eyes open but does not respond    Eyes:      Conjunctiva/sclera: Conjunctivae normal.   Cardiovascular:      Rate and Rhythm: Normal rate and regular rhythm.      Pulses: Normal pulses.      Heart sounds: Normal heart sounds.   Pulmonary:      Effort: Pulmonary effort is normal. No respiratory distress.      Breath sounds: Rhonchi present.   Abdominal:      General: Bowel sounds are normal. There is no distension.      Palpations: Abdomen is soft.      Tenderness: There is no abdominal tenderness.      Comments: JG tube in place   Musculoskeletal:      Right lower leg: No edema.      Left  lower leg: No edema.   Skin:     General: Skin is warm and dry.   Neurological:      Mental Status: Mental status is at baseline.      Comments: Awake    Contractures in extremities    Right midriatic pupil unresponsive. Left reactive pupil with cataract                Significant Labs: All pertinent labs within the past 24 hours have been reviewed.    Significant Imaging: I have reviewed all pertinent imaging results/findings within the past 24 hours.      Assessment & Plan  Pulmonary septic shock with acute hypoxic respiratory failure  Low serum cortisol level  Patient with Hypoxic Respiratory failure which is Acute with chronic hypercapnic respiratory failure. he is not on home oxygen. Supplemental oxygen was provided and noted-       .   Signs/symptoms of respiratory failure include- respiratory distress. Contributing diagnoses includes - ARDS and Aspiration Labs and images were reviewed. Patient Has recent ABG, which has been reviewed. Will treat underlying causes and adjust management of respiratory failure as follows-      Suspect aspiration pneumonia in the setting of hospital acquired pneumonia with recurrent vomiting with supperimposed atelectasis.   - s/p IV Zosyn x5 days  - Concomitant hypothermia and bradycardia in the setting of adrenal insufficiency.   - Off pressor support briefly on 4/10, but  back again same day.   - IPPV. Acute hypoxic rx failure resolved.     BCX 4/7 NGTD  Lactate wnl  Weaning SDS off 4/17  Endocrine consulted  Plan for ACTH stim test once off steroids to confirm/rule out adrenal insufficiency  Pulmonary toilet: bronchodilators PRN, hypertonic saline    Hypophosphatemia  Resolved    PEG tube malfunction  Malfunction of jejunostomy tube  Patient with GJ tube dependence, presenting with tube malfunction. IR consulted for exchange under fluoroscopy. Tube changed 4/2.   GI consulted d/t recurrent vomiting with initiation of tube feeds. CTAP with GJ tube in satisfactory position      5mg G tube metoclopramide Q8h   Restart tube feeds    Hypernatremia  Hypernatremia is likely due to Dehydration from missed free water flushes. The patient's most recent sodium results are listed below.    Recent Labs     04/15/25  0551 04/16/25  0450 04/17/25  0246   * 142 151*     Aim to correct the sodium by 8-10mEq in 24 hours.   Resume tube feeds and free water flushes as able  Ensure patient is getting FWF    Toxic metabolic encephalopathy  May be secondary to sedation with diazepam with superimposed HAP and sepsis   CT head ruled out CVA. ABG c/w acute hypercapnic rx failure  EEG c/w toxic and drug-induced encephalopathy. Ruled out seizures    Spastic quadriparesis  - Intrathecal baclofen pump in place.   - Mobility protocol.     Functional quadriplegia  - Intrathecal baclofen pump in place. Supportive care.     Cachexia  Resume tube feeds after GJ tube replaced as able    Hypothermia  Resolved  Per family, this is common when pt is hospitalized  Suspect low temps and bradycardia are related to dysautonomia  Likely secondary to adrenal insufficiency      Hypokalemia  Resolved    Severe malnutrition  Body mass index (BMI) less than 19  Nutrition consulted. Most recent weight and BMI monitored-     Measurements:  Wt Readings from Last 1 Encounters:   04/17/25 55 kg (121 lb 4.1 oz)   Body mass index is 18.99 kg/m².    Patient has been screened and assessed by RD.    Malnutrition Type:  Context:    Level:      Malnutrition Characteristic Summary:       Interventions/Recommendations (treatment strategy):  --Continue TF via PEJ Nutren 1.5 at 60ml/hr to provide to provide 1440 total fluid volume, 2160 kcals, 98 g PRO, 253 g CHO, 1100 mL fluid,144 % DRI  a. At home Tube feeding regimen: Nutren 1.5 @ 65 mL/hr x 14 hours to provide 910 total fluid volume, 1365 kcals, 62 g protein, 691 mL fluid, 91% DRI  b. Monitor for s/s of intolerance such as residuals >500ml, n/v/d, or abdominal distention.  --Per last RD  note: Add Iván BID as TF modifier  to provide 90 kcal, 2.5 g protein, 7 g L-Arginine, 7 g L-Glutamine per serving to aid in wound healing   --Do not mix Iván with formula in a tube-feeding bag  --Pour one packet of Iván in a clean, small (approximately 6- to 8-fl-oz) container for mixing  --Add 4 fl oz (120 mL) water at room temperature  --Mix well with disposable spoon or tongue blade until all particles are completely hydrated  --Verify correct tube placement  --Flush feeding tube with 30 mL water  --Administer Iván through feeding tube using a 60-mL or larger syringe  --Flush with an additional 30 mL water (a smaller amount of water can be used to flush the tube if the patient is on a fluid- restricted diet)  --Recommend weekly weights   --Nursing: please continue to document formula and rate on flowsheets  --RD to monitor weight, po intake, BG, skin    Nutrition consulted. Most recent weight and BMI monitored-     Traumatic brain injury  Continue current management with diazepam for contractures and keppra for seizure prophylaxis.     Pressure injury of sacral region, stage 2  Deep tissue injury  - Wound care    VTE Risk Mitigation (From admission, onward)           Ordered     heparin, porcine (PF) 100 unit/mL injection flush 10 Units  As needed (PRN)         04/10/25 0504     enoxaparin injection 30 mg  Daily         04/07/25 1830     IP VTE HIGH RISK PATIENT  Once         04/07/25 1403     Place sequential compression device  Until discontinued         04/01/25 0209                    Discharge Planning   EDINSON: 4/18/2025     Code Status: Full Code   Medical Readiness for Discharge Date:   Discharge Plan A: Home, Home with family   Discharge Delays: None known at this time                    Balaji Edwards DO  Department of Hospital Medicine   [facility] - Telemetry Stepdown

## 2025-04-17 NOTE — ASSESSMENT & PLAN NOTE
Nutrition consulted. Most recent weight and BMI monitored-     Measurements:  Wt Readings from Last 1 Encounters:   04/17/25 55 kg (121 lb 4.1 oz)   Body mass index is 18.99 kg/m².    Patient has been screened and assessed by RD.    Malnutrition Type:  Context:    Level:      Malnutrition Characteristic Summary:       Interventions/Recommendations (treatment strategy):  --Continue TF via PEJ Nutren 1.5 at 60ml/hr to provide to provide 1440 total fluid volume, 2160 kcals, 98 g PRO, 253 g CHO, 1100 mL fluid,144 % DRI  a. At home Tube feeding regimen: Nutren 1.5 @ 65 mL/hr x 14 hours to provide 910 total fluid volume, 1365 kcals, 62 g protein, 691 mL fluid, 91% DRI  b. Monitor for s/s of intolerance such as residuals >500ml, n/v/d, or abdominal distention.  --Per last RD note: Add Iván BID as TF modifier  to provide 90 kcal, 2.5 g protein, 7 g L-Arginine, 7 g L-Glutamine per serving to aid in wound healing   --Do not mix Iván with formula in a tube-feeding bag  --Pour one packet of Iván in a clean, small (approximately 6- to 8-fl-oz) container for mixing  --Add 4 fl oz (120 mL) water at room temperature  --Mix well with disposable spoon or tongue blade until all particles are completely hydrated  --Verify correct tube placement  --Flush feeding tube with 30 mL water  --Administer Iván through feeding tube using a 60-mL or larger syringe  --Flush with an additional 30 mL water (a smaller amount of water can be used to flush the tube if the patient is on a fluid- restricted diet)  --Recommend weekly weights   --Nursing: please continue to document formula and rate on flowsheets  --RD to monitor weight, po intake, BG, skin    Nutrition consulted. Most recent weight and BMI monitored-      For information on Fall & Injury Prevention, visit: [email or web address] OR  [email or web address] OR  https://www.cdc.gov/steadi/patient.html

## 2025-04-17 NOTE — ASSESSMENT & PLAN NOTE
Nutrition consulted. Most recent weight and BMI monitored-     Measurements:  Wt Readings from Last 1 Encounters:   04/17/25 55 kg (121 lb 4.1 oz)   Body mass index is 18.99 kg/m².    Patient has been screened and assessed by RD.    Malnutrition Type:  Context:    Level:      Malnutrition Characteristic Summary:       Interventions/Recommendations (treatment strategy):  --Continue TF via PEJ Nutren 1.5 at 60ml/hr to provide to provide 1440 total fluid volume, 2160 kcals, 98 g PRO, 253 g CHO, 1100 mL fluid,144 % DRI  a. At home Tube feeding regimen: Nutren 1.5 @ 65 mL/hr x 14 hours to provide 910 total fluid volume, 1365 kcals, 62 g protein, 691 mL fluid, 91% DRI  b. Monitor for s/s of intolerance such as residuals >500ml, n/v/d, or abdominal distention.  --Per last RD note: Add Iván BID as TF modifier  to provide 90 kcal, 2.5 g protein, 7 g L-Arginine, 7 g L-Glutamine per serving to aid in wound healing   --Do not mix Iván with formula in a tube-feeding bag  --Pour one packet of Iván in a clean, small (approximately 6- to 8-fl-oz) container for mixing  --Add 4 fl oz (120 mL) water at room temperature  --Mix well with disposable spoon or tongue blade until all particles are completely hydrated  --Verify correct tube placement  --Flush feeding tube with 30 mL water  --Administer Iván through feeding tube using a 60-mL or larger syringe  --Flush with an additional 30 mL water (a smaller amount of water can be used to flush the tube if the patient is on a fluid- restricted diet)  --Recommend weekly weights   --Nursing: please continue to document formula and rate on flowsheets  --RD to monitor weight, po intake, BG, skin    Nutrition consulted. Most recent weight and BMI monitored-

## 2025-04-17 NOTE — SUBJECTIVE & OBJECTIVE
Interval History:  Seen and evaluated on step down unit  No acute events overnight    Clinical status stable, unchanged  No new complaints    Objective:     Vital Signs (Most Recent):  Temp: 98 °F (36.7 °C) (04/17/25 1626)  Pulse: (!) 51 (04/17/25 1632)  Resp: 16 (04/17/25 1626)  BP: (!) 88/50 (04/17/25 1632)  SpO2: 100 % (04/17/25 1626) Vital Signs (24h Range):  Temp:  [97.9 °F (36.6 °C)-98.7 °F (37.1 °C)] 98 °F (36.7 °C)  Pulse:  [] 51  Resp:  [16-18] 16  SpO2:  [98 %-100 %] 100 %  BP: ()/(50-82) 88/50     Weight: 55 kg (121 lb 4.1 oz)  Body mass index is 18.99 kg/m².    Intake/Output Summary (Last 24 hours) at 4/17/2025 1636  Last data filed at 4/17/2025 0845  Gross per 24 hour   Intake 1150 ml   Output 1750 ml   Net -600 ml         Physical Exam  Constitutional:       General: He is not in acute distress.     Comments: Cachectic, eyes open but does not respond    Eyes:      Conjunctiva/sclera: Conjunctivae normal.   Cardiovascular:      Rate and Rhythm: Normal rate and regular rhythm.      Pulses: Normal pulses.      Heart sounds: Normal heart sounds.   Pulmonary:      Effort: Pulmonary effort is normal. No respiratory distress.      Breath sounds: Rhonchi present.   Abdominal:      General: Bowel sounds are normal. There is no distension.      Palpations: Abdomen is soft.      Tenderness: There is no abdominal tenderness.      Comments: JG tube in place   Musculoskeletal:      Right lower leg: No edema.      Left lower leg: No edema.   Skin:     General: Skin is warm and dry.   Neurological:      Mental Status: Mental status is at baseline.      Comments: Awake    Contractures in extremities    Right midriatic pupil unresponsive. Left reactive pupil with cataract                Significant Labs: All pertinent labs within the past 24 hours have been reviewed.    Significant Imaging: I have reviewed all pertinent imaging results/findings within the past 24 hours.

## 2025-04-17 NOTE — ASSESSMENT & PLAN NOTE
Hypernatremia is likely due to Dehydration from missed free water flushes. The patient's most recent sodium results are listed below.    Recent Labs     04/15/25  0551 04/16/25  0450 04/17/25  0246   * 142 151*     Aim to correct the sodium by 8-10mEq in 24 hours.   Resume tube feeds and free water flushes as able  Ensure patient is getting FWF

## 2025-04-17 NOTE — ASSESSMENT & PLAN NOTE
Patient with Hypoxic Respiratory failure which is Acute with chronic hypercapnic respiratory failure. he is not on home oxygen. Supplemental oxygen was provided and noted-       .   Signs/symptoms of respiratory failure include- respiratory distress. Contributing diagnoses includes - ARDS and Aspiration Labs and images were reviewed. Patient Has recent ABG, which has been reviewed. Will treat underlying causes and adjust management of respiratory failure as follows-      Suspect aspiration pneumonia in the setting of hospital acquired pneumonia with recurrent vomiting with supperimposed atelectasis.   - s/p IV Zosyn x5 days  - Concomitant hypothermia and bradycardia in the setting of adrenal insufficiency.   - Off pressor support briefly on 4/10, but  back again same day.   - IPPV. Acute hypoxic rx failure resolved.     BCX 4/7 NGTD  Lactate wnl  Weaning SDS off 4/17  Endocrine consulted  Plan for ACTH stim test once off steroids to confirm/rule out adrenal insufficiency  Pulmonary toilet: bronchodilators PRN, hypertonic saline

## 2025-04-17 NOTE — PLAN OF CARE
Problem: Adult Inpatient Plan of Care  Goal: Plan of Care Review  Outcome: Progressing  Goal: Patient-Specific Goal (Individualized)  Outcome: Progressing  Goal: Absence of Hospital-Acquired Illness or Injury  Outcome: Progressing  Goal: Optimal Comfort and Wellbeing  Outcome: Progressing  Goal: Readiness for Transition of Care  Outcome: Progressing     Problem: Skin Injury Risk Increased  Goal: Skin Health and Integrity  Outcome: Progressing     Problem: Wound  Goal: Optimal Coping  Outcome: Progressing  Goal: Optimal Functional Ability  Outcome: Progressing  Goal: Absence of Infection Signs and Symptoms  Outcome: Progressing  Goal: Improved Oral Intake  Outcome: Progressing  Goal: Optimal Pain Control and Function  Outcome: Progressing  Goal: Skin Health and Integrity  Outcome: Progressing  Goal: Optimal Wound Healing  Outcome: Progressing     Problem: Delirium  Goal: Optimal Coping  Outcome: Progressing  Goal: Improved Behavioral Control  Outcome: Progressing  Goal: Improved Attention and Thought Clarity  Outcome: Progressing  Goal: Improved Sleep  Outcome: Progressing     Problem: Infection  Goal: Absence of Infection Signs and Symptoms  Outcome: Progressing     Problem: Fall Injury Risk  Goal: Absence of Fall and Fall-Related Injury  Outcome: Progressing     POC in progress. Pt awaiting U/S of Liver pending. Stop tube feeding. Hypotensive and Sinus binta. Med team aware Vital signs. Bolus LR.  Continue to monitor. Bed alarm. Father @bedside.

## 2025-04-18 PROBLEM — R74.01 TRANSAMINITIS: Status: ACTIVE | Noted: 2025-04-18

## 2025-04-18 LAB
ABSOLUTE EOSINOPHIL (OHS): 0.26 K/UL
ABSOLUTE MONOCYTE (OHS): 0.5 K/UL (ref 0.3–1)
ABSOLUTE NEUTROPHIL COUNT (OHS): 7.84 K/UL (ref 1.8–7.7)
ALBUMIN SERPL BCP-MCNC: 2.6 G/DL (ref 3.5–5.2)
ALP SERPL-CCNC: 126 UNIT/L (ref 40–150)
ALT SERPL W/O P-5'-P-CCNC: 136 UNIT/L (ref 10–44)
ANION GAP (OHS): 8 MMOL/L (ref 8–16)
AST SERPL-CCNC: 38 UNIT/L (ref 11–45)
BASOPHILS # BLD AUTO: 0.05 K/UL
BASOPHILS NFR BLD AUTO: 0.4 %
BILIRUB SERPL-MCNC: 0.2 MG/DL (ref 0.1–1)
BUN SERPL-MCNC: 13 MG/DL (ref 6–20)
CALCIUM SERPL-MCNC: 8.5 MG/DL (ref 8.7–10.5)
CHLORIDE SERPL-SCNC: 109 MMOL/L (ref 95–110)
CO2 SERPL-SCNC: 29 MMOL/L (ref 23–29)
CREAT SERPL-MCNC: 0.5 MG/DL (ref 0.5–1.4)
ERYTHROCYTE [DISTWIDTH] IN BLOOD BY AUTOMATED COUNT: 16.7 % (ref 11.5–14.5)
GFR SERPLBLD CREATININE-BSD FMLA CKD-EPI: >60 ML/MIN/1.73/M2
GLUCOSE SERPL-MCNC: 98 MG/DL (ref 70–110)
HCT VFR BLD AUTO: 33.3 % (ref 40–54)
HGB BLD-MCNC: 10.4 GM/DL (ref 14–18)
IMM GRANULOCYTES # BLD AUTO: 0.17 K/UL (ref 0–0.04)
IMM GRANULOCYTES NFR BLD AUTO: 1.4 % (ref 0–0.5)
LYMPHOCYTES # BLD AUTO: 2.98 K/UL (ref 1–4.8)
MAGNESIUM SERPL-MCNC: 2.2 MG/DL (ref 1.6–2.6)
MCH RBC QN AUTO: 30.8 PG (ref 27–31)
MCHC RBC AUTO-ENTMCNC: 31.2 G/DL (ref 32–36)
MCV RBC AUTO: 99 FL (ref 82–98)
NUCLEATED RBC (/100WBC) (OHS): 0 /100 WBC
PHOSPHATE SERPL-MCNC: 3.5 MG/DL (ref 2.7–4.5)
PLATELET # BLD AUTO: 365 K/UL (ref 150–450)
PMV BLD AUTO: 11.2 FL (ref 9.2–12.9)
POCT GLUCOSE: 103 MG/DL (ref 70–110)
POCT GLUCOSE: 122 MG/DL (ref 70–110)
POCT GLUCOSE: 144 MG/DL (ref 70–110)
POTASSIUM SERPL-SCNC: 3.8 MMOL/L (ref 3.5–5.1)
PROT SERPL-MCNC: 6 GM/DL (ref 6–8.4)
RBC # BLD AUTO: 3.38 M/UL (ref 4.6–6.2)
RELATIVE EOSINOPHIL (OHS): 2.2 %
RELATIVE LYMPHOCYTE (OHS): 25.3 % (ref 18–48)
RELATIVE MONOCYTE (OHS): 4.2 % (ref 4–15)
RELATIVE NEUTROPHIL (OHS): 66.5 % (ref 38–73)
SODIUM SERPL-SCNC: 146 MMOL/L (ref 136–145)
WBC # BLD AUTO: 11.8 K/UL (ref 3.9–12.7)

## 2025-04-18 PROCEDURE — 63600175 PHARM REV CODE 636 W HCPCS: Performed by: INTERNAL MEDICINE

## 2025-04-18 PROCEDURE — 36415 COLL VENOUS BLD VENIPUNCTURE: CPT

## 2025-04-18 PROCEDURE — 25000003 PHARM REV CODE 250: Performed by: STUDENT IN AN ORGANIZED HEALTH CARE EDUCATION/TRAINING PROGRAM

## 2025-04-18 PROCEDURE — 20600001 HC STEP DOWN PRIVATE ROOM

## 2025-04-18 PROCEDURE — 84100 ASSAY OF PHOSPHORUS: CPT | Performed by: INTERNAL MEDICINE

## 2025-04-18 PROCEDURE — 80053 COMPREHEN METABOLIC PANEL: CPT

## 2025-04-18 PROCEDURE — 83735 ASSAY OF MAGNESIUM: CPT | Performed by: INTERNAL MEDICINE

## 2025-04-18 PROCEDURE — 85025 COMPLETE CBC W/AUTO DIFF WBC: CPT | Performed by: STUDENT IN AN ORGANIZED HEALTH CARE EDUCATION/TRAINING PROGRAM

## 2025-04-18 PROCEDURE — 63600175 PHARM REV CODE 636 W HCPCS

## 2025-04-18 RX ORDER — IBUPROFEN 200 MG
16 TABLET ORAL
Status: DISCONTINUED | OUTPATIENT
Start: 2025-04-18 | End: 2025-04-24 | Stop reason: HOSPADM

## 2025-04-18 RX ORDER — IBUPROFEN 200 MG
24 TABLET ORAL
Status: DISCONTINUED | OUTPATIENT
Start: 2025-04-18 | End: 2025-04-24 | Stop reason: HOSPADM

## 2025-04-18 RX ADMIN — METHYLPHENIDATE HYDROCHLORIDE 5 MG: 5 TABLET ORAL at 06:04

## 2025-04-18 RX ADMIN — POTASSIUM BICARBONATE 25 MEQ: 978 TABLET, EFFERVESCENT ORAL at 09:04

## 2025-04-18 RX ADMIN — POTASSIUM & SODIUM PHOSPHATES POWDER PACK 280-160-250 MG 2 PACKET: 280-160-250 PACK at 11:04

## 2025-04-18 RX ADMIN — HYDROCORTISONE 20 MG: 5 TABLET ORAL at 09:04

## 2025-04-18 RX ADMIN — METOCLOPRAMIDE 5 MG: 5 SOLUTION ORAL at 02:04

## 2025-04-18 RX ADMIN — POTASSIUM & SODIUM PHOSPHATES POWDER PACK 280-160-250 MG 2 PACKET: 280-160-250 PACK at 05:04

## 2025-04-18 RX ADMIN — Medication: at 09:04

## 2025-04-18 RX ADMIN — POTASSIUM & SODIUM PHOSPHATES POWDER PACK 280-160-250 MG 2 PACKET: 280-160-250 PACK at 12:04

## 2025-04-18 RX ADMIN — PANTOPRAZOLE SODIUM 40 MG: 40 INJECTION, POWDER, FOR SOLUTION INTRAVENOUS at 12:04

## 2025-04-18 RX ADMIN — GLYCOPYRROLATE 1 MG: 1 LIQUID ORAL at 09:04

## 2025-04-18 RX ADMIN — POTASSIUM & SODIUM PHOSPHATES POWDER PACK 280-160-250 MG 2 PACKET: 280-160-250 PACK at 06:04

## 2025-04-18 RX ADMIN — ENOXAPARIN SODIUM 30 MG: 40 INJECTION SUBCUTANEOUS at 05:04

## 2025-04-18 RX ADMIN — LEVETIRACETAM 500 MG: 500 SOLUTION ORAL at 09:04

## 2025-04-18 RX ADMIN — METOCLOPRAMIDE 5 MG: 5 SOLUTION ORAL at 09:04

## 2025-04-18 RX ADMIN — METOCLOPRAMIDE 5 MG: 5 SOLUTION ORAL at 06:04

## 2025-04-18 RX ADMIN — GLYCOPYRROLATE 1 MG: 1 LIQUID ORAL at 02:04

## 2025-04-18 NOTE — SUBJECTIVE & OBJECTIVE
Interval History:  Seen and evaluated on step down unit  No acute events overnight    Clinical status stable, unchanged  No new complaints  Liver enzymes improved    Objective:     Vital Signs (Most Recent):  Temp: 97.9 °F (36.6 °C) (04/18/25 0719)  Pulse: 92 (04/18/25 1051)  Resp: 16 (04/18/25 0719)  BP: 97/62 (04/18/25 0719)  SpO2: 100 % (04/18/25 1051) Vital Signs (24h Range):  Temp:  [97.8 °F (36.6 °C)-98 °F (36.7 °C)] 97.9 °F (36.6 °C)  Pulse:  [43-92] 92  Resp:  [13-18] 16  SpO2:  [96 %-100 %] 100 %  BP: ()/(50-69) 97/62     Weight: 53.9 kg (118 lb 13.3 oz)  Body mass index is 18.61 kg/m².    Intake/Output Summary (Last 24 hours) at 4/18/2025 1339  Last data filed at 4/18/2025 1030  Gross per 24 hour   Intake 60 ml   Output 1500 ml   Net -1440 ml         Physical Exam  Constitutional:       General: He is not in acute distress.     Comments: Cachectic, eyes open but does not respond    Eyes:      Conjunctiva/sclera: Conjunctivae normal.   Cardiovascular:      Rate and Rhythm: Normal rate and regular rhythm.      Pulses: Normal pulses.      Heart sounds: Normal heart sounds.   Pulmonary:      Effort: Pulmonary effort is normal. No respiratory distress.      Breath sounds: Rhonchi present.   Abdominal:      General: Bowel sounds are normal. There is no distension.      Palpations: Abdomen is soft.      Tenderness: There is no abdominal tenderness.      Comments: JG tube in place   Musculoskeletal:      Right lower leg: No edema.      Left lower leg: No edema.   Skin:     General: Skin is warm and dry.   Neurological:      Mental Status: Mental status is at baseline.      Comments: Awake    Contractures in extremities    Right midriatic pupil unresponsive. Left reactive pupil with cataract                Significant Labs: All pertinent labs within the past 24 hours have been reviewed.    Significant Imaging: I have reviewed all pertinent imaging results/findings within the past 24 hours.

## 2025-04-18 NOTE — PLAN OF CARE
Problem: Adult Inpatient Plan of Care  Goal: Plan of Care Review  Outcome: Progressing  Goal: Patient-Specific Goal (Individualized)  Outcome: Progressing  Goal: Absence of Hospital-Acquired Illness or Injury  Outcome: Progressing  Goal: Optimal Comfort and Wellbeing  Outcome: Progressing  Goal: Readiness for Transition of Care  Outcome: Progressing     Problem: Infection  Goal: Absence of Infection Signs and Symptoms  Outcome: Progressing     Problem: Delirium  Goal: Optimal Coping  Outcome: Progressing  Goal: Improved Behavioral Control  Outcome: Progressing  Goal: Improved Attention and Thought Clarity  Outcome: Progressing  Goal: Improved Sleep  Outcome: Progressing     Problem: Wound  Goal: Optimal Coping  Outcome: Progressing  Goal: Optimal Functional Ability  Outcome: Progressing  Goal: Absence of Infection Signs and Symptoms  Outcome: Progressing  Goal: Improved Oral Intake  Outcome: Progressing  Goal: Optimal Pain Control and Function  Outcome: Progressing  Goal: Skin Health and Integrity  Outcome: Progressing  Goal: Optimal Wound Healing  Outcome: Progressing     Problem: Skin Injury Risk Increased  Goal: Skin Health and Integrity  Outcome: Progressing     Problem: Fall Injury Risk  Goal: Absence of Fall and Fall-Related Injury  Outcome: Progressing     POC in progress. Non-verbal. Remain tube feeding tolerating well. Reposition. Wound care performed. No acute changes. Bed in lowest position. Bed alarm. Father @bedside.

## 2025-04-18 NOTE — ASSESSMENT & PLAN NOTE
Nutrition consulted. Most recent weight and BMI monitored-     Measurements:  Wt Readings from Last 1 Encounters:   04/18/25 53.9 kg (118 lb 13.3 oz)   Body mass index is 18.61 kg/m².    Patient has been screened and assessed by RD.    Malnutrition Type:  Context:    Level:      Malnutrition Characteristic Summary:       Interventions/Recommendations (treatment strategy):  --Continue TF via PEJ Nutren 1.5 at 60ml/hr to provide to provide 1440 total fluid volume, 2160 kcals, 98 g PRO, 253 g CHO, 1100 mL fluid,144 % DRI  a. At home Tube feeding regimen: Nutren 1.5 @ 65 mL/hr x 14 hours to provide 910 total fluid volume, 1365 kcals, 62 g protein, 691 mL fluid, 91% DRI  b. Monitor for s/s of intolerance such as residuals >500ml, n/v/d, or abdominal distention.  --Per last RD note: Add Iván BID as TF modifier  to provide 90 kcal, 2.5 g protein, 7 g L-Arginine, 7 g L-Glutamine per serving to aid in wound healing   --Do not mix Iván with formula in a tube-feeding bag  --Pour one packet of Iván in a clean, small (approximately 6- to 8-fl-oz) container for mixing  --Add 4 fl oz (120 mL) water at room temperature  --Mix well with disposable spoon or tongue blade until all particles are completely hydrated  --Verify correct tube placement  --Flush feeding tube with 30 mL water  --Administer Iván through feeding tube using a 60-mL or larger syringe  --Flush with an additional 30 mL water (a smaller amount of water can be used to flush the tube if the patient is on a fluid- restricted diet)  --Recommend weekly weights   --Nursing: please continue to document formula and rate on flowsheets  --RD to monitor weight, po intake, BG, skin    Nutrition consulted. Most recent weight and BMI monitored-

## 2025-04-18 NOTE — ASSESSMENT & PLAN NOTE
Improving without intervention  Discussed with hepatology they think this is transient  Liver US looked ok  Monitor

## 2025-04-18 NOTE — ASSESSMENT & PLAN NOTE
Patient with Hypoxic Respiratory failure which is Acute with chronic hypercapnic respiratory failure. he is not on home oxygen. Supplemental oxygen was provided and noted-       .   Signs/symptoms of respiratory failure include- respiratory distress. Contributing diagnoses includes - ARDS and Aspiration Labs and images were reviewed. Patient Has recent ABG, which has been reviewed. Will treat underlying causes and adjust management of respiratory failure as follows-      Suspect aspiration pneumonia in the setting of hospital acquired pneumonia with recurrent vomiting with supperimposed atelectasis.   s/p IV Zosyn x5 days  Concomitant hypothermia and bradycardia in the setting of adrenal insufficiency.   Off pressor support briefly on 4/10, but  back again same day.   IPPV. Acute hypoxic rx failure resolved    BCX 4/7 NGTD  Lactate wnl  Weaning SDS off 4/17  Endocrine consulted  Plan for ACTH stim test 4.19.25; hold steroids  Pulmonary toilet: bronchodilators PRN, hypertonic saline

## 2025-04-18 NOTE — PLAN OF CARE
"Endocrinology Plan of Care  04/18/25    HPI: "Mr. Posey is a 20 yo male with PMHx of TBI, qudariplegia, cachexia, epilepsy, PEG tube placement, who initially presented with G-J  tube malfunction and is currently admitted in MICU due to acute hypoxic respiratory failure and sepsis 2/2 pneumonia, treated with empiric antibiotics and requiring pressor support.  (Pt is nonverbal at baseline, history is obtained from mother during encounter)  Patient has been persistently bradycardic since arrival.  Persistent hyponatremia or hyperkalemia are not seen on chart review.  TSH within normal limits.  A morning cortisol drawn around 5:30 a.m. on 04/09/2025 was low at 3.3.  Patient was subsequently started on stress dose steroids IV hydrocortisone 100 mg q.8h on 4/9 at 10 pm.  Today morning 4/10, he was weaned off pressor support for a few hours, however he is currently placed back on Levophed.  Endocrinology is consulted for adrenal insufficiency.  Patient has no previous diagnosis of adrenal insufficiency and has no known chronic oral steroid exposure."     Interval history:  Steroids have been titrated down to PO HC 20/10.  Scheduled for 20 mg HC dose this AM.  No acute events overnight. Hypernatremia has been noticed, correcting with increased FWF.     Vitals and labs reviewed:  Vitals:    04/18/25 0402 04/18/25 0600 04/18/25 0719 04/18/25 0744   BP: 98/62  97/62    BP Location:   Left arm    Patient Position:   Lying    Pulse: 86  76 86   Resp:   16    Temp: 97.9 °F (36.6 °C)  97.9 °F (36.6 °C)    TempSrc:   Axillary    SpO2: 99%  100%    Weight:  53.9 kg (118 lb 13.3 oz)     Height:              Labs Reviewed and Include    Lab Results   Component Value Date    TSH 2.596 04/08/2025    TSH 2.42 08/31/2023    TSH 1.06 08/25/2023    CORTISOL 3.30 04/09/2025     (H) 04/18/2025     (H) 04/17/2025     04/16/2025     (H) 04/15/2025    K 3.8 04/18/2025    K 4.1 04/17/2025    K 3.1 (L) 04/16/2025    K 3.4 " (L) 04/15/2025    CALCIUM 8.5 (L) 04/18/2025    CALCIUM 8.6 (L) 04/17/2025    CALCIUM 8.4 (L) 04/16/2025    CALCIUM 8.4 (L) 04/15/2025    ALBUMIN 2.6 (L) 04/18/2025    ALBUMIN 2.6 (L) 04/17/2025    ALBUMIN 2.6 (L) 04/16/2025    ALBUMIN 2.5 (L) 04/15/2025    ESTGFRAFRICA  04/01/2022      Comment:      Not calculated for patients less than 18 years old.    ESTGFRAFRICA  03/31/2022      Comment:      Not calculated for patients less than 18 years old.         ASSESSMENT and PLAN:      Endocrine  Low serum cortisol level  Endocrinology consulted for possible adrenal insufficiency in the setting of hypotension requiring pressor support, bradycardia and intermittent hypothermia, with a low serum cortisol level (3.3 at 5.30 am on 4/9/25).   No persistent hyponatremia or hyperkalemia on labs. No hx chronic po steroid exposure.  TSH wnl.    Currently he is on HC 20/10, our plan would be to hold this afternoon's HC dose and test him tomorrow morning with an ACTH stimulation test to assess adrenal response.   He should not get hydrocortisone this afternoon or tomorrow AM as that would affect test results.      Georgina Hameed MD  [facility]

## 2025-04-18 NOTE — PROGRESS NOTES
[facility]  Progress Note    Patient Name: Jesus Posey  MRN: [id number]  Patient Class: IP- Inpatient   Admission Date: 4/1/2025  Length of Stay: 17 days  Attending Physician: Balaji Edwards DO  Primary Care Provider: Pallavi, Primary Doctor        Subjective     Principal Problem:Acute hypoxic respiratory failure        HPI:  Mr. 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      As he is nonverbal, father reported leakage from his GJ tube which resembled his tube feeds. Last tube exchange was last month.      Admitted to Hospital Medicine in the setting of GJ tube malfunction. Intermittently hypothermic and bradycardic secondary to dysautonomia. Infectious workup has been negative. On IV maintenance fluids. IR replaced tube on 4/2.   During hospital course with repeated episodes of intolerance to PO intake and emesis. Repeat CT AP with G Tube with good positioning. Stepped up to MICU on 4/7 due to acute hypoxic respiratory failure requiring up to 15 L  HFNC  in the setting of likely aspiration pneumonitis. Isolated febrile episode of 100.7F. CXR concerning for BL pulmonary opacities. Started on Unasyn by Hospital Medicine.    Overview/Hospital Course:  Stepped up to ICU in 4/7 the setting of acute hypoxic respiratory failure in the swtting of basilar atelectasis and mucus plugging, with sepsis secondary to PNA requiring up to 15 L HFNC. Started on Vanc-Zosyn. During ICU course requiring up to norepi 0.06 mcg likely in the setting of hypovolemia 2/2 poor intake. Encephalopathy improving. CTH w/o acute processes. Lactate improved. EEG c/w toxic/drug-induced encephalopathy. No seizures. Persistenty bradycardic and hypothermic with EKG junctional rhythm on 30s.On Zosyn. TSH wnl. Ordering TTE. Cortisol < 3 . Started on IV hydrocortisone 100 mg TID. Off pressors on 4/10 at  7:30 am, but back on pressors by 2pm, again off by 4/10 7pm. Repleting dextrose for hypoglycemia.    Patient feeding tubes replaced without complication.  Tube feeds restarted without issue.  Stress dose steroids weaned with the help of Endocrinology.  Electrolytes replaced with concern for refeeding syndrome.  Noted to have occasional hemodynamically stable bradycardia.  Chronic medical issue when ill on chart review.  Completed 5 days of antibiotics with Zosyn for aspiration pneumonia.    Interval History:  Seen and evaluated on step down unit  No acute events overnight    Clinical status stable, unchanged  No new complaints  Liver enzymes improved    Objective:     Vital Signs (Most Recent):  Temp: 97.9 °F (36.6 °C) (04/18/25 0719)  Pulse: 92 (04/18/25 1051)  Resp: 16 (04/18/25 0719)  BP: 97/62 (04/18/25 0719)  SpO2: 100 % (04/18/25 1051) Vital Signs (24h Range):  Temp:  [97.8 °F (36.6 °C)-98 °F (36.7 °C)] 97.9 °F (36.6 °C)  Pulse:  [43-92] 92  Resp:  [13-18] 16  SpO2:  [96 %-100 %] 100 %  BP: ()/(50-69) 97/62     Weight: 53.9 kg (118 lb 13.3 oz)  Body mass index is 18.61 kg/m².    Intake/Output Summary (Last 24 hours) at 4/18/2025 1339  Last data filed at 4/18/2025 1030  Gross per 24 hour   Intake 60 ml   Output 1500 ml   Net -1440 ml         Physical Exam  Constitutional:       General: He is not in acute distress.     Comments: Cachectic, eyes open but does not respond    Eyes:      Conjunctiva/sclera: Conjunctivae normal.   Cardiovascular:      Rate and Rhythm: Normal rate and regular rhythm.      Pulses: Normal pulses.      Heart sounds: Normal heart sounds.   Pulmonary:      Effort: Pulmonary effort is normal. No respiratory distress.      Breath sounds: Rhonchi present.   Abdominal:      General: Bowel sounds are normal. There is no distension.      Palpations: Abdomen is soft.      Tenderness: There is no abdominal tenderness.      Comments: JG tube in place   Musculoskeletal:      Right lower leg:  No edema.      Left lower leg: No edema.   Skin:     General: Skin is warm and dry.   Neurological:      Mental Status: Mental status is at baseline.      Comments: Awake    Contractures in extremities    Right midriatic pupil unresponsive. Left reactive pupil with cataract                Significant Labs: All pertinent labs within the past 24 hours have been reviewed.    Significant Imaging: I have reviewed all pertinent imaging results/findings within the past 24 hours.      Assessment & Plan  Pulmonary septic shock with acute hypoxic respiratory failure  Low serum cortisol level  Patient with Hypoxic Respiratory failure which is Acute with chronic hypercapnic respiratory failure. he is not on home oxygen. Supplemental oxygen was provided and noted-       .   Signs/symptoms of respiratory failure include- respiratory distress. Contributing diagnoses includes - ARDS and Aspiration Labs and images were reviewed. Patient Has recent ABG, which has been reviewed. Will treat underlying causes and adjust management of respiratory failure as follows-      Suspect aspiration pneumonia in the setting of hospital acquired pneumonia with recurrent vomiting with supperimposed atelectasis.   s/p IV Zosyn x5 days  Concomitant hypothermia and bradycardia in the setting of adrenal insufficiency.   Off pressor support briefly on 4/10, but  back again same day.   IPPV. Acute hypoxic rx failure resolved    BCX 4/7 NGTD  Lactate wnl  Weaning SDS off 4/17  Endocrine consulted  Plan for ACTH stim test 4.19.25; hold steroids  Pulmonary toilet: bronchodilators PRN, hypertonic saline    Hypophosphatemia  Resolved    PEG tube malfunction  Malfunction of jejunostomy tube  Patient with GJ tube dependence, presenting with tube malfunction. IR consulted for exchange under fluoroscopy. Tube changed 4/2.   GI consulted d/t recurrent vomiting with initiation of tube feeds. CTAP with GJ tube in satisfactory position     5mg G tube metoclopramide  Q8h   Restart tube feeds    Hypernatremia  Hypernatremia is likely due to Dehydration from missed free water flushes. The patient's most recent sodium results are listed below.    Improving w FWF  Aim to correct the sodium by 8-10mEq in 24 hours.   Resume tube feeds and free water flushes as able  Ensure patient is getting FWF    Toxic metabolic encephalopathy  May be secondary to sedation with diazepam with superimposed HAP and sepsis   CT head ruled out CVA. ABG c/w acute hypercapnic rx failure  EEG c/w toxic and drug-induced encephalopathy. Ruled out seizures    Spastic quadriparesis  Intrathecal baclofen pump in place.   Mobility protocol.     Functional quadriplegia  Intrathecal baclofen pump in place. Supportive care.     Cachexia  Resume tube feeds after GJ tube replaced as able    Hypothermia  Resolved  Per family, this is common when pt is hospitalized  Suspect low temps and bradycardia are related to dysautonomia  Likely secondary to adrenal insufficiency      Hypokalemia  Resolved    Severe malnutrition  Body mass index (BMI) less than 19  Nutrition consulted. Most recent weight and BMI monitored-     Measurements:  Wt Readings from Last 1 Encounters:   04/18/25 53.9 kg (118 lb 13.3 oz)   Body mass index is 18.61 kg/m².    Patient has been screened and assessed by RD.    Malnutrition Type:  Context:    Level:      Malnutrition Characteristic Summary:       Interventions/Recommendations (treatment strategy):  --Continue TF via PEJ Nutren 1.5 at 60ml/hr to provide to provide 1440 total fluid volume, 2160 kcals, 98 g PRO, 253 g CHO, 1100 mL fluid,144 % DRI  a. At home Tube feeding regimen: Nutren 1.5 @ 65 mL/hr x 14 hours to provide 910 total fluid volume, 1365 kcals, 62 g protein, 691 mL fluid, 91% DRI  b. Monitor for s/s of intolerance such as residuals >500ml, n/v/d, or abdominal distention.  --Per last RD note: Add Iván BID as TF modifier  to provide 90 kcal, 2.5 g protein, 7 g L-Arginine, 7 g  L-Glutamine per serving to aid in wound healing   --Do not mix Iván with formula in a tube-feeding bag  --Pour one packet of Iván in a clean, small (approximately 6- to 8-fl-oz) container for mixing  --Add 4 fl oz (120 mL) water at room temperature  --Mix well with disposable spoon or tongue blade until all particles are completely hydrated  --Verify correct tube placement  --Flush feeding tube with 30 mL water  --Administer Iván through feeding tube using a 60-mL or larger syringe  --Flush with an additional 30 mL water (a smaller amount of water can be used to flush the tube if the patient is on a fluid- restricted diet)  --Recommend weekly weights   --Nursing: please continue to document formula and rate on flowsheets  --RD to monitor weight, po intake, BG, skin    Nutrition consulted. Most recent weight and BMI monitored-     Traumatic brain injury  Continue current management with diazepam for contractures and keppra for seizure prophylaxis.     Pressure injury of sacral region, stage 2  Deep tissue injury  - Wound care    Transaminitis  Improving without intervention  Discussed with hepatology they think this is transient  Liver US looked ok  Monitor    VTE Risk Mitigation (From admission, onward)           Ordered     heparin, porcine (PF) 100 unit/mL injection flush 10 Units  As needed (PRN)         04/10/25 0504     enoxaparin injection 30 mg  Daily         04/07/25 1830     IP VTE HIGH RISK PATIENT  Once         04/07/25 1403     Place sequential compression device  Until discontinued         04/01/25 0209                    Discharge Planning   EDINSON: 4/18/2025     Code Status: Full Code   Medical Readiness for Discharge Date:   Discharge Plan A: Home, Home with family   Discharge Delays: None known at this time                    Balaji Edwards DO  Department of Hospital Medicine   [facility] - Telemetry Stepdown

## 2025-04-19 LAB
ABSOLUTE EOSINOPHIL (OHS): 0.37 K/UL
ABSOLUTE MONOCYTE (OHS): 0.99 K/UL (ref 0.3–1)
ABSOLUTE NEUTROPHIL COUNT (OHS): 7.6 K/UL (ref 1.8–7.7)
ALBUMIN SERPL BCP-MCNC: 2.7 G/DL (ref 3.5–5.2)
ALP SERPL-CCNC: 121 UNIT/L (ref 40–150)
ALT SERPL W/O P-5'-P-CCNC: 101 UNIT/L (ref 10–44)
ANION GAP (OHS): 9 MMOL/L (ref 8–16)
AST SERPL-CCNC: 24 UNIT/L (ref 11–45)
BASOPHILS # BLD AUTO: 0.06 K/UL
BASOPHILS NFR BLD AUTO: 0.5 %
BILIRUB SERPL-MCNC: 0.3 MG/DL (ref 0.1–1)
BUN SERPL-MCNC: 17 MG/DL (ref 6–20)
CALCIUM SERPL-MCNC: 8.7 MG/DL (ref 8.7–10.5)
CHLORIDE SERPL-SCNC: 104 MMOL/L (ref 95–110)
CO2 SERPL-SCNC: 27 MMOL/L (ref 23–29)
CORTIS SERPL-MCNC: 6.9 UG/DL (ref 4.3–22.4)
CORTIS SERPL-MCNC: 8.6 UG/DL
CREAT SERPL-MCNC: 0.4 MG/DL (ref 0.5–1.4)
ERYTHROCYTE [DISTWIDTH] IN BLOOD BY AUTOMATED COUNT: 16.9 % (ref 11.5–14.5)
GFR SERPLBLD CREATININE-BSD FMLA CKD-EPI: >60 ML/MIN/1.73/M2
GLUCOSE SERPL-MCNC: 77 MG/DL (ref 70–110)
HCT VFR BLD AUTO: 32 % (ref 40–54)
HGB BLD-MCNC: 10.2 GM/DL (ref 14–18)
IMM GRANULOCYTES # BLD AUTO: 0.22 K/UL (ref 0–0.04)
IMM GRANULOCYTES NFR BLD AUTO: 1.9 % (ref 0–0.5)
LYMPHOCYTES # BLD AUTO: 2.58 K/UL (ref 1–4.8)
MAGNESIUM SERPL-MCNC: 2.1 MG/DL (ref 1.6–2.6)
MCH RBC QN AUTO: 31.4 PG (ref 27–31)
MCHC RBC AUTO-ENTMCNC: 31.9 G/DL (ref 32–36)
MCV RBC AUTO: 99 FL (ref 82–98)
NUCLEATED RBC (/100WBC) (OHS): 0 /100 WBC
PHOSPHATE SERPL-MCNC: 4.5 MG/DL (ref 2.7–4.5)
PLATELET # BLD AUTO: 357 K/UL (ref 150–450)
PMV BLD AUTO: 10.8 FL (ref 9.2–12.9)
POCT GLUCOSE: 108 MG/DL (ref 70–110)
POCT GLUCOSE: 111 MG/DL (ref 70–110)
POCT GLUCOSE: 83 MG/DL (ref 70–110)
POCT GLUCOSE: 84 MG/DL (ref 70–110)
POCT GLUCOSE: 90 MG/DL (ref 70–110)
POCT GLUCOSE: 95 MG/DL (ref 70–110)
POCT GLUCOSE: 98 MG/DL (ref 70–110)
POTASSIUM SERPL-SCNC: 4.3 MMOL/L (ref 3.5–5.1)
PROT SERPL-MCNC: 6.1 GM/DL (ref 6–8.4)
RBC # BLD AUTO: 3.25 M/UL (ref 4.6–6.2)
RELATIVE EOSINOPHIL (OHS): 3.1 %
RELATIVE LYMPHOCYTE (OHS): 21.8 % (ref 18–48)
RELATIVE MONOCYTE (OHS): 8.4 % (ref 4–15)
RELATIVE NEUTROPHIL (OHS): 64.3 % (ref 38–73)
SODIUM SERPL-SCNC: 140 MMOL/L (ref 136–145)
W LEVETIRACETAM: 10.3 UG/ML
WBC # BLD AUTO: 11.82 K/UL (ref 3.9–12.7)

## 2025-04-19 PROCEDURE — 25000003 PHARM REV CODE 250: Performed by: STUDENT IN AN ORGANIZED HEALTH CARE EDUCATION/TRAINING PROGRAM

## 2025-04-19 PROCEDURE — 63600175 PHARM REV CODE 636 W HCPCS

## 2025-04-19 PROCEDURE — 85025 COMPLETE CBC W/AUTO DIFF WBC: CPT | Performed by: STUDENT IN AN ORGANIZED HEALTH CARE EDUCATION/TRAINING PROGRAM

## 2025-04-19 PROCEDURE — 84450 TRANSFERASE (AST) (SGOT): CPT

## 2025-04-19 PROCEDURE — 84100 ASSAY OF PHOSPHORUS: CPT | Performed by: INTERNAL MEDICINE

## 2025-04-19 PROCEDURE — 82024 ASSAY OF ACTH: CPT | Performed by: STUDENT IN AN ORGANIZED HEALTH CARE EDUCATION/TRAINING PROGRAM

## 2025-04-19 PROCEDURE — 63600175 PHARM REV CODE 636 W HCPCS: Performed by: INTERNAL MEDICINE

## 2025-04-19 PROCEDURE — 63600175 PHARM REV CODE 636 W HCPCS: Performed by: STUDENT IN AN ORGANIZED HEALTH CARE EDUCATION/TRAINING PROGRAM

## 2025-04-19 PROCEDURE — 36415 COLL VENOUS BLD VENIPUNCTURE: CPT | Performed by: STUDENT IN AN ORGANIZED HEALTH CARE EDUCATION/TRAINING PROGRAM

## 2025-04-19 PROCEDURE — 36415 COLL VENOUS BLD VENIPUNCTURE: CPT

## 2025-04-19 PROCEDURE — 82533 TOTAL CORTISOL: CPT | Performed by: STUDENT IN AN ORGANIZED HEALTH CARE EDUCATION/TRAINING PROGRAM

## 2025-04-19 PROCEDURE — 84132 ASSAY OF SERUM POTASSIUM: CPT

## 2025-04-19 PROCEDURE — 83735 ASSAY OF MAGNESIUM: CPT | Performed by: INTERNAL MEDICINE

## 2025-04-19 PROCEDURE — 20600001 HC STEP DOWN PRIVATE ROOM

## 2025-04-19 RX ORDER — COSYNTROPIN 0.25 MG/ML
0.25 INJECTION, POWDER, FOR SOLUTION INTRAMUSCULAR; INTRAVENOUS ONCE
Status: COMPLETED | OUTPATIENT
Start: 2025-04-19 | End: 2025-04-19

## 2025-04-19 RX ADMIN — GLYCOPYRROLATE 1 MG: 1 LIQUID ORAL at 03:04

## 2025-04-19 RX ADMIN — PANTOPRAZOLE SODIUM 40 MG: 40 INJECTION, POWDER, FOR SOLUTION INTRAVENOUS at 08:04

## 2025-04-19 RX ADMIN — Medication: at 09:04

## 2025-04-19 RX ADMIN — METOCLOPRAMIDE 5 MG: 5 SOLUTION ORAL at 09:04

## 2025-04-19 RX ADMIN — METHYLPHENIDATE HYDROCHLORIDE 5 MG: 5 TABLET ORAL at 06:04

## 2025-04-19 RX ADMIN — LEVETIRACETAM 500 MG: 500 SOLUTION ORAL at 09:04

## 2025-04-19 RX ADMIN — POTASSIUM & SODIUM PHOSPHATES POWDER PACK 280-160-250 MG 2 PACKET: 280-160-250 PACK at 06:04

## 2025-04-19 RX ADMIN — SODIUM CHLORIDE, POTASSIUM CHLORIDE, SODIUM LACTATE AND CALCIUM CHLORIDE 1000 ML: 600; 310; 30; 20 INJECTION, SOLUTION INTRAVENOUS at 04:04

## 2025-04-19 RX ADMIN — METOCLOPRAMIDE 5 MG: 5 SOLUTION ORAL at 06:04

## 2025-04-19 RX ADMIN — METOCLOPRAMIDE 5 MG: 5 SOLUTION ORAL at 03:04

## 2025-04-19 RX ADMIN — ENOXAPARIN SODIUM 30 MG: 40 INJECTION SUBCUTANEOUS at 04:04

## 2025-04-19 RX ADMIN — GLYCOPYRROLATE 1 MG: 1 LIQUID ORAL at 09:04

## 2025-04-19 RX ADMIN — SODIUM CHLORIDE, POTASSIUM CHLORIDE, SODIUM LACTATE AND CALCIUM CHLORIDE 500 ML: 600; 310; 30; 20 INJECTION, SOLUTION INTRAVENOUS at 09:04

## 2025-04-19 RX ADMIN — COSYNTROPIN 0.25 MG: 0.25 INJECTION, POWDER, LYOPHILIZED, FOR SOLUTION INTRAMUSCULAR; INTRAVENOUS at 12:04

## 2025-04-19 NOTE — PROGRESS NOTES
[facility] - Telemetry [facility] Medicine  Progress Note    Patient Name: Jesus Posey  MRN: [id number]  Patient Class: IP- Inpatient   Admission Date: 4/1/2025  Length of Stay: 18 days  Attending Physician: Balaji Edwards DO  Primary Care Provider: Pallavi, Primary Doctor        Subjective     Principal Problem:Acute hypoxic respiratory failure        HPI:  Mr. 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      As he is nonverbal, father reported leakage from his GJ tube which resembled his tube feeds. Last tube exchange was last month.      Admitted to Hospital Medicine in the setting of GJ tube malfunction. Intermittently hypothermic and bradycardic secondary to dysautonomia. Infectious workup has been negative. On IV maintenance fluids. IR replaced tube on 4/2.   During hospital course with repeated episodes of intolerance to PO intake and emesis. Repeat CT AP with G Tube with good positioning. Stepped up to MICU on 4/7 due to acute hypoxic respiratory failure requiring up to 15 L  HFNC  in the setting of likely aspiration pneumonitis. Isolated febrile episode of 100.7F. CXR concerning for BL pulmonary opacities. Started on Unasyn by Hospital Medicine.    Overview/Hospital Course:  Stepped up to ICU in 4/7 the setting of acute hypoxic respiratory failure in the swtting of basilar atelectasis and mucus plugging, with sepsis secondary to PNA requiring up to 15 L HFNC. Started on Vanc-Zosyn. During ICU course requiring up to norepi 0.06 mcg likely in the setting of hypovolemia 2/2 poor intake. Encephalopathy improving. CTH w/o acute processes. Lactate improved. EEG c/w toxic/drug-induced encephalopathy. No seizures. Persistenty bradycardic and hypothermic with EKG junctional rhythm on 30s.On Zosyn. TSH wnl. Ordering TTE. Cortisol < 3 . Started on IV hydrocortisone 100 mg TID. Off pressors on 4/10 at  7:30 am, but back on pressors by 2pm, again off by 4/10 7pm. Repleting dextrose for hypoglycemia.    Patient feeding tubes replaced without complication.  Tube feeds restarted without issue.  Stress dose steroids weaned with the help of Endocrinology.  Electrolytes replaced with concern for refeeding syndrome.  Noted to have occasional hemodynamically stable bradycardia.  Chronic medical issue when ill on chart review.  Completed 5 days of antibiotics with Zosyn for aspiration pneumonia.    Interval History:  Seen and evaluated on step down unit  No acute events overnight    Clinical status stable, unchanged  No new complaints    Objective:     Vital Signs (Most Recent):  Temp: 98.3 °F (36.8 °C) (04/19/25 1155)  Pulse: 90 (04/19/25 1155)  Resp: 12 (04/19/25 1155)  BP: (!) 101/55 (04/19/25 1155)  SpO2: 100 % (04/19/25 1155) Vital Signs (24h Range):  Temp:  [97.3 °F (36.3 °C)-98.4 °F (36.9 °C)] 98.3 °F (36.8 °C)  Pulse:  [84-98] 90  Resp:  [12-20] 12  SpO2:  [95 %-100 %] 100 %  BP: ()/(50-73) 101/55     Weight: 53.1 kg (117 lb 1 oz)  Body mass index is 18.33 kg/m².    Intake/Output Summary (Last 24 hours) at 4/19/2025 1333  Last data filed at 4/19/2025 0922  Gross per 24 hour   Intake 1030 ml   Output 1450 ml   Net -420 ml         Physical Exam  Constitutional:       General: He is not in acute distress.     Comments: Cachectic, eyes open but does not respond    Eyes:      Conjunctiva/sclera: Conjunctivae normal.   Cardiovascular:      Rate and Rhythm: Normal rate and regular rhythm.      Pulses: Normal pulses.      Heart sounds: Normal heart sounds.   Pulmonary:      Effort: Pulmonary effort is normal. No respiratory distress.      Breath sounds: Rhonchi present.   Abdominal:      General: Bowel sounds are normal. There is no distension.      Palpations: Abdomen is soft.      Tenderness: There is no abdominal tenderness.      Comments: JG tube in place   Musculoskeletal:      Right lower leg: No edema.       Left lower leg: No edema.   Skin:     General: Skin is warm and dry.   Neurological:      Mental Status: Mental status is at baseline.      Comments: Awake    Contractures in extremities    Right midriatic pupil unresponsive. Left reactive pupil with cataract                Significant Labs: All pertinent labs within the past 24 hours have been reviewed.    Significant Imaging: I have reviewed all pertinent imaging results/findings within the past 24 hours.      Assessment & Plan  Pulmonary septic shock with acute hypoxic respiratory failure  Low serum cortisol level  Patient with Hypoxic Respiratory failure which is Acute with chronic hypercapnic respiratory failure. he is not on home oxygen. Supplemental oxygen was provided and noted-      Signs/symptoms of respiratory failure include- respiratory distress. Contributing diagnoses includes - ARDS and Aspiration Labs and images were reviewed. Patient Has recent ABG, which has been reviewed. Will treat underlying causes and adjust management of respiratory failure as follows-     Suspect aspiration pneumonia in the setting of hospital acquired pneumonia with recurrent vomiting with supperimposed atelectasis.   s/p IV Zosyn x5 days  Concomitant hypothermia and bradycardia in the setting of adrenal insufficiency.   Off pressor support briefly on 4/10, but  back again same day.   IPPV. Acute hypoxic rx failure resolved    BCX 4/7 NGTD  Lactate wnl  Weaning SDS off 4/17  Endocrine consulted, appreciated recommendations  Pulmonary toilet: bronchodilators PRN, hypertonic saline  Endo supposed to be doing ACTH stim test 4.19.25; hold steroids    Hypophosphatemia  Resolved    PEG tube malfunction  Malfunction of jejunostomy tube  Patient with GJ tube dependence, presenting with tube malfunction. IR consulted for exchange under fluoroscopy. Tube changed 4/2.   GI consulted d/t recurrent vomiting with initiation of tube feeds. CTAP with GJ tube in satisfactory position    5mg  G tube metoclopramide Q8h   Restart tube feeds  Tolerating feeds    Hypernatremia  Resolved  Cont FWF    Toxic metabolic encephalopathy  May be secondary to sedation with diazepam with superimposed HAP and sepsis   CT head ruled out CVA. ABG c/w acute hypercapnic rx failure  EEG c/w toxic and drug-induced encephalopathy. Ruled out seizures    Spastic quadriparesis  Intrathecal baclofen pump in place.   Mobility protocol.     Functional quadriplegia  Intrathecal baclofen pump in place. Supportive care.     Cachexia  Resume tube feeds after GJ tube replaced as able    Hypothermia  Resolved  Per family, this is common when pt is hospitalized  Suspect low temps and bradycardia are related to dysautonomia  Likely secondary to adrenal insufficiency    Hypokalemia  Resolved    Severe malnutrition  Body mass index (BMI) less than 19  Nutrition consulted. Most recent weight and BMI monitored-     Measurements:  Wt Readings from Last 1 Encounters:   04/19/25 53.1 kg (117 lb 1 oz)   Body mass index is 18.33 kg/m².    Patient has been screened and assessed by RD.    Malnutrition Type:  Context:    Level:      Malnutrition Characteristic Summary:       Interventions/Recommendations (treatment strategy):  --Continue TF via PEJ Nutren 1.5 at 60ml/hr to provide to provide 1440 total fluid volume, 2160 kcals, 98 g PRO, 253 g CHO, 1100 mL fluid,144 % DRI  a. At home Tube feeding regimen: Nutren 1.5 @ 65 mL/hr x 14 hours to provide 910 total fluid volume, 1365 kcals, 62 g protein, 691 mL fluid, 91% DRI  b. Monitor for s/s of intolerance such as residuals >500ml, n/v/d, or abdominal distention.  --Per last RD note: Add Iván BID as TF modifier  to provide 90 kcal, 2.5 g protein, 7 g L-Arginine, 7 g L-Glutamine per serving to aid in wound healing   --Do not mix Iván with formula in a tube-feeding bag  --Pour one packet of Iván in a clean, small (approximately 6- to 8-fl-oz) container for mixing  --Add 4 fl oz (120 mL) water at room  temperature  --Mix well with disposable spoon or tongue blade until all particles are completely hydrated  --Verify correct tube placement  --Flush feeding tube with 30 mL water  --Administer Iván through feeding tube using a 60-mL or larger syringe  --Flush with an additional 30 mL water (a smaller amount of water can be used to flush the tube if the patient is on a fluid- restricted diet)  --Recommend weekly weights   --Nursing: please continue to document formula and rate on flowsheets  --RD to monitor weight, po intake, BG, skin    Nutrition consulted. Most recent weight and BMI monitored-     Traumatic brain injury  Continue current management with diazepam for contractures and keppra for seizure prophylaxis.     Pressure injury of sacral region, stage 2  Deep tissue injury  - Wound care    Transaminitis  Improving without intervention  Discussed with hepatology they think this is transient  Liver US looked ok  Monitor    VTE Risk Mitigation (From admission, onward)           Ordered     heparin, porcine (PF) 100 unit/mL injection flush 10 Units  As needed (PRN)         04/10/25 0504     enoxaparin injection 30 mg  Daily         04/07/25 1830     IP VTE HIGH RISK PATIENT  Once         04/07/25 1403     Place sequential compression device  Until discontinued         04/01/25 0209                    Discharge Planning   EDINSON: 4/19/2025     Code Status: Full Code   Medical Readiness for Discharge Date:   Discharge Plan A: Home, Home with family   Discharge Delays: None known at this time                    Balaji Edwards DO  Department of Hospital Medicine   [facility] - Telemetry Stepdown

## 2025-04-19 NOTE — ASSESSMENT & PLAN NOTE
Nutrition consulted. Most recent weight and BMI monitored-     Measurements:  Wt Readings from Last 1 Encounters:   04/19/25 53.1 kg (117 lb 1 oz)   Body mass index is 18.33 kg/m².    Patient has been screened and assessed by RD.    Malnutrition Type:  Context:    Level:      Malnutrition Characteristic Summary:       Interventions/Recommendations (treatment strategy):  --Continue TF via PEJ Nutren 1.5 at 60ml/hr to provide to provide 1440 total fluid volume, 2160 kcals, 98 g PRO, 253 g CHO, 1100 mL fluid,144 % DRI  a. At home Tube feeding regimen: Nutren 1.5 @ 65 mL/hr x 14 hours to provide 910 total fluid volume, 1365 kcals, 62 g protein, 691 mL fluid, 91% DRI  b. Monitor for s/s of intolerance such as residuals >500ml, n/v/d, or abdominal distention.  --Per last RD note: Add Iván BID as TF modifier  to provide 90 kcal, 2.5 g protein, 7 g L-Arginine, 7 g L-Glutamine per serving to aid in wound healing   --Do not mix Iván with formula in a tube-feeding bag  --Pour one packet of Iván in a clean, small (approximately 6- to 8-fl-oz) container for mixing  --Add 4 fl oz (120 mL) water at room temperature  --Mix well with disposable spoon or tongue blade until all particles are completely hydrated  --Verify correct tube placement  --Flush feeding tube with 30 mL water  --Administer Iván through feeding tube using a 60-mL or larger syringe  --Flush with an additional 30 mL water (a smaller amount of water can be used to flush the tube if the patient is on a fluid- restricted diet)  --Recommend weekly weights   --Nursing: please continue to document formula and rate on flowsheets  --RD to monitor weight, po intake, BG, skin    Nutrition consulted. Most recent weight and BMI monitored-

## 2025-04-19 NOTE — PLAN OF CARE
Problem: Adult Inpatient Plan of Care  Goal: Plan of Care Review  Outcome: Progressing  Goal: Patient-Specific Goal (Individualized)  Outcome: Progressing  Goal: Absence of Hospital-Acquired Illness or Injury  Outcome: Progressing  Goal: Optimal Comfort and Wellbeing  Outcome: Progressing  Goal: Readiness for Transition of Care  Outcome: Progressing     Problem: Skin Injury Risk Increased  Goal: Skin Health and Integrity  Outcome: Progressing     Problem: Wound  Goal: Optimal Coping  Outcome: Progressing  Goal: Optimal Functional Ability  Outcome: Progressing  Goal: Absence of Infection Signs and Symptoms  Outcome: Progressing  Goal: Improved Oral Intake  Outcome: Progressing  Goal: Optimal Pain Control and Function  Outcome: Progressing  Goal: Skin Health and Integrity  Outcome: Progressing  Goal: Optimal Wound Healing  Outcome: Progressing     Problem: Delirium  Goal: Optimal Coping  Outcome: Progressing  Goal: Improved Behavioral Control  Outcome: Progressing  Goal: Improved Attention and Thought Clarity  Outcome: Progressing  Goal: Improved Sleep  Outcome: Progressing     Problem: Infection  Goal: Absence of Infection Signs and Symptoms  Outcome: Progressing     Problem: Fall Injury Risk  Goal: Absence of Fall and Fall-Related Injury  Outcome: Progressing     POC in progress. Pt non-verbal. Hypotensive. LR Bolus x1L.  No acute changes. Family @bedside.

## 2025-04-19 NOTE — ASSESSMENT & PLAN NOTE
Patient with GJ tube dependence, presenting with tube malfunction. IR consulted for exchange under fluoroscopy. Tube changed 4/2.   GI consulted d/t recurrent vomiting with initiation of tube feeds. CTAP with GJ tube in satisfactory position    5mg G tube metoclopramide Q8h   Restart tube feeds  Tolerating feeds

## 2025-04-19 NOTE — SUBJECTIVE & OBJECTIVE
Interval History:  Seen and evaluated on step down unit  No acute events overnight    Clinical status stable, unchanged  No new complaints    Objective:     Vital Signs (Most Recent):  Temp: 98.3 °F (36.8 °C) (04/19/25 1155)  Pulse: 90 (04/19/25 1155)  Resp: 12 (04/19/25 1155)  BP: (!) 101/55 (04/19/25 1155)  SpO2: 100 % (04/19/25 1155) Vital Signs (24h Range):  Temp:  [97.3 °F (36.3 °C)-98.4 °F (36.9 °C)] 98.3 °F (36.8 °C)  Pulse:  [84-98] 90  Resp:  [12-20] 12  SpO2:  [95 %-100 %] 100 %  BP: ()/(50-73) 101/55     Weight: 53.1 kg (117 lb 1 oz)  Body mass index is 18.33 kg/m².    Intake/Output Summary (Last 24 hours) at 4/19/2025 1333  Last data filed at 4/19/2025 0922  Gross per 24 hour   Intake 1030 ml   Output 1450 ml   Net -420 ml         Physical Exam  Constitutional:       General: He is not in acute distress.     Comments: Cachectic, eyes open but does not respond    Eyes:      Conjunctiva/sclera: Conjunctivae normal.   Cardiovascular:      Rate and Rhythm: Normal rate and regular rhythm.      Pulses: Normal pulses.      Heart sounds: Normal heart sounds.   Pulmonary:      Effort: Pulmonary effort is normal. No respiratory distress.      Breath sounds: Rhonchi present.   Abdominal:      General: Bowel sounds are normal. There is no distension.      Palpations: Abdomen is soft.      Tenderness: There is no abdominal tenderness.      Comments: JG tube in place   Musculoskeletal:      Right lower leg: No edema.      Left lower leg: No edema.   Skin:     General: Skin is warm and dry.   Neurological:      Mental Status: Mental status is at baseline.      Comments: Awake    Contractures in extremities    Right midriatic pupil unresponsive. Left reactive pupil with cataract                Significant Labs: All pertinent labs within the past 24 hours have been reviewed.    Significant Imaging: I have reviewed all pertinent imaging results/findings within the past 24 hours.

## 2025-04-19 NOTE — PLAN OF CARE
Problem: Adult Inpatient Plan of Care  Goal: Plan of Care Review  Outcome: Progressing  Goal: Absence of Hospital-Acquired Illness or Injury  Outcome: Progressing  Goal: Optimal Comfort and Wellbeing  Outcome: Progressing  Goal: Readiness for Transition of Care  Outcome: Progressing     Problem: Skin Injury Risk Increased  Goal: Skin Health and Integrity  Outcome: Progressing     Problem: Wound  Goal: Optimal Coping  Outcome: Progressing  Goal: Absence of Infection Signs and Symptoms  Outcome: Progressing  Goal: Improved Oral Intake  Outcome: Progressing  Goal: Optimal Pain Control and Function  Outcome: Progressing  Goal: Skin Health and Integrity  Outcome: Progressing  Goal: Optimal Wound Healing  Outcome: Progressing     Problem: Fall Injury Risk  Goal: Absence of Fall and Fall-Related Injury  Outcome: Progressing

## 2025-04-19 NOTE — ASSESSMENT & PLAN NOTE
Patient with Hypoxic Respiratory failure which is Acute with chronic hypercapnic respiratory failure. he is not on home oxygen. Supplemental oxygen was provided and noted-      Signs/symptoms of respiratory failure include- respiratory distress. Contributing diagnoses includes - ARDS and Aspiration Labs and images were reviewed. Patient Has recent ABG, which has been reviewed. Will treat underlying causes and adjust management of respiratory failure as follows-     Suspect aspiration pneumonia in the setting of hospital acquired pneumonia with recurrent vomiting with supperimposed atelectasis.   s/p IV Zosyn x5 days  Concomitant hypothermia and bradycardia in the setting of adrenal insufficiency.   Off pressor support briefly on 4/10, but  back again same day.   IPPV. Acute hypoxic rx failure resolved    BCX 4/7 NGTD  Lactate wnl  Weaning SDS off 4/17  Endocrine consulted, appreciated recommendations  Pulmonary toilet: bronchodilators PRN, hypertonic saline  Endo supposed to be doing ACTH stim test 4.19.25; hold steroids

## 2025-04-20 LAB
ABSOLUTE EOSINOPHIL (OHS): 0.36 K/UL
ABSOLUTE MONOCYTE (OHS): 0.99 K/UL (ref 0.3–1)
ABSOLUTE NEUTROPHIL COUNT (OHS): 6.09 K/UL (ref 1.8–7.7)
ALBUMIN SERPL BCP-MCNC: 2.8 G/DL (ref 3.5–5.2)
ALP SERPL-CCNC: 140 UNIT/L (ref 40–150)
ALT SERPL W/O P-5'-P-CCNC: 81 UNIT/L (ref 10–44)
ANION GAP (OHS): 10 MMOL/L (ref 8–16)
AST SERPL-CCNC: 17 UNIT/L (ref 11–45)
BASOPHILS # BLD AUTO: 0.05 K/UL
BASOPHILS NFR BLD AUTO: 0.5 %
BILIRUB SERPL-MCNC: 0.3 MG/DL (ref 0.1–1)
BUN SERPL-MCNC: 14 MG/DL (ref 6–20)
CALCIUM SERPL-MCNC: 9 MG/DL (ref 8.7–10.5)
CHLORIDE SERPL-SCNC: 109 MMOL/L (ref 95–110)
CO2 SERPL-SCNC: 26 MMOL/L (ref 23–29)
CORTIS SERPL-MCNC: 11.9 UG/DL (ref 4.3–22.4)
CREAT SERPL-MCNC: 0.5 MG/DL (ref 0.5–1.4)
ERYTHROCYTE [DISTWIDTH] IN BLOOD BY AUTOMATED COUNT: 17.3 % (ref 11.5–14.5)
GFR SERPLBLD CREATININE-BSD FMLA CKD-EPI: >60 ML/MIN/1.73/M2
GLUCOSE SERPL-MCNC: 77 MG/DL (ref 70–110)
HCT VFR BLD AUTO: 32.6 % (ref 40–54)
HGB BLD-MCNC: 9.9 GM/DL (ref 14–18)
IMM GRANULOCYTES # BLD AUTO: 0.22 K/UL (ref 0–0.04)
IMM GRANULOCYTES NFR BLD AUTO: 2.2 % (ref 0–0.5)
LYMPHOCYTES # BLD AUTO: 2.24 K/UL (ref 1–4.8)
MAGNESIUM SERPL-MCNC: 2.1 MG/DL (ref 1.6–2.6)
MCH RBC QN AUTO: 30.7 PG (ref 27–31)
MCHC RBC AUTO-ENTMCNC: 30.4 G/DL (ref 32–36)
MCV RBC AUTO: 101 FL (ref 82–98)
NUCLEATED RBC (/100WBC) (OHS): 0 /100 WBC
PHOSPHATE SERPL-MCNC: 3.9 MG/DL (ref 2.7–4.5)
PLATELET # BLD AUTO: 394 K/UL (ref 150–450)
PMV BLD AUTO: 11 FL (ref 9.2–12.9)
POCT GLUCOSE: 105 MG/DL (ref 70–110)
POCT GLUCOSE: 111 MG/DL (ref 70–110)
POCT GLUCOSE: 121 MG/DL (ref 70–110)
POCT GLUCOSE: 87 MG/DL (ref 70–110)
POTASSIUM SERPL-SCNC: 4.2 MMOL/L (ref 3.5–5.1)
PROT SERPL-MCNC: 6.5 GM/DL (ref 6–8.4)
RBC # BLD AUTO: 3.23 M/UL (ref 4.6–6.2)
RELATIVE EOSINOPHIL (OHS): 3.6 %
RELATIVE LYMPHOCYTE (OHS): 22.5 % (ref 18–48)
RELATIVE MONOCYTE (OHS): 9.9 % (ref 4–15)
RELATIVE NEUTROPHIL (OHS): 61.3 % (ref 38–73)
SODIUM SERPL-SCNC: 145 MMOL/L (ref 136–145)
WBC # BLD AUTO: 9.95 K/UL (ref 3.9–12.7)

## 2025-04-20 PROCEDURE — 20600001 HC STEP DOWN PRIVATE ROOM

## 2025-04-20 PROCEDURE — 25000003 PHARM REV CODE 250: Performed by: STUDENT IN AN ORGANIZED HEALTH CARE EDUCATION/TRAINING PROGRAM

## 2025-04-20 PROCEDURE — 82533 TOTAL CORTISOL: CPT | Performed by: STUDENT IN AN ORGANIZED HEALTH CARE EDUCATION/TRAINING PROGRAM

## 2025-04-20 PROCEDURE — 36415 COLL VENOUS BLD VENIPUNCTURE: CPT

## 2025-04-20 PROCEDURE — 83735 ASSAY OF MAGNESIUM: CPT | Performed by: INTERNAL MEDICINE

## 2025-04-20 PROCEDURE — 84520 ASSAY OF UREA NITROGEN: CPT

## 2025-04-20 PROCEDURE — 36415 COLL VENOUS BLD VENIPUNCTURE: CPT | Performed by: STUDENT IN AN ORGANIZED HEALTH CARE EDUCATION/TRAINING PROGRAM

## 2025-04-20 PROCEDURE — 84100 ASSAY OF PHOSPHORUS: CPT | Performed by: INTERNAL MEDICINE

## 2025-04-20 PROCEDURE — 63600175 PHARM REV CODE 636 W HCPCS

## 2025-04-20 PROCEDURE — 85025 COMPLETE CBC W/AUTO DIFF WBC: CPT | Performed by: STUDENT IN AN ORGANIZED HEALTH CARE EDUCATION/TRAINING PROGRAM

## 2025-04-20 PROCEDURE — 63600175 PHARM REV CODE 636 W HCPCS: Performed by: INTERNAL MEDICINE

## 2025-04-20 RX ORDER — COSYNTROPIN 0.25 MG/ML
0.25 INJECTION, POWDER, FOR SOLUTION INTRAMUSCULAR; INTRAVENOUS ONCE
Status: COMPLETED | OUTPATIENT
Start: 2025-04-20 | End: 2025-04-21

## 2025-04-20 RX ORDER — SODIUM CHLORIDE 9 MG/ML
INJECTION, SOLUTION INTRAVENOUS CONTINUOUS
Status: ACTIVE | OUTPATIENT
Start: 2025-04-20 | End: 2025-04-21

## 2025-04-20 RX ADMIN — LEVETIRACETAM 500 MG: 500 SOLUTION ORAL at 08:04

## 2025-04-20 RX ADMIN — METOCLOPRAMIDE 5 MG: 5 SOLUTION ORAL at 09:04

## 2025-04-20 RX ADMIN — Medication: at 08:04

## 2025-04-20 RX ADMIN — GLYCOPYRROLATE 1 MG: 1 LIQUID ORAL at 08:04

## 2025-04-20 RX ADMIN — GLYCOPYRROLATE 1 MG: 1 LIQUID ORAL at 03:04

## 2025-04-20 RX ADMIN — SODIUM CHLORIDE: 9 INJECTION, SOLUTION INTRAVENOUS at 05:04

## 2025-04-20 RX ADMIN — METHYLPHENIDATE HYDROCHLORIDE 5 MG: 5 TABLET ORAL at 06:04

## 2025-04-20 RX ADMIN — PANTOPRAZOLE SODIUM 40 MG: 40 INJECTION, POWDER, FOR SOLUTION INTRAVENOUS at 09:04

## 2025-04-20 RX ADMIN — ENOXAPARIN SODIUM 30 MG: 40 INJECTION SUBCUTANEOUS at 05:04

## 2025-04-20 RX ADMIN — METOCLOPRAMIDE 5 MG: 5 SOLUTION ORAL at 06:04

## 2025-04-20 RX ADMIN — METOCLOPRAMIDE 5 MG: 5 SOLUTION ORAL at 03:04

## 2025-04-20 NOTE — ASSESSMENT & PLAN NOTE
Patient with Hypoxic Respiratory failure which is Acute with chronic hypercapnic respiratory failure. he is not on home oxygen. Supplemental oxygen was provided and noted-      Signs/symptoms of respiratory failure include- respiratory distress. Contributing diagnoses includes - ARDS and Aspiration Labs and images were reviewed. Patient Has recent ABG, which has been reviewed. Will treat underlying causes and adjust management of respiratory failure as follows-     Suspect aspiration pneumonia in the setting of hospital acquired pneumonia with recurrent vomiting with supperimposed atelectasis.   s/p IV Zosyn x5 days  Concomitant hypothermia and bradycardia in the setting of adrenal insufficiency.   Off pressor support briefly on 4/10, but  back again same day.   IPPV. Acute hypoxic rx failure resolved    BCX 4/7 NGTD  Lactate wnl  Weaned SDS off 4/18  Endocrine consulted, appreciated recommendations  Pulmonary toilet: bronchodilators PRN, hypertonic saline  Endo coordinating ACTH stim test 4.20.25 (nursing staff confusion 4/19 regarding labs); hold steroids

## 2025-04-20 NOTE — CARE UPDATE
"RAPID RESPONSE NURSE CHART REVIEW        Chart Reviewed: 04/20/2025, 10:20 AM    MRN: [id number]  Bed: 8079/8079 A    Dx: Acute hypoxic respiratory failure    Jesus Posey has a past medical history of Atelectasis and Traumatic brain injury.    Last VS: BP (!) 99/57 (BP Location: Left arm, Patient Position: Lying)   Pulse 95   Temp 97.6 °F (36.4 °C) (Axillary)   Resp 16   Ht 5' 7" (1.702 m)   Wt 53.1 kg (117 lb 1 oz)   SpO2 100%   BMI 18.33 kg/m²     24H Vital Sign Range:  Temp:  [97.5 °F (36.4 °C)-98.3 °F (36.8 °C)]   Pulse:  [60-97]   Resp:  [12-19]   BP: ()/(44-60)   SpO2:  [98 %-100 %]     Level of Consciousness (AVPU): alert    Recent Labs     04/18/25  0651 04/19/25  0529 04/20/25  0517   WBC 11.80 11.82 9.95   HGB 10.4* 10.2* 9.9*   HCT 33.3* 32.0* 32.6*    357 394       Recent Labs     04/18/25  0651 04/19/25  0529 04/20/25  0517   * 140 145   K 3.8 4.3 4.2    104 109   CO2 29 27 26   BUN 13 17 14   CREATININE 0.5 0.4* 0.5   PHOS 3.5 4.5 3.9   MG 2.2 2.1 2.1      OXYGEN: room air     MEWS score: 2    Rounding completed with Charge nurse Leroy for hypotension overnight reports BP improved this morning.  Pt stable in no acute distress at this time. Plan to continue to closely monitor BP. No additional concerns verbalized at this time. Instructed to call [phone] for further concerns or assistance.    Nathaniel Pollock RN        "

## 2025-04-20 NOTE — CARE UPDATE
"RAPID RESPONSE NURSE CHART REVIEW        Chart Reviewed: 04/19/2025, 10:43 PM    MRN: [id number]  Bed: 8079/8079 A    Dx: Acute hypoxic respiratory failure    Jesus Posey has a past medical history of Atelectasis and Traumatic brain injury.    Last VS: BP (!) 88/53 (BP Location: Left arm)   Pulse 67   Temp 97.9 °F (36.6 °C) (Axillary)   Resp 15   Ht 5' 7" (1.702 m)   Wt 53.1 kg (117 lb 1 oz)   SpO2 98%   BMI 18.33 kg/m²     24H Vital Sign Range:  Temp:  [97.6 °F (36.4 °C)-98.4 °F (36.9 °C)]   Pulse:  [61-98]   Resp:  [12-20]   BP: ()/(44-73)   SpO2:  [95 %-100 %]     Level of Consciousness (AVPU): alert    Recent Labs     04/17/25  0246 04/18/25  0651 04/19/25  0529   WBC 13.04* 11.80 11.82   HGB 10.3* 10.4* 10.2*   HCT 33.0* 33.3* 32.0*    365 357       Recent Labs     04/17/25  0246 04/18/25  0651 04/19/25  0529   * 146* 140   K 4.1 3.8 4.3   * 109 104   CO2 29 29 27   BUN 10 13 17   CREATININE 0.5 0.5 0.4*   PHOS 3.5 3.5 4.5   MG 2.4 2.2 2.1       OXYGEN:  Flow (L/min) (Oxygen Therapy): (S) 5 (Audibly congested, Oral & NT suctioned, improved immediately)  Oxygen Concentration (%): 40       MEWS score: 2    Rounding completed with charge nurse Wild for hypotension reports Pt receiving 1L bolus, current BP 93/50. Otherwise VSS . No additional concerns verbalized at this time. Instructed to call [phone] for further concerns or assistance.    Myrtle Reeves RN       "

## 2025-04-20 NOTE — ASSESSMENT & PLAN NOTE
May be secondary to sedation with diazepam with superimposed HAP and sepsis   CT head ruled out CVA. ABG c/w acute hypercapnic rx failure  EEG c/w toxic and drug-induced encephalopathy. Ruled out seizures  Per family, now at baseline

## 2025-04-20 NOTE — SUBJECTIVE & OBJECTIVE
Interval History:  Seen and evaluated on step down unit  No acute events overnight    Clinical status stable, unchanged  Per family at bedside, feeding tube clogged this morning, 3rd time  Discussed increasing TF flush frequency w/ nursing    Objective:     Vital Signs (Most Recent):  Temp: 98.2 °F (36.8 °C) (04/20/25 1155)  Pulse: 94 (04/20/25 1155)  Resp: 18 (04/20/25 1155)  BP: (!) 100/52 (04/20/25 1155)  SpO2: 100 % (04/20/25 1155) Vital Signs (24h Range):  Temp:  [97.5 °F (36.4 °C)-98.2 °F (36.8 °C)] 98.2 °F (36.8 °C)  Pulse:  [] 94  Resp:  [15-19] 18  SpO2:  [98 %-100 %] 100 %  BP: ()/(44-60) 100/52     Weight: 53.1 kg (117 lb 1 oz)  Body mass index is 18.33 kg/m².    Intake/Output Summary (Last 24 hours) at 4/20/2025 1156  Last data filed at 4/20/2025 0750  Gross per 24 hour   Intake 2112 ml   Output --   Net 2112 ml         Physical Exam  Constitutional:       General: He is not in acute distress.     Comments: Cachectic, eyes open but does not respond    Eyes:      Conjunctiva/sclera: Conjunctivae normal.   Cardiovascular:      Rate and Rhythm: Normal rate and regular rhythm.      Pulses: Normal pulses.      Heart sounds: Normal heart sounds.   Pulmonary:      Effort: Pulmonary effort is normal. No respiratory distress.      Breath sounds: Rhonchi present.   Abdominal:      General: Bowel sounds are normal. There is no distension.      Palpations: Abdomen is soft.      Tenderness: There is no abdominal tenderness.      Comments: JG tube in place   Musculoskeletal:      Right lower leg: No edema.      Left lower leg: No edema.   Skin:     General: Skin is warm and dry.   Neurological:      Mental Status: Mental status is at baseline.      Comments: Awake    Contractures in extremities    Right midriatic pupil unresponsive. Left reactive pupil with cataract                Significant Labs: All pertinent labs within the past 24 hours have been reviewed.    Significant Imaging: I have reviewed all  pertinent imaging results/findings within the past 24 hours.  Review of Systems

## 2025-04-20 NOTE — ASSESSMENT & PLAN NOTE
Resolved  Per family, this is common when pt is hospitalized  Suspect low temps and bradycardia are related to dysautonomia  Likely secondary to adrenal insufficiency  Appreciate Endo assistance w/ AI cam

## 2025-04-20 NOTE — ASSESSMENT & PLAN NOTE
Patient with GJ tube dependence, presenting with tube malfunction. IR consulted for exchange under fluoroscopy. Tube changed 4/2.   GI consulted d/t recurrent vomiting with initiation of tube feeds. CTAP with GJ tube in satisfactory position    5mg G tube metoclopramide Q8h   Restarted tube feeds  Tolerating feeds at goal  Frequent flushing to avoid tube clogging

## 2025-04-20 NOTE — PROGRESS NOTES
[facility] Tele[facility]  Progress Note    Patient Name: Jesus Posey  MRN: [id number]  Patient Class: IP- Inpatient   Admission Date: 4/1/2025  Length of Stay: 19 days  Attending Physician: Bailee Nelson MD  Primary Care Provider: Pallavi, Primary Doctor        Subjective     Principal Problem:Acute hypoxic respiratory failure        HPI:  Mr. 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      As he is nonverbal, father reported leakage from his GJ tube which resembled his tube feeds. Last tube exchange was last month.      Admitted to Hospital Medicine in the setting of GJ tube malfunction. Intermittently hypothermic and bradycardic secondary to dysautonomia. Infectious workup has been negative. On IV maintenance fluids. IR replaced tube on 4/2.   During hospital course with repeated episodes of intolerance to PO intake and emesis. Repeat CT AP with G Tube with good positioning. Stepped up to MICU on 4/7 due to acute hypoxic respiratory failure requiring up to 15 L  HFNC  in the setting of likely aspiration pneumonitis. Isolated febrile episode of 100.7F. CXR concerning for BL pulmonary opacities. Started on Unasyn by Hospital Medicine.    Overview/Hospital Course:  Stepped up to ICU in 4/7 the setting of acute hypoxic respiratory failure in the swtting of basilar atelectasis and mucus plugging, with sepsis secondary to PNA requiring up to 15 L HFNC. Started on Vanc-Zosyn. During ICU course requiring up to norepi 0.06 mcg likely in the setting of hypovolemia 2/2 poor intake. Encephalopathy improving. CTH w/o acute processes. Lactate improved. EEG c/w toxic/drug-induced encephalopathy. No seizures. Persistenty bradycardic and hypothermic with EKG junctional rhythm on 30s.On Zosyn. TSH wnl. TTE EF 65%, normal diastolic function. Cortisol < 3 . Started on IV hydrocortisone 100 mg TID. Off  pressors on 4/10 at 7:30 am, but back on pressors by 2pm, again off by 4/10 7pm. Repleting dextrose for hypoglycemia.    Patient feeding tubes replaced without complication.  Tube feeds restarted without issue.  Stress dose steroids weaned with the help of Endocrinology.  Electrolytes replaced with concern for refeeding syndrome.  Noted to have occasional hemodynamically stable bradycardia.  Chronic medical issue when ill on chart review.  Completed 5 days of antibiotics with Zosyn for aspiration pneumonia.    Interval History:  Seen and evaluated on step down unit  No acute events overnight    Clinical status stable, unchanged  Per family at bedside, feeding tube clogged this morning, 3rd time  Discussed increasing TF flush frequency w/ nursing    Objective:     Vital Signs (Most Recent):  Temp: 98.2 °F (36.8 °C) (04/20/25 1155)  Pulse: 94 (04/20/25 1155)  Resp: 18 (04/20/25 1155)  BP: (!) 100/52 (04/20/25 1155)  SpO2: 100 % (04/20/25 1155) Vital Signs (24h Range):  Temp:  [97.5 °F (36.4 °C)-98.2 °F (36.8 °C)] 98.2 °F (36.8 °C)  Pulse:  [] 94  Resp:  [15-19] 18  SpO2:  [98 %-100 %] 100 %  BP: ()/(44-60) 100/52     Weight: 53.1 kg (117 lb 1 oz)  Body mass index is 18.33 kg/m².    Intake/Output Summary (Last 24 hours) at 4/20/2025 1156  Last data filed at 4/20/2025 0750  Gross per 24 hour   Intake 2112 ml   Output --   Net 2112 ml         Physical Exam  Constitutional:       General: He is not in acute distress.     Comments: Cachectic, eyes open but does not respond    Eyes:      Conjunctiva/sclera: Conjunctivae normal.   Cardiovascular:      Rate and Rhythm: Normal rate and regular rhythm.      Pulses: Normal pulses.      Heart sounds: Normal heart sounds.   Pulmonary:      Effort: Pulmonary effort is normal. No respiratory distress.      Breath sounds: Rhonchi present.   Abdominal:      General: Bowel sounds are normal. There is no distension.      Palpations: Abdomen is soft.      Tenderness: There is  no abdominal tenderness.      Comments: JG tube in place   Musculoskeletal:      Right lower leg: No edema.      Left lower leg: No edema.   Skin:     General: Skin is warm and dry.   Neurological:      Mental Status: Mental status is at baseline.      Comments: Awake    Contractures in extremities    Right midriatic pupil unresponsive. Left reactive pupil with cataract                Significant Labs: All pertinent labs within the past 24 hours have been reviewed.    Significant Imaging: I have reviewed all pertinent imaging results/findings within the past 24 hours.  Review of Systems      Assessment & Plan  Pulmonary septic shock with acute hypoxic respiratory failure  Low serum cortisol level  Patient with Hypoxic Respiratory failure which is Acute with chronic hypercapnic respiratory failure. he is not on home oxygen. Supplemental oxygen was provided and noted-      Signs/symptoms of respiratory failure include- respiratory distress. Contributing diagnoses includes - ARDS and Aspiration Labs and images were reviewed. Patient Has recent ABG, which has been reviewed. Will treat underlying causes and adjust management of respiratory failure as follows-     Suspect aspiration pneumonia in the setting of hospital acquired pneumonia with recurrent vomiting with supperimposed atelectasis.   s/p IV Zosyn x5 days  Concomitant hypothermia and bradycardia in the setting of adrenal insufficiency.   Off pressor support briefly on 4/10, but  back again same day.   IPPV. Acute hypoxic rx failure resolved    BCX 4/7 NGTD  Lactate wnl  Weaned SDS off 4/18  Endocrine consulted, appreciated recommendations  Pulmonary toilet: bronchodilators PRN, hypertonic saline  Endo coordinating ACTH stim test 4.20.25 (nursing staff confusion 4/19 regarding labs); hold steroids    Hypophosphatemia  Resolved    PEG tube malfunction  Malfunction of jejunostomy tube  Patient with GJ tube dependence, presenting with tube malfunction. IR consulted  for exchange under fluoroscopy. Tube changed 4/2.   GI consulted d/t recurrent vomiting with initiation of tube feeds. CTAP with GJ tube in satisfactory position    5mg G tube metoclopramide Q8h   Restarted tube feeds  Tolerating feeds at goal  Frequent flushing to avoid tube clogging    Hypernatremia  Resolved  Cont FWF    Toxic metabolic encephalopathy  May be secondary to sedation with diazepam with superimposed HAP and sepsis   CT head ruled out CVA. ABG c/w acute hypercapnic rx failure  EEG c/w toxic and drug-induced encephalopathy. Ruled out seizures  Per family, now at baseline    Spastic quadriparesis  Intrathecal baclofen pump in place.   Mobility protocol.     Functional quadriplegia  Intrathecal baclofen pump in place. Supportive care.     Cachexia  Resumed tube feeds after GJ tube replaced as able    Hypothermia  Resolved  Per family, this is common when pt is hospitalized  Suspect low temps and bradycardia are related to dysautonomia  Likely secondary to adrenal insufficiency  Appreciate Endo assistance w/ AI eval    Hypokalemia  Resolved    Severe malnutrition  Body mass index (BMI) less than 19  Nutrition consulted. Most recent weight and BMI monitored-     Measurements:  Wt Readings from Last 1 Encounters:   04/19/25 53.1 kg (117 lb 1 oz)   Body mass index is 18.33 kg/m².    Patient has been screened and assessed by RD.    Malnutrition Type:  Context:    Level:      Malnutrition Characteristic Summary:       Interventions/Recommendations (treatment strategy):  --Continue TF via PEJ Nutren 1.5 at 60ml/hr to provide to provide 1440 total fluid volume, 2160 kcals, 98 g PRO, 253 g CHO, 1100 mL fluid,144 % DRI  a. At home Tube feeding regimen: Nutren 1.5 @ 65 mL/hr x 14 hours to provide 910 total fluid volume, 1365 kcals, 62 g protein, 691 mL fluid, 91% DRI  b. Monitor for s/s of intolerance such as residuals >500ml, n/v/d, or abdominal distention.  --Per last RD note: Add Iván BID as TF modifier  to  provide 90 kcal, 2.5 g protein, 7 g L-Arginine, 7 g L-Glutamine per serving to aid in wound healing   --Do not mix Iván with formula in a tube-feeding bag  --Pour one packet of Iván in a clean, small (approximately 6- to 8-fl-oz) container for mixing  --Add 4 fl oz (120 mL) water at room temperature  --Mix well with disposable spoon or tongue blade until all particles are completely hydrated  --Verify correct tube placement  --Flush feeding tube with 30 mL water  --Administer Iván through feeding tube using a 60-mL or larger syringe  --Flush with an additional 30 mL water (a smaller amount of water can be used to flush the tube if the patient is on a fluid- restricted diet)  --Recommend weekly weights   --Nursing: please continue to document formula and rate on flowsheets  --RD to monitor weight, po intake, BG, skin    Nutrition consulted. Most recent weight and BMI monitored-     Traumatic brain injury  Continue current management with diazepam for contractures and keppra for seizure prophylaxis.     Pressure injury of sacral region, stage 2  Deep tissue injury  - Wound care    Transaminitis  Improving without intervention  Discussed with hepatology they think this is transient  Liver US looked ok  Monitor    VTE Risk Mitigation (From admission, onward)           Ordered     heparin, porcine (PF) 100 unit/mL injection flush 10 Units  As needed (PRN)         04/10/25 0504     enoxaparin injection 30 mg  Daily         04/07/25 1830     IP VTE HIGH RISK PATIENT  Once         04/07/25 1403     Place sequential compression device  Until discontinued         04/01/25 0209                    Discharge Planning   EDINSON: 4/21/2025     Code Status: Full Code   Medical Readiness for Discharge Date:   Discharge Plan A: Home, Home with family   Discharge Delays: None known at this time                    Bailee Nelson MD  Department of Hospital Medicine   [facility] - Tele[facility] Step[facility]

## 2025-04-20 NOTE — PLAN OF CARE
Problem: Adult Inpatient Plan of Care  Goal: Plan of Care Review  Outcome: Progressing  Goal: Patient-Specific Goal (Individualized)  Outcome: Progressing  Goal: Absence of Hospital-Acquired Illness or Injury  Outcome: Progressing  Goal: Optimal Comfort and Wellbeing  Outcome: Progressing  Goal: Readiness for Transition of Care  Outcome: Progressing     Problem: Skin Injury Risk Increased  Goal: Skin Health and Integrity  Outcome: Progressing     Problem: Wound  Goal: Optimal Coping  Outcome: Progressing  Goal: Optimal Functional Ability  Outcome: Progressing  Goal: Absence of Infection Signs and Symptoms  Outcome: Progressing  Goal: Improved Oral Intake  Outcome: Progressing  Goal: Optimal Pain Control and Function  Outcome: Progressing  Goal: Skin Health and Integrity  Outcome: Progressing  Goal: Optimal Wound Healing  Outcome: Progressing     Problem: Delirium  Goal: Optimal Coping  Outcome: Progressing  Goal: Improved Behavioral Control  Outcome: Progressing  Goal: Improved Attention and Thought Clarity  Outcome: Progressing  Goal: Improved Sleep  Outcome: Progressing     Problem: Infection  Goal: Absence of Infection Signs and Symptoms  Outcome: Progressing     Problem: Fall Injury Risk  Goal: Absence of Fall and Fall-Related Injury  Outcome: Progressing     POC in progress. IV Fluids in progress. No s/sof discomfort or distress. family @BEDSIDE

## 2025-04-21 ENCOUNTER — TELEPHONE (OUTPATIENT)
Dept: PULMONOLOGY | Facility: CLINIC | Age: 20
End: 2025-04-21
Payer: MEDICAID

## 2025-04-21 PROBLEM — D64.9 ANEMIA: Status: ACTIVE | Noted: 2025-04-21

## 2025-04-21 LAB
ABSOLUTE EOSINOPHIL (OHS): 0.26 K/UL
ABSOLUTE MONOCYTE (OHS): 1.07 K/UL (ref 0.3–1)
ABSOLUTE NEUTROPHIL COUNT (OHS): 5.08 K/UL (ref 1.8–7.7)
ALBUMIN SERPL BCP-MCNC: 2.9 G/DL (ref 3.5–5.2)
ALP SERPL-CCNC: 138 UNIT/L (ref 40–150)
ALT SERPL W/O P-5'-P-CCNC: 72 UNIT/L (ref 10–44)
ANION GAP (OHS): 8 MMOL/L (ref 8–16)
AST SERPL-CCNC: 21 UNIT/L (ref 11–45)
BASOPHILS # BLD AUTO: 0.05 K/UL
BASOPHILS NFR BLD AUTO: 0.6 %
BILIRUB SERPL-MCNC: 0.3 MG/DL (ref 0.1–1)
BUN SERPL-MCNC: 14 MG/DL (ref 6–20)
CALCIUM SERPL-MCNC: 9.1 MG/DL (ref 8.7–10.5)
CHLORIDE SERPL-SCNC: 106 MMOL/L (ref 95–110)
CO2 SERPL-SCNC: 28 MMOL/L (ref 23–29)
CORTIS SERPL-MCNC: 5.4 UG/DL (ref 4.3–22.4)
CREAT SERPL-MCNC: 0.5 MG/DL (ref 0.5–1.4)
ERYTHROCYTE [DISTWIDTH] IN BLOOD BY AUTOMATED COUNT: 17.3 % (ref 11.5–14.5)
FERRITIN SERPL-MCNC: 166 NG/ML (ref 20–300)
GFR SERPLBLD CREATININE-BSD FMLA CKD-EPI: >60 ML/MIN/1.73/M2
GLUCOSE SERPL-MCNC: 79 MG/DL (ref 70–110)
HCT VFR BLD AUTO: 31.3 % (ref 40–54)
HGB BLD-MCNC: 9.8 GM/DL (ref 14–18)
IMM GRANULOCYTES # BLD AUTO: 0.2 K/UL (ref 0–0.04)
IMM GRANULOCYTES NFR BLD AUTO: 2.3 % (ref 0–0.5)
IRON SATN MFR SERPL: 14 % (ref 20–50)
IRON SERPL-MCNC: 24 UG/DL (ref 45–160)
LYMPHOCYTES # BLD AUTO: 1.97 K/UL (ref 1–4.8)
MAGNESIUM SERPL-MCNC: 2.1 MG/DL (ref 1.6–2.6)
MCH RBC QN AUTO: 31.4 PG (ref 27–31)
MCHC RBC AUTO-ENTMCNC: 31.3 G/DL (ref 32–36)
MCV RBC AUTO: 100 FL (ref 82–98)
NUCLEATED RBC (/100WBC) (OHS): 0 /100 WBC
PHOSPHATE SERPL-MCNC: 3.2 MG/DL (ref 2.7–4.5)
PLATELET # BLD AUTO: 406 K/UL (ref 150–450)
PMV BLD AUTO: 11.6 FL (ref 9.2–12.9)
POCT GLUCOSE: 101 MG/DL (ref 70–110)
POCT GLUCOSE: 107 MG/DL (ref 70–110)
POCT GLUCOSE: 110 MG/DL (ref 70–110)
POCT GLUCOSE: 122 MG/DL (ref 70–110)
POCT GLUCOSE: 82 MG/DL (ref 70–110)
POCT GLUCOSE: 90 MG/DL (ref 70–110)
POCT GLUCOSE: 91 MG/DL (ref 70–110)
POTASSIUM SERPL-SCNC: 4 MMOL/L (ref 3.5–5.1)
PROT SERPL-MCNC: 6.6 GM/DL (ref 6–8.4)
RBC # BLD AUTO: 3.12 M/UL (ref 4.6–6.2)
RELATIVE EOSINOPHIL (OHS): 3 %
RELATIVE LYMPHOCYTE (OHS): 22.8 % (ref 18–48)
RELATIVE MONOCYTE (OHS): 12.4 % (ref 4–15)
RELATIVE NEUTROPHIL (OHS): 58.9 % (ref 38–73)
RETICS/RBC NFR AUTO: 4.1 % (ref 0.4–2)
SODIUM SERPL-SCNC: 142 MMOL/L (ref 136–145)
TIBC SERPL-MCNC: 169 UG/DL (ref 250–450)
TRANSFERRIN SERPL-MCNC: 114 MG/DL (ref 200–375)
WBC # BLD AUTO: 8.63 K/UL (ref 3.9–12.7)

## 2025-04-21 PROCEDURE — 25000003 PHARM REV CODE 250: Performed by: STUDENT IN AN ORGANIZED HEALTH CARE EDUCATION/TRAINING PROGRAM

## 2025-04-21 PROCEDURE — 82728 ASSAY OF FERRITIN: CPT | Performed by: STUDENT IN AN ORGANIZED HEALTH CARE EDUCATION/TRAINING PROGRAM

## 2025-04-21 PROCEDURE — 36415 COLL VENOUS BLD VENIPUNCTURE: CPT

## 2025-04-21 PROCEDURE — 36415 COLL VENOUS BLD VENIPUNCTURE: CPT | Performed by: STUDENT IN AN ORGANIZED HEALTH CARE EDUCATION/TRAINING PROGRAM

## 2025-04-21 PROCEDURE — 85025 COMPLETE CBC W/AUTO DIFF WBC: CPT | Performed by: STUDENT IN AN ORGANIZED HEALTH CARE EDUCATION/TRAINING PROGRAM

## 2025-04-21 PROCEDURE — 82533 TOTAL CORTISOL: CPT | Performed by: STUDENT IN AN ORGANIZED HEALTH CARE EDUCATION/TRAINING PROGRAM

## 2025-04-21 PROCEDURE — 63600175 PHARM REV CODE 636 W HCPCS: Performed by: STUDENT IN AN ORGANIZED HEALTH CARE EDUCATION/TRAINING PROGRAM

## 2025-04-21 PROCEDURE — 80053 COMPREHEN METABOLIC PANEL: CPT

## 2025-04-21 PROCEDURE — 25000242 PHARM REV CODE 250 ALT 637 W/ HCPCS: Performed by: STUDENT IN AN ORGANIZED HEALTH CARE EDUCATION/TRAINING PROGRAM

## 2025-04-21 PROCEDURE — 83735 ASSAY OF MAGNESIUM: CPT | Performed by: INTERNAL MEDICINE

## 2025-04-21 PROCEDURE — 63600175 PHARM REV CODE 636 W HCPCS: Performed by: INTERNAL MEDICINE

## 2025-04-21 PROCEDURE — 63600175 PHARM REV CODE 636 W HCPCS

## 2025-04-21 PROCEDURE — 85045 AUTOMATED RETICULOCYTE COUNT: CPT | Performed by: STUDENT IN AN ORGANIZED HEALTH CARE EDUCATION/TRAINING PROGRAM

## 2025-04-21 PROCEDURE — 20600001 HC STEP DOWN PRIVATE ROOM

## 2025-04-21 PROCEDURE — 82024 ASSAY OF ACTH: CPT | Performed by: STUDENT IN AN ORGANIZED HEALTH CARE EDUCATION/TRAINING PROGRAM

## 2025-04-21 PROCEDURE — 84100 ASSAY OF PHOSPHORUS: CPT | Performed by: INTERNAL MEDICINE

## 2025-04-21 PROCEDURE — 84466 ASSAY OF TRANSFERRIN: CPT | Performed by: STUDENT IN AN ORGANIZED HEALTH CARE EDUCATION/TRAINING PROGRAM

## 2025-04-21 RX ORDER — COSYNTROPIN 0.25 MG/ML
0.25 INJECTION, POWDER, FOR SOLUTION INTRAMUSCULAR; INTRAVENOUS ONCE
Status: DISCONTINUED | OUTPATIENT
Start: 2025-04-21 | End: 2025-04-21

## 2025-04-21 RX ORDER — COSYNTROPIN 0.25 MG/ML
0.25 INJECTION, POWDER, FOR SOLUTION INTRAMUSCULAR; INTRAVENOUS ONCE
Status: COMPLETED | OUTPATIENT
Start: 2025-04-22 | End: 2025-04-22

## 2025-04-21 RX ADMIN — DIAZEPAM 5 MG: 5 SOLUTION ORAL at 09:04

## 2025-04-21 RX ADMIN — METOCLOPRAMIDE 5 MG: 5 SOLUTION ORAL at 05:04

## 2025-04-21 RX ADMIN — COSYNTROPIN 0.25 MG: 0.25 INJECTION, POWDER, LYOPHILIZED, FOR SOLUTION INTRAMUSCULAR; INTRAVENOUS at 11:04

## 2025-04-21 RX ADMIN — LEVETIRACETAM 500 MG: 500 SOLUTION ORAL at 09:04

## 2025-04-21 RX ADMIN — METOCLOPRAMIDE 5 MG: 5 SOLUTION ORAL at 09:04

## 2025-04-21 RX ADMIN — DIAZEPAM 5 MG: 5 SOLUTION ORAL at 05:04

## 2025-04-21 RX ADMIN — ENOXAPARIN SODIUM 30 MG: 40 INJECTION SUBCUTANEOUS at 05:04

## 2025-04-21 RX ADMIN — METHYLPHENIDATE HYDROCHLORIDE 5 MG: 5 TABLET ORAL at 07:04

## 2025-04-21 RX ADMIN — Medication: at 09:04

## 2025-04-21 RX ADMIN — PANTOPRAZOLE SODIUM 40 MG: 40 INJECTION, POWDER, FOR SOLUTION INTRAVENOUS at 09:04

## 2025-04-21 NOTE — SUBJECTIVE & OBJECTIVE
Interval History:  Seen and evaluated on step down unit  No acute events overnight    Clinical status stable, unchanged- Father at bedside, asking about low RBC count and addition of metoclopramide to his medications. ACTH stimulation test attempt today following IVF overnight.    Objective:     Vital Signs (Most Recent):  Temp: 98.5 °F (36.9 °C) (04/21/25 0733)  Pulse: 78 (04/21/25 1116)  Resp: 16 (04/21/25 0733)  BP: (!) 91/55 (04/21/25 0733)  SpO2: 100 % (04/21/25 0733) Vital Signs (24h Range):  Temp:  [97.4 °F (36.3 °C)-99.6 °F (37.6 °C)] 98.5 °F (36.9 °C)  Pulse:  [] 78  Resp:  [14-18] 16  SpO2:  [100 %] 100 %  BP: (91-99)/(54-63) 91/55     Weight: 53.1 kg (117 lb 1 oz)  Body mass index is 18.33 kg/m².    Intake/Output Summary (Last 24 hours) at 4/21/2025 1225  Last data filed at 4/21/2025 0539  Gross per 24 hour   Intake 1815 ml   Output --   Net 1815 ml         Physical Exam  Constitutional:       General: He is not in acute distress.     Comments: Cachectic, eyes open but does not respond    Eyes:      Conjunctiva/sclera: Conjunctivae normal.   Cardiovascular:      Rate and Rhythm: Normal rate and regular rhythm.      Pulses: Normal pulses.      Heart sounds: Normal heart sounds.   Pulmonary:      Effort: Pulmonary effort is normal. No respiratory distress.      Breath sounds: Rhonchi present.   Abdominal:      General: Bowel sounds are normal. There is no distension.      Palpations: Abdomen is soft.      Tenderness: There is no abdominal tenderness.      Comments: JG tube in place   Musculoskeletal:      Right lower leg: No edema.      Left lower leg: No edema.   Skin:     General: Skin is warm and dry.   Neurological:      Mental Status: Mental status is at baseline.      Comments: Awake    Contractures in extremities    Right midriatic pupil unresponsive. Left reactive pupil with cataract                Significant Labs: All pertinent labs within the past 24 hours have been reviewed.    Significant  Imaging: I have reviewed all pertinent imaging results/findings within the past 24 hours.  Review of Systems

## 2025-04-21 NOTE — PROGRESS NOTES
"[facility] - [facility]  Endocrinology  Progress Note    Admit Date: 4/1/2025     Mr. Posey is a 18 yo male with PMHx of TBI, qudariplegia, cachexia, epilepsy, PEG tube placement, who initially presented with G-J  tube malfunction and is currently admitted in MICU due to acute hypoxic respiratory failure and sepsis 2/2 pneumonia, treated with empiric antibiotics and requiring pressor support.  (Pt is nonverbal at baseline, history is obtained from mother during encounter)  Patient has been persistently bradycardic since arrival.  Persistent hyponatremia or hyperkalemia are not seen on chart review.  TSH within normal limits.  A morning cortisol drawn around 5:30 a.m. on 04/09/2025 was low at 3.3.  Patient was subsequently started on stress dose steroids IV hydrocortisone 100 mg q.8h on 4/9 at 10 pm.  Today morning 4/10, he was weaned off pressor support for a few hours, however he is currently placed back on Levophed.  Endocrinology is consulted for adrenal insufficiency.  Patient has no previous diagnosis of adrenal insufficiency and has no known chronic oral steroid exposure.    Interval HPI:   Overnight events:  No events overnight from Endocrinology standpoint. Patient's ACTH stimulation test was attempted today but was unsuccessful. Has been off of HC since yesterday morning 4/20. No vasopressor use at this time.    /62   Pulse 75   Temp 98.2 °F (36.8 °C)   Resp (!) 22   Ht 5' 7" (1.702 m)   Wt 49 kg (108 lb 0.4 oz)   SpO2 100%   BMI 16.92 kg/m²     Labs Reviewed and Include    Recent Labs   Lab 04/12/25  0851 04/12/25  1735   CALCIUM 8.6* 8.6*   ALBUMIN 2.5*  --    * 149*   K 2.6* 3.6   CO2 29 30*   * 108   BUN 7 6   CREATININE 0.5 0.5   ALKPHOS 118  --    ALT 12  --    AST 10*  --    BILITOT 0.1  --      Lab Results   Component Value Date    WBC 8.15 04/12/2025    HGB 11.7 (L) 04/12/2025    HCT 36.8 (L) 04/12/2025    MCV 97 04/12/2025     04/12/2025     Recent Labs   Lab " "04/08/25  1619   TSH 2.596     No results found for: "HGBA1C"    Nutritional status:   Body mass index is 16.92 kg/m².  Lab Results   Component Value Date    ALBUMIN 2.5 (L) 04/12/2025    ALBUMIN 2.6 (L) 04/11/2025    ALBUMIN 2.7 (L) 04/10/2025     No results found for: "PREALBUMIN"    Estimated Creatinine Clearance: 163.3 mL/min (based on SCr of 0.5 mg/dL).    Accu-Checks  Recent Labs     04/11/25  1220 04/11/25  1643 04/11/25  2154 04/12/25  0352 04/12/25  0820 04/12/25  1156 04/12/25  1625 04/12/25 2012 04/12/25  2356 04/13/25  0503   POCTGLUCOSE 140* 90 133* 187* 156* 138* 107 140* 187* 153*       Current Medications and/or Treatments Impacting Glycemic Control  Immunotherapy:    Immunosuppressants       None          Steroids:   Hormones (From admission, onward)      Start     Stop Route Frequency Ordered    04/09/25 2200  hydrocortisone sodium succinate injection 100 mg         -- IV Every 8 hours 04/09/25 1717          Pressors:    Autonomic Drugs (From admission, onward)      Start     Stop Route Frequency Ordered    04/08/25 2245  NORepinephrine 4 mg in sodium chloride 0.9% 250 mL infusion (premix)        Question Answer Comment   Begin at (in mcg/kg/min): 0.02    Titrate by: (in mcg/kg/min (RANGE PREFERRED) 0.02 - 0.2    Titrate interval: (in minutes) (RANGE PREFERRED) 2 - 5    Titrate to maintain: (MAP or SBP) MAP    Titrate to keep MAP within the range or GREATER than (if single number): (in mmHg) 65 - 75    Maximum dose: (in mcg/kg/min) 0.2        04/09/25 2145 IV Continuous 04/08/25 2143 04/07/25 2115  NORepinephrine 4 mg in sodium chloride 0.9% 250 mL infusion (premix)        Question Answer Comment   Begin at (in mcg/kg/min): 0.02    Titrate by: (in mcg/kg/min (RANGE PREFERRED) 0.02 - 0.2    Titrate interval: (in minutes) (RANGE PREFERRED) 2 - 5    Titrate to maintain: (MAP or SBP) MAP    Titrate to keep MAP within the range or GREATER than (if single number): (in mmHg) 65 - 75    Maximum dose: " (in mcg/kg/min) 0.2        04/2004 IV Continuous 04/07/25 2003          Hyperglycemia/Diabetes Medications:   Antihyperglycemics (From admission, onward)      None            ASSESSMENT and PLAN    Endocrine  Low serum cortisol level  Endocrinology consulted for possible adrenal insufficiency in the setting of hypotension requiring pressor support, bradycardia and intermittent hypothermia, with a low serum cortisol level (3.3 at 5.30 am on 4/9/25).   No persistent hyponatremia or hyperkalemia on labs. No hx chronic po steroid exposure.  TSH wnl.  Attempted stimulation test on the morning of 4/21 but labs were not obtained at appropriate times after administration of cosyntropin test.    -Will attempt another try for the stimulation test tomorrow.  Patient is to not have any steroids in his system at time of testing, please ensure patient does not receive steroids the evening prior to and on the morning of the stimulation test.  Procedure includes obtaining a cortisol and ACTH level, then cosyntropin medication is to be administered and cortisol levels are to be drawn at 30 minutes and 60 minutes after patient receives cosyntropin injection.  I suspect this is a nurse draw and will require primary nurse to draw the labs if phlebotomy is unable to do so in a timely fashion as timing is essential for diagnosis and treatment.            Joshua Reeves MD  Endocrinology  [facility]

## 2025-04-21 NOTE — ASSESSMENT & PLAN NOTE
Patient with Hypoxic Respiratory failure which is Acute with chronic hypercapnic respiratory failure. he is not on home oxygen. Supplemental oxygen was provided and noted-      Signs/symptoms of respiratory failure include- respiratory distress. Contributing diagnoses includes - ARDS and Aspiration Labs and images were reviewed. Patient Has recent ABG, which has been reviewed. Will treat underlying causes and adjust management of respiratory failure as follows-     Suspect aspiration pneumonia in the setting of hospital acquired pneumonia with recurrent vomiting with supperimposed atelectasis.   s/p IV Zosyn x5 days  Concomitant hypothermia and bradycardia in the setting of adrenal insufficiency.   Off pressor support briefly on 4/10, but  back again same day.   IPPV. Acute hypoxic rx failure resolved    BCX 4/7 NGTD  Lactate wnl  Weaned SDS off 4/18  Endocrine consulted, appreciated recommendations  Pulmonary toilet: bronchodilators PRN, hypertonic saline    Endo coordinating ACTH stim test 4.21.25 (nursing staff confusion 4/19 regarding labs, phlebotomy unable to obtain labs on 04/20 and family requesting delay and testing); hold steroids

## 2025-04-21 NOTE — TELEPHONE ENCOUNTER
Spoke with Ms Ríos with [employer], informed her that I have received her message. Ms Ríos states that I can disregard message. I verbalized to Ms Ríos that I understand.

## 2025-04-21 NOTE — ASSESSMENT & PLAN NOTE
Anemia is likely due to multifactorial etiology. Most recent hemoglobin and hematocrit are listed below.  Recent Labs     04/19/25  0529 04/20/25  0517 04/21/25  0719   HGB 10.2* 9.9* 9.8*   HCT 32.0* 32.6* 31.3*     Plan  - Monitor serial CBC: Daily  - Transfuse PRBC if patient becomes hemodynamically unstable, symptomatic or H/H drops below 7/21.  - Patient has not received any PRBC transfusions to date  - Patient's anemia is currently stable  - iron panel in a.m., B12, folate; patient also on Protonix and Keppra

## 2025-04-21 NOTE — ASSESSMENT & PLAN NOTE
Patient with GJ tube dependence, presenting with tube malfunction. IR consulted for exchange under fluoroscopy. Tube changed 4/2.   GI consulted d/t recurrent vomiting with initiation of tube feeds. CTAP with GJ tube in satisfactory position    5mg G tube metoclopramide Q8h   Restarted tube feeds  Tolerating feeds at goal  Frequent flushing to avoid tube clogging (changed to every 4 hours)

## 2025-04-21 NOTE — PROGRESS NOTES
[facility] - Telemetry [facility] Medicine  Progress Note    Patient Name: Jesus Posey  MRN: [id number]  Patient Class: IP- Inpatient   Admission Date: 4/1/2025  Length of Stay: 20 days  Attending Physician: Bailee Nelson MD  Primary Care Provider: Pallavi, Primary Doctor        Subjective     Principal Problem:Acute hypoxic respiratory failure        HPI:  Mr. 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      As he is nonverbal, father reported leakage from his GJ tube which resembled his tube feeds. Last tube exchange was last month.      Admitted to Hospital Medicine in the setting of GJ tube malfunction. Intermittently hypothermic and bradycardic secondary to dysautonomia. Infectious workup has been negative. On IV maintenance fluids. IR replaced tube on 4/2.   During hospital course with repeated episodes of intolerance to PO intake and emesis. Repeat CT AP with G Tube with good positioning. Stepped up to MICU on 4/7 due to acute hypoxic respiratory failure requiring up to 15 L  HFNC  in the setting of likely aspiration pneumonitis. Isolated febrile episode of 100.7F. CXR concerning for BL pulmonary opacities. Started on Unasyn by Hospital Medicine.    Overview/Hospital Course:  Stepped up to ICU in 4/7 the setting of acute hypoxic respiratory failure in the swtting of basilar atelectasis and mucus plugging, with sepsis secondary to PNA requiring up to 15 L HFNC. Started on Vanc-Zosyn. During ICU course requiring up to norepi 0.06 mcg likely in the setting of hypovolemia 2/2 poor intake. Encephalopathy improving. CTH w/o acute processes. Lactate improved. EEG c/w toxic/drug-induced encephalopathy. No seizures. Persistenty bradycardic and hypothermic with EKG junctional rhythm on 30s.On Zosyn. TSH wnl. TTE EF 65%, normal diastolic function. Cortisol < 3 . Started on IV hydrocortisone 100 mg TID. Off  pressors on 4/10 at 7:30 am, but back on pressors by 2pm, again off by 4/10 7pm. Repleting dextrose for hypoglycemia.    Patient feeding tubes replaced without complication.  Tube feeds restarted without issue.  Stress dose steroids weaned with the help of Endocrinology.  Electrolytes replaced with concern for refeeding syndrome.  Noted to have occasional hemodynamically stable bradycardia.  Chronic medical issue when ill on chart review.  Completed 5 days of antibiotics with Zosyn for aspiration pneumonia.    Interval History:  Seen and evaluated on step down unit  No acute events overnight    Clinical status stable, unchanged- Father at bedside, asking about low RBC count and addition of metoclopramide to his medications. ACTH stimulation test attempt today following IVF overnight.    Objective:     Vital Signs (Most Recent):  Temp: 98.5 °F (36.9 °C) (04/21/25 0733)  Pulse: 78 (04/21/25 1116)  Resp: 16 (04/21/25 0733)  BP: (!) 91/55 (04/21/25 0733)  SpO2: 100 % (04/21/25 0733) Vital Signs (24h Range):  Temp:  [97.4 °F (36.3 °C)-99.6 °F (37.6 °C)] 98.5 °F (36.9 °C)  Pulse:  [] 78  Resp:  [14-18] 16  SpO2:  [100 %] 100 %  BP: (91-99)/(54-63) 91/55     Weight: 53.1 kg (117 lb 1 oz)  Body mass index is 18.33 kg/m².    Intake/Output Summary (Last 24 hours) at 4/21/2025 1225  Last data filed at 4/21/2025 0539  Gross per 24 hour   Intake 1815 ml   Output --   Net 1815 ml         Physical Exam  Constitutional:       General: He is not in acute distress.     Comments: Cachectic, eyes open but does not respond    Eyes:      Conjunctiva/sclera: Conjunctivae normal.   Cardiovascular:      Rate and Rhythm: Normal rate and regular rhythm.      Pulses: Normal pulses.      Heart sounds: Normal heart sounds.   Pulmonary:      Effort: Pulmonary effort is normal. No respiratory distress.      Breath sounds: Rhonchi present.   Abdominal:      General: Bowel sounds are normal. There is no distension.      Palpations: Abdomen is  soft.      Tenderness: There is no abdominal tenderness.      Comments: JG tube in place   Musculoskeletal:      Right lower leg: No edema.      Left lower leg: No edema.   Skin:     General: Skin is warm and dry.   Neurological:      Mental Status: Mental status is at baseline.      Comments: Awake    Contractures in extremities    Right midriatic pupil unresponsive. Left reactive pupil with cataract                Significant Labs: All pertinent labs within the past 24 hours have been reviewed.    Significant Imaging: I have reviewed all pertinent imaging results/findings within the past 24 hours.  Review of Systems      Assessment & Plan  Pulmonary septic shock with acute hypoxic respiratory failure  Low serum cortisol level  Patient with Hypoxic Respiratory failure which is Acute with chronic hypercapnic respiratory failure. he is not on home oxygen. Supplemental oxygen was provided and noted-      Signs/symptoms of respiratory failure include- respiratory distress. Contributing diagnoses includes - ARDS and Aspiration Labs and images were reviewed. Patient Has recent ABG, which has been reviewed. Will treat underlying causes and adjust management of respiratory failure as follows-     Suspect aspiration pneumonia in the setting of hospital acquired pneumonia with recurrent vomiting with supperimposed atelectasis.   s/p IV Zosyn x5 days  Concomitant hypothermia and bradycardia in the setting of adrenal insufficiency.   Off pressor support briefly on 4/10, but  back again same day.   IPPV. Acute hypoxic rx failure resolved    BCX 4/7 NGTD  Lactate wnl  Weaned SDS off 4/18  Endocrine consulted, appreciated recommendations  Pulmonary toilet: bronchodilators PRN, hypertonic saline    Endo coordinating ACTH stim test 4.21.25 (nursing staff confusion 4/19 regarding labs, phlebotomy unable to obtain labs on 04/20 and family requesting delay and testing); hold steroids    Hypophosphatemia  Resolved    PEG tube  malfunction  Malfunction of jejunostomy tube  Patient with GJ tube dependence, presenting with tube malfunction. IR consulted for exchange under fluoroscopy. Tube changed 4/2.   GI consulted d/t recurrent vomiting with initiation of tube feeds. CTAP with GJ tube in satisfactory position    5mg G tube metoclopramide Q8h   Restarted tube feeds  Tolerating feeds at goal  Frequent flushing to avoid tube clogging (changed to every 4 hours)    Hypernatremia  Resolved  Cont FWF    Toxic metabolic encephalopathy  May be secondary to sedation with diazepam with superimposed HAP and sepsis   CT head ruled out CVA. ABG c/w acute hypercapnic rx failure  EEG c/w toxic and drug-induced encephalopathy. Ruled out seizures  Per family, now at baseline    Spastic quadriparesis  Intrathecal baclofen pump in place.   Mobility protocol.     Functional quadriplegia  Intrathecal baclofen pump in place. Supportive care.     Cachexia  Resumed tube feeds after GJ tube replaced as able    Hypothermia  Resolved  Per family, this is common when pt is hospitalized  Suspect low temps and bradycardia are related to dysautonomia  Likely secondary to adrenal insufficiency  Appreciate Endo assistance w/ AI eval    Hypokalemia  Resolved    Severe malnutrition  Body mass index (BMI) less than 19  Nutrition consulted. Most recent weight and BMI monitored-     Measurements:  Wt Readings from Last 1 Encounters:   04/19/25 53.1 kg (117 lb 1 oz)   Body mass index is 18.33 kg/m².    Patient has been screened and assessed by RD.    Malnutrition Type:  Context:    Level:      Malnutrition Characteristic Summary:       Interventions/Recommendations (treatment strategy):  --Continue TF via PEJ Nutren 1.5 at 60ml/hr to provide to provide 1440 total fluid volume, 2160 kcals, 98 g PRO, 253 g CHO, 1100 mL fluid,144 % DRI  a. At home Tube feeding regimen: Nutren 1.5 @ 65 mL/hr x 14 hours to provide 910 total fluid volume, 1365 kcals, 62 g protein, 691 mL fluid, 91%  DRI  b. Monitor for s/s of intolerance such as residuals >500ml, n/v/d, or abdominal distention.  --Per last RD note: Add Iván BID as TF modifier  to provide 90 kcal, 2.5 g protein, 7 g L-Arginine, 7 g L-Glutamine per serving to aid in wound healing   --Do not mix Iván with formula in a tube-feeding bag  --Pour one packet of Iván in a clean, small (approximately 6- to 8-fl-oz) container for mixing  --Add 4 fl oz (120 mL) water at room temperature  --Mix well with disposable spoon or tongue blade until all particles are completely hydrated  --Verify correct tube placement  --Flush feeding tube with 30 mL water  --Administer Iván through feeding tube using a 60-mL or larger syringe  --Flush with an additional 30 mL water (a smaller amount of water can be used to flush the tube if the patient is on a fluid- restricted diet)  --Recommend weekly weights   --Nursing: please continue to document formula and rate on flowsheets  --RD to monitor weight, po intake, BG, skin    Nutrition consulted. Most recent weight and BMI monitored-     Traumatic brain injury  Continue current management with diazepam for contractures and keppra for seizure prophylaxis.     Pressure injury of sacral region, stage 2  Deep tissue injury  - Wound care    Transaminitis  Improving without intervention  Discussed with hepatology they think this is transient  Liver US looked ok  Monitor    Anemia  Anemia is likely due to  multifactorial etiology . Most recent hemoglobin and hematocrit are listed below.  Recent Labs     04/19/25  0529 04/20/25  0517 04/21/25  0719   HGB 10.2* 9.9* 9.8*   HCT 32.0* 32.6* 31.3*     Plan  - Monitor serial CBC: Daily  - Transfuse PRBC if patient becomes hemodynamically unstable, symptomatic or H/H drops below 7/21.  - Patient has not received any PRBC transfusions to date  - Patient's anemia is currently stable  - iron panel in a.m., B12, folate; patient also on Protonix and Keppra    VTE Risk Mitigation (From  admission, onward)           Ordered     heparin, porcine (PF) 100 unit/mL injection flush 10 Units  As needed (PRN)         04/10/25 0504     enoxaparin injection 30 mg  Daily         04/07/25 1830     IP VTE HIGH RISK PATIENT  Once         04/07/25 1403     Place sequential compression device  Until discontinued         04/01/25 0209                    Discharge Planning   EDINSON: 4/22/2025     Code Status: Full Code   Medical Readiness for Discharge Date:   Discharge Plan A: Home, Home with family   Discharge Delays: None known at this time                    Bailee Nelson MD  Department of Hospital Medicine   [facility] - Telemetry Stepdown

## 2025-04-21 NOTE — TELEPHONE ENCOUNTER
----- Message from Khushbu sent at 4/21/2025 11:27 AM CDT -----  Regarding: Order equipment  Contact: Dwight  CONSULT/ADVISORYName of Caller: Khushbu/Earle Preference:  [phone] of Call:  Caller is requesting Dr. Maki to order a low air loss mattress - CPT code .  Please fax to  [phone].  Thank you

## 2025-04-21 NOTE — ASSESSMENT & PLAN NOTE
Endocrinology consulted for possible adrenal insufficiency in the setting of hypotension requiring pressor support, bradycardia and intermittent hypothermia, with a low serum cortisol level (3.3 at 5.30 am on 4/9/25).   No persistent hyponatremia or hyperkalemia on labs. No hx chronic po steroid exposure.  TSH wnl.  Attempted stimulation test on the morning of 4/21 but labs were not obtained at appropriate times after administration of cosyntropin test.    -Will attempt another try for the stimulation test tomorrow.  Patient is to not have any steroids in his system at time of testing, please ensure patient does not receive steroids the evening prior to and on the morning of the stimulation test.  Procedure includes obtaining a cortisol and ACTH level, then cosyntropin medication is to be administered and cortisol levels are to be drawn at 30 minutes and 60 minutes after patient receives cosyntropin injection.  I suspect this is a nurse draw and will require primary nurse to draw the labs if phlebotomy is unable to do so in a timely fashion as timing is essential for diagnosis and treatment.

## 2025-04-21 NOTE — PLAN OF CARE
[facility] - Telemetry Stepdown  Discharge Reassessment    Primary Care Provider: No, Primary Doctor    Expected Discharge Date: 4/22/2025    Reassessment (most recent)       Discharge Reassessment - 04/21/25 1509          Discharge Reassessment    Assessment Type Discharge Planning Reassessment     Did the patient's condition or plan change since previous assessment? No     Discharge Plan A Home;Home with family     Discharge Plan B Home;Home with family     DME Needed Upon Discharge  lift device;hospital bed     Transition of Care Barriers None     Why the patient remains in the hospital Requires continued medical care        Post-Acute Status    Discharge Delays None known at this time                   Pt continues to need medical care. Hospital bed, lift and alternating pads was delivered to the house. Medicaid doesn't pay for low pressure mattress,  will continue to follow and offer support as needed.  Discharge Plan A and Plan B have been determined by review of patient's clinical status, future medical and therapeutic needs, and coverage/benefits for post-acute care in coordination with multidisciplinary team members.  Celso Espinoza, [facility]    [facility]  [phone]

## 2025-04-22 LAB
ABSOLUTE EOSINOPHIL (OHS): 0.19 K/UL
ABSOLUTE MONOCYTE (OHS): 0.89 K/UL (ref 0.3–1)
ABSOLUTE NEUTROPHIL COUNT (OHS): 4.03 K/UL (ref 1.8–7.7)
ACTH (OHS): 21 PG/ML
ACTH (OHS): 9 PG/ML
ALBUMIN SERPL BCP-MCNC: 3.1 G/DL (ref 3.5–5.2)
ALP SERPL-CCNC: 148 UNIT/L (ref 40–150)
ALT SERPL W/O P-5'-P-CCNC: 69 UNIT/L (ref 10–44)
ANION GAP (OHS): 11 MMOL/L (ref 8–16)
AST SERPL-CCNC: 22 UNIT/L (ref 11–45)
BASOPHILS # BLD AUTO: 0.05 K/UL
BASOPHILS NFR BLD AUTO: 0.7 %
BILIRUB SERPL-MCNC: 0.2 MG/DL (ref 0.1–1)
BUN SERPL-MCNC: 12 MG/DL (ref 6–20)
CALCIUM SERPL-MCNC: 9.4 MG/DL (ref 8.7–10.5)
CHLORIDE SERPL-SCNC: 111 MMOL/L (ref 95–110)
CO2 SERPL-SCNC: 25 MMOL/L (ref 23–29)
CREAT SERPL-MCNC: 0.5 MG/DL (ref 0.5–1.4)
ERYTHROCYTE [DISTWIDTH] IN BLOOD BY AUTOMATED COUNT: 17.1 % (ref 11.5–14.5)
FOLATE SERPL-MCNC: 12.9 NG/ML (ref 4–24)
GFR SERPLBLD CREATININE-BSD FMLA CKD-EPI: >60 ML/MIN/1.73/M2
GLUCOSE SERPL-MCNC: 96 MG/DL (ref 70–110)
HCT VFR BLD AUTO: 32.3 % (ref 40–54)
HGB BLD-MCNC: 10.1 GM/DL (ref 14–18)
IMM GRANULOCYTES # BLD AUTO: 0.12 K/UL (ref 0–0.04)
IMM GRANULOCYTES NFR BLD AUTO: 1.8 % (ref 0–0.5)
LYMPHOCYTES # BLD AUTO: 1.48 K/UL (ref 1–4.8)
MAGNESIUM SERPL-MCNC: 2.1 MG/DL (ref 1.6–2.6)
MCH RBC QN AUTO: 31.6 PG (ref 27–31)
MCHC RBC AUTO-ENTMCNC: 31.3 G/DL (ref 32–36)
MCV RBC AUTO: 101 FL (ref 82–98)
NUCLEATED RBC (/100WBC) (OHS): 0 /100 WBC
PHOSPHATE SERPL-MCNC: 3 MG/DL (ref 2.7–4.5)
PLATELET # BLD AUTO: 389 K/UL (ref 150–450)
PMV BLD AUTO: 11.3 FL (ref 9.2–12.9)
POCT GLUCOSE: 107 MG/DL (ref 70–110)
POCT GLUCOSE: 114 MG/DL (ref 70–110)
POCT GLUCOSE: 123 MG/DL (ref 70–110)
POCT GLUCOSE: 124 MG/DL (ref 70–110)
POCT GLUCOSE: 126 MG/DL (ref 70–110)
POCT GLUCOSE: 97 MG/DL (ref 70–110)
POTASSIUM SERPL-SCNC: 4 MMOL/L (ref 3.5–5.1)
PROT SERPL-MCNC: 7.1 GM/DL (ref 6–8.4)
RBC # BLD AUTO: 3.2 M/UL (ref 4.6–6.2)
RELATIVE EOSINOPHIL (OHS): 2.8 %
RELATIVE LYMPHOCYTE (OHS): 21.9 % (ref 18–48)
RELATIVE MONOCYTE (OHS): 13.2 % (ref 4–15)
RELATIVE NEUTROPHIL (OHS): 59.6 % (ref 38–73)
SODIUM SERPL-SCNC: 147 MMOL/L (ref 136–145)
VIT B12 SERPL-MCNC: 452 PG/ML (ref 210–950)
WBC # BLD AUTO: 6.76 K/UL (ref 3.9–12.7)

## 2025-04-22 PROCEDURE — 25000003 PHARM REV CODE 250: Performed by: STUDENT IN AN ORGANIZED HEALTH CARE EDUCATION/TRAINING PROGRAM

## 2025-04-22 PROCEDURE — 84100 ASSAY OF PHOSPHORUS: CPT | Performed by: INTERNAL MEDICINE

## 2025-04-22 PROCEDURE — 36415 COLL VENOUS BLD VENIPUNCTURE: CPT

## 2025-04-22 PROCEDURE — 63600175 PHARM REV CODE 636 W HCPCS

## 2025-04-22 PROCEDURE — 83921 ORGANIC ACID SINGLE QUANT: CPT | Performed by: STUDENT IN AN ORGANIZED HEALTH CARE EDUCATION/TRAINING PROGRAM

## 2025-04-22 PROCEDURE — 20600001 HC STEP DOWN PRIVATE ROOM

## 2025-04-22 PROCEDURE — 82746 ASSAY OF FOLIC ACID SERUM: CPT | Performed by: STUDENT IN AN ORGANIZED HEALTH CARE EDUCATION/TRAINING PROGRAM

## 2025-04-22 PROCEDURE — 63600175 PHARM REV CODE 636 W HCPCS: Performed by: STUDENT IN AN ORGANIZED HEALTH CARE EDUCATION/TRAINING PROGRAM

## 2025-04-22 PROCEDURE — 85025 COMPLETE CBC W/AUTO DIFF WBC: CPT | Performed by: STUDENT IN AN ORGANIZED HEALTH CARE EDUCATION/TRAINING PROGRAM

## 2025-04-22 PROCEDURE — 83735 ASSAY OF MAGNESIUM: CPT | Performed by: INTERNAL MEDICINE

## 2025-04-22 PROCEDURE — 63600175 PHARM REV CODE 636 W HCPCS: Performed by: INTERNAL MEDICINE

## 2025-04-22 PROCEDURE — 80053 COMPREHEN METABOLIC PANEL: CPT

## 2025-04-22 PROCEDURE — 25000242 PHARM REV CODE 250 ALT 637 W/ HCPCS: Performed by: STUDENT IN AN ORGANIZED HEALTH CARE EDUCATION/TRAINING PROGRAM

## 2025-04-22 PROCEDURE — 82607 VITAMIN B-12: CPT | Performed by: STUDENT IN AN ORGANIZED HEALTH CARE EDUCATION/TRAINING PROGRAM

## 2025-04-22 PROCEDURE — 94761 N-INVAS EAR/PLS OXIMETRY MLT: CPT

## 2025-04-22 RX ORDER — COSYNTROPIN 0.25 MG/ML
0.25 INJECTION, POWDER, FOR SOLUTION INTRAMUSCULAR; INTRAVENOUS ONCE
Status: COMPLETED | OUTPATIENT
Start: 2025-04-23 | End: 2025-04-23

## 2025-04-22 RX ORDER — METOCLOPRAMIDE HYDROCHLORIDE 5 MG/5ML
5 SOLUTION ORAL EVERY 8 HOURS
Qty: 473 ML | Refills: 0 | Status: SHIPPED | OUTPATIENT
Start: 2025-04-22

## 2025-04-22 RX ADMIN — DIAZEPAM 5 MG: 5 SOLUTION ORAL at 09:04

## 2025-04-22 RX ADMIN — Medication: at 09:04

## 2025-04-22 RX ADMIN — PANTOPRAZOLE SODIUM 40 MG: 40 INJECTION, POWDER, FOR SOLUTION INTRAVENOUS at 08:04

## 2025-04-22 RX ADMIN — METHYLPHENIDATE HYDROCHLORIDE 5 MG: 5 TABLET ORAL at 06:04

## 2025-04-22 RX ADMIN — Medication: at 08:04

## 2025-04-22 RX ADMIN — LEVETIRACETAM 500 MG: 500 SOLUTION ORAL at 08:04

## 2025-04-22 RX ADMIN — GLYCOPYRROLATE 1 MG: 1 LIQUID ORAL at 09:04

## 2025-04-22 RX ADMIN — METOCLOPRAMIDE 5 MG: 5 SOLUTION ORAL at 09:04

## 2025-04-22 RX ADMIN — GLYCOPYRROLATE 1 MG: 1 LIQUID ORAL at 02:04

## 2025-04-22 RX ADMIN — METOCLOPRAMIDE 5 MG: 5 SOLUTION ORAL at 06:04

## 2025-04-22 RX ADMIN — DIAZEPAM 5 MG: 5 SOLUTION ORAL at 02:04

## 2025-04-22 RX ADMIN — METOCLOPRAMIDE 5 MG: 5 SOLUTION ORAL at 02:04

## 2025-04-22 RX ADMIN — LEVETIRACETAM 500 MG: 500 SOLUTION ORAL at 09:04

## 2025-04-22 RX ADMIN — ENOXAPARIN SODIUM 30 MG: 40 INJECTION SUBCUTANEOUS at 05:04

## 2025-04-22 RX ADMIN — GLYCOPYRROLATE 1 MG: 1 LIQUID ORAL at 08:04

## 2025-04-22 RX ADMIN — COSYNTROPIN 0.25 MG: 0.25 INJECTION, POWDER, LYOPHILIZED, FOR SOLUTION INTRAMUSCULAR; INTRAVENOUS at 08:04

## 2025-04-22 NOTE — ASSESSMENT & PLAN NOTE
Anemia is likely due to multifactorial etiology. Most recent hemoglobin and hematocrit are listed below.  Recent Labs     04/20/25  0517 04/21/25  0719 04/22/25  0559   HGB 9.9* 9.8* 10.1*   HCT 32.6* 31.3* 32.3*     Plan  - Monitor serial CBC: Daily  - Transfuse PRBC if patient becomes hemodynamically unstable, symptomatic or H/H drops below 7/21.  - Patient has not received any PRBC transfusions to date  - Patient's anemia is currently stable  - iron panel in a.m., B12, folate; patient also on Protonix and Keppra

## 2025-04-22 NOTE — PLAN OF CARE
Problem: Adult Inpatient Plan of Care  Goal: Plan of Care Review  Flowsheets (Taken 4/22/2025   Plan of Care Reviewed With: (father at bedside)   patient   parent   other (see comments)    Goal: Absence of Hospital-Acquired Illness or Injury    Intervention: Identify and Manage Fall Risk  Flowsheets (Taken 4/22/2025   Safety Promotion/Fall Prevention:   bed alarm set   Fall Risk reviewed with patient/family   family to remain at bedside   lighting adjusted   side rails raised x 2    Intervention: Prevent Skin Injury  Flowsheets (Taken 4/22/2025   Body Position: weight shifting    Intervention: Prevent and Manage VTE (Venous Thromboembolism) Risk  Flowsheets (Taken 4/22/2025   VTE Prevention/Management: ROM (passive) performed    Goal: Optimal Comfort and Wellbeing    Intervention: Monitor Pain and Promote Comfort  Flowsheets (Taken 4/22/2025   Pain Management Interventions: pain management plan reviewed with patient/caregiver    Intervention: Provide Person-Centered Care  Flowsheets (Taken 4/22/2025 0214)  Trust Relationship/Rapport:   questions answered   questions encouraged   reassurance provided   thoughts/feelings acknowledged     Problem: Fall Injury Risk  Goal: Absence of Fall and Fall-Related Injury  Intervention: Identify and Manage Contributors  Flowsheets (Taken 4/22/2025   Self-Care Promotion: BADL personal routines maintained  Medication Review/Management: medications reviewed     Problem: Infection  Goal: Absence of Infection Signs and Symptoms  Intervention: Prevent or Manage Infection  Flowsheets (Taken 4/22/2025   Infection Management:   aseptic technique maintained   cultures obtained and sent to lab

## 2025-04-22 NOTE — PROGRESS NOTES
[facility] - Telemetry [facility] Medicine  Progress Note    Patient Name: Jesus Posey  MRN: [id number]  Patient Class: IP- Inpatient   Admission Date: 4/1/2025  Length of Stay: 21 days  Attending Physician: Balaji Edwards DO  Primary Care Provider: Care), [facility] (Primary        Subjective     Principal Problem:Acute hypoxic respiratory failure        HPI:  Mr. 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      As he is nonverbal, father reported leakage from his GJ tube which resembled his tube feeds. Last tube exchange was last month.      Admitted to Hospital Medicine in the setting of GJ tube malfunction. Intermittently hypothermic and bradycardic secondary to dysautonomia. Infectious workup has been negative. On IV maintenance fluids. IR replaced tube on 4/2.   During hospital course with repeated episodes of intolerance to PO intake and emesis. Repeat CT AP with G Tube with good positioning. Stepped up to MICU on 4/7 due to acute hypoxic respiratory failure requiring up to 15 L  HFNC  in the setting of likely aspiration pneumonitis. Isolated febrile episode of 100.7F. CXR concerning for BL pulmonary opacities. Started on Unasyn by Hospital Medicine.    Overview/Hospital Course:  Stepped up to ICU in 4/7 the setting of acute hypoxic respiratory failure in the swtting of basilar atelectasis and mucus plugging, with sepsis secondary to PNA requiring up to 15 L HFNC. Started on Vanc-Zosyn. During ICU course requiring up to norepi 0.06 mcg likely in the setting of hypovolemia 2/2 poor intake. Encephalopathy improving. CTH w/o acute processes. Lactate improved. EEG c/w toxic/drug-induced encephalopathy. No seizures. Persistenty bradycardic and hypothermic with EKG junctional rhythm on 30s.On Zosyn. TSH wnl. TTE EF 65%, normal diastolic function. Cortisol <  3 . Started on IV hydrocortisone 100 mg TID. Off pressors on 4/10 at 7:30 am, but back on pressors by 2pm, again off by 4/10 7pm. Repleting dextrose for hypoglycemia.    Patient feeding tubes replaced without complication.  Tube feeds restarted without issue.  Stress dose steroids weaned with the help of Endocrinology.  Electrolytes replaced with concern for refeeding syndrome.  Noted to have occasional hemodynamically stable bradycardia.  Chronic medical issue when ill on chart review.  Completed 5 days of antibiotics with Zosyn for aspiration pneumonia.    Interval History:  Seen and evaluated on step down unit  No acute events overnight    Clinical status stable, unchanged  No new complaints  Still having an issue with stim test    Objective:     Vital Signs (Most Recent):  Temp: 98.4 °F (36.9 °C) (04/22/25 1154)  Pulse: 78 (04/22/25 1447)  Resp: 16 (04/22/25 1400)  BP: 103/70 (04/22/25 1154)  SpO2: 100 % (04/22/25 1400) Vital Signs (24h Range):  Temp:  [97.4 °F (36.3 °C)-98.4 °F (36.9 °C)] 98.4 °F (36.9 °C)  Pulse:  [60-95] 78  Resp:  [10-20] 16  SpO2:  [96 %-100 %] 100 %  BP: ()/(47-70) 103/70     Weight: 53.1 kg (117 lb 1 oz)  Body mass index is 18.33 kg/m².    Intake/Output Summary (Last 24 hours) at 4/22/2025 1514  Last data filed at 4/22/2025 1200  Gross per 24 hour   Intake 901 ml   Output --   Net 901 ml         Physical Exam  Constitutional:       General: He is not in acute distress.     Comments: Cachectic, eyes open but does not respond    Eyes:      Conjunctiva/sclera: Conjunctivae normal.   Cardiovascular:      Rate and Rhythm: Normal rate and regular rhythm.      Pulses: Normal pulses.      Heart sounds: Normal heart sounds.   Pulmonary:      Effort: Pulmonary effort is normal. No respiratory distress.      Breath sounds: Rhonchi present.   Abdominal:      General: Bowel sounds are normal. There is no distension.      Palpations: Abdomen is soft.      Tenderness: There is no abdominal  tenderness.      Comments: JG tube in place   Musculoskeletal:      Right lower leg: No edema.      Left lower leg: No edema.   Skin:     General: Skin is warm and dry.   Neurological:      Mental Status: Mental status is at baseline.      Comments: Awake    Contractures in extremities    Right midriatic pupil unresponsive. Left reactive pupil with cataract                Significant Labs: All pertinent labs within the past 24 hours have been reviewed.    Significant Imaging: I have reviewed all pertinent imaging results/findings within the past 24 hours.      Assessment & Plan  Pulmonary septic shock with acute hypoxic respiratory failure  Low serum cortisol level  Patient with Hypoxic Respiratory failure which is Acute with chronic hypercapnic respiratory failure. he is not on home oxygen. Supplemental oxygen was provided and noted-      Signs/symptoms of respiratory failure include- respiratory distress. Contributing diagnoses includes - ARDS and Aspiration Labs and images were reviewed. Patient Has recent ABG, which has been reviewed. Will treat underlying causes and adjust management of respiratory failure as follows-     Suspect aspiration pneumonia in the setting of hospital acquired pneumonia with recurrent vomiting with supperimposed atelectasis.   s/p IV Zosyn x5 days  Concomitant hypothermia and bradycardia in the setting of adrenal insufficiency.   Off pressor support briefly on 4/10, but  back again same day.   IPPV. Acute hypoxic rx failure resolved    BCX 4/7 NGTD  Lactate wnl  Weaned SDS off 4/18  Endocrine consulted, appreciated recommendations  Pulmonary toilet: bronchodilators PRN, hypertonic saline    Having issues with coordinating stim test  Will attempt again 4.23.25    Instruction for stim test:  8am cortisol and ACTH should be drawn before Cosyntropin is given  Then Cosyntropin should be given  Then cortisol should be checked at 30 min and 60 min after cosyntropin is given    Per endo, if  this cannot be done correctly before d/c he should be d/c on physiologic hydrocortisone 20/10 with AI sick day rules written down for him counseled on AI and sick day rules and given a large number of HC tabs     Hypophosphatemia  Resolved    PEG tube malfunction  Malfunction of jejunostomy tube  Patient with GJ tube dependence, presenting with tube malfunction. IR consulted for exchange under fluoroscopy. Tube changed 4/2.   GI consulted d/t recurrent vomiting with initiation of tube feeds. CTAP with GJ tube in satisfactory position    5mg G tube metoclopramide Q8h   Restarted tube feeds  Tolerating feeds at goal  Frequent flushing to avoid tube clogging (changed to every 4 hours)    Hypernatremia  Resolved  Cont FWF    Toxic metabolic encephalopathy  May be secondary to sedation with diazepam with superimposed HAP and sepsis   CT head ruled out CVA. ABG c/w acute hypercapnic rx failure  EEG c/w toxic and drug-induced encephalopathy. Ruled out seizures  Per family, now at baseline    Spastic quadriparesis  Intrathecal baclofen pump in place.   Mobility protocol.     Functional quadriplegia  Intrathecal baclofen pump in place. Supportive care.     Cachexia  Resumed tube feeds after GJ tube replaced as able    Hypothermia  Resolved  Per family, this is common when pt is hospitalized  Suspect low temps and bradycardia are related to dysautonomia  Likely secondary to adrenal insufficiency  Appreciate Endo assistance w/ AI eval    Hypokalemia  Resolved    Severe malnutrition  Body mass index (BMI) less than 19  Nutrition consulted. Most recent weight and BMI monitored-     Measurements:  Wt Readings from Last 1 Encounters:   04/19/25 53.1 kg (117 lb 1 oz)   Body mass index is 18.33 kg/m².    Patient has been screened and assessed by RD.    Malnutrition Type:  Context:    Level:      Malnutrition Characteristic Summary:       Interventions/Recommendations (treatment strategy):  --Continue TF via [employer] Nutren 1.5 at 60ml/hr  to provide to provide 1440 total fluid volume, 2160 kcals, 98 g PRO, 253 g CHO, 1100 mL fluid,144 % DRI  a. At home Tube feeding regimen: Nutren 1.5 @ 65 mL/hr x 14 hours to provide 910 total fluid volume, 1365 kcals, 62 g protein, 691 mL fluid, 91% DRI  b. Monitor for s/s of intolerance such as residuals >500ml, n/v/d, or abdominal distention.  --Per last RD note: Add Iván BID as TF modifier  to provide 90 kcal, 2.5 g protein, 7 g L-Arginine, 7 g L-Glutamine per serving to aid in wound healing   --Do not mix Iván with formula in a tube-feeding bag  --Pour one packet of Iván in a clean, small (approximately 6- to 8-fl-oz) container for mixing  --Add 4 fl oz (120 mL) water at room temperature  --Mix well with disposable spoon or tongue blade until all particles are completely hydrated  --Verify correct tube placement  --Flush feeding tube with 30 mL water  --Administer Iván through feeding tube using a 60-mL or larger syringe  --Flush with an additional 30 mL water (a smaller amount of water can be used to flush the tube if the patient is on a fluid- restricted diet)  --Recommend weekly weights   --Nursing: please continue to document formula and rate on flowsheets  --RD to monitor weight, po intake, BG, skin    Nutrition consulted. Most recent weight and BMI monitored-     Traumatic brain injury  Continue current management with diazepam for contractures and keppra for seizure prophylaxis.     Pressure injury of sacral region, stage 2  Deep tissue injury  - Wound care    Transaminitis  Improving without intervention  Discussed with hepatology they think this is transient  Liver US looked ok  Monitor    Anemia  Anemia is likely due to  multifactorial etiology . Most recent hemoglobin and hematocrit are listed below.  Recent Labs     04/20/25  0517 04/21/25  0719 04/22/25  0559   HGB 9.9* 9.8* 10.1*   HCT 32.6* 31.3* 32.3*     Plan  - Monitor serial CBC: Daily  - Transfuse PRBC if patient becomes hemodynamically  unstable, symptomatic or H/H drops below 7/21.  - Patient has not received any PRBC transfusions to date  - Patient's anemia is currently stable  - iron panel in a.m., B12, folate; patient also on Protonix and Keppra    VTE Risk Mitigation (From admission, onward)           Ordered     heparin, porcine (PF) 100 unit/mL injection flush 10 Units  As needed (PRN)         04/10/25 0504     enoxaparin injection 30 mg  Daily         04/07/25 1830     IP VTE HIGH RISK PATIENT  Once         04/07/25 1403     Place sequential compression device  Until discontinued         04/01/25 0209                    Discharge Planning   EDINSON: 4/23/2025     Code Status: Full Code   Medical Readiness for Discharge Date:   Discharge Plan A: Home, Home with family   Discharge Delays: None known at this time                    Balaji Edwards DO  Department of Hospital Medicine   [facility] - Telemetry Stepdown

## 2025-04-22 NOTE — SUBJECTIVE & OBJECTIVE
Interval History:  Seen and evaluated on step down unit  No acute events overnight    Clinical status stable, unchanged  No new complaints  Still having an issue with stim test    Objective:     Vital Signs (Most Recent):  Temp: 98.4 °F (36.9 °C) (04/22/25 1154)  Pulse: 78 (04/22/25 1447)  Resp: 16 (04/22/25 1400)  BP: 103/70 (04/22/25 1154)  SpO2: 100 % (04/22/25 1400) Vital Signs (24h Range):  Temp:  [97.4 °F (36.3 °C)-98.4 °F (36.9 °C)] 98.4 °F (36.9 °C)  Pulse:  [60-95] 78  Resp:  [10-20] 16  SpO2:  [96 %-100 %] 100 %  BP: ()/(47-70) 103/70     Weight: 53.1 kg (117 lb 1 oz)  Body mass index is 18.33 kg/m².    Intake/Output Summary (Last 24 hours) at 4/22/2025 1514  Last data filed at 4/22/2025 1200  Gross per 24 hour   Intake 901 ml   Output --   Net 901 ml         Physical Exam  Constitutional:       General: He is not in acute distress.     Comments: Cachectic, eyes open but does not respond    Eyes:      Conjunctiva/sclera: Conjunctivae normal.   Cardiovascular:      Rate and Rhythm: Normal rate and regular rhythm.      Pulses: Normal pulses.      Heart sounds: Normal heart sounds.   Pulmonary:      Effort: Pulmonary effort is normal. No respiratory distress.      Breath sounds: Rhonchi present.   Abdominal:      General: Bowel sounds are normal. There is no distension.      Palpations: Abdomen is soft.      Tenderness: There is no abdominal tenderness.      Comments: JG tube in place   Musculoskeletal:      Right lower leg: No edema.      Left lower leg: No edema.   Skin:     General: Skin is warm and dry.   Neurological:      Mental Status: Mental status is at baseline.      Comments: Awake    Contractures in extremities    Right midriatic pupil unresponsive. Left reactive pupil with cataract                Significant Labs: All pertinent labs within the past 24 hours have been reviewed.    Significant Imaging: I have reviewed all pertinent imaging results/findings within the past 24 hours.

## 2025-04-22 NOTE — PROGRESS NOTES
"[facility] - Telemetry Stepdown  Endocrinology  Progress Note    Admit Date: 4/1/2025     Mr. Posey is a 18 yo male with PMHx of TBI, qudariplegia, cachexia, epilepsy, PEG tube placement, who initially presented with G-J  tube malfunction and is currently admitted in MICU due to acute hypoxic respiratory failure and sepsis 2/2 pneumonia, treated with empiric antibiotics and requiring pressor support.  (Pt is nonverbal at baseline, history is obtained from mother during encounter)  Patient has been persistently bradycardic since arrival.  Persistent hyponatremia or hyperkalemia are not seen on chart review.  TSH within normal limits.  A morning cortisol drawn around 5:30 a.m. on 04/09/2025 was low at 3.3.  Patient was subsequently started on stress dose steroids IV hydrocortisone 100 mg q.8h on 4/9 at 10 pm.  Today morning 4/10, he was weaned off pressor support for a few hours, however he is currently placed back on Levophed.  Endocrinology is consulted for adrenal insufficiency.  Patient has no previous diagnosis of adrenal insufficiency and has no known chronic oral steroid exposure.    Interval HPI:   Overnight events:  No events overnight from Endocrinology standpoint. Patient has remained off steroids sine 4/18 with no need for vasopressor support. Have been unsuccessful with stimulation test for the past three days due to various reasons.     /70 (BP Location: Right arm, Patient Position: Lying)   Pulse 78   Temp 98.4 °F (36.9 °C) (Oral)   Resp 16   Ht 5' 7" (1.702 m)   Wt 53.1 kg (117 lb 1 oz)   SpO2 100%   BMI 18.33 kg/m²     Labs Reviewed and Include    Recent Labs   Lab 04/22/25  0559   CALCIUM 9.4   ALBUMIN 3.1*   *   K 4.0   CO2 25   *   BUN 12   CREATININE 0.5   ALKPHOS 148   ALT 69*   AST 22   BILITOT 0.2     Lab Results   Component Value Date    WBC 6.76 04/22/2025    HGB 10.1 (L) 04/22/2025    HCT 32.3 (L) 04/22/2025     (H) 04/22/2025     04/22/2025     No results " "for input(s): "TSH", "FREET4" in the last 168 hours.  No results found for: "HGBA1C"    Nutritional status:   Body mass index is 18.33 kg/m².  Lab Results   Component Value Date    ALBUMIN 3.1 (L) 04/22/2025    ALBUMIN 2.9 (L) 04/21/2025    ALBUMIN 2.8 (L) 04/20/2025     No results found for: "PREALBUMIN"    Estimated Creatinine Clearance: 177 mL/min (based on SCr of 0.5 mg/dL).    Accu-Checks  Recent Labs     04/20/25 2119 04/21/25  0010 04/21/25  0536 04/21/25  0854 04/21/25  1234 04/21/25  1700 04/21/25  2122 04/22/25  0013 04/22/25  0348 04/22/25  0726   POCTGLUCOSE 87 91 101 107 110 122* 82 123* 124* 107       Current Medications and/or Treatments Impacting Glycemic Control  Immunotherapy:    Immunosuppressants       None          Steroids:   Hormones (From admission, onward)      None          Pressors:    Autonomic Drugs (From admission, onward)      Start     Stop Route Frequency Ordered    04/08/25 2245  NORepinephrine 4 mg in sodium chloride 0.9% 250 mL infusion (premix)        Question Answer Comment   Begin at (in mcg/kg/min): 0.02    Titrate by: (in mcg/kg/min (RANGE PREFERRED) 0.02 - 0.2    Titrate interval: (in minutes) (RANGE PREFERRED) 2 - 5    Titrate to maintain: (MAP or SBP) MAP    Titrate to keep MAP within the range or GREATER than (if single number): (in mmHg) 65 - 75    Maximum dose: (in mcg/kg/min) 0.2        04/09/25 2145 IV Continuous 04/08/25 2143 04/07/25 2115  NORepinephrine 4 mg in sodium chloride 0.9% 250 mL infusion (premix)        Question Answer Comment   Begin at (in mcg/kg/min): 0.02    Titrate by: (in mcg/kg/min (RANGE PREFERRED) 0.02 - 0.2    Titrate interval: (in minutes) (RANGE PREFERRED) 2 - 5    Titrate to maintain: (MAP or SBP) MAP    Titrate to keep MAP within the range or GREATER than (if single number): (in mmHg) 65 - 75    Maximum dose: (in mcg/kg/min) 0.2        04/2004 IV Continuous 04/07/25 2003          Hyperglycemia/Diabetes Medications: "   Antihyperglycemics (From admission, onward)      None            ASSESSMENT and PLAN    Endocrine  Low serum cortisol level  Endocrinology consulted for possible adrenal insufficiency in the setting of hypotension requiring pressor support, bradycardia and intermittent hypothermia, with a low serum cortisol level (3.3 at 5.30 am on 4/9/25).   No persistent hyponatremia or hyperkalemia on labs. No hx chronic po steroid exposure.  TSH wnl.  Attempted stimulation test on the morning of 4/21 but labs were not obtained at appropriate times after administration of cosyntropin test.  Attempted stimulation test on the morning of 4/22 but labs were not obtained.    -Will attempt another try for the stimulation test tomorrow.  Patient is to not have any steroids in his system at time of testing, please ensure patient does not receive steroids the evening prior to and on the morning of the stimulation test.  Procedure includes obtaining a cortisol and ACTH level, then cosyntropin medication is to be administered and cortisol levels are to be drawn at 30 minutes and 60 minutes after patient receives cosyntropin injection.  I suspect this is a nurse draw and will require primary nurse to draw the labs if phlebotomy is unable to do so in a timely fashion as timing is essential for diagnosis and treatment.    If patient is discharged today prior to this test being completed, recommend the following:  Order Hydrocortisone 20 mg in the AM and 10 mg in the afternoon    Provide patient with the following instructions:  Adrenal insufficiency self-care instructions and sick day rules     Adrenal insufficiency, which involves a deficiency in steroid hormones that are normally produced by the body, can result in symptoms that include generalized and severe fatigue, malaise, dizziness, and low blood pressure. Should you develop any of these symptoms, please call us immediately or go to the ER if severe symptoms develop. You may also take  "your emergency intramuscular dexamethasone injection if you have this available    Please refer to the following instructions for patients with adrenal insufficiency:  1. Please get an alert bracelet that says "Adrenal insufficiency. Give stress doses of steroids in case of illness."     2. If you become nauseous and are unable to keep hydrocortisone down, you need to go to an emergency room to get this medication via IV.     3. If you are ill with a low-grade fever of  F, please double your dose of hydrocortisone. If you have a fever of >100 F, please triple your hydrocortisone dose for 3 days or until fever resolves.     4. If you are undergoing a planned medical procedure (such as minor surgery, colonoscopy) or a major dental procedure (tooth extraction, root canal etc) you should double your dose the day before and the day of and the day after the procedure.    5. If a major surgery requiring general anesthesia is being planned, please notify your endocrinologist so that we can give instructions to the anesthesia team that will be taking care of you with regards to the amount of hydrocortisone to be given during surgery.    Please call us with any questions and to notify us that you are ill at home or are heading to ER/hospital so that we can help you through the situation and communicate with the physicians taking care of you.           Patient will need follow up with Endocrinology clinic, if discharged prior to the test being done we will reach out to the patient to set up an appointment.    Joshua Reeves MD  Endocrinology  [facility] - [facility]  "

## 2025-04-22 NOTE — ASSESSMENT & PLAN NOTE
Endocrinology consulted for possible adrenal insufficiency in the setting of hypotension requiring pressor support, bradycardia and intermittent hypothermia, with a low serum cortisol level (3.3 at 5.30 am on 4/9/25).   No persistent hyponatremia or hyperkalemia on labs. No hx chronic po steroid exposure.  TSH wnl.  Attempted stimulation test on the morning of 4/21 but labs were not obtained at appropriate times after administration of cosyntropin test.  Attempted stimulation test on the morning of 4/22 but labs were not obtained.    -Will attempt another try for the stimulation test tomorrow.  Patient is to not have any steroids in his system at time of testing, please ensure patient does not receive steroids the evening prior to and on the morning of the stimulation test.  Procedure includes obtaining a cortisol and ACTH level, then cosyntropin medication is to be administered and cortisol levels are to be drawn at 30 minutes and 60 minutes after patient receives cosyntropin injection.  I suspect this is a nurse draw and will require primary nurse to draw the labs if phlebotomy is unable to do so in a timely fashion as timing is essential for diagnosis and treatment.    If patient is discharged today prior to this test being completed, recommend the following:  Order Hydrocortisone 20 mg in the AM and 10 mg in the afternoon    Provide patient with the following instructions:  Adrenal insufficiency self-care instructions and sick day rules     Adrenal insufficiency, which involves a deficiency in steroid hormones that are normally produced by the body, can result in symptoms that include generalized and severe fatigue, malaise, dizziness, and low blood pressure. Should you develop any of these symptoms, please call us immediately or go to the ER if severe symptoms develop. You may also take your emergency intramuscular dexamethasone injection if you have this available    Please refer to the following instructions  "for patients with adrenal insufficiency:  1. Please get an alert bracelet that says "Adrenal insufficiency. Give stress doses of steroids in case of illness."     2. If you become nauseous and are unable to keep hydrocortisone down, you need to go to an emergency room to get this medication via IV.     3. If you are ill with a low-grade fever of  F, please double your dose of hydrocortisone. If you have a fever of >100 F, please triple your hydrocortisone dose for 3 days or until fever resolves.     4. If you are undergoing a planned medical procedure (such as minor surgery, colonoscopy) or a major dental procedure (tooth extraction, root canal etc) you should double your dose the day before and the day of and the day after the procedure.    5. If a major surgery requiring general anesthesia is being planned, please notify your endocrinologist so that we can give instructions to the anesthesia team that will be taking care of you with regards to the amount of hydrocortisone to be given during surgery.    Please call us with any questions and to notify us that you are ill at home or are heading to ER/hospital so that we can help you through the situation and communicate with the physicians taking care of you.         "

## 2025-04-22 NOTE — PLAN OF CARE
Problem: Adult Inpatient Plan of Care  Goal: Plan of Care Review  Flowsheets (Taken 4/22/2025 0214)  Plan of Care Reviewed With: (father at bedside)   patient   parent   other (see comments)  Goal: Absence of Hospital-Acquired Illness or Injury  Intervention: Identify and Manage Fall Risk  Flowsheets (Taken 4/22/2025 0214)  Safety Promotion/Fall Prevention:   bed alarm set   Fall Risk reviewed with patient/family   family to remain at bedside   lighting adjusted   side rails raised x 2  Intervention: Prevent Skin Injury  Flowsheets (Taken 4/22/2025 0214)  Body Position: weight shifting  Intervention: Prevent and Manage VTE (Venous Thromboembolism) Risk  Flowsheets (Taken 4/22/2025 0214)  VTE Prevention/Management: ROM (passive) performed  Goal: Optimal Comfort and Wellbeing  Intervention: Monitor Pain and Promote Comfort  Flowsheets (Taken 4/22/2025 0214)  Pain Management Interventions: pain management plan reviewed with patient/caregiver  Intervention: Provide Person-Centered Care  Flowsheets (Taken 4/22/2025 0214)  Trust Relationship/Rapport:   questions answered   questions encouraged   reassurance provided   thoughts/feelings acknowledged     Problem: Fall Injury Risk  Goal: Absence of Fall and Fall-Related Injury  Intervention: Identify and Manage Contributors  Flowsheets (Taken 4/22/2025 0214)  Self-Care Promotion: BADL personal routines maintained  Medication Review/Management: medications reviewed     Problem: Infection  Goal: Absence of Infection Signs and Symptoms  Intervention: Prevent or Manage Infection  Flowsheets (Taken 4/22/2025 0214)  Infection Management:   aseptic technique maintained   cultures obtained and sent to lab

## 2025-04-22 NOTE — ASSESSMENT & PLAN NOTE
Patient with Hypoxic Respiratory failure which is Acute with chronic hypercapnic respiratory failure. he is not on home oxygen. Supplemental oxygen was provided and noted-      Signs/symptoms of respiratory failure include- respiratory distress. Contributing diagnoses includes - ARDS and Aspiration Labs and images were reviewed. Patient Has recent ABG, which has been reviewed. Will treat underlying causes and adjust management of respiratory failure as follows-     Suspect aspiration pneumonia in the setting of hospital acquired pneumonia with recurrent vomiting with supperimposed atelectasis.   s/p IV Zosyn x5 days  Concomitant hypothermia and bradycardia in the setting of adrenal insufficiency.   Off pressor support briefly on 4/10, but  back again same day.   IPPV. Acute hypoxic rx failure resolved    BCX 4/7 NGTD  Lactate wnl  Weaned SDS off 4/18  Endocrine consulted, appreciated recommendations  Pulmonary toilet: bronchodilators PRN, hypertonic saline    Having issues with coordinating stim test  Will attempt again 4.23.25    Instruction for stim test:  8am cortisol and ACTH should be drawn before Cosyntropin is given  Then Cosyntropin should be given  Then cortisol should be checked at 30 min and 60 min after cosyntropin is given    Per endo, if this cannot be done correctly before d/c he should be d/c on physiologic hydrocortisone 20/10 with AI sick day rules written down for him counseled on AI and sick day rules and given a large number of HC tabs

## 2025-04-22 NOTE — SUBJECTIVE & OBJECTIVE
"Interval HPI:   Overnight events:  No events overnight from Endocrinology standpoint. Patient has remained off steroids sine 4/18 with no need for vasopressor support. Have been unsuccessful with stimulation test for the past three days due to various reasons.     /70 (BP Location: Right arm, Patient Position: Lying)   Pulse 78   Temp 98.4 °F (36.9 °C) (Oral)   Resp 16   Ht 5' 7" (1.702 m)   Wt 53.1 kg (117 lb 1 oz)   SpO2 100%   BMI 18.33 kg/m²     Labs Reviewed and Include    Recent Labs   Lab 04/22/25  0559   CALCIUM 9.4   ALBUMIN 3.1*   *   K 4.0   CO2 25   *   BUN 12   CREATININE 0.5   ALKPHOS 148   ALT 69*   AST 22   BILITOT 0.2     Lab Results   Component Value Date    WBC 6.76 04/22/2025    HGB 10.1 (L) 04/22/2025    HCT 32.3 (L) 04/22/2025     (H) 04/22/2025     04/22/2025     No results for input(s): "TSH", "FREET4" in the last 168 hours.  No results found for: "HGBA1C"    Nutritional status:   Body mass index is 18.33 kg/m².  Lab Results   Component Value Date    ALBUMIN 3.1 (L) 04/22/2025    ALBUMIN 2.9 (L) 04/21/2025    ALBUMIN 2.8 (L) 04/20/2025     No results found for: "PREALBUMIN"    Estimated Creatinine Clearance: 177 mL/min (based on SCr of 0.5 mg/dL).    Accu-Checks  Recent Labs     04/20/25 2119 04/21/25  0010 04/21/25  0536 04/21/25  0854 04/21/25  1234 04/21/25  1700 04/21/25  2122 04/22/25  0013 04/22/25  0348 04/22/25  0726   POCTGLUCOSE 87 91 101 107 110 122* 82 123* 124* 107       Current Medications and/or Treatments Impacting Glycemic Control  Immunotherapy:    Immunosuppressants       None          Steroids:   Hormones (From admission, onward)      None          Pressors:    Autonomic Drugs (From admission, onward)      Start     Stop Route Frequency Ordered    04/08/25 2245  NORepinephrine 4 mg in sodium chloride 0.9% 250 mL infusion (premix)        Question Answer Comment   Begin at (in mcg/kg/min): 0.02    Titrate by: (in mcg/kg/min (RANGE " PREFERRED) 0.02 - 0.2    Titrate interval: (in minutes) (RANGE PREFERRED) 2 - 5    Titrate to maintain: (MAP or SBP) MAP    Titrate to keep MAP within the range or GREATER than (if single number): (in mmHg) 65 - 75    Maximum dose: (in mcg/kg/min) 0.2        04/09/25 2145 IV Continuous 04/08/25 2143 04/07/25 2115  NORepinephrine 4 mg in sodium chloride 0.9% 250 mL infusion (premix)        Question Answer Comment   Begin at (in mcg/kg/min): 0.02    Titrate by: (in mcg/kg/min (RANGE PREFERRED) 0.02 - 0.2    Titrate interval: (in minutes) (RANGE PREFERRED) 2 - 5    Titrate to maintain: (MAP or SBP) MAP    Titrate to keep MAP within the range or GREATER than (if single number): (in mmHg) 65 - 75    Maximum dose: (in mcg/kg/min) 0.2        04/2004 IV Continuous 04/07/25 2003          Hyperglycemia/Diabetes Medications:   Antihyperglycemics (From admission, onward)      None

## 2025-04-23 LAB
ABSOLUTE EOSINOPHIL (OHS): 0.2 K/UL
ABSOLUTE MONOCYTE (OHS): 0.91 K/UL (ref 0.3–1)
ABSOLUTE NEUTROPHIL COUNT (OHS): 4.18 K/UL (ref 1.8–7.7)
ALBUMIN SERPL BCP-MCNC: 3.2 G/DL (ref 3.5–5.2)
ALP SERPL-CCNC: 147 UNIT/L (ref 40–150)
ALT SERPL W/O P-5'-P-CCNC: 54 UNIT/L (ref 10–44)
ANION GAP (OHS): 9 MMOL/L (ref 8–16)
AST SERPL-CCNC: 14 UNIT/L (ref 11–45)
BASOPHILS # BLD AUTO: 0.05 K/UL
BASOPHILS NFR BLD AUTO: 0.7 %
BILIRUB SERPL-MCNC: 0.2 MG/DL (ref 0.1–1)
BUN SERPL-MCNC: 15 MG/DL (ref 6–20)
CALCIUM SERPL-MCNC: 9.3 MG/DL (ref 8.7–10.5)
CHLORIDE SERPL-SCNC: 107 MMOL/L (ref 95–110)
CO2 SERPL-SCNC: 28 MMOL/L (ref 23–29)
CORTIS SERPL-MCNC: 10.3 UG/DL
CORTIS SERPL-MCNC: 19.9 UG/DL
CORTIS SERPL-MCNC: 21.2 UG/DL
CREAT SERPL-MCNC: 0.5 MG/DL (ref 0.5–1.4)
ERYTHROCYTE [DISTWIDTH] IN BLOOD BY AUTOMATED COUNT: 16.9 % (ref 11.5–14.5)
GFR SERPLBLD CREATININE-BSD FMLA CKD-EPI: >60 ML/MIN/1.73/M2
GLUCOSE SERPL-MCNC: 121 MG/DL (ref 70–110)
HCT VFR BLD AUTO: 32.1 % (ref 40–54)
HGB BLD-MCNC: 9.8 GM/DL (ref 14–18)
IMM GRANULOCYTES # BLD AUTO: 0.14 K/UL (ref 0–0.04)
IMM GRANULOCYTES NFR BLD AUTO: 1.9 % (ref 0–0.5)
LYMPHOCYTES # BLD AUTO: 1.75 K/UL (ref 1–4.8)
MAGNESIUM SERPL-MCNC: 2.1 MG/DL (ref 1.6–2.6)
MCH RBC QN AUTO: 30.1 PG (ref 27–31)
MCHC RBC AUTO-ENTMCNC: 30.5 G/DL (ref 32–36)
MCV RBC AUTO: 99 FL (ref 82–98)
NUCLEATED RBC (/100WBC) (OHS): 0 /100 WBC
PHOSPHATE SERPL-MCNC: 2.8 MG/DL (ref 2.7–4.5)
PLATELET # BLD AUTO: 392 K/UL (ref 150–450)
PMV BLD AUTO: 11.2 FL (ref 9.2–12.9)
POCT GLUCOSE: 111 MG/DL (ref 70–110)
POCT GLUCOSE: 113 MG/DL (ref 70–110)
POCT GLUCOSE: 113 MG/DL (ref 70–110)
POCT GLUCOSE: 114 MG/DL (ref 70–110)
POCT GLUCOSE: 158 MG/DL (ref 70–110)
POCT GLUCOSE: 88 MG/DL (ref 70–110)
POTASSIUM SERPL-SCNC: 3.9 MMOL/L (ref 3.5–5.1)
PROT SERPL-MCNC: 7.2 GM/DL (ref 6–8.4)
RBC # BLD AUTO: 3.26 M/UL (ref 4.6–6.2)
RELATIVE EOSINOPHIL (OHS): 2.8 %
RELATIVE LYMPHOCYTE (OHS): 24.2 % (ref 18–48)
RELATIVE MONOCYTE (OHS): 12.6 % (ref 4–15)
RELATIVE NEUTROPHIL (OHS): 57.8 % (ref 38–73)
SODIUM SERPL-SCNC: 144 MMOL/L (ref 136–145)
WBC # BLD AUTO: 7.23 K/UL (ref 3.9–12.7)

## 2025-04-23 PROCEDURE — 20600001 HC STEP DOWN PRIVATE ROOM

## 2025-04-23 PROCEDURE — 63600175 PHARM REV CODE 636 W HCPCS

## 2025-04-23 PROCEDURE — 25000003 PHARM REV CODE 250: Performed by: STUDENT IN AN ORGANIZED HEALTH CARE EDUCATION/TRAINING PROGRAM

## 2025-04-23 PROCEDURE — 82533 TOTAL CORTISOL: CPT | Performed by: STUDENT IN AN ORGANIZED HEALTH CARE EDUCATION/TRAINING PROGRAM

## 2025-04-23 PROCEDURE — 82024 ASSAY OF ACTH: CPT | Performed by: STUDENT IN AN ORGANIZED HEALTH CARE EDUCATION/TRAINING PROGRAM

## 2025-04-23 PROCEDURE — 63600175 PHARM REV CODE 636 W HCPCS: Performed by: STUDENT IN AN ORGANIZED HEALTH CARE EDUCATION/TRAINING PROGRAM

## 2025-04-23 PROCEDURE — 36415 COLL VENOUS BLD VENIPUNCTURE: CPT

## 2025-04-23 PROCEDURE — 83735 ASSAY OF MAGNESIUM: CPT | Performed by: INTERNAL MEDICINE

## 2025-04-23 PROCEDURE — 85025 COMPLETE CBC W/AUTO DIFF WBC: CPT | Performed by: STUDENT IN AN ORGANIZED HEALTH CARE EDUCATION/TRAINING PROGRAM

## 2025-04-23 PROCEDURE — 63600175 PHARM REV CODE 636 W HCPCS: Performed by: INTERNAL MEDICINE

## 2025-04-23 PROCEDURE — 36415 COLL VENOUS BLD VENIPUNCTURE: CPT | Performed by: STUDENT IN AN ORGANIZED HEALTH CARE EDUCATION/TRAINING PROGRAM

## 2025-04-23 PROCEDURE — 80053 COMPREHEN METABOLIC PANEL: CPT

## 2025-04-23 PROCEDURE — 25000242 PHARM REV CODE 250 ALT 637 W/ HCPCS: Performed by: STUDENT IN AN ORGANIZED HEALTH CARE EDUCATION/TRAINING PROGRAM

## 2025-04-23 PROCEDURE — 84100 ASSAY OF PHOSPHORUS: CPT | Performed by: INTERNAL MEDICINE

## 2025-04-23 RX ADMIN — LEVETIRACETAM 500 MG: 500 SOLUTION ORAL at 09:04

## 2025-04-23 RX ADMIN — GLYCOPYRROLATE 1 MG: 1 LIQUID ORAL at 09:04

## 2025-04-23 RX ADMIN — Medication: at 09:04

## 2025-04-23 RX ADMIN — METOCLOPRAMIDE 5 MG: 5 SOLUTION ORAL at 05:04

## 2025-04-23 RX ADMIN — GLYCOPYRROLATE 1 MG: 1 LIQUID ORAL at 03:04

## 2025-04-23 RX ADMIN — METHYLPHENIDATE HYDROCHLORIDE 5 MG: 5 TABLET ORAL at 05:04

## 2025-04-23 RX ADMIN — PANTOPRAZOLE SODIUM 40 MG: 40 INJECTION, POWDER, FOR SOLUTION INTRAVENOUS at 08:04

## 2025-04-23 RX ADMIN — ENOXAPARIN SODIUM 30 MG: 40 INJECTION SUBCUTANEOUS at 05:04

## 2025-04-23 RX ADMIN — DIAZEPAM 5 MG: 5 SOLUTION ORAL at 09:04

## 2025-04-23 RX ADMIN — METOCLOPRAMIDE 5 MG: 5 SOLUTION ORAL at 09:04

## 2025-04-23 RX ADMIN — METOCLOPRAMIDE 5 MG: 5 SOLUTION ORAL at 02:04

## 2025-04-23 RX ADMIN — COSYNTROPIN 0.25 MG: 0.25 INJECTION, POWDER, LYOPHILIZED, FOR SOLUTION INTRAMUSCULAR; INTRAVENOUS at 08:04

## 2025-04-23 RX ADMIN — DIAZEPAM 5 MG: 5 SOLUTION ORAL at 11:04

## 2025-04-23 NOTE — ASSESSMENT & PLAN NOTE
Patient with Hypoxic Respiratory failure which is Acute with chronic hypercapnic respiratory failure. he is not on home oxygen. Supplemental oxygen was provided and noted-      Signs/symptoms of respiratory failure include- respiratory distress. Contributing diagnoses includes - ARDS and Aspiration Labs and images were reviewed. Patient Has recent ABG, which has been reviewed. Will treat underlying causes and adjust management of respiratory failure as follows-     Suspect aspiration pneumonia in the setting of hospital acquired pneumonia with recurrent vomiting with supperimposed atelectasis.   s/p IV Zosyn x5 days  Concomitant hypothermia and bradycardia in the setting of adrenal insufficiency.   Off pressor support briefly on 4/10, but  back again same day.   IPPV. Acute hypoxic rx failure resolved    BCX 4/7 NGTD  Lactate wnl  Weaned SDS off 4/18  Endocrine consulted, appreciated recommendations  Pulmonary toilet: bronchodilators PRN, hypertonic saline    Had appropriate response to stim test  No indication for steroids

## 2025-04-23 NOTE — PROGRESS NOTES
[facility] Medicine  Progress Note    Patient Name: Jesus Posey  MRN: [id number]  Patient Class: IP- Inpatient   Admission Date: 4/1/2025  Length of Stay: 22 days  Attending Physician: Balaji Edwards DO  Primary Care Provider: Care), [facility] (Primary        Subjective     Principal Problem:Acute hypoxic respiratory failure        HPI:  Mr. 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      As he is nonverbal, father reported leakage from his GJ tube which resembled his tube feeds. Last tube exchange was last month.      Admitted to Hospital Medicine in the setting of GJ tube malfunction. Intermittently hypothermic and bradycardic secondary to dysautonomia. Infectious workup has been negative. On IV maintenance fluids. IR replaced tube on 4/2.   During hospital course with repeated episodes of intolerance to PO intake and emesis. Repeat CT AP with G Tube with good positioning. Stepped up to MICU on 4/7 due to acute hypoxic respiratory failure requiring up to 15 L  HFNC  in the setting of likely aspiration pneumonitis. Isolated febrile episode of 100.7F. CXR concerning for BL pulmonary opacities. Started on Unasyn by Hospital Medicine.    Overview/Hospital Course:  Stepped up to ICU in 4/7 the setting of acute hypoxic respiratory failure in the swtting of basilar atelectasis and mucus plugging, with sepsis secondary to PNA requiring up to 15 L HFNC. Started on Vanc-Zosyn. During ICU course requiring up to norepi 0.06 mcg likely in the setting of hypovolemia 2/2 poor intake. Encephalopathy improving. CTH w/o acute processes. Lactate improved. EEG c/w toxic/drug-induced encephalopathy. No seizures. Persistenty bradycardic and hypothermic with EKG junctional rhythm on 30s.On Zosyn. TSH wnl. TTE EF 65%, normal diastolic function. Cortisol <  3 . Started on IV hydrocortisone 100 mg TID. Off pressors on 4/10 at 7:30 am, but back on pressors by 2pm, again off by 4/10 7pm. Repleting dextrose for hypoglycemia.    Patient feeding tubes replaced without complication.  Tube feeds restarted without issue.  Stress dose steroids weaned with the help of Endocrinology.  Electrolytes replaced with concern for refeeding syndrome.  Noted to have occasional hemodynamically stable bradycardia.  Chronic medical issue when ill on chart review.  Completed 5 days of antibiotics with Zosyn for aspiration pneumonia.    Underwent stim test for AI; had appropriate response, no indication for treatment.    Interval History:  Seen and evaluated on step down unit  No acute events overnight    Clinical status stable, unchanged  No new complaints    Objective:     Vital Signs (Most Recent):  Temp: 98.4 °F (36.9 °C) (04/23/25 1505)  Pulse: 84 (04/23/25 1505)  Resp: 20 (04/23/25 1505)  BP: (!) 98/57 (04/23/25 1505)  SpO2: 100 % (04/23/25 1505) Vital Signs (24h Range):  Temp:  [98.2 °F (36.8 °C)-99.6 °F (37.6 °C)] 98.4 °F (36.9 °C)  Pulse:  [] 84  Resp:  [16-20] 20  SpO2:  [94 %-100 %] 100 %  BP: ()/(51-64) 98/57     Weight: 53.1 kg (117 lb 1 oz)  Body mass index is 18.33 kg/m².    Intake/Output Summary (Last 24 hours) at 4/23/2025 1547  Last data filed at 4/23/2025 0815  Gross per 24 hour   Intake 1980 ml   Output --   Net 1980 ml         Physical Exam  Constitutional:       General: He is not in acute distress.     Comments: Cachectic, eyes open but does not respond    Eyes:      Conjunctiva/sclera: Conjunctivae normal.   Cardiovascular:      Rate and Rhythm: Normal rate and regular rhythm.      Pulses: Normal pulses.      Heart sounds: Normal heart sounds.   Pulmonary:      Effort: Pulmonary effort is normal. No respiratory distress.      Breath sounds: Rhonchi present.   Abdominal:      General: Bowel sounds are normal. There is no distension.      Palpations: Abdomen is  soft.      Tenderness: There is no abdominal tenderness.      Comments: JG tube in place   Musculoskeletal:      Right lower leg: No edema.      Left lower leg: No edema.   Skin:     General: Skin is warm and dry.   Neurological:      Mental Status: Mental status is at baseline.      Comments: Awake    Contractures in extremities    Right midriatic pupil unresponsive. Left reactive pupil with cataract                Significant Labs: All pertinent labs within the past 24 hours have been reviewed.    Significant Imaging: I have reviewed all pertinent imaging results/findings within the past 24 hours.      Assessment & Plan  Pulmonary septic shock with acute hypoxic respiratory failure  Low serum cortisol level  Patient with Hypoxic Respiratory failure which is Acute with chronic hypercapnic respiratory failure. he is not on home oxygen. Supplemental oxygen was provided and noted-      Signs/symptoms of respiratory failure include- respiratory distress. Contributing diagnoses includes - ARDS and Aspiration Labs and images were reviewed. Patient Has recent ABG, which has been reviewed. Will treat underlying causes and adjust management of respiratory failure as follows-     Suspect aspiration pneumonia in the setting of hospital acquired pneumonia with recurrent vomiting with supperimposed atelectasis.   s/p IV Zosyn x5 days  Concomitant hypothermia and bradycardia in the setting of adrenal insufficiency.   Off pressor support briefly on 4/10, but  back again same day.   IPPV. Acute hypoxic rx failure resolved    BCX 4/7 NGTD  Lactate wnl  Weaned SDS off 4/18  Endocrine consulted, appreciated recommendations  Pulmonary toilet: bronchodilators PRN, hypertonic saline    Had appropriate response to stim test  No indication for steroids    Hypophosphatemia  Resolved    PEG tube malfunction  Malfunction of jejunostomy tube  Patient with GJ tube dependence, presenting with tube malfunction. IR consulted for exchange under  fluoroscopy. Tube changed 4/2.   GI consulted d/t recurrent vomiting with initiation of tube feeds. CTAP with GJ tube in satisfactory position    5mg G tube metoclopramide Q8h   Restarted tube feeds  Tolerating feeds at goal  Frequent flushing to avoid tube clogging (changed to every 4 hours)    Hypernatremia  Resolved  Cont FWF    Toxic metabolic encephalopathy  May be secondary to sedation with diazepam with superimposed HAP and sepsis   CT head ruled out CVA. ABG c/w acute hypercapnic rx failure  EEG c/w toxic and drug-induced encephalopathy. Ruled out seizures  Per family, now at baseline    Spastic quadriparesis  Intrathecal baclofen pump in place.   Mobility protocol.     Functional quadriplegia  Intrathecal baclofen pump in place. Supportive care.     Cachexia  Resumed tube feeds after GJ tube replaced as able    Hypothermia  Resolved  Per family, this is common when pt is hospitalized  Suspect low temps and bradycardia are related to dysautonomia  Likely secondary to adrenal insufficiency  Appreciate Endo assistance w/ AI eval    Hypokalemia  Resolved    Severe malnutrition  Body mass index (BMI) less than 19  Nutrition consulted. Most recent weight and BMI monitored-     Measurements:  Wt Readings from Last 1 Encounters:   04/19/25 53.1 kg (117 lb 1 oz)   Body mass index is 18.33 kg/m².    Patient has been screened and assessed by RD.    Malnutrition Type:  Context:    Level:      Malnutrition Characteristic Summary:       Interventions/Recommendations (treatment strategy):  --Continue TF via PEJ Nutren 1.5 at 60ml/hr to provide to provide 1440 total fluid volume, 2160 kcals, 98 g PRO, 253 g CHO, 1100 mL fluid,144 % DRI  a. At home Tube feeding regimen: Nutren 1.5 @ 65 mL/hr x 14 hours to provide 910 total fluid volume, 1365 kcals, 62 g protein, 691 mL fluid, 91% DRI  b. Monitor for s/s of intolerance such as residuals >500ml, n/v/d, or abdominal distention.  --Per last RD note: Add Iván BID as TF modifier   to provide 90 kcal, 2.5 g protein, 7 g L-Arginine, 7 g L-Glutamine per serving to aid in wound healing   --Do not mix Iván with formula in a tube-feeding bag  --Pour one packet of Iván in a clean, small (approximately 6- to 8-fl-oz) container for mixing  --Add 4 fl oz (120 mL) water at room temperature  --Mix well with disposable spoon or tongue blade until all particles are completely hydrated  --Verify correct tube placement  --Flush feeding tube with 30 mL water  --Administer Iván through feeding tube using a 60-mL or larger syringe  --Flush with an additional 30 mL water (a smaller amount of water can be used to flush the tube if the patient is on a fluid- restricted diet)  --Recommend weekly weights   --Nursing: please continue to document formula and rate on flowsheets  --RD to monitor weight, po intake, BG, skin    Nutrition consulted. Most recent weight and BMI monitored-     Traumatic brain injury  Continue current management with diazepam for contractures and keppra for seizure prophylaxis.     Pressure injury of sacral region, stage 2  Deep tissue injury  - Wound care    Transaminitis  Improving without intervention  Discussed with hepatology they think this is transient  Liver US looked ok  Monitor    Anemia  Anemia is likely due to  multifactorial etiology . Most recent hemoglobin and hematocrit are listed below.  Recent Labs     04/21/25  0719 04/22/25  0559 04/23/25  0529   HGB 9.8* 10.1* 9.8*   HCT 31.3* 32.3* 32.1*     Plan  - Monitor serial CBC: Daily  - Transfuse PRBC if patient becomes hemodynamically unstable, symptomatic or H/H drops below 7/21.  - Patient has not received any PRBC transfusions to date  - Patient's anemia is currently stable  - iron panel in a.m., B12, folate; patient also on Protonix and Keppra    VTE Risk Mitigation (From admission, onward)           Ordered     heparin, porcine (PF) 100 unit/mL injection flush 10 Units  As needed (PRN)         04/10/25 0748      enoxaparin injection 30 mg  Daily         04/07/25 1830     IP VTE HIGH RISK PATIENT  Once         04/07/25 1403     Place sequential compression device  Until discontinued         04/01/25 0209                    Discharge Planning   EDINSON: 4/23/2025     Code Status: Full Code   Medical Readiness for Discharge Date: 4/23/2025  Discharge Plan A: Home, Home with family   Discharge Delays: None known at this time                    Balaji Edwards DO  Department of Hospital Medicine   [facility] - Telemetry Stepdown

## 2025-04-23 NOTE — ASSESSMENT & PLAN NOTE
Endocrinology consulted for possible adrenal insufficiency in the setting of hypotension requiring pressor support, bradycardia and intermittent hypothermia, with a low serum cortisol level (3.3 at 5.30 am on 4/9/25).   No persistent hyponatremia or hyperkalemia on labs. No hx chronic po steroid exposure.  TSH wnl.  Attempted stimulation test on the morning of 4/21 but labs were not obtained at appropriate times after administration of cosyntropin test.  Attempted stimulation test on the morning of 4/22 but labs were not obtained.  Stimulation test done on 4/23, baseline cortisol was 10.3, 60 minute after cosyntropin cortisol increased to 19.9 indicating good robust response and no AI diagnosis    -Patient does not have AI as indicated above, does not need to be discharged with steroids nor follow up with endocrinology outpatient.

## 2025-04-23 NOTE — PLAN OF CARE
Problem: Adult Inpatient Plan of Care  Goal: Plan of Care Review  Outcome: Progressing  Goal: Patient-Specific Goal (Individualized)  Outcome: Progressing  Goal: Absence of Hospital-Acquired Illness or Injury  Outcome: Progressing  Goal: Optimal Comfort and Wellbeing  Outcome: Progressing  Goal: Readiness for Transition of Care  Outcome: Progressing     Problem: Skin Injury Risk Increased  Goal: Skin Health and Integrity  Outcome: Progressing     Problem: Wound  Goal: Optimal Coping  Outcome: Progressing  Goal: Optimal Functional Ability  Outcome: Progressing  Goal: Absence of Infection Signs and Symptoms  Outcome: Progressing  Goal: Improved Oral Intake  Outcome: Progressing  Goal: Optimal Pain Control and Function  Outcome: Progressing  Goal: Skin Health and Integrity  Outcome: Progressing  Goal: Optimal Wound Healing  Outcome: Progressing     Problem: Delirium  Goal: Optimal Coping  Outcome: Progressing  Goal: Improved Behavioral Control  Outcome: Progressing  Goal: Improved Attention and Thought Clarity  Outcome: Progressing  Goal: Improved Sleep  Outcome: Progressing     Problem: Fall Injury Risk  Goal: Absence of Fall and Fall-Related Injury  Outcome: Progressing     Problem: Infection  Goal: Absence of Infection Signs and Symptoms  Outcome: Progressing     Problem: Skin Injury Risk Increased  Goal: Skin Health and Integrity  Outcome: Progressing     Problem: Wound  Goal: Optimal Coping  Outcome: Progressing     Problem: Delirium  Goal: Optimal Coping  Outcome: Progressing     Problem: Fall Injury Risk  Goal: Absence of Fall and Fall-Related Injury  Outcome: Progressing     Problem: Infection  Goal: Absence of Infection Signs and Symptoms  Outcome: Progressing

## 2025-04-23 NOTE — PROGRESS NOTES
"[facility] - Tele[facility] Stepdown  Endocrinology  Progress Note    Admit Date: 4/1/2025     Mr. Posey is a 18 yo male with PMHx of TBI, qudariplegia, cachexia, epilepsy, PEG tube placement, who initially presented with G-J  tube malfunction and is currently admitted in MICU due to acute hypoxic respiratory failure and sepsis 2/2 pneumonia, treated with empiric antibiotics and requiring pressor support.  (Pt is nonverbal at baseline, history is obtained from mother during encounter)  Patient has been persistently bradycardic since arrival.  Persistent hyponatremia or hyperkalemia are not seen on chart review.  TSH within normal limits.  A morning cortisol drawn around 5:30 a.m. on 04/09/2025 was low at 3.3.  Patient was subsequently started on stress dose steroids IV hydrocortisone 100 mg q.8h on 4/9 at 10 pm.  Today morning 4/10, he was weaned off pressor support for a few hours, however he is currently placed back on Levophed.  Endocrinology is consulted for adrenal insufficiency.  Patient has no previous diagnosis of adrenal insufficiency and has no known chronic oral steroid exposure.    Interval HPI:   Overnight events:  No events overnight from endocrinology standpoint. Patient has stimulation test done this morning and is not indicative of AI as 8 am cortisol and 60 minute cortisol were robust with good response after cosyntropin test. Family notified at bedside this morning and all questions and concerns were addressed and answered.     BP (!) 92/55 (BP Location: Left arm, Patient Position: Lying)   Pulse 79   Temp 99.2 °F (37.3 °C) (Axillary)   Resp 18   Ht 5' 7" (1.702 m)   Wt 53.1 kg (117 lb 1 oz)   SpO2 100%   BMI 18.33 kg/m²     Labs Reviewed and Include    Recent Labs   Lab 04/23/25  0529   CALCIUM 9.3   ALBUMIN 3.2*      K 3.9   CO2 28      BUN 15   CREATININE 0.5   ALKPHOS 147   ALT 54*   AST 14   BILITOT 0.2     Lab Results   Component Value Date    WBC 7.23 04/23/2025    HGB 9.8 (L) " "04/23/2025    HCT 32.1 (L) 04/23/2025    MCV 99 (H) 04/23/2025     04/23/2025     No results for input(s): "TSH", "FREET4" in the last 168 hours.  No results found for: "HGBA1C"    Nutritional status:   Body mass index is 18.33 kg/m².  Lab Results   Component Value Date    ALBUMIN 3.2 (L) 04/23/2025    ALBUMIN 3.1 (L) 04/22/2025    ALBUMIN 2.9 (L) 04/21/2025     No results found for: "PREALBUMIN"    Estimated Creatinine Clearance: 177 mL/min (based on SCr of 0.5 mg/dL).    Accu-Checks  Recent Labs     04/21/25 2122 04/22/25  0013 04/22/25  0348 04/22/25  0726 04/22/25  1156 04/22/25  1556 04/22/25  2010 04/23/25  0134 04/23/25  0406 04/23/25  0811   POCTGLUCOSE 82 123* 124* 107 114* 126* 97 88 113* 111*       Current Medications and/or Treatments Impacting Glycemic Control  Immunotherapy:    Immunosuppressants       None          Steroids:   Hormones (From admission, onward)      None          Pressors:    Autonomic Drugs (From admission, onward)      Start     Stop Route Frequency Ordered    04/08/25 2245  NORepinephrine 4 mg in sodium chloride 0.9% 250 mL infusion (premix)        Question Answer Comment   Begin at (in mcg/kg/min): 0.02    Titrate by: (in mcg/kg/min (RANGE PREFERRED) 0.02 - 0.2    Titrate interval: (in minutes) (RANGE PREFERRED) 2 - 5    Titrate to maintain: (MAP or SBP) MAP    Titrate to keep MAP within the range or GREATER than (if single number): (in mmHg) 65 - 75    Maximum dose: (in mcg/kg/min) 0.2        04/09/25 2145 IV Continuous 04/08/25 2143 04/07/25 2115  NORepinephrine 4 mg in sodium chloride 0.9% 250 mL infusion (premix)        Question Answer Comment   Begin at (in mcg/kg/min): 0.02    Titrate by: (in mcg/kg/min (RANGE PREFERRED) 0.02 - 0.2    Titrate interval: (in minutes) (RANGE PREFERRED) 2 - 5    Titrate to maintain: (MAP or SBP) MAP    Titrate to keep MAP within the range or GREATER than (if single number): (in mmHg) 65 - 75    Maximum dose: (in mcg/kg/min) 0.2        " 04/2004 IV Continuous 04/07/25 2003          Hyperglycemia/Diabetes Medications:   Antihyperglycemics (From admission, onward)      None            ASSESSMENT and PLAN    Neuro  Traumatic brain injury  Known history, currently quadriplegic and non-verbal      Endocrine  Low serum cortisol level  Endocrinology consulted for possible adrenal insufficiency in the setting of hypotension requiring pressor support, bradycardia and intermittent hypothermia, with a low serum cortisol level (3.3 at 5.30 am on 4/9/25).   No persistent hyponatremia or hyperkalemia on labs. No hx chronic po steroid exposure.  TSH wnl.  Attempted stimulation test on the morning of 4/21 but labs were not obtained at appropriate times after administration of cosyntropin test.  Attempted stimulation test on the morning of 4/22 but labs were not obtained.  Stimulation test done on 4/23, baseline cortisol was 10.3, 60 minute after cosyntropin cortisol increased to 19.9 indicating good robust response and no AI diagnosis    -Patient does not have AI as indicated above, does not need to be discharged with steroids nor follow up with endocrinology outpatient.            Joshua Reeves MD  Endocrinology  [facility] - Tele[facility]

## 2025-04-23 NOTE — PLAN OF CARE
Problem: Adult Inpatient Plan of Care  Goal: Plan of Care Review  Flowsheets (Taken 4/23/2025   Plan of Care Reviewed With: (father at bedside)   patient   parent   other (see comments)     Goal: Absence of Hospital-Acquired Illness or Injury     Intervention: Identify and Manage Fall Risk  Flowsheets (Taken 4/23/2025   Safety Promotion/Fall Prevention:   bed alarm set   Fall Risk reviewed with patient/family   family to remain at bedside   lighting adjusted   side rails raised x 2     Intervention: Prevent Skin Injury  Flowsheets (Taken 4/23/2025   Body Position: weight shifting     Intervention: Prevent and Manage VTE (Venous Thromboembolism) Risk  Flowsheets (Taken 4/22/2025   VTE Prevention/Management: ROM (passive) performed     Goal: Optimal Comfort and Wellbeing     Intervention: Monitor Pain and Promote Comfort  Flowsheets (Taken 4/23/2025   Pain Management Interventions: pain management plan reviewed with patient/caregiver     Intervention: Provide Person-Centered Care  Flowsheets (Taken 4/23/2025 0214)  Trust Relationship/Rapport:   questions answered   questions encouraged   reassurance provided   thoughts/feelings acknowledged     Problem: Fall Injury Risk  Goal: Absence of Fall and Fall-Related Injury  Intervention: Identify and Manage Contributors  Flowsheets (Taken 4/23/2025   Self-Care Promotion: BADL personal routines maintained  Medication Review/Management: medications reviewed     Problem: Infection  Goal: Absence of Infection Signs and Symptoms  Intervention: Prevent or Manage Infection  Flowsheets (Taken 4/23/2025   Infection Management:   aseptic technique maintained       Resting quietly in bed. Repositioned. Tube feeding tolerating well. No s/s of distress. Father @bedside.

## 2025-04-23 NOTE — CARE UPDATE
"RAPID RESPONSE NURSE CHART REVIEW        Chart Reviewed: 04/23/2025, 7:16 AM    MRN: [id number]  Bed: 8079/8079 A    Dx: Acute hypoxic respiratory failure    Jesus Posey has a past medical history of Atelectasis and Traumatic brain injury.    Last VS: /64   Pulse 109   Temp 99.6 °F (37.6 °C) (Oral)   Resp 16   Ht 5' 7" (1.702 m)   Wt 53.1 kg (117 lb 1 oz)   SpO2 (!) 94%   BMI 18.33 kg/m²     24H Vital Sign Range:  Temp:  [98 °F (36.7 °C)-99.6 °F (37.6 °C)]   Pulse:  []   Resp:  [16-20]   BP: ()/(51-70)   SpO2:  [94 %-100 %]     Level of Consciousness (AVPU): responds to pain    Recent Labs     04/21/25  0719 04/22/25  0559 04/23/25  0529   WBC 8.63 6.76 7.23   HGB 9.8* 10.1* 9.8*   HCT 31.3* 32.3* 32.1*    389 392       Recent Labs     04/21/25  0719 04/22/25  0559 04/23/25  0529    147* 144   K 4.0 4.0 3.9    111* 107   CO2 28 25 28   BUN 14 12 15   CREATININE 0.5 0.5 0.5   PHOS 3.2 3.0 2.8   MG 2.1 2.1 2.1        OXYGEN:  Flow (L/min) (Oxygen Therapy): (S) 5 (Audibly congested, Oral & NT suctioned, improved immediately)  Oxygen Concentration (%): 40       MEWS score: 4    Rounding completed at 09:02 AM.    Rounding completed w/Charge CHRIS Harper.  No acute concerns verbalized at this time. Instructed to call [phone] for further concerns or assistance.    Anu Carney RN       "

## 2025-04-23 NOTE — NURSING
Status stable and unchanged, slept well, NAD. Had an uneventful night. Pericare and incontinence pad x4 this shift with one stool noted. Gen. Skin c/d/I, no new breaks noted. Tolerating TF. No tele changes. Father at bs. Safety maintained.

## 2025-04-23 NOTE — SUBJECTIVE & OBJECTIVE
Interval History:  Seen and evaluated on step down unit  No acute events overnight    Clinical status stable, unchanged  No new complaints    Objective:     Vital Signs (Most Recent):  Temp: 98.4 °F (36.9 °C) (04/23/25 1505)  Pulse: 84 (04/23/25 1505)  Resp: 20 (04/23/25 1505)  BP: (!) 98/57 (04/23/25 1505)  SpO2: 100 % (04/23/25 1505) Vital Signs (24h Range):  Temp:  [98.2 °F (36.8 °C)-99.6 °F (37.6 °C)] 98.4 °F (36.9 °C)  Pulse:  [] 84  Resp:  [16-20] 20  SpO2:  [94 %-100 %] 100 %  BP: ()/(51-64) 98/57     Weight: 53.1 kg (117 lb 1 oz)  Body mass index is 18.33 kg/m².    Intake/Output Summary (Last 24 hours) at 4/23/2025 1547  Last data filed at 4/23/2025 0815  Gross per 24 hour   Intake 1980 ml   Output --   Net 1980 ml         Physical Exam  Constitutional:       General: He is not in acute distress.     Comments: Cachectic, eyes open but does not respond    Eyes:      Conjunctiva/sclera: Conjunctivae normal.   Cardiovascular:      Rate and Rhythm: Normal rate and regular rhythm.      Pulses: Normal pulses.      Heart sounds: Normal heart sounds.   Pulmonary:      Effort: Pulmonary effort is normal. No respiratory distress.      Breath sounds: Rhonchi present.   Abdominal:      General: Bowel sounds are normal. There is no distension.      Palpations: Abdomen is soft.      Tenderness: There is no abdominal tenderness.      Comments: JG tube in place   Musculoskeletal:      Right lower leg: No edema.      Left lower leg: No edema.   Skin:     General: Skin is warm and dry.   Neurological:      Mental Status: Mental status is at baseline.      Comments: Awake    Contractures in extremities    Right midriatic pupil unresponsive. Left reactive pupil with cataract                Significant Labs: All pertinent labs within the past 24 hours have been reviewed.    Significant Imaging: I have reviewed all pertinent imaging results/findings within the past 24 hours.

## 2025-04-23 NOTE — PLAN OF CARE
Received a call from Magali Tolbert [phone] and she stated that the low air loss mattress is covered it just has to be billed under Code . Notified MD to re-order mattress and notified [facility] of this new development.  Awaiting to see if mattress is covered.    3:50 PM  In talking with [facility] the father had refused to the hospital bed, lift and DARINEL due to not having the low air mattress. I went talk with him and told him that even though Magali Tolbert states the mattress is covered [facility] is going by what Medicaid states. [facility] will try and get auth for the low air mattress.  MD notified  Celso Espinoza, [facility]    [facility]  [phone]

## 2025-04-23 NOTE — SUBJECTIVE & OBJECTIVE
"Interval HPI:   Overnight events:  No events overnight from endocrinology standpoint. Patient has stimulation test done this morning and is not indicative of AI as 8 am cortisol and 60 minute cortisol were robust with good response after cosyntropin test. Family notified at bedside this morning and all questions and concerns were addressed and answered.     BP (!) 92/55 (BP Location: Left arm, Patient Position: Lying)   Pulse 79   Temp 99.2 °F (37.3 °C) (Axillary)   Resp 18   Ht 5' 7" (1.702 m)   Wt 53.1 kg (117 lb 1 oz)   SpO2 100%   BMI 18.33 kg/m²     Labs Reviewed and Include    Recent Labs   Lab 04/23/25  0529   CALCIUM 9.3   ALBUMIN 3.2*      K 3.9   CO2 28      BUN 15   CREATININE 0.5   ALKPHOS 147   ALT 54*   AST 14   BILITOT 0.2     Lab Results   Component Value Date    WBC 7.23 04/23/2025    HGB 9.8 (L) 04/23/2025    HCT 32.1 (L) 04/23/2025    MCV 99 (H) 04/23/2025     04/23/2025     No results for input(s): "TSH", "FREET4" in the last 168 hours.  No results found for: "HGBA1C"    Nutritional status:   Body mass index is 18.33 kg/m².  Lab Results   Component Value Date    ALBUMIN 3.2 (L) 04/23/2025    ALBUMIN 3.1 (L) 04/22/2025    ALBUMIN 2.9 (L) 04/21/2025     No results found for: "PREALBUMIN"    Estimated Creatinine Clearance: 177 mL/min (based on SCr of 0.5 mg/dL).    Accu-Checks  Recent Labs     04/21/25  2122 04/22/25  0013 04/22/25  0348 04/22/25  0726 04/22/25  1156 04/22/25  1556 04/22/25  2010 04/23/25  0134 04/23/25  0406 04/23/25  0811   POCTGLUCOSE 82 123* 124* 107 114* 126* 97 88 113* 111*       Current Medications and/or Treatments Impacting Glycemic Control  Immunotherapy:    Immunosuppressants       None          Steroids:   Hormones (From admission, onward)      None          Pressors:    Autonomic Drugs (From admission, onward)      Start     Stop Route Frequency Ordered    04/08/25 2417  NORepinephrine 4 mg in sodium chloride 0.9% 250 mL infusion (premix)      "   Question Answer Comment   Begin at (in mcg/kg/min): 0.02    Titrate by: (in mcg/kg/min (RANGE PREFERRED) 0.02 - 0.2    Titrate interval: (in minutes) (RANGE PREFERRED) 2 - 5    Titrate to maintain: (MAP or SBP) MAP    Titrate to keep MAP within the range or GREATER than (if single number): (in mmHg) 65 - 75    Maximum dose: (in mcg/kg/min) 0.2        04/09/25 2145 IV Continuous 04/08/25 2143 04/07/25 2115  NORepinephrine 4 mg in sodium chloride 0.9% 250 mL infusion (premix)        Question Answer Comment   Begin at (in mcg/kg/min): 0.02    Titrate by: (in mcg/kg/min (RANGE PREFERRED) 0.02 - 0.2    Titrate interval: (in minutes) (RANGE PREFERRED) 2 - 5    Titrate to maintain: (MAP or SBP) MAP    Titrate to keep MAP within the range or GREATER than (if single number): (in mmHg) 65 - 75    Maximum dose: (in mcg/kg/min) 0.2        04/2004 IV Continuous 04/07/25 2003          Hyperglycemia/Diabetes Medications:   Antihyperglycemics (From admission, onward)      None

## 2025-04-23 NOTE — ASSESSMENT & PLAN NOTE
Anemia is likely due to multifactorial etiology. Most recent hemoglobin and hematocrit are listed below.  Recent Labs     04/21/25  0719 04/22/25  0559 04/23/25  0529   HGB 9.8* 10.1* 9.8*   HCT 31.3* 32.3* 32.1*     Plan  - Monitor serial CBC: Daily  - Transfuse PRBC if patient becomes hemodynamically unstable, symptomatic or H/H drops below 7/21.  - Patient has not received any PRBC transfusions to date  - Patient's anemia is currently stable  - iron panel in a.m., B12, folate; patient also on Protonix and Keppra

## 2025-04-24 VITALS
HEIGHT: 67 IN | WEIGHT: 114.94 LBS | HEART RATE: 86 BPM | DIASTOLIC BLOOD PRESSURE: 60 MMHG | RESPIRATION RATE: 16 BRPM | OXYGEN SATURATION: 100 % | TEMPERATURE: 99 F | BODY MASS INDEX: 18.04 KG/M2 | SYSTOLIC BLOOD PRESSURE: 126 MMHG

## 2025-04-24 LAB
ABSOLUTE EOSINOPHIL (OHS): 0.22 K/UL
ABSOLUTE MONOCYTE (OHS): 1.15 K/UL (ref 0.3–1)
ABSOLUTE NEUTROPHIL COUNT (OHS): 3.91 K/UL (ref 1.8–7.7)
ALBUMIN SERPL BCP-MCNC: 3.4 G/DL (ref 3.5–5.2)
ALP SERPL-CCNC: 151 UNIT/L (ref 40–150)
ALT SERPL W/O P-5'-P-CCNC: 44 UNIT/L (ref 10–44)
ANION GAP (OHS): 10 MMOL/L (ref 8–16)
AST SERPL-CCNC: 22 UNIT/L (ref 11–45)
BASOPHILS # BLD AUTO: 0.08 K/UL
BASOPHILS NFR BLD AUTO: 1.1 %
BILIRUB SERPL-MCNC: 0.2 MG/DL (ref 0.1–1)
BUN SERPL-MCNC: 14 MG/DL (ref 6–20)
CALCIUM SERPL-MCNC: 9.6 MG/DL (ref 8.7–10.5)
CHLORIDE SERPL-SCNC: 107 MMOL/L (ref 95–110)
CO2 SERPL-SCNC: 23 MMOL/L (ref 23–29)
CREAT SERPL-MCNC: 0.5 MG/DL (ref 0.5–1.4)
ERYTHROCYTE [DISTWIDTH] IN BLOOD BY AUTOMATED COUNT: 16.3 % (ref 11.5–14.5)
GFR SERPLBLD CREATININE-BSD FMLA CKD-EPI: >60 ML/MIN/1.73/M2
GLUCOSE SERPL-MCNC: 118 MG/DL (ref 70–110)
GLUCOSE SERPL-MCNC: 89 MG/DL (ref 70–110)
HCT VFR BLD AUTO: 34 % (ref 40–54)
HGB BLD-MCNC: 10.6 GM/DL (ref 14–18)
IMM GRANULOCYTES # BLD AUTO: 0.15 K/UL (ref 0–0.04)
IMM GRANULOCYTES NFR BLD AUTO: 2 % (ref 0–0.5)
LYMPHOCYTES # BLD AUTO: 1.93 K/UL (ref 1–4.8)
MAGNESIUM SERPL-MCNC: 2.1 MG/DL (ref 1.6–2.6)
MCH RBC QN AUTO: 30.5 PG (ref 27–31)
MCHC RBC AUTO-ENTMCNC: 31.2 G/DL (ref 32–36)
MCV RBC AUTO: 98 FL (ref 82–98)
NUCLEATED RBC (/100WBC) (OHS): 0 /100 WBC
PHOSPHATE SERPL-MCNC: 3.3 MG/DL (ref 2.7–4.5)
PLATELET # BLD AUTO: 408 K/UL (ref 150–450)
PMV BLD AUTO: 11.7 FL (ref 9.2–12.9)
POCT GLUCOSE: 123 MG/DL (ref 70–110)
POCT GLUCOSE: 124 MG/DL (ref 70–110)
POCT GLUCOSE: 124 MG/DL (ref 70–110)
POCT GLUCOSE: 126 MG/DL (ref 70–110)
POTASSIUM SERPL-SCNC: 5.1 MMOL/L (ref 3.5–5.1)
PROT SERPL-MCNC: 7.7 GM/DL (ref 6–8.4)
RBC # BLD AUTO: 3.48 M/UL (ref 4.6–6.2)
RELATIVE EOSINOPHIL (OHS): 3 %
RELATIVE LYMPHOCYTE (OHS): 25.9 % (ref 18–48)
RELATIVE MONOCYTE (OHS): 15.5 % (ref 4–15)
RELATIVE NEUTROPHIL (OHS): 52.5 % (ref 38–73)
SODIUM SERPL-SCNC: 140 MMOL/L (ref 136–145)
WBC # BLD AUTO: 7.44 K/UL (ref 3.9–12.7)

## 2025-04-24 PROCEDURE — 83735 ASSAY OF MAGNESIUM: CPT | Performed by: INTERNAL MEDICINE

## 2025-04-24 PROCEDURE — 25000003 PHARM REV CODE 250: Performed by: STUDENT IN AN ORGANIZED HEALTH CARE EDUCATION/TRAINING PROGRAM

## 2025-04-24 PROCEDURE — 85025 COMPLETE CBC W/AUTO DIFF WBC: CPT | Performed by: STUDENT IN AN ORGANIZED HEALTH CARE EDUCATION/TRAINING PROGRAM

## 2025-04-24 PROCEDURE — 36415 COLL VENOUS BLD VENIPUNCTURE: CPT

## 2025-04-24 PROCEDURE — 82040 ASSAY OF SERUM ALBUMIN: CPT

## 2025-04-24 PROCEDURE — 25000242 PHARM REV CODE 250 ALT 637 W/ HCPCS: Performed by: STUDENT IN AN ORGANIZED HEALTH CARE EDUCATION/TRAINING PROGRAM

## 2025-04-24 PROCEDURE — 84100 ASSAY OF PHOSPHORUS: CPT | Performed by: INTERNAL MEDICINE

## 2025-04-24 PROCEDURE — 36415 COLL VENOUS BLD VENIPUNCTURE: CPT | Performed by: STUDENT IN AN ORGANIZED HEALTH CARE EDUCATION/TRAINING PROGRAM

## 2025-04-24 RX ADMIN — METOCLOPRAMIDE 5 MG: 5 SOLUTION ORAL at 06:04

## 2025-04-24 RX ADMIN — GLYCOPYRROLATE 1 MG: 1 LIQUID ORAL at 02:04

## 2025-04-24 RX ADMIN — Medication: at 09:04

## 2025-04-24 RX ADMIN — LEVETIRACETAM 500 MG: 500 SOLUTION ORAL at 09:04

## 2025-04-24 RX ADMIN — METOCLOPRAMIDE 5 MG: 5 SOLUTION ORAL at 02:04

## 2025-04-24 RX ADMIN — METHYLPHENIDATE HYDROCHLORIDE 5 MG: 5 TABLET ORAL at 06:04

## 2025-04-24 RX ADMIN — GLYCOPYRROLATE 1 MG: 1 LIQUID ORAL at 09:04

## 2025-04-24 RX ADMIN — DIAZEPAM 5 MG: 5 SOLUTION ORAL at 02:04

## 2025-04-24 RX ADMIN — DIAZEPAM 5 MG: 5 SOLUTION ORAL at 09:04

## 2025-04-24 NOTE — NURSING
Patient being discharge home on stretcher with [facility]. Accompany by grandmother. Discharge instructions reviewed with grandmother. Telemetry monitor and J-tube and PEG clamped off.

## 2025-04-24 NOTE — PLAN OF CARE
Recommendations      --Continue TF via PEJ Nutren 1.5 at 60ml/hr to provide to provide 1440 total fluid volume, 2160 kcals, 98 g PRO, 253 g CHO, 1100 mL fluid,144 % DRI    a. At home Tube feeding regimen: Nutren 1.5 @ 65 mL/hr x 14 hours to provide 910 total fluid volume, 1365 kcals, 62 g protein, 691 mL fluid, 91% DRI       b. Monitor for s/s of intolerance such as residuals >500ml, n/v/d, or abdominal distention.          --Recommend weekly weights     --Nursing: please continue to document formula and rate on flowsheets  --RD to monitor weight, po intake, BG, skin     Goals:   1.  75% nutritional needs met with diet/EN/PN during admission    2. Maintain dry weight during admission    3.  BG within target range during admission    4. Display s/s of wound healing during admission     Nutrition Goal Status: goal met  Communication of RD Recs: other (comment) (poc)   recent discharge for flu, is now having fever "possible UTI" and multiple falls. Speaks cantonese only.    No other PMH.

## 2025-04-24 NOTE — PLAN OF CARE
Talked with Grandmother and father and they wish for patient to be D/C home. New order for mattress placed into system and equipment will be delivered tomorrow to the home. Transport arranged for 5:30.    Celso Espinoza [facility] [place]    [facility]  [phone]

## 2025-04-24 NOTE — PLAN OF CARE
Pt unable to participate in plan of care due to TBI and being non-verbal. Educated father on plan of care. Pt rested without any s/s of distress noted. Meds given as per order through PEG tube. No c/o noted, changed pt incontinence pad multiple times. No complications noted. Feeding going at 60 mL/hr, pt tolerating well. Bed in lowest locked position, call light and personal belongings within reach. Hourly rounding completed.

## 2025-04-24 NOTE — PROGRESS NOTES
[facility] - Tele[facility] Stepdown  Adult Nutrition  Progress Note    SUMMARY     Recommendations      --Continue TF via PEJ Nutren 1.5 at 60ml/hr to provide to provide 1440 total fluid volume, 2160 kcals, 98 g PRO, 253 g CHO, 1100 mL fluid,144 % DRI    a. At home Tube feeding regimen: Nutren 1.5 @ 65 mL/hr x 14 hours to provide 910 total fluid volume, 1365 kcals, 62 g protein, 691 mL fluid, 91% DRI       b. Monitor for s/s of intolerance such as residuals >500ml, n/v/d, or abdominal distention.          --Recommend weekly weights     --Nursing: please continue to document formula and rate on flowsheets  --RD to monitor weight, po intake, BG, skin     Goals:   1.  75% nutritional needs met with diet/EN/PN during admission    2. Maintain dry weight during admission    3.  BG within target range during admission    4. Display s/s of wound healing during admission     Nutrition Goal Status: goal met  Communication of RD Recs: other (comment) (poc)    Nutrition Discharge Planning    Nutrition Discharge Planning: Resume home/ nursing home regimen    Assessment and Plan    Nutrition Problem  Increased nutrient needs (protein, calorie)     Related to (etiology):   Wound healing     Signs and Symptoms (as evidenced by):   pressure injury to heal- deep tissue, BMI 16.92     Interventions/Recommendations (treatment strategy):  Collaboration of nutritional care with other providers.  EN  Iván     Nutrition Diagnosis Status:   Continues     Reason for Assessment    Reason For Assessment: RD follow-up  Diagnosis: gastrointestinal disease (PEG tube malfunction)  General Information Comments: RD f/u. Patient remains admitted for septic shock and acute respiratory failure. Tube feeding infusing @ goal rate (60 ml/hr) via GJ tube. Loose stools noted. 3# weight loss noted. Noted deep tissue pressure injury on left heel, stage 2 pressure injury on sacral spine, full thickness skin tear LL quadrant, deep tissue pressure injury left toe-  "nutrition intervention indicated. RD to monitor.  Nutrition Related Social Determinants of Health: SDOH: Adequate food in home environment and Other:  Tube feeds      Nutrition/Diet History    Nutrition Intake History: a. At home Tube feeding regimen: Nutren 1.5 @ 65 mL/hr x 14 hours to provide 910 total fluid volume, 1365 kcals, 62 g protein, 691 mL fluid, 91% DRI  Spiritual, Cultural Beliefs, Jain Practices, Values that Affect Care: no  Food Allergies: NKFA  Factors Affecting Nutritional Intake: NPO    Anthropometrics    Height: 5' 7" (170.2 cm)  Height (inches): 67 in  Height Method: Stated  Weight: 52.1 kg (114 lb 15.2 oz)  Weight (lb): 114.95 lb  Weight Method: Bed Scale  Ideal Body Weight (IBW), Male: 148 lb  % Ideal Body Weight, Male (lb): 72.97 %  BMI (Calculated): 18  BMI Grade: less than 18.5 - underweight       Lab/Procedures/Meds    Pertinent Labs Reviewed: reviewed  Pertinent Labs Comments: N/A labs  Pertinent Medications Reviewed: reviewed  Pertinent Medications Comments: Enoxaparin, pantoprazole    Estimated/Assessed Needs    Weight Used For Calorie Calculations: 67.1 kg (148 lb) (IBW)  Energy Calorie Requirements (kcal): 0016-7912 (25-30 kcal/kg of IBW)  Energy Need Method: Kcal/kg  Protein Requirements:  (1.5-2.5 g/kg)  Weight Used For Protein Calculations: 49 kg (108 lb 0.4 oz) (actual body weight)     Estimated Fluid Requirement Method: RDA Method  RDA Method (mL): 1678         Nutrition Prescription Ordered    Current Diet Order: NPO  Current Nutrition Support Formula Ordered: Nutren 1.5  Current Nutrition Support Rate Ordered: 60 (ml) (ml/hr)  Current Nutrition Support Frequency Ordered: x 24 hours    Evaluation of Received Nutrient/Fluid Intake    Enteral Calories (kcal): 2160  Enteral Protein (gm): 98  Enteral (Free Water) Fluid (mL): 1100  % Kcal Needs: 108%  % Protein Needs: 100%  I/O: +17.3 L since admit  Energy Calories Required: meeting needs  Protein Required: meeting " needs  Comments: LBM 4/23- loose  % Intake of Estimated Energy Needs: 75 - 100 %  % Meal Intake: NPO    Nutrition Risk    Level of Risk/Frequency of Follow-up: moderate (f/u 1-2x/week)     Monitor and Evaluation    Monitor and Evaluation: Enteral and parenteral nutrition administration, Weight, Electrolyte and renal panel, Glucose/endocrine profile, Gastrointestinal profile, Inflammatory profile, Lipid profile, Skin, Nutrition focused physical findings     Nutrition Follow-Up    RD Follow-up?: Yes

## 2025-04-24 NOTE — PROGRESS NOTES
[facility] - Telemetry Stepdown  Wound Care    Patient Name:  Jesus Posey   MRN:  [id number]  Date: 4/24/2025  Diagnosis: Acute hypoxic respiratory failure    History:     Past Medical History:   Diagnosis Date    Atelectasis 10/08/2024    Seen on imaging   IS  Deep breathing exercises      Traumatic brain injury        Social History[1]    Precautions:     Allergies as of 03/31/2025    (No Known Allergies)       WO Assessment Details/Treatment     Patient seen for inpatient wound care consult follow up. Patient is known to inpatient wound care. Upon assessment, right heel with blanchable erythema. No open wounds or active drainage noted. Left heel with non-blanchable purple discoloration indicative of deep tissue pressure injury. No open wounds or active drainage noted.            Reviewed chart for this encounter.  See flowsheet for findings.        Recommendations: Apply BPCO to bilateral heels BID and PRN for capillary stimulation. Ensure EHOB boots in use for offloading. No other issues or concerns at this time. Nursing to maintain pressure injury prevention measures.      Discussed POC with patient and primary nurse.  See EMR for orders and patient education.     Bedside nursing to continue care and monitoring.  Bedside to maintain pressure injury prevention interventions.        04/24/25 0900   WOCN Assessment   WOCN Total Time (mins) 30   Visit Date 04/24/25   Visit Time 0900   Consult Type Follow Up   WOCN Speciality Wound   Intervention assessed;changed;applied;chart review;coordination of care;orders   Teaching on-going        Wound 04/01/25 0623 Pressure Injury Left Heel   Date First Assessed/Time First Assessed: 04/01/25 0623   Present on Original Admission: Yes  Primary Wound Type: Pressure Injury  Side: Left  Location: Heel  Is this injury device related?: No   Wound Image    Pressure Injury Stage DTPI   Dressing Appearance Open to air   Drainage Amount None   Drainage Characteristics/Odor No odor    Appearance Maroon;Purple   Care   (BPCO)        Wound 04/14/25 1726 Right Heel   Date First Assessed/Time First Assessed: 04/14/25 1726   Present on Original Admission: No  Side: Right  Location: Heel   Wound Image        Orders placed.   Franklyn TURCIOS, RN, [facility]  04/24/2025         [1]   Social History  Socioeconomic History    Marital status: Single   Tobacco Use    Smoking status: Never    Smokeless tobacco: Never     Social Drivers of Health     Financial Resource Strain: Low Risk  (4/2/2025)    Overall Financial Resource Strain (CARDIA)     Difficulty of Paying Living Expenses: Not hard at all   Food Insecurity: No Food Insecurity (4/2/2025)    Hunger Vital Sign     Worried About Running Out of Food in the Last Year: Never true     Ran Out of Food in the Last Year: Never true   Transportation Needs: No Transportation Needs (4/2/2025)    PRAPARE - Transportation     Lack of Transportation (Medical): No     Lack of Transportation (Non-Medical): No   Physical Activity: Inactive (4/2/2025)    Exercise Vital Sign     Days of Exercise per Week: 0 days     Minutes of Exercise per Session: 0 min   Stress: Patient Unable To Answer (4/2/2025)    [employer] - Occupational Stress Questionnaire     Feeling of Stress : Patient unable to answer   Housing Stability: Low Risk  (4/2/2025)    Housing Stability Vital Sign     Unable to Pay for Housing in the Last Year: No     Homeless in the Last Year: No

## 2025-04-25 LAB — ACTH (OHS): 36 PG/ML

## 2025-04-25 NOTE — ASSESSMENT & PLAN NOTE
Nutrition consulted. Most recent weight and BMI monitored-     Measurements:  Wt Readings from Last 1 Encounters:   04/24/25 52.1 kg (114 lb 15.2 oz)   Body mass index is 18 kg/m².    Patient has been screened and assessed by RD.    Malnutrition Type:  Context:    Level:      Malnutrition Characteristic Summary:       Interventions/Recommendations (treatment strategy):  --Continue TF via PEJ Nutren 1.5 at 60ml/hr to provide to provide 1440 total fluid volume, 2160 kcals, 98 g PRO, 253 g CHO, 1100 mL fluid,144 % DRI  a. At home Tube feeding regimen: Nutren 1.5 @ 65 mL/hr x 14 hours to provide 910 total fluid volume, 1365 kcals, 62 g protein, 691 mL fluid, 91% DRI  b. Monitor for s/s of intolerance such as residuals >500ml, n/v/d, or abdominal distention.  --Per last RD note: Add Iván BID as TF modifier  to provide 90 kcal, 2.5 g protein, 7 g L-Arginine, 7 g L-Glutamine per serving to aid in wound healing   --Do not mix Iván with formula in a tube-feeding bag  --Pour one packet of Iván in a clean, small (approximately 6- to 8-fl-oz) container for mixing  --Add 4 fl oz (120 mL) water at room temperature  --Mix well with disposable spoon or tongue blade until all particles are completely hydrated  --Verify correct tube placement  --Flush feeding tube with 30 mL water  --Administer Iván through feeding tube using a 60-mL or larger syringe  --Flush with an additional 30 mL water (a smaller amount of water can be used to flush the tube if the patient is on a fluid- restricted diet)  --Recommend weekly weights   --Nursing: please continue to document formula and rate on flowsheets  --RD to monitor weight, po intake, BG, skin    Nutrition consulted. Most recent weight and BMI monitored-

## 2025-04-25 NOTE — ASSESSMENT & PLAN NOTE
Anemia is likely due to multifactorial etiology. Most recent hemoglobin and hematocrit are listed below.  Recent Labs     04/23/25  0529 04/24/25  1404   HGB 9.8* 10.6*   HCT 32.1* 34.0*     Plan  - Monitor serial CBC: Daily  - Transfuse PRBC if patient becomes hemodynamically unstable, symptomatic or H/H drops below 7/21.  - Patient has not received any PRBC transfusions to date  - Patient's anemia is currently stable  - iron panel in a.m., B12, folate; patient also on Protonix and Keppra

## 2025-04-25 NOTE — DISCHARGE SUMMARY
[facility] - Telemetry [facility] Medicine  Discharge Summary      Patient Name: Jesus Posey  MRN: [id number]  [facility]: [id number]  Patient Class: IP- Inpatient  Admission Date: 4/1/2025  Hospital Length of Stay: 23 days  Discharge Date and Time: 4/24/2025  6:52 PM  Attending Physician: No att. providers found   Discharging Provider: Billy Martin MD  [facility]  [facility] Billy Martin MD  Primary Care Team: [facility]    HPI:   Mr. 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      As he is nonverbal, father reported leakage from his GJ tube which resembled his tube feeds. Last tube exchange was last month.      Admitted to Hospital Medicine in the setting of GJ tube malfunction. Intermittently hypothermic and bradycardic secondary to dysautonomia. Infectious workup has been negative. On IV maintenance fluids. IR replaced tube on 4/2.   During hospital course with repeated episodes of intolerance to PO intake and emesis. Repeat CT AP with G Tube with good positioning. Stepped up to MICU on 4/7 due to acute hypoxic respiratory failure requiring up to 15 L  HFNC  in the setting of likely aspiration pneumonitis. Isolated febrile episode of 100.7F. CXR concerning for BL pulmonary opacities. Started on Unasyn by Hospital Medicine.    * No surgery found *      Hospital Course:   Stepped up to ICU in 4/7 the setting of acute hypoxic respiratory failure in the swtting of basilar atelectasis and mucus plugging, with sepsis secondary to PNA requiring up to 15 L HFNC. Started on Vanc-Zosyn. During ICU course requiring up to norepi 0.06 mcg likely in the setting of hypovolemia 2/2 poor intake. Encephalopathy improving. CTH w/o acute processes. Lactate improved. EEG c/w toxic/drug-induced  encephalopathy. No seizures. Persistenty bradycardic and hypothermic with EKG junctional rhythm on 30s.On Zosyn. TSH wnl. TTE EF 65%, normal diastolic function. Cortisol < 3 . Started on IV hydrocortisone 100 mg TID. Off pressors on 4/10 at 7:30 am, but back on pressors by 2pm, again off by 4/10 7pm. Repleting dextrose for hypoglycemia.    Patient feeding tubes replaced without complication.  Tube feeds restarted without issue.  Stress dose steroids weaned with the help of Endocrinology.  Electrolytes replaced with concern for refeeding syndrome.  Noted to have occasional hemodynamically stable bradycardia.  Chronic medical issue when ill on chart review.  Completed 5 days of antibiotics with Zosyn for aspiration pneumonia.    Underwent stim test for AI; had appropriate response, no indication for treatment or endocrine follow up.    Discharged home with DME.      Patient deemed appropriate for discharge. I personally saw, examined, and evaluated patient prior to departure. Plan discussed with patient family, who was agreeable and amenable; medications were discussed and reviewed, outpatient follow-up scheduled, ER precautions were given, all questions were answered to the patient's satisfaction, and Jesus Posey was subsequently discharged.       Goals of Care Treatment Preferences:  Code Status: Full Code      SDOH Screening:  The patient was screened for utility difficulties, food insecurity, transport difficulties, housing insecurity, and interpersonal safety and there were no concerns identified this admission.     Consults:   Consults (From admission, onward)          Status Ordering Provider     Inpatient consult to Endocrinology  Once        Provider:  (Not yet assigned)    Completed RM NAVARRETE     Inpatient consult to Interventional Radiology  Once        Provider:  (Not yet assigned)    Completed NURY GUERRA     Inpatient consult to Neurosurgery  Once        Provider:  (Not yet assigned)     Completed RM NAVARRETE     Inpatient consult to Interventional Radiology  Once        Provider:  (Not yet assigned)    Completed RM NAVARRETE     Inpatient consult to Critical Care Medicine  Once        Provider:  Bess Dale DNP    Completed BIRDIE COX     Inpatient consult to Gastroenterology  Once        Provider:  (Not yet assigned)    Completed BIRDIE COX     Inpatient consult to Registered Dietitian/Nutritionist  Once        Provider:  (Not yet assigned)    Completed SAM MARKS     Inpatient consult to Skin Integrity  Practitioner  Once        Provider:  (Not yet assigned)    Completed BELLO BAUER     Inpatient consult to Interventional Radiology  Once        Provider:  (Not yet assigned)    Completed TITUS HALL            Assessment & Plan  Pulmonary septic shock with acute hypoxic respiratory failure  Low serum cortisol level  Patient with Hypoxic Respiratory failure which is Acute with chronic hypercapnic respiratory failure. he is not on home oxygen. Supplemental oxygen was provided and noted-      Signs/symptoms of respiratory failure include- respiratory distress. Contributing diagnoses includes - ARDS and Aspiration Labs and images were reviewed. Patient Has recent ABG, which has been reviewed. Will treat underlying causes and adjust management of respiratory failure as follows-     Suspect aspiration pneumonia in the setting of hospital acquired pneumonia with recurrent vomiting with supperimposed atelectasis.   s/p IV Zosyn x5 days  Concomitant hypothermia and bradycardia in the setting of adrenal insufficiency.   Off pressor support briefly on 4/10, but  back again same day.   IPPV. Acute hypoxic rx failure resolved    BCX 4/7 NGTD  Lactate wnl  Weaned SDS off 4/18  Endocrine consulted, appreciated recommendations  Pulmonary toilet: bronchodilators PRN, hypertonic saline    Had appropriate response to stim test  No  indication for steroids    Hypophosphatemia  Resolved    PEG tube malfunction  Malfunction of jejunostomy tube  Patient with GJ tube dependence, presenting with tube malfunction. IR consulted for exchange under fluoroscopy. Tube changed 4/2.   GI consulted d/t recurrent vomiting with initiation of tube feeds. CTAP with GJ tube in satisfactory position    5mg G tube metoclopramide Q8h   Restarted tube feeds  Tolerating feeds at goal  Frequent flushing to avoid tube clogging (changed to every 4 hours)    Hypernatremia  Resolved  Cont FWF    Toxic metabolic encephalopathy  May be secondary to sedation with diazepam with superimposed HAP and sepsis   CT head ruled out CVA. ABG c/w acute hypercapnic rx failure  EEG c/w toxic and drug-induced encephalopathy. Ruled out seizures  Per family, now at baseline    Spastic quadriparesis  Intrathecal baclofen pump in place.   Mobility protocol.     Functional quadriplegia  Intrathecal baclofen pump in place. Supportive care.     Cachexia  Resumed tube feeds after GJ tube replaced as able    Hypothermia  Resolved  Per family, this is common when pt is hospitalized  Suspect low temps and bradycardia are related to dysautonomia  Likely secondary to adrenal insufficiency  Appreciate Endo assistance w/ AI eval    Hypokalemia  Resolved    Severe malnutrition  Body mass index (BMI) less than 19  Nutrition consulted. Most recent weight and BMI monitored-     Measurements:  Wt Readings from Last 1 Encounters:   04/24/25 52.1 kg (114 lb 15.2 oz)   Body mass index is 18 kg/m².    Patient has been screened and assessed by RD.    Malnutrition Type:  Context:    Level:      Malnutrition Characteristic Summary:       Interventions/Recommendations (treatment strategy):  --Continue TF via PEJ Nutren 1.5 at 60ml/hr to provide to provide 1440 total fluid volume, 2160 kcals, 98 g PRO, 253 g CHO, 1100 mL fluid,144 % DRI  a. At home Tube feeding regimen: Nutren 1.5 @ 65 mL/hr x 14 hours to provide 910  total fluid volume, 1365 kcals, 62 g protein, 691 mL fluid, 91% DRI  b. Monitor for s/s of intolerance such as residuals >500ml, n/v/d, or abdominal distention.  --Per last RD note: Add Iván BID as TF modifier  to provide 90 kcal, 2.5 g protein, 7 g L-Arginine, 7 g L-Glutamine per serving to aid in wound healing   --Do not mix Iván with formula in a tube-feeding bag  --Pour one packet of Iván in a clean, small (approximately 6- to 8-fl-oz) container for mixing  --Add 4 fl oz (120 mL) water at room temperature  --Mix well with disposable spoon or tongue blade until all particles are completely hydrated  --Verify correct tube placement  --Flush feeding tube with 30 mL water  --Administer Iván through feeding tube using a 60-mL or larger syringe  --Flush with an additional 30 mL water (a smaller amount of water can be used to flush the tube if the patient is on a fluid- restricted diet)  --Recommend weekly weights   --Nursing: please continue to document formula and rate on flowsheets  --RD to monitor weight, po intake, BG, skin    Nutrition consulted. Most recent weight and BMI monitored-     Traumatic brain injury  Continue current management with diazepam for contractures and keppra for seizure prophylaxis.     Pressure injury of sacral region, stage 2  Deep tissue injury  - Wound care    Transaminitis  Improving without intervention  Discussed with hepatology they think this is transient  Liver US looked ok  Monitor    Anemia  Anemia is likely due to  multifactorial etiology . Most recent hemoglobin and hematocrit are listed below.  Recent Labs     04/23/25  0529 04/24/25  1404   HGB 9.8* 10.6*   HCT 32.1* 34.0*     Plan  - Monitor serial CBC: Daily  - Transfuse PRBC if patient becomes hemodynamically unstable, symptomatic or H/H drops below 7/21.  - Patient has not received any PRBC transfusions to date  - Patient's anemia is currently stable  - iron panel in a.m., B12, folate; patient also on Protonix and  "Keppra    Final Active Diagnoses:    Diagnosis Date Noted POA    PRINCIPAL PROBLEM:  Pulmonary septic shock with acute hypoxic respiratory failure [J96.01] 04/07/2025 Yes    Anemia [D64.9] 04/21/2025 Yes    Transaminitis [R74.01] 04/18/2025 No    Hypophosphatemia [E83.39] 04/13/2025 No    Pressure injury of sacral region, stage 2 [L89.152] 04/10/2025 Yes    Low serum cortisol level [R79.89] 04/10/2025 No    Deep tissue injury [T14.8XXA] 04/07/2025 Yes    Toxic metabolic encephalopathy [G92.8] 04/07/2025 Yes    Malfunction of jejunostomy tube [K94.13] 04/07/2025 Yes    Hypernatremia [E87.0] 04/03/2025 No    Hypokalemia [E87.6] 04/03/2025 No    Severe malnutrition [E43] 04/03/2025 Yes    Body mass index (BMI) less than 19 [Z68.1] 04/03/2025 Not Applicable    Hypothermia [T68.XXXA] 04/02/2025 Yes    Functional quadriplegia [R53.2] 10/08/2024 Yes     Chronic    PEG tube malfunction [K94.23] 10/08/2024 Yes    Cachexia [R64] 10/08/2024 Yes     Chronic    Spastic quadriparesis [G82.50] 08/16/2024 Yes     Chronic    Traumatic brain injury [S06.9XAA] 08/16/2024 Yes      Problems Resolved During this Admission:    Diagnosis Date Noted Date Resolved POA    Atelectasis [J98.11] 10/08/2024 04/06/2025 Yes       Discharged Condition: good    Disposition: Home-Health Care [facility]    Follow Up:   Follow-up Information       [facility] 4[facility]24/[facility]25.    Specialty: Family Medicine  Why: Established pt's hospital f/u visit @ 2:45pm. Please bring discharge summary, ID, insurance card, and medication list.  Contact information:  [address]  [facility] [address] [phone]                           Patient Instructions:      PATIENT (ALEXUS) LIFT FOR HOME USE     Order Specific Question Answer Comments   Height: 5' 7" (1.702 m)    Weight: 49 kg (108 lb 0.4 oz)    Does patient have medical equipment at home? hospital bed Patient's father states that there was an order placed " "prior for a low air loss mattress, but the mattress was never delivered.   Length of need (1-99 months): 19      HOSPITAL BED FOR HOME USE     Order Specific Question Answer Comments   Type: Semi-electric    Length of need (1-99 months): 99    Does patient have medical equipment at home? hospital bed Patient's father states that there was an order placed prior for a low air loss mattress, but the mattress was never delivered.   Height: 5' 7" (1.702 m)    Weight: 52.1 kg (114 lb 15.2 oz)    Accessories: Low air loss mattress    Please check all that apply: Patient requires positioning of the body in ways not feasible in an ordinary bed due to a medical condition which is expected to last at least one month.      Diet NPO     Notify your health care provider if you experience any of the following:  temperature >100.4     Notify your health care provider if you experience any of the following:  increased confusion or weakness     Notify your health care provider if you experience any of the following:  persistent dizziness, light-headedness, or visual disturbances     Notify your health care provider if you experience any of the following:  worsening rash     Notify your health care provider if you experience any of the following:  severe persistent headache     Notify your health care provider if you experience any of the following:  difficulty breathing or increased cough     Notify your health care provider if you experience any of the following:  redness, tenderness, or signs of infection (pain, swelling, redness, odor or green/yellow discharge around incision site)     Notify your health care provider if you experience any of the following:  severe uncontrolled pain     Notify your health care provider if you experience any of the following:  persistent nausea and vomiting or diarrhea     Notify your health care provider if you experience any of the following:  temperature >100.4     Activity as tolerated "     Activity as tolerated       Significant Diagnostic Studies: Labs: All labs within the past 24 hours have been reviewed    Pending Diagnostic Studies:       Procedure Component Value Units Date/Time    Methylmalonic Acid, Serum [[id number]] Collected: 2559    Order Status: Sent Lab Status: In process Updated: 25    Specimen: Blood            Medications:  Reconciled Home Medications:      Medication List        START taking these medications      metoclopramide HCl 5 mg/5 mL Soln  Commonly known as: REGLAN  Give 5 mLs (5 mg total) by G Tube route every 8 (eight) hours.            CONTINUE taking these medications      baclofen 25 mg/5 mL (5 mg/mL) Susp oral liquid  25 mg by Per G Tube route 4 (four) times daily.     diazePAM 5 mg/5 mL (1 mg/mL) oral solution  Commonly known as: VALIUM  SMARTSI Milliliter(s) Gastro Tube 3 Times Daily     glycopyrrolate 1 mg/5 mL (0.2 mg/mL) Soln  Commonly known as: CUVPOSA  40 mcg/kg by Per G Tube route 3 (three) times daily as needed.     HYDROcodone-acetaminophen 5-325 mg per tablet  Commonly known as: NORCO  1 tablet by Per G Tube route every 4 (four) hours as needed for Pain.     levETIRAcetam 100 mg/mL Soln  Commonly known as: KEPPRA  5 mLs (500 mg total) by Per G Tube route 2 (two) times daily.     polyethylene glycol 17 gram/dose powder  Commonly known as: GLYCOLAX  17 g by Per G Tube route 2 (two) times daily as needed for Constipation.     QUILLIVANT XR 5 mg/mL (25 mg/5 mL) Sr24  Generic drug: methylphenidate HCl  7 mLs by Per G Tube route Daily.            STOP taking these medications      hydroCHLOROthiazide 12.5 MG Tab              Indwelling Lines/Drains at time of discharge:   Lines/Drains/Airways       Drain  Duration                  Gastrostomy/Enterostomy 25 1430 Low profile gastrostomy device (LPGD) LUQ 14 days                    Time spent on the discharge of patient: 35 minutes         Billy Martin MD  Department of Hospital  Medicine  [facility] - Telemetry Stepdown

## 2025-04-25 NOTE — PLAN OF CARE
[facility] - Telemetry Stepdown  Discharge Final Note    Primary Care Provider: [facility] (Primary    Expected Discharge Date: 4/24/2025    Final Discharge Note (most recent)       Final Note - 04/25/25 0903          Final Note    Assessment Type Final Discharge Note     Anticipated Discharge Disposition Home or Self Care        Post-Acute Status    Post-Acute Authorization E     HME Status Set-up Complete/Auth obtained   [facility] working on getting auth for low margo loss mattress    Discharge Delays None known at this time                     Important Message from Medicare             Contact Info       [facility] (Primary Care)   Specialty: Family Medicine   Relationship: PCP - General    [address] [facility] [address]   Phone: [phone]       Next Steps: Follow up on 4/24/2025    Instructions: Established pt's hospital f/u visit @ 2:45pm. Please bring discharge summary, ID, insurance card, and medication list.         Pt D/C home to family on Ambulance. [facility] working on getting auth for Low air loss mattress.  Discharge Plan A and Plan B have been determined by review of patient's clinical status, future medical and therapeutic needs, and coverage/benefits for post-acute care in coordination with multidisciplinary team members.  Celso Espinoza, [facility]    [facility]  [phone]

## 2025-04-29 ENCOUNTER — TELEPHONE (OUTPATIENT)
Dept: PHYSICAL MEDICINE AND REHAB | Facility: CLINIC | Age: 20
End: 2025-04-29
Payer: MEDICAID
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
## 2025-05-27 ENCOUNTER — CLINICAL SUPPORT (OUTPATIENT)
Dept: REHABILITATION | Facility: HOSPITAL | Age: 20
End: 2025-05-27
Payer: MEDICAID

## 2025-05-27 DIAGNOSIS — S06.9X6S TRAUMATIC BRAIN INJURY, WITH LOSS OF CONSCIOUSNESS GREATER THAN 24 HOURS WITHOUT RETURN TO PRE-EXISTING CONSCIOUS LEVEL WITH PATIENT SURVIVING, SEQUELA: ICD-10-CM

## 2025-05-27 DIAGNOSIS — G82.50 SPASTIC QUADRIPARESIS: Chronic | ICD-10-CM

## 2025-05-27 DIAGNOSIS — G82.50 SPASTIC QUADRIPARESIS: ICD-10-CM

## 2025-05-27 DIAGNOSIS — S06.9XAA TRAUMATIC BRAIN INJURY, WITH UNKNOWN LOSS OF CONSCIOUSNESS STATUS, INITIAL ENCOUNTER: Primary | ICD-10-CM

## 2025-05-27 PROCEDURE — 97163 PT EVAL HIGH COMPLEX 45 MIN: CPT | Mod: PO

## 2025-05-27 PROCEDURE — 97165 OT EVAL LOW COMPLEX 30 MIN: CPT | Mod: PO
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
## 2025-06-21 RX ORDER — METOCLOPRAMIDE HYDROCHLORIDE 5 MG/5ML
5 SOLUTION ORAL EVERY 8 HOURS
Qty: 473 ML | Refills: 0 | OUTPATIENT
Start: 2025-06-21

## 2025-06-23 NOTE — PROGRESS NOTES
Outpatient Rehab    Occupational Therapy Visit    Patient Name: Jesus Posey  MRN: [id number]  YOB: 2005  Encounter Date: 6/24/2025    Therapy Diagnosis:   Encounter Diagnoses   Name Primary?    Spastic quadriparesis Yes    Traumatic brain injury, with unknown loss of consciousness status, initial encounter      Physician: Michael Nunez MD    Physician Orders: Eval and Treat  Medical Diagnosis: Traumatic brain injury, with unknown loss of consciousness status, subsequent encounter  Spastic quadriparesis  Surgical Diagnosis: Not applicable for this Episode   Surgical Date: Not applicable for this Episode  Days Since Last Surgery: Not applicable for this Episode    Visit # / Visits Authorized: 1 / 2  Insurance Authorization Period: 4/2/2025 to 12/31/2025  Date of Evaluation: 3/26/2025  Plan of Care Certification: 5/27/2025 to 7/31/2025      Time In: 0844   Time Out: 0930  Total Time (in minutes): 46   Total Billable Time (in minutes): 46    Precautions: Standard, Fall, G-Tube; Baclofen Pump        Subjective   Pt's grandmother present throughout treatment session. She reported she has been stretching him but has not done shoulder stretches completed/educated on last session. She reported she would prefer her son (pt's father) to complete these as she has a bad shoulder herself. Pt non verbal..  Family / care giver present for this visit:     No pain indicated    Objective            Treatment:     Therapeutic activities to improve functional performance for 46  minutes, including:  Pt supine on incline wedge at start of OT after completing PT and remained in this position for entirety of session  - Dynasplint rep present at beginning of session for co-evaluation for upper extremity splinting to optimize positioning, joint integrity, and skin integrity, and for tone management   - Skin Inspection:   Skin maceration to left thumb and palm along radial distribution present   Pt's skin gently cleaned,  dried, and towel roll placed in hand to promote optimal positioning (radial abduction and open hand)   Pt's grandmother educated on importance of skin integrity and how to care for maceration/prevent further skin breakdown at home   - Administered handout and pt's grandmother re-educated on PROM/stretching routine   - PROM to BUE for the following planes within tolerable/available range:   LUE wrist stretch towards neutral from resting extension  RUE wrist stretch towards neutral from resting flexion  Elbow extension  Shoulder flexion, 3 x 30 sec ea  Shoulder abduction, 3 x 30 sec ea  ER with STM to pec, x2 ea  - Pt's grandmother educated on optimal positioning of upper extremities in supine and sidelying positions with demonstration provided; handout administered     EMS and OT transfer pt from mat to stretcher     Time Entry(in minutes):  Therapeutic Activity Time Entry: 46    Assessment & Plan   Assessment: Pt tolerated today's session well. Grandmother present throughout and receptive to all education topics for PROM/stretching routine. Return demonstration was not provided today, as she would prefer that her son complete shoulder stretches due to her own shoulder pain/weakness. She did verbalize understanding and handout was provided for her to teach her son in the home, but he will be present in future session to ensure proper carryover. Pt's grandmother also verbalized good understanding of optimal UE positioning when pt is supine and sidelying in bed including importance of shoulder abduction in supine and protraction in sidelying. [employer] was present at beginning of session to initiate process of UE splinting for tone management, to decrease risk of further joint contractures, to optimize UE positioning, and to maximize joint and skin integrity. Determined that pt would benefit from R elbow extension Dynasplint, B neuroflex elbow spints and B neuroflex cone splints for B hands secondary to B UE  contractures that are beginning to cause skin breakdown. Pt would continue to benefit from skilled occupational therapy services to address the below goals and for ongoing pt/family education and HEP guidance.   Evaluation/Treatment Tolerance: Patient tolerated treatment well    The patient will continue to benefit from skilled outpatient occupational therapy in order to address the deficits listed in the problem list on the initial evaluation, provide patient and family education, and maximize the patients level of independence in the home and community environments.     The patient's spiritual, cultural, and educational needs were considered, and the patient is agreeable to the plan of care and goals.           Plan: Cont OT POC 1x/wk x 4 wks; POC Expiration: 7/31/2025; Focus of OT for BUE stretching routine, positioning, and to follow up with dynasplint for splinting    Goals:   Active       STG=LTG       Pt's grandmother/other family members to demonstrate understanding of BUE PROM stretching routine to prevent further joint contractures and tone management  (Progressing)       Start:  05/27/25    Expected End:  07/31/25            Pt's grandmother/other family members to demonstrate understanding of optimal BUE positioning supine in bed for tone management and to reduce risk of further contractures  (Progressing)       Start:  05/27/25    Expected End:  07/31/25            OT to initiate process of UE splinting for tone management and decrease further joint contractures  (Met)       Start:  05/27/25    Expected End:  07/31/25    Resolved:  06/24/25             Padmini Mcgregor OT

## 2025-06-23 NOTE — PROGRESS NOTES
Outpatient Rehab    Physical Therapy Visit    Patient Name: Jesus Posey  MRN: [id number]  YOB: 2005  Encounter Date: 6/24/2025    Therapy Diagnosis:   Encounter Diagnosis   Name Primary?    Impaired functional mobility and endurance Yes     Physician: Michael Nunez MD    Physician Orders: Eval and Treat  Medical Diagnosis: Spastic quadriparesis  Traumatic brain injury, with loss of consciousness greater than 24 hours without return to pre-existing conscious level with patient surviving, sequela  Surgical Diagnosis: Not applicable for this Episode   Surgical Date: Not applicable for this Episode  Days Since Last Surgery: Not applicable for this Episode    Visit # / Visits Authorized:    Insurance Authorization Period: 5/27/2025 to 12/31/2025  Date of Evaluation: 5/27/2025  Plan of Care Certification: 5/29/2025 to 7/24/2025      PT/PTA: PT   Number of PTA visits since last PT visit:0  Time In: 0800   Time Out: 0845  Total Time (in minutes): 45   Total Billable Time (in minutes): 45    FOTO:  Intake Score:  %  Survey Score 2:  %  Survey Score 3:  %    Precautions:     Fall, non verbal, PEG tube, incontinent      Subjective   patient non verbal but does not appear to be in distress or pain.    No pain indicated    Objective            Treatment:  Therapeutic Exercise  TE 1: 30 minutes spent on family education of the following: supine hamstring stretch, quad stretch with bolster, ankle PF stretch. Going over proper body mechanics of both the family member and the patient to avoid injury. The patient's grandmother verbilizes understanding  TE 2: reviewing instructions to progressively increase angle of HOB to improve upright sitting tolerance  TE 3: patient postioned with one wedge and pillow under head for initial 30 minutes at about 30 degree angle  TE 4: 15 minutes at 50 degree angle with wedge and three pillows to promote proper body positioning  TE 5: total assist for all functional mobility  including positioning of pillows and wedge      Time Entry(in minutes):  Therapeutic Exercise Time Entry: 45    Assessment & Plan   Assessment: Jesus tolerated today's session fairly well. Treatment limited due to patient's inability to follow commands or participate in any functional activities. Additionally, the patient demonstrates significant extensor tone/ rigidity that limits his range of motion and overall mobility to tolerate functional tasks such as upright sitting. The patient's grandmother was educated on various stretches to perform on a daily basis to improve overall mobility and range of motion. She verbalizes understanding at this time.   Evaluation/Treatment Tolerance: Patient tolerated treatment well    The patient will continue to benefit from skilled outpatient physical therapy in order to address the deficits listed in the problem list on the initial evaluation, provide patient and family education, and maximize the patients level of independence in the home and community environments.     The patient's spiritual, cultural, and educational needs were considered, and the patient is agreeable to the plan of care and goals.           Plan: upright sitting tolerance, HEP for passive stretching, caregiver education, ROM    Goals:   Active       Short term goals = long term goals        Patient's family verbalize/ demonstrate understanding of passive stretching home exercise program        Start:  05/28/25    Expected End:  06/25/25            The patient's family verbalize/ demonstrate understanding of pressure relief/ body positioning in bed/ WC       Start:  05/28/25    Expected End:  07/23/25            Patient tolerate 20 minutes long sitting at 60 degree incline       Start:  05/28/25    Expected End:  07/23/25                Laure Clement, PT

## 2025-06-24 ENCOUNTER — CLINICAL SUPPORT (OUTPATIENT)
Dept: REHABILITATION | Facility: HOSPITAL | Age: 20
End: 2025-06-24
Payer: MEDICAID

## 2025-06-24 DIAGNOSIS — G82.50 SPASTIC QUADRIPARESIS: Primary | Chronic | ICD-10-CM

## 2025-06-24 DIAGNOSIS — S06.9XAA TRAUMATIC BRAIN INJURY, WITH UNKNOWN LOSS OF CONSCIOUSNESS STATUS, INITIAL ENCOUNTER: ICD-10-CM

## 2025-06-24 DIAGNOSIS — Z74.09 IMPAIRED FUNCTIONAL MOBILITY AND ENDURANCE: Primary | ICD-10-CM

## 2025-06-24 PROCEDURE — 97530 THERAPEUTIC ACTIVITIES: CPT | Mod: PO

## 2025-06-24 PROCEDURE — 97110 THERAPEUTIC EXERCISES: CPT | Mod: PO

## 2025-06-24 NOTE — PATIENT INSTRUCTIONS
Supine Shoulder Stretches:  - Shoulder Flexion:   Flexion: ROM (Supine)        Position (A) [place]: Hold left arm close to side of trunk. Motion (B) - Lift arm over head in line with trunk, palm turned inward. CAUTION: Do not push into shoulder joint. Do not force movement if painful. Repeat about 3 times with a sustained hold at the end range. Repeat with other arm. Do 1 session per day.     Copyright © I. All rights reserved.     Shoulder Abduction:  Abduction: ROM (Supine)        Position (A) [place]: Hold left arm at elbow and wrist. Elbow may be bent or straight. Motion (B) -Glide arm out to side. -Do not allow arm to go beyond shoulder level. CAUTION: Do not push into shoulder joint. Repeat about 3 times with a sustained hold at end range. Repeat with other arm. Do 1 session per day.     Copyright © [employer]. All rights reserved.     Shoulder External Rotation:  External Rotation In Flexion: ROM (Supine)        Position (A) [place]: Hold left arm upright, elbow bent to 90°. Motion (B) - Rotate arm so hand moves toward patient's head. - Elbow remains in place, wrist straight. CAUTION: Stop at point of tension in muscle or joint. Repeat about 3 times with sustained end range stretch. Repeat with other arm. Do 1 session per day. Variation: Patient is passive. Can also apply soft tissue massage to pec region concurrently with this stretch.     Copyright © [employer]. All rights reserved.     Elbow Extension Stretch:   - Gently bring patient's affected upper extremity into extension.     Extension: Facilitated (Supine)         Position (A) [place]: Support under left elbow. Place other hand under palm. Motion (B) -Cue patient to straighten elbow by pushing forward. -[place] assists as needed to fully straighten elbow. Repeat sequence about 3 times with sustained end range stretch. Repeat with other arm. Do 1 session per day.     Copyright © [employer]. All rights reserved.     Wrist Stretches:   - Wrist Flexion and Extension: Hold  patient's hand like a handshake and move wrist in forward/back directions.     PROM: Wrist Flexion / Extension                         Grasp right hand and slowly bend wrist until stretch is felt. Relax. Then stretch as far as possible in opposite direction.   Repeat about 3 times per set. Do 1 set per session. Do 1 sessions per day.    Copyright © [employer]. All rights reserved.     Hand/Finger Stretches:   - Finger Extension:   Extension: ROM        Position (A) [place]: Stabilize left hand near wrist. Support fingers at tips. Motion (B) -Straighten fingers fully. -[place] assists by guiding all fingers straight. CAUTION: Do not force movement. Repeat with other hand. Do 1 session per day. Variation: Perform with wrist flexed toward palm. Patient is passive.      Copyright © [employer]. All rights reserved.       Bed Positioning:    Positioning: Lying on Back        Elevate arms: Shoulder is positioned forward, arm out to side. May use folded towel under shoulder blade. Palm is turned upward as able.  Position affected leg: Hip and knee slightly bent. Toes pointing up as able. Avoid tight tucking sheets.     Copyright © [employer]. All rights reserved.     Positioning: Sidelying       Elevate affected arm: Shoulder is positioned forward. Elbow out and palm turned upward.  Position affected leg: Hip and knee slightly bent. Place other leg on pillow to support weight.  May place pillow behind back    Opposite arm:         Elevate affected arm: Shoulder is positioned forward. Elbow straightened as able. Hand with palm down.  Position affected leg: Hip and knee slightly bent. Toes pointing forward.  May place another pillow behind back to support trunk position.    Copyright © [employer]. All rights reserved.

## 2025-06-30 NOTE — PROGRESS NOTES
Outpatient Rehab    Physical Therapy Visit    Patient Name: Jesus Posey  MRN: [id number]  YOB: 2005  Encounter Date: 7/1/2025    Therapy Diagnosis:   Encounter Diagnosis   Name Primary?    Impaired functional mobility and endurance Yes     Physician: Michael Nunez MD    Physician Orders: Eval and Treat  Medical Diagnosis: Spastic quadriparesis  Traumatic brain injury, with loss of consciousness greater than 24 hours without return to pre-existing conscious level with patient surviving, sequela  Surgical Diagnosis: Not applicable for this Episode   Surgical Date: Not applicable for this Episode  Days Since Last Surgery: Not applicable for this Episode    Visit # / Visits Authorized:    Insurance Authorization Period: 6/24/2025 to 8/15/2025  Date of Evaluation: 5/27/2025  Plan of Care Certification: 5/29/2025 to 7/24/2025      PT/PTA: PT   Number of PTA visits since last PT visit:0  Time In: 0845   Time Out: 0930  Total Time (in minutes): 45   Total Billable Time (in minutes):      FOTO:  Intake Score:  %  Survey Score 2:  %  Survey Score 3:  %    Precautions:     Fall, non verbal, PEG tube, incontinent      Subjective   pt non verbal.  Family / care giver present for this visit:   Pain reported as 0/10. No pain indicated    Objective            Treatment:  Therapeutic Exercise  TE 1: 30 minutes spent on family education of the following (with the patient's father): supine hamstring stretch, quad stretch with bolster, ankle PF stretch. Going over proper body mechanics of both the family member and the patient to avoid injury. The patient's father verbilizes understanding  TE 2: reviewing instructions to progressively increase angle of HOB to improve upright sitting tolerance  TE 3: patient postioned with one wedge and pillow under head for initial 30 minutes at about 30 degree angle  TE 4: 15 minutes at 50 degree angle with wedge and three pillows to promote proper body positioning  TE 5: total  assist for all functional mobility including positioning of pillows and wedge      Time Entry(in minutes):  Therapeutic Exercise Time Entry: 45    Assessment & Plan   Assessment: Jesus tolerated today's session well with majority of the session spent educating the patient's father on BLE stretching, body mechancic, and pressure relief. The patient's father verbalizes/ demonstrates understanding of home exercise program.   Evaluation/Treatment Tolerance: Patient tolerated treatment well    The patient will continue to benefit from skilled outpatient physical therapy in order to address the deficits listed in the problem list on the initial evaluation, provide patient and family education, and maximize the patients level of independence in the home and community environments.     The patient's spiritual, cultural, and educational needs were considered, and the patient is agreeable to the plan of care and goals.           Plan: upright sitting tolerance, HEP for passive stretching, caregiver education, ROM    Goals:   Active       Short term goals = long term goals        Patient's family verbalize/ demonstrate understanding of passive stretching home exercise program  (Progressing)       Start:  05/28/25    Expected End:  06/25/25            The patient's family verbalize/ demonstrate understanding of pressure relief/ body positioning in bed/ WC (Progressing)       Start:  05/28/25    Expected End:  07/23/25            Patient tolerate 20 minutes long sitting at 60 degree incline (Progressing)       Start:  05/28/25    Expected End:  07/23/25                Laure Clement PT

## 2025-06-30 NOTE — PROGRESS NOTES
Outpatient Rehab    Occupational Therapy Visit    Patient Name: Jesus Posey  MRN: [id number]  YOB: 2005  Encounter Date: 7/1/2025    Therapy Diagnosis:   Encounter Diagnoses   Name Primary?    Spastic quadriparesis Yes    Traumatic brain injury, with unknown loss of consciousness status, initial encounter        Physician: Michael Nunez MD    Physician Orders: Eval and Treat  Medical Diagnosis: Traumatic brain injury, with unknown loss of consciousness status, subsequent encounter  Spastic quadriparesis  Surgical Diagnosis: Not applicable for this Episode   Surgical Date: Not applicable for this Episode  Days Since Last Surgery: Not applicable for this Episode    Visit # / Visits Authorized: 2 / 4  Insurance Authorization Period: 6/24/2025 to 7/18/2025  Date of Evaluation: 3/26/2025  Plan of Care Certification: 5/27/2025 to 7/31/2025      Time In: 0802   Time Out: 0847  Total Time (in minutes): 45   Total Billable Time (in minutes): 45    Precautions: Standard, Fall, G-Tube; Baclofen Pump        Subjective   Pt's father present throughout treatment session. Brought antispasticity splint today. He reported that he has been holding his left thumb out of this palm. They do some stretches at home..  Family / care giver present for this visit:       No pain indicated    Objective            Treatment:     Therapeutic activities to improve functional performance for 45  minutes, including:  Pt's father transferred Jesus from stretcher to supine on mat  - Skin Inspection to left thumb/hand; reviewed strategies to prevent skin breakdown and maintain skin integrity    - Pt's father educated on the following PROM/stretching routine with return demonstration and completion of the following to BUE within tolerable/available range:   LUE wrist stretch towards neutral from resting extension  RUE wrist stretch towards neutral from resting flexion  Elbow extension  Shoulder flexion  Shoulder abduction  ER with  STM to pec  STM to pec/lat insertion   - Reviewed pressure relief schedule with pt's father   - Reviewed body mechanics for pt's father during stretching and transfers   - Pt's father informed of recommended Dynasplints discussed last session   - Pt's father educated on wear schedule of anti spasticity splints and hand cushion that could be used during off times    After OT session, donned, fit, and made adjustments to anti spasticity splints for BUE. Removed metal portion of splint on RUE due to significant resting wrist flexion to prevent injury.     Time Entry(in minutes):  Therapeutic Activity Time Entry: 45    Assessment & Plan   Assessment: Pt tolerated today's session well. Father present throughout. Thorough education provided for previously administered PROM/stretching routine, as pt's father will primarily be completing stretches. He was very receptive and provided return demonstration for all. Pt's skin on left hand/thumb looks much better than last week. Skin is now dry and is in the process of fully healing. Pt's father understanding of positioning needed to prevent skin breakdown in the future. Email sent to Dynasplint rep to check on status of recommended elbow and wrist splints. Pt would continue to benefit from skilled occupational therapy services to address the below goals and for ongoing pt/family education and HEP guidance.        The patient will continue to benefit from skilled outpatient occupational therapy in order to address the deficits listed in the problem list on the initial evaluation, provide patient and family education, and maximize the patients level of independence in the home and community environments.     The patient's spiritual, cultural, and educational needs were considered, and the patient is agreeable to the plan of care and goals.     Education  Education was done with Patient and Other recipient present.   Father participated in education. They identified as Parent.  The reported learning style is Demonstration, Listening, and Pictures/video. The recipient Demonstrates understanding and Verbalizes understanding.     Education on PROM/stretching routine reviewed with pt's father; educated pt's father on proper body mechanics; hand cushion resource provided        Plan: Cont OT POC 1x/wk x 4 wks; POC Expiration: 7/31/2025; Focus of OT for BUE stretching routine, positioning, and to follow up with dynasplint for splinting    Goals:   Active       STG=LTG       Pt's grandmother/other family members to demonstrate understanding of BUE PROM stretching routine to prevent further joint contractures and tone management  (Progressing)       Start:  05/27/25    Expected End:  07/31/25            Pt's grandmother/other family members to demonstrate understanding of optimal BUE positioning supine in bed for tone management and to reduce risk of further contractures  (Progressing)       Start:  05/27/25    Expected End:  07/31/25            OT to initiate process of UE splinting for tone management and decrease further joint contractures  (Met)       Start:  05/27/25    Expected End:  07/31/25    Resolved:  06/24/25               Padmini Mcgregor OT

## 2025-07-01 ENCOUNTER — CLINICAL SUPPORT (OUTPATIENT)
Dept: REHABILITATION | Facility: HOSPITAL | Age: 20
End: 2025-07-01
Payer: MEDICAID

## 2025-07-01 DIAGNOSIS — Z74.09 IMPAIRED FUNCTIONAL MOBILITY AND ENDURANCE: Primary | ICD-10-CM

## 2025-07-01 DIAGNOSIS — S06.9XAA TRAUMATIC BRAIN INJURY, WITH UNKNOWN LOSS OF CONSCIOUSNESS STATUS, INITIAL ENCOUNTER: ICD-10-CM

## 2025-07-01 DIAGNOSIS — G82.50 SPASTIC QUADRIPARESIS: Primary | Chronic | ICD-10-CM

## 2025-07-01 PROCEDURE — 97530 THERAPEUTIC ACTIVITIES: CPT | Mod: PO

## 2025-07-01 PROCEDURE — 97110 THERAPEUTIC EXERCISES: CPT | Mod: PO

## 2025-07-08 ENCOUNTER — TELEPHONE (OUTPATIENT)
Dept: PHYSICAL MEDICINE AND REHAB | Facility: CLINIC | Age: 20
End: 2025-07-08
Payer: MEDICAID

## 2025-07-08 DIAGNOSIS — G82.50 SPASTIC QUADRIPARESIS: Primary | ICD-10-CM

## 2025-07-09 NOTE — PROGRESS NOTES
Outpatient Rehab    Physical Therapy Discharge    Patient Name: Jesus Posey  MRN: [id number]  YOB: 2005  Encounter Date: 7/10/2025    Therapy Diagnosis:   Encounter Diagnosis   Name Primary?    Impaired functional mobility and endurance Yes     Physician: Michael Nunez MD    Physician Orders: Eval and Treat  Medical Diagnosis: Spastic quadriparesis  Traumatic brain injury, with loss of consciousness greater than 24 hours without return to pre-existing conscious level with patient surviving, sequela  Surgical Diagnosis: Not applicable for this Episode   Surgical Date: Not applicable for this Episode  Days Since Last Surgery: Not applicable for this Episode    Visit # / Visits Authorized:    Insurance Authorization Period: 6/24/2025 to 8/15/2025  Date of Evaluation: 5/27/2025  Plan of Care Certification: 5/29/2025 to 7/24/2025      PT/PTA: PT   Number of PTA visits since last PT visit:0  Time In: 0930   Time Out: 1008  Total Time (in minutes): 38   Total Billable Time (in minutes):  38 minutes    FOTO:  Intake Score:  %  Survey Score 2:  %  Survey Score 3:  %    Precautions:     Fall, non verbal, PEG tube, incontinent      Subjective   pt non verbal.    No pain indicated    Objective            Treatment:  Therapeutic Exercise  TE 1: 30 minutes spent on family education/ passive ROM of the following (with the patient's grandmother): supine hamstring stretch, quad stretch with bolster, ankle PF stretch. Going over proper body mechanics of both the family member and the patient to avoid injury.  TE 2: reviewing instructions to progressively increase angle of HOB to improve upright sitting tolerance  TE 3: patient postioned with one wedge and pillow under head for initial 30 minutes at about 45 degree angle  TE 4: education on how to properly don pressure relief boots with the patient's grandmother  TE 5: total assist for all functional mobility including positioning of pillows and wedge      Time  Entry(in minutes):  Therapeutic Exercise Time Entry: 38    Assessment & Plan   Assessment: Jesus tolerated today's session fairly well despite inability to actively participate. The patient's family reports understanding of passive stretching home exercise program. Time was spent educating the patient's grandmother on proper donning of pressure relief boots that the patient's grandmother brought from home. She verbalizes/ demonstrates understanding. The patient's grandmother is in agreement to discharge at the following session.   Evaluation/Treatment Tolerance: Patient tolerated treatment well    The patient's spiritual, cultural, and educational needs were considered, and the patient is agreeable to the plan of care and goals.           Plan: upright sitting tolerance, HEP for passive stretching, caregiver education, ROM    Goals:   Active       Short term goals = long term goals        Patient's family verbalize/ demonstrate understanding of passive stretching home exercise program  (Progressing)       Start:  05/28/25    Expected End:  06/25/25            The patient's family verbalize/ demonstrate understanding of pressure relief/ body positioning in bed/ WC (Progressing)       Start:  05/28/25    Expected End:  07/23/25            Patient tolerate 20 minutes long sitting at 60 degree incline (Progressing)       Start:  05/28/25    Expected End:  07/23/25                Laure Clement, PT

## 2025-07-10 ENCOUNTER — CLINICAL SUPPORT (OUTPATIENT)
Dept: REHABILITATION | Facility: HOSPITAL | Age: 20
End: 2025-07-10
Payer: MEDICAID

## 2025-07-10 VITALS — HEART RATE: 73 BPM | SYSTOLIC BLOOD PRESSURE: 108 MMHG | DIASTOLIC BLOOD PRESSURE: 76 MMHG

## 2025-07-10 DIAGNOSIS — S06.9XAA TRAUMATIC BRAIN INJURY, WITH UNKNOWN LOSS OF CONSCIOUSNESS STATUS, INITIAL ENCOUNTER: ICD-10-CM

## 2025-07-10 DIAGNOSIS — G82.50 SPASTIC QUADRIPARESIS: Primary | Chronic | ICD-10-CM

## 2025-07-10 DIAGNOSIS — Z74.09 IMPAIRED FUNCTIONAL MOBILITY AND ENDURANCE: Primary | ICD-10-CM

## 2025-07-10 PROCEDURE — 97530 THERAPEUTIC ACTIVITIES: CPT | Mod: PO

## 2025-07-10 PROCEDURE — 97110 THERAPEUTIC EXERCISES: CPT | Mod: PO

## 2025-07-10 NOTE — PROGRESS NOTES
Outpatient Rehab    Occupational Therapy Visit    Patient Name: Jesus Posey  MRN: [id number]  YOB: 2005  Encounter Date: 7/10/2025    Therapy Diagnosis:   Encounter Diagnoses   Name Primary?    Spastic quadriparesis Yes    Traumatic brain injury, with unknown loss of consciousness status, initial encounter        Physician: Michael Nunez MD    Physician Orders: Eval and Treat  Medical Diagnosis: Traumatic brain injury, with unknown loss of consciousness status, subsequent encounter  Spastic quadriparesis    Visit # / Visits Authorized: 3 / 4  Insurance Authorization Period: 6/24/2025 to 7/18/2025  Date of Evaluation: 3/26/2025  Plan of Care Certification: 5/27/2025 to 7/31/2025      Time In: 0845   Time Out: 0930  Total Time (in minutes): 45   Total Billable Time (in minutes): 45    Precautions: Standard, Fall, G-Tube; Baclofen Pump        Subjective   Pt is nonverbal. Grandmother reported she did not complete stretching/ROM this week but thinks the father completed..  Family / care giver present for this visit:  (grandmother)        unable to indicate    Objective   Vital Signs  /76   Pulse 73   BP Location: Left arm  BP Position: Supine  BP Cuff Size: Adult               Treatment:     Therapeutic activities to improve functional performance for 45  minutes, including:  Drawsheet transfer from EMS Stretcher<>mat: dependent   - Patient positioned at 30 degree incline on blue wedge, cervical spine in neutral midline; Moon-boots/heel floating boots removed - 1/2 bolster placed under knees   - Skin Inspection to bilateral thumb/hand; reviewed strategies to prevent skin breakdown and maintain skin integrity      -PROM with gentle end range stretch completed for Bilateral upper extremities in all functional planes     At cessation of session - donned pt's bilateral anti spasticity splints to encourage open grasp     Handoff to PT     Time Entry(in minutes):  Therapeutic Activity Time Entry:  45    Assessment & Plan   Assessment: Jesus presented with intact skin to left hand with no noted macerations of breakdowns. He tolerated manual stretching well today and does not demo adverse or resistive nature. Caregiver not present within (visiting with Jesus's childhood family friend) session for education on this date. Still awaiting dynasplint delivery at this time pending authorization.       Reached out to [employer] rep today for update on delivery of custom splints - pending at this time.        The patient will continue to benefit from skilled outpatient occupational therapy in order to address the deficits listed in the problem list on the initial evaluation, provide patient and family education, and maximize the patients level of independence in the home and community environments.     The patient's spiritual, cultural, and educational needs were considered, and the patient is agreeable to the plan of care and goals.             Plan: Cont OT POC 1x/wk x 4 wks; POC Expiration: 7/31/2025; Focus of OT is on family education for BUE stretching routine, positioning. Plan for discharge at next session.    Goals:   Active       STG=LTG       Pt's grandmother/other family members to demonstrate understanding of BUE PROM stretching routine to prevent further joint contractures and tone management  (Progressing)       Start:  05/27/25    Expected End:  07/31/25            Pt's grandmother/other family members to demonstrate understanding of optimal BUE positioning supine in bed for tone management and to reduce risk of further contractures  (Progressing)       Start:  05/27/25    Expected End:  07/31/25            OT to initiate process of UE splinting for tone management and decrease further joint contractures  (Met)       Start:  05/27/25    Expected End:  07/31/25    Resolved:  06/24/25               CLARICE Castaneda

## 2025-07-14 NOTE — PROGRESS NOTES
Outpatient Rehab    Physical Therapy Discharge    Patient Name: Jesus Posey  MRN: [id number]  YOB: 2005  Encounter Date: 7/15/2025    Therapy Diagnosis:   Encounter Diagnosis   Name Primary?    Impaired functional mobility and endurance Yes     Physician: Michael Nunez MD    Physician Orders: Eval and Treat  Medical Diagnosis: Spastic quadriparesis  Traumatic brain injury, with loss of consciousness greater than 24 hours without return to pre-existing conscious level with patient surviving, sequela  Surgical Diagnosis: Not applicable for this Episode   Surgical Date: Not applicable for this Episode  Days Since Last Surgery: Not applicable for this Episode    Visit # / Visits Authorized:  1 / 4  Insurance Authorization Period: 6/24/2025 to 8/15/2025  Date of Evaluation: 5/27/2025  Plan of Care Certification: 5/29/2025 to 7/24/2025      PT/PTA: PT   Number of PTA visits since last PT visit:0  Time In: 0845   Time Out: 0910  Total Time (in minutes): 25   Total Billable Time (in minutes):      FOTO:  Intake Score: Not applicable for this Episode%  Survey Score 2: Not applicable for this Episode%  Survey Score 3: Not applicable for this Episode%    Precautions:  Additional Precautions and Protocol Details: Fall, non verbal, PEG tube, incontinent    Subjective   pt non verbal.  Family / care giver present for this visit:   Pain reported as 0/10.      Objective             RANGE OF MOTION--LOWER EXTREMITIES  (R) LE               Knee: about 26 degrees flexion              Ankle: ankle PF contracture      (L) LE: Hip: to be more thoroughly assessed at first follow up Knee: about 41 degrees flexion               Ankle: ankle PF contracture                Evaluation 7/15/2025   Supine to long sitting   total assist of two. Patient moans in discomfort, unable to come to full upright sitting  prior to discomfort causing therapists to return the patient to supine  Total assist of two    Stretcher to/from mat  transfer Via EMS total assist draw sheet   Via EMS total assist draw sheet    Sitting EOM  Not assessed due to significant mobility limitations/ BLE extension tone   Unable to perform    Bed mobility: roll left  Total assist of two  Total assist of two    Bed mobility: roll right   Total assist of two  Total assist of two         Treatment:  Therapeutic Exercise  TE 1: 15 minutes spent on family education/ passive ROM of the following (with the patient's grandmother): supine hamstring stretch, quad stretch with bolster, ankle PF stretch. Going over proper body mechanics of both the family member and the patient to avoid injury.  TE 2: time to assess functional mobility and position changes      Time Entry(in minutes):  Therapeutic Exercise Time Entry: 25        PHYSICAL THERAPY DISCHARGE SUMMARY   Total Visits:4  Missed Visits:0  Cancelled Visits: 0    Status Towards Goals Met: Patient met 2/2 goals at this time     Goals Not achieved and why:   Patient met the majority of his goals at this time. He was unable to meet goal of increasing the HOB elevation to 60 degrees but his grandma reports they are progressively increasing HOB elevation at home daily. Due to chronicity of condition, significant tone/ spasticity and inability to follow commands/ activitly participate in therapy, the patient has maximized the benefits of therapy at this time and is appropriate for discharge. The patient's family was provided extensive education on passive stretching home exercise program and them importance of performing this on a daily basis at home. His grandmother verbalizes/ demonstrates understanding at this time.         Discharge reason : Patient has maximized benefit from PT at this time    PLAN   This patient is discharged from Outpatient Physical Therapy Services.     Laure Clement, PT  07/15/2025

## 2025-07-15 ENCOUNTER — CLINICAL SUPPORT (OUTPATIENT)
Dept: REHABILITATION | Facility: HOSPITAL | Age: 20
End: 2025-07-15
Payer: MEDICAID

## 2025-07-15 DIAGNOSIS — G82.50 SPASTIC QUADRIPARESIS: Primary | Chronic | ICD-10-CM

## 2025-07-15 DIAGNOSIS — Z74.09 IMPAIRED FUNCTIONAL MOBILITY AND ENDURANCE: Primary | ICD-10-CM

## 2025-07-15 DIAGNOSIS — S06.9XAA TRAUMATIC BRAIN INJURY, WITH UNKNOWN LOSS OF CONSCIOUSNESS STATUS, INITIAL ENCOUNTER: ICD-10-CM

## 2025-07-15 PROCEDURE — 97530 THERAPEUTIC ACTIVITIES: CPT | Mod: PO

## 2025-07-15 PROCEDURE — 97110 THERAPEUTIC EXERCISES: CPT | Mod: PO

## 2025-07-15 NOTE — PATIENT INSTRUCTIONS
Positioning: Lying on Affected Side        Elevate affected arm: Shoulder is positioned forward. Elbow out and palm turned upward.  Position affected leg: Hip and knee slightly bent. Place other leg on pillow to support weight.  May place pillow behind back    Copyright © I. All rights reserved.     Positioning: Lying on Unaffected Side        Elevate affected arm: Shoulder is positioned forward. Elbow straightened as able. Hand with palm down.  Position affected leg: Hip and knee slightly bent. Toes pointing forward.  May place another pillow behind back to support trunk position.    Copyright © I. All rights reserved.       Positioning: Lying on Back        Elevate affected arm: Shoulder is positioned forward, arm out to side. May use folded towel under shoulder blade. Palm is turned upward as able.  Position affected leg: Hip and knee slightly bent. Toes pointing up as able. Avoid tight tucking sheets. May use footboard or cradle.    Copyright © I. All rights reserved.

## 2025-07-15 NOTE — PROGRESS NOTES
Outpatient Rehab    Occupational Therapy Discharge    Patient Name: Jesus Posey  MRN: [id number]  YOB: 2005  Encounter Date: 7/15/2025    Therapy Diagnosis:   Encounter Diagnoses   Name Primary?    Spastic quadriparesis Yes    Traumatic brain injury, with unknown loss of consciousness status, initial encounter      Physician: Michael Nunez MD    Physician Orders: Eval and Treat  Medical Diagnosis: Traumatic brain injury, with unknown loss of consciousness status, subsequent encounter  Spastic quadriparesis  Surgical Diagnosis: Not applicable for this Episode   Surgical Date: Not applicable for this Episode  Days Since Last Surgery: Not applicable for this Episode    Visit # / Visits Authorized: 4 / 4  Insurance Authorization Period: 6/24/2025 to 7/18/2025  Date of Evaluation: 3/26/2025  Plan of Care Certification: 5/27/2025 to 7/31/2025      Time In: 0759   Time Out: 0850  Total Time (in minutes): 51   Total Billable Time (in minutes): 51    FOTO: not appropriate for completion - pt unable to participate in FOTO survey     Precautions: Standard, Fall, G-Tube; Baclofen Pump        Subjective   Pt's grandmother present throughout treatment session. Reported that they've been completing stretching routine at home..  Family / care giver present for this visit:  (grandmother)    unable to indicate    Objective          Skin Inspection to L hand:   - Normal; no skin breakdown or maceration     Taken in supine:    LUE PROM:   Shoulder:              Flexion: 130*              Abduction: full when completed in slight scaption              ER: ~5-10* past neutral   Elbow: Able to achieve full elbow extension   Wrist: Resting posture in slight extension              Flexion: 46*              Extension: WNL (70*)   Fist: Tight; contractures in MP and PIP joints; able to passively open hand enough for hygiene      RUE PROM:   Shoulder:              Flexion: ~*              Abduction: ~120* when  completed in slight scaption               ER: Just > than neutral   Elbow: Able to achieve ~90*   Wrist: Resting posture in significant flexion               Flexion: 90* (resting)               Extension: -30*  Fist: Tight; contractures in MP and PIP joints (no PIP joint contracture in SF); able to passively open hand enough for hygiene; easier to radially abduct thumb on RUE      Strength, , pinch, and coordination not assessed due to pt's spasticity, lack of active movement, and difficulty following commands      Tone:  Modified Faiza Scale:     LUE:  3 Considerable increase in muscle tone, passive movement difficult    RUE:   4 Limb rigid     Bed mobility:   Rolling right: total assist  Rolling left: total assist  Long Sitting: total assist for trunk and head/neck; discomfort reported  Long Sitting on 20* wedge: total assist to achieve position; more comfortable when supported on wedge     Treatment:     Therapeutic activities to improve functional performance for 51 minutes, including:  Jesus transferred by EMS from stretcher to supine on mat  The following were completed with pt in supine:  - Skin inspection to left thumb/hand  - PROM/stretching of BUE within tolerable/available range:  LUE wrist stretch towards neutral from resting extension  RUE wrist stretch towards neutral from resting flexion  Elbow extension (RUE transverse massage over biceps tendon)  Shoulder flexion  Shoulder abduction in scaption plane  ER with STM to pec  STM to pec/lat insertion   - Reassessment completed. See objective measures above.  - Reiterated importance of stretching routine and BUE positioning in supine.  - Discussion with pt's grandmother about status of Dynasplint.      Time Entry (in minutes):  Therapeutic Activity Time Entry: 51    Assessment & Plan   Assessment: Pt tolerated today's session well. Grandmother present throughout session and verbalized understanding on status of Dynasplint. [facility] Patrick  ([employer] representative) is awaiting authorization from insurance for recommended splints. Informed pt's family that he will be in contact with them once authorization is received to schedule an in-home fitting. OT provided family with contact information to email/call clinic in the event they don't hear back from [employer] within a week-week and a half. Time spent at beginning of session on PROM/stretching due to continued tight musculature. Reassessment completed this date for discharge, and PROM improvements have been made since initial eval. LUE shoulder flexion, abduction, ER and wrist flexion and extension have all increased. RUE shoulder flexion and abduction and wrist extension have increased as well. These results are indicative of pt's family being compliant with stretching routine taught to them. Pt's left hand continues to be dry and in the healing process. Pt's family met established goals for BUE stretching routine and positioning. Pt and pt's family have maximized benefits from occupational therapy, and pt is therefore discharged.  Evaluation/Treatment Tolerance: Patient tolerated treatment well    The patient's spiritual, cultural, and educational needs were considered, and the patient is agreeable to the plan of care and goals.           Plan: Pt is discharged from occupational therapy.    Goals:   Resolved       STG=LTG       Pt's grandmother/other family members to demonstrate understanding of BUE PROM stretching routine to prevent further joint contractures and tone management  (Met)       Start:  05/27/25    Expected End:  07/31/25    Resolved:  07/15/25         Pt's grandmother/other family members to demonstrate understanding of optimal BUE positioning supine in bed for tone management and to reduce risk of further contractures  (Met)       Start:  05/27/25    Expected End:  07/31/25    Resolved:  07/15/25         OT to initiate process of UE splinting for tone management and decrease further  joint contractures  (Met)       Start:  05/27/25    Expected End:  07/31/25    Resolved:  06/24/25             ANGELES Eaton    I certify that I was present in the room directing the student in service delivery and guiding them using my skilled judgment. As the co-signing therapist I have reviewed the students documentation and am responsible for the treatment, assessment, and plan.     Padmini Mcgregor, ELIZA, LOTR  07/15/2025

## 2025-08-20 ENCOUNTER — OFFICE VISIT (OUTPATIENT)
Dept: PHYSICAL MEDICINE AND REHAB | Facility: CLINIC | Age: 20
End: 2025-08-20
Payer: MEDICAID

## 2025-08-20 ENCOUNTER — PROCEDURE VISIT (OUTPATIENT)
Dept: PHYSICAL MEDICINE AND REHAB | Facility: CLINIC | Age: 20
End: 2025-08-20
Payer: MEDICAID

## 2025-08-20 VITALS — HEART RATE: 77 BPM | DIASTOLIC BLOOD PRESSURE: 75 MMHG | SYSTOLIC BLOOD PRESSURE: 105 MMHG

## 2025-08-20 DIAGNOSIS — S06.9XAD TRAUMATIC BRAIN INJURY, WITH UNKNOWN LOSS OF CONSCIOUSNESS STATUS, SUBSEQUENT ENCOUNTER: ICD-10-CM

## 2025-08-20 DIAGNOSIS — S06.9XAD TRAUMATIC BRAIN INJURY, WITH UNKNOWN LOSS OF CONSCIOUSNESS STATUS, SUBSEQUENT ENCOUNTER: Primary | ICD-10-CM

## 2025-08-20 DIAGNOSIS — G82.50 SPASTIC QUADRIPARESIS: ICD-10-CM

## 2025-08-20 DIAGNOSIS — G82.50 SPASTIC QUADRIPARESIS: Primary | ICD-10-CM

## 2025-08-20 PROCEDURE — 64642 CHEMODENERV 1 EXTREMITY 1-4: CPT | Mod: PBBFAC | Performed by: PHYSICAL MEDICINE & REHABILITATION

## 2025-08-20 PROCEDURE — 62368 ANALYZE SP INF PUMP W/REPROG: CPT | Mod: PBBFAC | Performed by: PHYSICAL MEDICINE & REHABILITATION

## 2025-08-20 PROCEDURE — 64643 CHEMODENERV 1 EXTREM 1-4 EA: CPT | Mod: PBBFAC | Performed by: PHYSICAL MEDICINE & REHABILITATION

## (undated) DEVICE — NDL HYPO REG 25G X 1 1/2

## (undated) DEVICE — SUT SILK 2-0 SH 18IN BLACK

## (undated) DEVICE — TRAY NEURO OMC

## (undated) DEVICE — MARKER SKIN RULER STERILE

## (undated) DEVICE — SUT VICRYL PLUS 2-0

## (undated) DEVICE — CORD BIPOLAR 12 FOOT

## (undated) DEVICE — TUBE FRAZIER 5MM 2FT SOFT TIP

## (undated) DEVICE — SUT SILK 0 SH 30IN BLK BR

## (undated) DEVICE — ELECTRODE REM PLYHSV RETURN 9

## (undated) DEVICE — TRAY LUMBAR PUNCTURE ADULT

## (undated) DEVICE — DRESSING TRANS 4X4 TEGADERM

## (undated) DEVICE — SUT VICRYL PLUS 3-0 SH 18IN

## (undated) DEVICE — DURAPREP SURG SCRUB 26ML

## (undated) DEVICE — DRAPE TOP 53X102IN

## (undated) DEVICE — DRAPE STERI INSTRUMENT 1018

## (undated) DEVICE — STAPLER SKIN PROXIMATE WIDE

## (undated) DEVICE — BINDER ABDOMINAL 9 30-45

## (undated) DEVICE — TRAY SKIN SCRUB WET PREMIUM

## (undated) DEVICE — DRAPE T TRNSVRS LAP 102X78X121

## (undated) DEVICE — SYR ONLY LUER LOCK 20CC

## (undated) DEVICE — SUT VICRYL+ 2-0 SH 18IN

## (undated) DEVICE — DRAPE THREE-QTR REINF 53X77IN

## (undated) DEVICE — NDL SPINAL 18GX3.5 SPINOCAN

## (undated) DEVICE — CATH PASSER FOR MORPHINE PUMP

## (undated) DEVICE — TOWEL OR DISP STRL BLUE 4/PK

## (undated) DEVICE — SPONGE COTTON TRAY 4X4IN

## (undated) DEVICE — SYR 3CC LUER LOC

## (undated) DEVICE — KIT SURGIFLO HEMOSTATIC MATRIX

## (undated) DEVICE — TAPE MEDIPORE 4IN X 2YDS

## (undated) DEVICE — DRAPE STERI-DRAPE 1000 17X11IN

## (undated) DEVICE — CLOSURE SKIN STERI STRIP 1/2X4

## (undated) DEVICE — DRAPE C-ARM ELAS CLIP 42X120IN

## (undated) DEVICE — DRAPE INCISE IOBAN 2 23X17IN

## (undated) DEVICE — ELECTRODE BLADE INSULATED 1 IN